# Patient Record
Sex: MALE | Race: WHITE | NOT HISPANIC OR LATINO | Employment: OTHER | URBAN - METROPOLITAN AREA
[De-identification: names, ages, dates, MRNs, and addresses within clinical notes are randomized per-mention and may not be internally consistent; named-entity substitution may affect disease eponyms.]

---

## 2019-02-10 ENCOUNTER — APPOINTMENT (EMERGENCY)
Dept: RADIOLOGY | Facility: HOSPITAL | Age: 54
End: 2019-02-10
Payer: COMMERCIAL

## 2019-02-10 ENCOUNTER — HOSPITAL ENCOUNTER (INPATIENT)
Facility: HOSPITAL | Age: 54
LOS: 4 days | Discharge: HOME | DRG: 045 | End: 2019-02-14
Attending: ANESTHESIOLOGY | Admitting: ANESTHESIOLOGY
Payer: COMMERCIAL

## 2019-02-10 ENCOUNTER — HOSPITAL ENCOUNTER (EMERGENCY)
Facility: HOSPITAL | Age: 54
End: 2019-02-10
Attending: EMERGENCY MEDICINE | Admitting: EMERGENCY MEDICINE
Payer: COMMERCIAL

## 2019-02-10 VITALS
RESPIRATION RATE: 18 BRPM | WEIGHT: 228.62 LBS | SYSTOLIC BLOOD PRESSURE: 163 MMHG | HEIGHT: 72 IN | TEMPERATURE: 97.3 F | HEART RATE: 61 BPM | OXYGEN SATURATION: 96 % | DIASTOLIC BLOOD PRESSURE: 97 MMHG | BODY MASS INDEX: 30.97 KG/M2

## 2019-02-10 DIAGNOSIS — I63.512 CEREBROVASCULAR ACCIDENT (CVA) DUE TO OCCLUSION OF LEFT MIDDLE CEREBRAL ARTERY (HCC): ICD-10-CM

## 2019-02-10 DIAGNOSIS — F10.10 ALCOHOL ABUSE: ICD-10-CM

## 2019-02-10 DIAGNOSIS — I63.9 CVA (CEREBRAL VASCULAR ACCIDENT) (HCC): ICD-10-CM

## 2019-02-10 DIAGNOSIS — G45.9 TIA (TRANSIENT ISCHEMIC ATTACK): Primary | ICD-10-CM

## 2019-02-10 DIAGNOSIS — I63.9 CVA (CEREBRAL VASCULAR ACCIDENT) (HCC): Primary | ICD-10-CM

## 2019-02-10 PROBLEM — I10 HYPERTENSION: Status: ACTIVE | Noted: 2019-02-10

## 2019-02-10 LAB
ANION GAP SERPL CALCULATED.3IONS-SCNC: 8 MMOL/L (ref 4–13)
APTT PPP: 26 SECONDS (ref 26–38)
BUN SERPL-MCNC: 17 MG/DL (ref 5–25)
CALCIUM SERPL-MCNC: 9.1 MG/DL (ref 8.3–10.1)
CHLORIDE SERPL-SCNC: 104 MMOL/L (ref 100–108)
CO2 SERPL-SCNC: 27 MMOL/L (ref 21–32)
CREAT SERPL-MCNC: 1.18 MG/DL (ref 0.6–1.3)
ERYTHROCYTE [DISTWIDTH] IN BLOOD BY AUTOMATED COUNT: 11.9 % (ref 11.6–15.1)
GFR SERPL CREATININE-BSD FRML MDRD: 70 ML/MIN/1.73SQ M
GLUCOSE SERPL-MCNC: 123 MG/DL (ref 65–140)
HCT VFR BLD AUTO: 47.6 % (ref 36.5–49.3)
HGB BLD-MCNC: 15.5 G/DL (ref 12–17)
INR PPP: 0.96 (ref 0.86–1.16)
MCH RBC QN AUTO: 28.6 PG (ref 26.8–34.3)
MCHC RBC AUTO-ENTMCNC: 32.6 G/DL (ref 31.4–37.4)
MCV RBC AUTO: 88 FL (ref 82–98)
PLATELET # BLD AUTO: 223 THOUSANDS/UL (ref 149–390)
PMV BLD AUTO: 8.8 FL (ref 8.9–12.7)
POTASSIUM SERPL-SCNC: 4.6 MMOL/L (ref 3.5–5.3)
PROTHROMBIN TIME: 10.1 SECONDS (ref 9.4–11.7)
RBC # BLD AUTO: 5.42 MILLION/UL (ref 3.88–5.62)
SODIUM SERPL-SCNC: 139 MMOL/L (ref 136–145)
WBC # BLD AUTO: 5.25 THOUSAND/UL (ref 4.31–10.16)

## 2019-02-10 PROCEDURE — 80048 BASIC METABOLIC PNL TOTAL CA: CPT | Performed by: EMERGENCY MEDICINE

## 2019-02-10 PROCEDURE — 70498 CT ANGIOGRAPHY NECK: CPT

## 2019-02-10 PROCEDURE — 93005 ELECTROCARDIOGRAM TRACING: CPT

## 2019-02-10 PROCEDURE — 99285 EMERGENCY DEPT VISIT HI MDM: CPT

## 2019-02-10 PROCEDURE — 36415 COLL VENOUS BLD VENIPUNCTURE: CPT | Performed by: EMERGENCY MEDICINE

## 2019-02-10 PROCEDURE — 92610 EVALUATE SWALLOWING FUNCTION: CPT

## 2019-02-10 PROCEDURE — G8996 SWALLOW CURRENT STATUS: HCPCS

## 2019-02-10 PROCEDURE — G8997 SWALLOW GOAL STATUS: HCPCS

## 2019-02-10 PROCEDURE — 70450 CT HEAD/BRAIN W/O DYE: CPT

## 2019-02-10 PROCEDURE — 96374 THER/PROPH/DIAG INJ IV PUSH: CPT

## 2019-02-10 PROCEDURE — 85730 THROMBOPLASTIN TIME PARTIAL: CPT | Performed by: EMERGENCY MEDICINE

## 2019-02-10 PROCEDURE — 99291 CRITICAL CARE FIRST HOUR: CPT | Performed by: ANESTHESIOLOGY

## 2019-02-10 PROCEDURE — 85027 COMPLETE CBC AUTOMATED: CPT | Performed by: EMERGENCY MEDICINE

## 2019-02-10 PROCEDURE — 82948 REAGENT STRIP/BLOOD GLUCOSE: CPT

## 2019-02-10 PROCEDURE — 70496 CT ANGIOGRAPHY HEAD: CPT

## 2019-02-10 PROCEDURE — G8998 SWALLOW D/C STATUS: HCPCS

## 2019-02-10 PROCEDURE — 99255 IP/OBS CONSLTJ NEW/EST HI 80: CPT | Performed by: PSYCHIATRY & NEUROLOGY

## 2019-02-10 PROCEDURE — 85610 PROTHROMBIN TIME: CPT | Performed by: EMERGENCY MEDICINE

## 2019-02-10 RX ORDER — FOLIC ACID 1 MG/1
1 TABLET ORAL DAILY
Status: DISCONTINUED | OUTPATIENT
Start: 2019-02-11 | End: 2019-02-14 | Stop reason: HOSPADM

## 2019-02-10 RX ORDER — SODIUM CHLORIDE 9 MG/ML
100 INJECTION, SOLUTION INTRAVENOUS CONTINUOUS
Status: DISCONTINUED | OUTPATIENT
Start: 2019-02-10 | End: 2019-02-12

## 2019-02-10 RX ORDER — LABETALOL 20 MG/4 ML (5 MG/ML) INTRAVENOUS SYRINGE
10 ONCE
Status: COMPLETED | OUTPATIENT
Start: 2019-02-10 | End: 2019-02-10

## 2019-02-10 RX ORDER — CLOPIDOGREL BISULFATE 75 MG/1
75 TABLET ORAL DAILY
Status: DISCONTINUED | OUTPATIENT
Start: 2019-02-11 | End: 2019-02-14 | Stop reason: HOSPADM

## 2019-02-10 RX ORDER — CLOPIDOGREL BISULFATE 75 MG/1
300 TABLET ORAL ONCE
Status: COMPLETED | OUTPATIENT
Start: 2019-02-10 | End: 2019-02-10

## 2019-02-10 RX ORDER — ATORVASTATIN CALCIUM 40 MG/1
40 TABLET, FILM COATED ORAL
Status: DISCONTINUED | OUTPATIENT
Start: 2019-02-10 | End: 2019-02-12

## 2019-02-10 RX ORDER — ASPIRIN 325 MG
325 TABLET ORAL DAILY
Status: DISCONTINUED | OUTPATIENT
Start: 2019-02-11 | End: 2019-02-12

## 2019-02-10 RX ORDER — THIAMINE MONONITRATE (VIT B1) 100 MG
100 TABLET ORAL DAILY
Status: DISCONTINUED | OUTPATIENT
Start: 2019-02-11 | End: 2019-02-14 | Stop reason: HOSPADM

## 2019-02-10 RX ORDER — CLOPIDOGREL BISULFATE 75 MG/1
300 TABLET ORAL DAILY
Status: DISCONTINUED | OUTPATIENT
Start: 2019-02-10 | End: 2019-02-10

## 2019-02-10 RX ORDER — ASPIRIN 325 MG
325 TABLET ORAL ONCE
Status: COMPLETED | OUTPATIENT
Start: 2019-02-10 | End: 2019-02-10

## 2019-02-10 RX ADMIN — LABETALOL 20 MG/4 ML (5 MG/ML) INTRAVENOUS SYRINGE 10 MG: at 10:44

## 2019-02-10 RX ADMIN — IOHEXOL 85 ML: 350 INJECTION, SOLUTION INTRAVENOUS at 10:00

## 2019-02-10 RX ADMIN — SODIUM CHLORIDE 100 ML/HR: 0.9 INJECTION, SOLUTION INTRAVENOUS at 15:48

## 2019-02-10 RX ADMIN — ASPIRIN 325 MG: 325 TABLET ORAL at 12:04

## 2019-02-10 RX ADMIN — PHENYLEPHRINE HYDROCHLORIDE 25 MCG/MIN: 10 INJECTION INTRAVENOUS at 18:25

## 2019-02-10 RX ADMIN — ATORVASTATIN CALCIUM 40 MG: 40 TABLET, FILM COATED ORAL at 17:50

## 2019-02-10 RX ADMIN — SODIUM CHLORIDE 1000 ML: 0.9 INJECTION, SOLUTION INTRAVENOUS at 12:02

## 2019-02-10 RX ADMIN — CLOPIDOGREL BISULFATE 300 MG: 75 TABLET ORAL at 15:45

## 2019-02-10 NOTE — PLAN OF CARE
Problem: Prexisting or High Potential for Compromised Skin Integrity  Goal: Skin integrity is maintained or improved  Description  INTERVENTIONS:  - Identify patients at risk for skin breakdown  - Assess and monitor skin integrity  - Assess and monitor nutrition and hydration status  - Monitor labs (i e  albumin)  - Assess for incontinence   - Turn and reposition patient  - Assist with mobility/ambulation  - Relieve pressure over bony prominences  - Avoid friction and shearing  - Provide appropriate hygiene as needed including keeping skin clean and dry  - Evaluate need for skin moisturizer/barrier cream  - Collaborate with interdisciplinary team (i e  Nutrition, Rehabilitation, etc )   - Patient/family teaching  Outcome: Progressing     Problem: Potential for Falls  Goal: Patient will remain free of falls  Description  INTERVENTIONS:  - Assess patient frequently for physical needs  -  Identify cognitive and physical deficits and behaviors that affect risk of falls    -  [place] fall precautions as indicated by assessment   - Educate patient/family on patient safety including physical limitations  - Instruct patient to call for assistance with activity based on assessment  - Modify environment to reduce risk of injury  - Consider OT/PT consult to assist with strengthening/mobility  Outcome: Progressing     Problem: PAIN - ADULT  Goal: Verbalizes/displays adequate comfort level or baseline comfort level  Description  Interventions:  - Encourage patient to monitor pain and request assistance  - Assess pain using appropriate pain scale  - Administer analgesics based on type and severity of pain and evaluate response  - Implement non-pharmacological measures as appropriate and evaluate response  - Consider cultural and social influences on pain and pain management  - Notify physician/advanced practitioner if interventions unsuccessful or patient reports new pain  Outcome: Progressing     Problem: INFECTION - ADULT  Goal: Absence or prevention of progression during hospitalization  Description  INTERVENTIONS:  - Assess and monitor for signs and symptoms of infection  - Monitor lab/diagnostic results  - Monitor all insertion sites, i e  indwelling lines, tubes, and drains  - Monitor endotracheal (as able) and nasal secretions for changes in amount and color  - [place] appropriate cooling/warming therapies per order  - Administer medications as ordered  - Instruct and encourage patient and family to use good hand hygiene technique  - Identify and instruct in appropriate isolation precautions for identified infection/condition  Outcome: Progressing  Goal: Absence of fever/infection during neutropenic period  Description  INTERVENTIONS:  - Monitor WBC  - Implement neutropenic guidelines  Outcome: Progressing     Problem: SAFETY ADULT  Goal: Maintain or return to baseline ADL function  Description  INTERVENTIONS:  -  Assess patient's ability to carry out ADLs; assess patient's baseline for ADL function and identify physical deficits which impact ability to perform ADLs (bathing, care of mouth/teeth, toileting, grooming, dressing, etc )  - Assess/evaluate cause of self-care deficits   - Assess range of motion  - Assess patient's mobility; develop plan if impaired  - Assess patient's need for assistive devices and provide as appropriate  - Encourage maximum independence but intervene and supervise when necessary  ¯ Involve family in performance of ADLs  ¯ Assess for home care needs following discharge   ¯ Request OT consult to assist with ADL evaluation and planning for discharge  ¯ Provide patient education as appropriate  Outcome: Progressing  Goal: Maintain or return mobility status to optimal level  Description  INTERVENTIONS:  - Assess patient's baseline mobility status (ambulation, transfers, stairs, etc )    - Identify cognitive and physical deficits and behaviors that affect mobility  - Identify mobility aids required to assist with transfers and/or ambulation (gait belt, sit-to-stand, lift, walker, cane, etc )  - [place] fall precautions as indicated by assessment  - Record patient progress and toleration of activity level on Mobility SBAR; progress patient to next Phase/Stage  - Instruct patient to call for assistance with activity based on assessment  - Request Rehabilitation consult to assist with strengthening/weightbearing, etc   Outcome: Progressing     Problem: DISCHARGE PLANNING  Goal: Discharge to home or other facility with appropriate resources  Description  INTERVENTIONS:  - Identify barriers to discharge w/patient and caregiver  - Arrange for needed discharge resources and transportation as appropriate  - Identify discharge learning needs (meds, wound care, etc )  - Arrange for interpretive services to assist at discharge as needed  - Refer to Case Management Department for coordinating discharge planning if the patient needs post-hospital services based on physician/advanced practitioner order or complex needs related to functional status, cognitive ability, or social support system  Outcome: Progressing     Problem: Knowledge Deficit  Goal: Patient/family/caregiver demonstrates understanding of disease process, treatment plan, medications, and discharge instructions  Description  Complete learning assessment and assess knowledge base  Interventions:  - Provide teaching at level of understanding  - Provide teaching via preferred learning methods  Outcome: Progressing     Problem: NEUROSENSORY - ADULT  Goal: Achieves stable or improved neurological status  Description  INTERVENTIONS  - Monitor and report changes in neurological status  - Initiate measures to prevent increased intracranial pressure  - Maintain blood pressure and fluid volume within ordered parameters to optimize cerebral perfusion  - Monitor temperature, glucose, and sodium or any other associated labs   Initiate appropriate interventions as ordered  - Monitor for seizure activity   - Administer anti-seizure medications as ordered  Outcome: Progressing  Goal: Absence of seizures  Description  INTERVENTIONS  - Monitor for seizure activity  - Administer anti-seizure medications as ordered  - Monitor neurological status  Outcome: Progressing  Goal: Remains free of injury related to seizures activity  Description  INTERVENTIONS  - Maintain airway, patient safety  and administer oxygen as ordered  - Monitor patient for seizure activity, document and report duration and description of seizure to physician/advanced practitioner  - If seizure occurs,  ensure patient safety during seizure  - Reorient patient post seizure  - Seizure pads on all 4 side rails  - Instruct patient/family to notify RN of any seizure activity including if an aura is experienced  - Instruct patient/family to call for assistance with activity based on nursing assessment  - Administer anti-seizure medications as ordered  - Monitor fetal well being  Outcome: Progressing  Goal: Achieves maximal functionality and self care  Description  INTERVENTIONS  - Monitor swallowing and airway patency with patient fatigue and changes in neurological status  - Encourage and assist patient to increase activity and self care with guidance from rehab services  - Encourage visually impaired, hearing impaired and aphasic patients to use assistive/communication devices  Outcome: Progressing     Problem: CARDIOVASCULAR - ADULT  Goal: Maintains optimal cardiac output and hemodynamic stability  Description  INTERVENTIONS:  - Monitor I/O, vital signs and rhythm  - Monitor for S/S and trends of decreased cardiac output i e  bleeding, hypotension  - Administer and titrate ordered vasoactive medications to optimize hemodynamic stability  - Assess quality of pulses, skin color and temperature  - Assess for signs of decreased coronary artery perfusion - ex   Angina  - Instruct patient to report change in severity of symptoms  Outcome: Progressing  Goal: Absence of cardiac dysrhythmias or at baseline rhythm  Description  INTERVENTIONS:  - Continuous cardiac monitoring, monitor vital signs, obtain 12 lead EKG if indicated  - Administer antiarrhythmic and heart rate control medications as ordered  - Monitor electrolytes and administer replacement therapy as ordered  Outcome: Progressing     Problem: RESPIRATORY - ADULT  Goal: Achieves optimal ventilation and oxygenation  Description  INTERVENTIONS:  - Assess for changes in respiratory status  - Assess for changes in mentation and behavior  - Position to facilitate oxygenation and minimize respiratory effort  - Oxygen administration by appropriate delivery method based on oxygen saturation (per order) or ABGs  - Initiate smoking cessation education as indicated  - Encourage broncho-pulmonary hygiene including cough, deep breathe, Incentive Spirometry  - Assess the need for suctioning and aspirate as needed  - Assess and instruct to report SOB or any respiratory difficulty  - Respiratory Therapy support as indicated  Outcome: Progressing     Problem: GASTROINTESTINAL - ADULT  Goal: Maintains or returns to baseline bowel function  Description  INTERVENTIONS:  - Assess bowel function  - Encourage oral fluids to ensure adequate hydration  - Administer IV fluids as ordered to ensure adequate hydration  - Administer ordered medications as needed  - Encourage mobilization and activity  - Nutrition services referral to assist patient with appropriate food choices  Outcome: Progressing     Problem: GENITOURINARY - ADULT  Goal: Maintains or returns to baseline urinary function  Description  INTERVENTIONS:  - Assess urinary function  - Encourage oral fluids to ensure adequate hydration  - Administer IV fluids as ordered to ensure adequate hydration  - Administer ordered medications as needed  - Offer frequent toileting  - Follow urinary retention protocol if ordered  Outcome: Progressing Problem: METABOLIC, FLUID AND ELECTROLYTES - ADULT  Goal: Electrolytes maintained within normal limits  Description  INTERVENTIONS:  - Monitor labs and assess patient for signs and symptoms of electrolyte imbalances  - Administer electrolyte replacement as ordered  - Monitor response to electrolyte replacements, including repeat lab results as appropriate  - Instruct patient on fluid and nutrition as appropriate  Outcome: Progressing  Goal: Fluid balance maintained  Description  INTERVENTIONS:  - Monitor labs and assess for signs and symptoms of volume excess or deficit  - Monitor I/O and WT  - Instruct patient on fluid and nutrition as appropriate  Outcome: Progressing  Goal: Glucose maintained within target range  Description  INTERVENTIONS:  - Monitor Blood Glucose as ordered  - Assess for signs and symptoms of hyperglycemia and hypoglycemia  - Administer ordered medications to maintain glucose within target range  - Assess nutritional intake and initiate nutrition service referral as needed  Outcome: Progressing     Problem: SKIN/TISSUE INTEGRITY - ADULT  Goal: Skin integrity remains intact  Description  INTERVENTIONS  - Identify patients at risk for skin breakdown  - Assess and monitor skin integrity  - Assess and monitor nutrition and hydration status  - Monitor labs (i e  albumin)  - Assess for incontinence   - Turn and reposition patient  - Assist with mobility/ambulation  - Relieve pressure over bony prominences  - Avoid friction and shearing  - Provide appropriate hygiene as needed including keeping skin clean and dry  - Evaluate need for skin moisturizer/barrier cream  - Collaborate with interdisciplinary team (i e  Nutrition, Rehabilitation, etc )   - Patient/family teaching  Outcome: Progressing  Goal: Incision(s), wounds(s) or drain site(s) healing without S/S of infection  Description  INTERVENTIONS  - Assess and document risk factors for skin impairment   - Assess and document dressing, incision, wound bed, drain sites and surrounding tissue  - Initiate Nutrition services consult and/or wound management as needed  Outcome: Progressing  Goal: Oral mucous membranes remain intact  Description  INTERVENTIONS  - Assess oral mucosa and hygiene practices  - Implement preventative oral hygiene regimen  - Implement oral medicated treatments as ordered  - Initiate Nutrition services referral as needed  Outcome: Progressing     Problem: HEMATOLOGIC - ADULT  Goal: Maintains hematologic stability  Description  INTERVENTIONS  - Assess for signs and symptoms of bleeding or hemorrhage  - Monitor labs  - Administer supportive blood products/factors as ordered and appropriate  Outcome: Progressing     Problem: MUSCULOSKELETAL - ADULT  Goal: Maintain or return mobility to safest level of function  Description  INTERVENTIONS:  - Assess patient's ability to carry out ADLs; assess patient's baseline for ADL function and identify physical deficits which impact ability to perform ADLs (bathing, care of mouth/teeth, toileting, grooming, dressing, etc )  - Assess/evaluate cause of self-care deficits   - Assess range of motion  - Assess patient's mobility; develop plan if impaired  - Assess patient's need for assistive devices and provide as appropriate  - Encourage maximum independence but intervene and supervise when necessary  - Involve family in performance of ADLs  - Assess for home care needs following discharge   - Request OT consult to assist with ADL evaluation and planning for discharge  - Provide patient education as appropriate  Outcome: Progressing  Goal: Maintain proper alignment of affected body part  Description  INTERVENTIONS:  - Support, maintain and protect limb and body alignment  - Provide pt/fam with appropriate education  Outcome: Progressing     Problem: Neurological Deficit  Goal: Neurological status is stable or improving  Description  Interventions:  - Monitor and assess patient's level of consciousness, motor function, sensory function, and level of assistance needed for ADLs  - Monitor and report changes from baseline  Collaborate with interdisciplinary team to initiate plan and implement interventions as ordered  - Provide and maintain a safe environment  - Utilize seizure precautions  - Utilize fall precautions  - Utilize aspiration precautions  - Utilize bleeding precautions  Outcome: Progressing     Problem: Activity Intolerance/Impaired Mobility  Goal: Mobility/activity is maintained at optimum level for patient  Description  Interventions:  - Assess and monitor patient  barriers to mobility and need for assistive/adaptive devices  - Assess patient's emotional response to limitations  - Collaborate with interdisciplinary team and initiate plans and interventions as ordered  - Encourage independent activity per ability   - Maintain proper body alignment  - Perform active/passive rom as tolerated/ordered  - Plan activities to conserve energy   - Turn patient  Outcome: Progressing     Problem: Communication Impairment  Goal: Ability to express needs and understand communication  Description  Assess patient's communication skills and ability to understand information  Patient will demonstrate use of effective communication techniques, alternative methods of communication and understanding even if not able to speak  - Encourage communication and provide alternate methods of communication as needed  - Collaborate with case management/ for discharge needs  - Include patient/family/caregiver in decisions related to communication  Outcome: Progressing     Problem: Potential for Aspiration  Goal: Non-ventilated patient's risk of aspiration is minimized  Description  Assess and monitor vital signs, respiratory status, and labs (WBC)  Monitor for signs of aspiration (tachypnea, cough, rales, wheezing, cyanosis, fever)  - Assess and monitor patient's ability to swallow    - Place patient up in chair to eat if possible  - HOB up at 90 degrees to eat if unable to get patient up into chair   - Supervise patient during oral intake  - Instruct patient to take small bites  - Instruct patient to take small single sips when taking liquids  - Follow patient-specific strategies generated by speech pathologist   Outcome: Progressing  Goal: Ventilated patient's risk of aspiration is minimized  Description  Assess and monitor vital signs, respiratory status, airway cuff pressure, and labs (WBC)  Monitor for signs of aspiration (tachypnea, cough, rales, wheezing, cyanosis, fever)  - Elevate head of bed 30 degrees if patient has tube feeding   - Monitor tube feeding  Outcome: Progressing     Problem: Nutrition  Goal: Nutrition/Hydration status is improving  Description  Monitor and assess patient's nutrition/hydration status for malnutrition (ex- brittle hair, bruises, dry skin, pale skin and conjunctiva, muscle wasting, smooth red tongue, and disorientation)  Collaborate with interdisciplinary team and initiate plan and interventions as ordered  Monitor patient's weight and dietary intake as ordered or per policy  Utilize nutrition screening tool and intervene per policy  Determine patient's food preferences and provide high-protein, high-caloric foods as appropriate  - Assist patient with eating   - Allow adequate time for meals   - Encourage patient to take dietary supplement as ordered  - Collaborate with clinical nutritionist   - Include patient/family/caregiver in decisions related to nutrition    Outcome: Progressing

## 2019-02-10 NOTE — H&P
History and Physical - Critical Care   Luis M Lr 48 y o  male MRN: [id number]  Unit/Bed#: ICU 07 Encounter: [id number]    Reason for Admission / Chief Complaint: neurological monitoring with potential for TPA if symptoms worsen  History of Present Illness:  Luis M Lr is a 48 y o  male with vague history of htn who presents as a transfer from from [facility] with stroke like symptoms that resolved and TPA was held  Patient is with daughter and wife  His wife admits that he's been having word finding with expressive aphasia for the past 2 weeks  This morning around 830am, his wife found him sitting on a stool in his kitchen and he was complaining of right sided weakness  Patient denies headaches, lh, dizziness, or recent falls  While in [address] non-contrast ct of head showed loss of left insular ribbon consistent with acute L MCA stroke without acute intracranial hemorrhage  CTA of neck and brain showed Left ICA occlusion and thrombus within Left MCA M1 and excellent collaterol flow  tPA was planned but held due to rapidly improved symptoms  History obtained from the patient, wife, and daughter  Past Medical History:  Past Medical History:   Diagnosis Date    Hypertension        Past Surgical History:  History reviewed  No pertinent surgical history  Past Family History:  History reviewed  No pertinent family history      Social History:  Social History     Tobacco Use   Smoking Status Never Smoker   Smokeless Tobacco Never Used     Social History     Substance and Sexual Activity   Alcohol Use Yes    Alcohol/week: 3 6 oz    Types: 6 Cans of beer per week    Frequency: 4 or more times a week    Drinks per session: 5 or 6    Binge frequency: Weekly     Social History     Substance and Sexual Activity   Drug Use Never     Marital Status: /Civil Union  Exercise History: unknown    Medications:  Current Facility-Administered Medications   Medication Dose Route Frequency    [START ON 2019] aspirin tablet 325 mg  325 mg Oral Daily    clopidogrel (PLAVIX) tablet 300 mg  300 mg Oral Daily     Home medications:  Prior to Admission medications    Not on File     Allergies:  No Known Allergies    ROS:   Review of Systems   Constitutional: Negative for activity change, chills, diaphoresis and fever  Eyes: Negative for photophobia and visual disturbance  Respiratory: Negative for cough, chest tightness and shortness of breath  Cardiovascular: Negative for chest pain, palpitations and leg swelling  Gastrointestinal: Negative for abdominal distention, abdominal pain, constipation, diarrhea, nausea and vomiting  Musculoskeletal: Negative for joint swelling, neck pain and neck stiffness  Skin: Negative for color change and rash  Neurological: Positive for facial asymmetry, speech difficulty and weakness  Negative for dizziness, tremors, seizures, syncope, light-headedness and numbness  Psychiatric/Behavioral: Positive for confusion  Vitals:  Vitals:    02/10/19 1305 02/10/19 1310   BP: 165/98 159/93   BP Location: Right arm    Pulse: 70 70   Resp: 20 (!) 23   Temp: (!) 97 4 °F (36 3 °C)    TempSrc: Oral    SpO2: 98% 98%   Weight: 103 kg (226 lb 6 6 oz)    Height: 6' (1 829 m)      Temperature:   Temp (24hrs), Av 4 °F (36 3 °C), Min:97 3 °F (36 3 °C), Max:97 4 °F (36 3 °C)    Current Temperature: (!) 97 4 °F (36 3 °C)    Weights:   IBW: 77 6 kg  Body mass index is 30 71 kg/m²  Hemodynamic Monitoring:  N/A     Non-Invasive/Invasive Ventilation Settings:  Respiratory    Lab Data (Last 4 hours)    None         O2/Vent Data (Last 4 hours)    None              No results found for: PHART, UBE1DSX, PO2ART, KRS9PRY, X2SDRLVS, BEART, SOURCE  SpO2: SpO2: 98 %     Physical Exam:  Physical Exam   Constitutional: He appears well-developed and well-nourished  No distress  HENT:   Head: Normocephalic and atraumatic     Nose: Nose normal    Mouth/Throat: Oropharynx is clear and moist  No oropharyngeal exudate  Eyes: Pupils are equal, round, and reactive to light  EOM are normal    Neck: Normal range of motion  Neck supple  No JVD present  No tracheal deviation present  No thyromegaly present  Cardiovascular: Normal rate, regular rhythm, normal heart sounds and intact distal pulses  Exam reveals no friction rub  No murmur heard  Pulmonary/Chest: Effort normal and breath sounds normal  No stridor  No respiratory distress  Abdominal: Soft  Bowel sounds are normal  He exhibits no distension  There is no tenderness  There is no guarding  Musculoskeletal: Normal range of motion  He exhibits no edema or tenderness  Neurological: He is alert  He displays normal reflexes  No sensory deficit  He exhibits normal muscle tone  Coordination abnormal  GCS eye subscore is 4  GCS verbal subscore is 5  GCS motor subscore is 6  Alert only to person  +expressive aphasia  Mild right facial droop  Mild right UE weakness   Skin: Skin is warm and dry  Capillary refill takes less than 2 seconds  He is not diaphoretic  No erythema  No pallor  Psychiatric: He has a normal mood and affect  His behavior is normal        Labs:  Results from last 7 days   Lab Units 02/10/19  0959   WBC Thousand/uL 5 25   HEMOGLOBIN g/dL 15 5   HEMATOCRIT % 47 6   PLATELETS Thousands/uL 223      Results from last 7 days   Lab Units 02/10/19  0959   POTASSIUM mmol/L 4 6   CHLORIDE mmol/L 104   CO2 mmol/L 27   BUN mg/dL 17   CREATININE mg/dL 1 18   CALCIUM mg/dL 9 1              Results from last 7 days   Lab Units 02/10/19  0959   INR  0 96   PTT seconds 26         No results found for: TROPONINI    Imaging:  I have personally reviewed pertinent reports  MRI Inpatient Order    (Results Pending)     CT head w/o contrast:  demonstrates loss of the left insular ribbon consistent with acute left middle cerebral artery distribution stroke   Also noted is a small focus of decreased density in the cortical left posterior insula and low left posterior frontal lobe consistent with small late subacute or perhaps chronic posterior MCA distribution infarction  No acute intracranial hemorrhage  CTA brain and neck: demonstrates loss of the left insular ribbon consistent with acute left middle cerebral artery distribution stroke  Also noted is a small focus of encephalomalacia in the left posterior insula and low left posterior frontal lobe   consistent with small chronic cortical infarction in that location  No acute intracranial hemorrhage        Micro:  No results found for: Torrie Mon, SPUTUMCULTUR    ______________________________________________________________________    Assessment: 48year old male who presents with 2 weeks of expressive aphasia and word finding with new onset confusion, Right sided weakness and right facial droop      Plan:     Neuro:   1  Ischemic M1 CVA  · Thrombolytics contraindicated at this time  · Daily  and 300mg Plavix  · Neurology consulted  · Neurosurgery consulted  · Neurochecks q1  · F/u MRI  · Lipid panel, A1c pending    CV:   · SBP goal >180  · Meeting goal without pressors   · S/p 10mg labetalol  · Echo pending    Pulm:   · No acute issues  · Maintain SpO2 >90%  · Continue spirometry    GI:  · No acute issues  · Gi ptx not indicated at this time    :   · No acute issues  · Monitor I's and O's    F/E/N:   · Maintenance Nss @ 100cc/hour  · Replete electrolytes as necessary    ID:   · No acute issues  · Monitor daily CBC and Temp  Heme:   · No acute issues    Endo:   · Follow up A1C pending  · q6 BG checks     Msk/Skin:   · Out of bed with assistence      Disposition: Continue ICU admission  ______________________________________________________________________    VTE Pharmacologic Prophylaxis: Pharmacologic VTE Prophylaxis contraindicated due to Acute CVA  VTE Mechanical Prophylaxis: sequential compression device    Invasive lines and devices:   Invasive Devices Peripheral Intravenous Line            Peripheral IV 02/10/19 Left Arm less than 1 day                Code Status: Level 1 - Full Code  POA:    POLST:      Given critical illness, patient length of stay will require greater than two midnights  Portions of the record may have been created with voice recognition software  Occasional wrong word or "sound a like" substitutions may have occurred due to the inherent limitations of voice recognition software  Read the chart carefully and recognize, using context, where substitutions have occurred        [address] [place] [address],

## 2019-02-10 NOTE — ED PROVIDER NOTES
History  Chief Complaint   Patient presents with    STROKE Alert     Pt with facial droop? difficulty communicating symtpom started 1 hour ago? pt has been having periods of confusion for two weeks  Patient is brought in by medics today for expressive aphasia  The patient was in his usual health this morning and was seen by his wife at 0800 hours  At 0830 hours when she saw him again he was unable to find the right words to communicate with her  He had no headache no nausea or vomiting  Wife states that the patient has been having episodes for at least the last 2 weeks with slurred speech, right facial droop, and mild weakness in the right arm  Patient did not seek medical attention at that time  He had no episodes of expressive aphasia during that time  None       History reviewed  No pertinent past medical history  History reviewed  No pertinent surgical history  History reviewed  No pertinent family history  I have reviewed and agree with the history as documented  Social History     Tobacco Use    Smoking status: Never Smoker    Smokeless tobacco: Never Used   Substance Use Topics    Alcohol use: Yes     Alcohol/week: 3 6 oz     Types: 6 Cans of beer per week    Drug use: Never        Review of Systems   Constitutional: Negative for fever  HENT: Negative for congestion, drooling and trouble swallowing  Respiratory: Negative for cough and shortness of breath  Cardiovascular: Negative for chest pain  Gastrointestinal: Negative for abdominal pain and vomiting  Genitourinary: Negative for dysuria  Musculoskeletal: Negative for back pain  Skin: Negative for rash  Neurological: Positive for facial asymmetry, speech difficulty and weakness  Negative for tremors, seizures, syncope and headaches  Hematological: Does not bruise/bleed easily  Psychiatric/Behavioral: Positive for confusion  All other systems reviewed and are negative        Physical Exam  Physical Exam   Constitutional: He is oriented to person, place, and time  He appears well-developed and well-nourished  HENT:   Head: Normocephalic and atraumatic  Mouth/Throat: Oropharynx is clear and moist    Eyes: Pupils are equal, round, and reactive to light  Conjunctivae and EOM are normal    Neck: Normal range of motion  Neck supple  Cardiovascular: Normal rate, regular rhythm and normal heart sounds  Pulmonary/Chest: Effort normal and breath sounds normal    Abdominal: Soft  There is no tenderness  Musculoskeletal: Normal range of motion  He exhibits no edema  Neurological: He is alert and oriented to person, place, and time  No sensory deficit  He exhibits normal muscle tone  Coordination normal    Patient has mild right-sided facial droop  He is exhibiting expressive aphasia on arrival without slurred speech  Skin: Skin is warm  Psychiatric: He has a normal mood and affect  His behavior is normal    Nursing note and vitals reviewed        Vital Signs  ED Triage Vitals   Temperature Pulse Respirations Blood Pressure SpO2   02/10/19 1012 02/10/19 0950 02/10/19 0950 02/10/19 0950 02/10/19 0950   (!) 97 3 °F (36 3 °C) 70 16 (!) 180/87 99 %      Temp Source Heart Rate Source Patient Position - Orthostatic VS BP Location FiO2 (%)   02/10/19 1012 02/10/19 0950 02/10/19 0950 02/10/19 0950 --   Tympanic Monitor Lying Right arm       Pain Score       02/10/19 0950       No Pain           Vitals:    02/10/19 1024 02/10/19 1037 02/10/19 1047 02/10/19 1104   BP: (!) 179/103 164/100 166/99 161/96   Pulse: 84 82 72 60   Patient Position - Orthostatic VS: Lying Sitting Sitting Sitting       Visual Acuity  Visual Acuity      Most Recent Value   L Pupil Size (mm)  3   R Pupil Size (mm)  3          ED Medications  Medications   sodium chloride 0 9 % bolus 1,000 mL (has no administration in time range)   aspirin tablet 325 mg (has no administration in time range)   iohexol (OMNIPAQUE) 350 MG/ML injection (MULTI-DOSE) 85 mL (85 mL Intravenous Given 2/10/19 1000)   Labetalol HCl (NORMODYNE) injection 10 mg (10 mg Intravenous Given 2/10/19 1044)       Diagnostic Studies  Results Reviewed     Procedure Component Value Units Date/Time    Protime-INR [[id number]]  (Normal) Collected:  02/10/19 0959    Lab Status:  Final result Specimen:  Blood from Arm, Right Updated:  02/10/19 1019     Protime 10 1 seconds      INR 0 96    APTT [[id number]]  (Normal) Collected:  02/10/19 0959    Lab Status:  Final result Specimen:  Blood from Arm, Right Updated:  02/10/19 1019     PTT 26 seconds     Basic metabolic panel [[id number]] Collected:  02/10/19 0959    Lab Status:  Final result Specimen:  Blood from Arm, Right Updated:  02/10/19 1015     Sodium 139 mmol/L      Potassium 4 6 mmol/L      Chloride 104 mmol/L      CO2 27 mmol/L      ANION GAP 8 mmol/L      BUN 17 mg/dL      Creatinine 1 18 mg/dL      Glucose 123 mg/dL      Calcium 9 1 mg/dL      eGFR 70 ml/min/1 73sq m     Narrative:       National Kidney Disease Education Program recommendations are as follows:  GFR calculation is accurate only with a steady state creatinine  Chronic Kidney disease less than 60 ml/min/1 73 sq  meters  Kidney failure less than 15 ml/min/1 73 sq  meters  CBC and Platelet [[id number]]  (Abnormal) Collected:  02/10/19 0959    Lab Status:  Final result Specimen:  Blood from Arm, Right Updated:  02/10/19 1004     WBC 5 25 Thousand/uL      RBC 5 42 Million/uL      Hemoglobin 15 5 g/dL      Hematocrit 47 6 %      MCV 88 fL      MCH 28 6 pg      MCHC 32 6 g/dL      RDW 11 9 %      Platelets 373 Thousands/uL      MPV 8 8 fL                  CTA stroke alert (head/neck)   Final Result by Lorna Gaytan MD (02/10 1011)      Occlusion of left internal carotid artery beginning at its origin  Enhancement of proximal left M1 via collateral circulation across [place] via small caliber left A1 and tiny caliber left posterior communicating arteries    Occlusion of left mid to distal left M1 middle cerebral artery segment  There is some    enhancement relatively diminutive of anterior and middle M2 and M3 middle cerebral artery branches presumably via collateral circulation  Mild atherosclerotic narrowing at the origin of the right internal carotid artery  Noncontrast head CT demonstrates loss of the left insular ribbon consistent with acute left middle cerebral artery distribution stroke  Also noted is a small focus of encephalomalacia in the left posterior insula and low left posterior frontal lobe    consistent with small chronic cortical infarction in that location  No acute intracranial hemorrhage  Findings were directly discussed with Loretta Bocanegra on 2/10/2019 10:03 AM                      Workstation performed: [id number]         CT stroke alert brain   Final Result by Larissa Whitlock MD (02/10 1014)      Noncontrast head CT demonstrates loss of the left insular ribbon consistent with acute left middle cerebral artery distribution stroke  Also noted is a small focus of decreased density in the cortical left posterior insula and low left posterior frontal lobe consistent with small late subacute or perhaps chronic posterior MCA distribution infarction  No acute intracranial hemorrhage        Findings were directly discussed with Loretta Bocanegra on 2/10/2019 10:03 AM       Workstation performed: [id number]                    Procedures  Procedures       Phone Contacts  ED Phone Contact    ED Course             Stroke Assessment     Row Name 02/10/19 1148             NIH Stroke Scale    Interval  Baseline      Level of Consciousness (1a )  0      LOC Questions (1b )  0      LOC Commands (1c )  0      Best Gaze (2 )  0      Visual (3 )  0      Facial Palsy (4 )  1      Motor Arm, Left (5a )  0      Motor Arm, Right (5b )  0      Motor Leg, Left (6a )  0      Motor Leg, Right (6b )  0      Limb Ataxia (7 )  0      Sensory (8 )  0      Best Language (9 )  2      Dysarthria (10 )  0      Extinction and Inattention (11 ) (Formerly Neglect)  0      Total  3                          MDM  Number of Diagnoses or Management Options  CVA (cerebral vascular accident) [facility]):   TIA (transient ischemic attack):   Diagnosis management comments: Patient had recent subacute symptoms of mild right facial droop, right  weakness  He was still exhibiting the new finding of expressive aphasia on arrival which had started at 8 30 a m     The patient's expressive aphasia resolved after CT scan, after I had discussed the case with Neurology and decided to give the patient tPA  Thrombolysis was canceled due to the resolution of the symptoms of expressive aphasia  The M1 occlusion which reconstitute from the other blood vessels is still a concern to IR who is considering thrombectomy  Will transfer the patient to [facility]      Disposition  Final diagnoses:   CVA (cerebral vascular accident) [facility])   TIA (transient ischemic attack)     Time reflects when diagnosis was documented in both MDM as applicable and the Disposition within this note     Time User Action Codes Description Comment    2/10/2019  9:48 AM [place] [facility] A Add [I63 9] CVA (cerebral vascular accident) ([facility] [address] )     2/10/2019 11:15 AM [place] [facility] A Add [G45 9] TIA (transient ischemic attack)     2/10/2019 11:15 AM Aure Koch Modify [I63 9] CVA (cerebral vascular accident) ([facility] [address] )     2/10/2019 11:15 AM [place] [facility] A Modify [G45 9] TIA (transient ischemic attack)       ED Disposition     ED Disposition Condition Date/Time Comment    Transfer to Another Facility-In Network  Sun Feb 10, 2019 11:45 AM Patient will be transferred to [facility] 16    None         Patient's Medications    No medications on file     No discharge procedures on file      ED Provider  Electronically Signed by           Ariana Salazar MD  02/10/19 9802

## 2019-02-10 NOTE — EMTALA/ACUTE CARE TRANSFER
[facility] EMERGENCY DEPARTMENT  [address] [facility] [address]  Dept: [phone]      EMTALA TRANSFER CONSENT    NAME Marisela Emmanuel                                         1965                              MRN [id number]    I have been informed of my rights regarding examination, treatment, and transfer   by Dr Chema Chavez MD    Benefits:      Risks:        Consent for Transfer:  I acknowledge that my medical condition has been evaluated and explained to me by the emergency department physician or other qualified medical person and/or my attending physician, who has recommended that I be transferred to the service of  Accepting Physician: Dr Arianna Hampton at [address] Name, [address] : Sarah Marsheila  The above potential benefits of such transfer, the potential risks associated with such transfer, and the probable risks of not being transferred have been explained to me, and I fully understand them  The doctor has explained that, in my case, the benefits of transfer outweigh the risks  I agree to be transferred  I authorize the performance of emergency medical procedures and treatments upon me in both transit and upon arrival at the receiving facility  Additionally, I authorize the release of any and all medical records to the receiving facility and request they be transported with me, if possible  I understand that the safest mode of transportation during a medical emergency is an ambulance and that the Hospital advocates the use of this mode of transport  Risks of traveling to the receiving facility by car, including absence of medical control, life sustaining equipment, such as oxygen, and medical personnel has been explained to me and I fully understand them  (LANE CORRECT BOX BELOW)  [  ]  I consent to the stated transfer and to be transported by ambulance/helicopter    [  ]  I consent to the stated transfer, but refuse transportation by ambulance and accept full responsibility for my transportation by car  I understand the risks of non-ambulance transfers and I exonerate the Hospital and its staff from any deterioration in my condition that results from this refusal     X___________________________________________    DATE  02/10/19  TIME________  Signature of patient or legally responsible individual signing on patient behalf           RELATIONSHIP TO PATIENT_________________________          Provider Certification    NAME Jo Cazares                                         1965                              MRN [id number]    A medical screening exam was performed on the above named patient  Based on the examination:    Condition Necessitating Transfer The primary encounter diagnosis was TIA (transient ischemic attack)  A diagnosis of CVA (cerebral vascular accident) [facility]) was also pertinent to this visit  Patient Condition:      Reason for Transfer:      Transfer Requirements: [address] [place] [address]   · Space available and qualified personnel available for treatment as acknowledged by    · Agreed to accept transfer and to provide appropriate medical treatment as acknowledged by       Dr Cooper Browne  · Appropriate medical records of the examination and treatment of the patient are provided at the time of transfer   [address] _______  · Transfer will be performed by qualified personnel from    and appropriate transfer equipment as required, including the use of necessary and appropriate life support measures      Provider Certification: I have examined the patient and explained the following risks and benefits of being transferred/refusing transfer to the patient/family:         Based on these reasonable risks and benefits to the patient and/or the unborn child(khai), and based upon the information available at the time of the patients examination, I certify that the medical benefits reasonably to be expected from the provision of appropriate medical treatments at another medical facility outweigh the increasing risks, if any, to the individuals medical condition, and in the case of labor to the unborn child, from effecting the transfer      X____________________________________________ DATE 02/10/19        TIME_______      ORIGINAL - SEND TO MEDICAL RECORDS   COPY - SEND WITH PATIENT DURING TRANSFER

## 2019-02-10 NOTE — PROGRESS NOTES
Mr Karen Mcdaniel presents with a left MCA syndrome  By report from the [facility] ED, he has been having fixed RUE weakness and right facial droop symptoms for the past 2 weeks  There was also intermittent dysarthria  This morning he presented to the ER with similar symptoms but also now with aphasia  CTA revealed left ICA occlusion as well as thrombus within the left MCA M1 segment but either with excellent collaterals or some flow past the clot  tPA was planned to be administered but ultimately not given as his symptoms rapidly improved and only had the RUE weakness and facial droop remaining  I have recommended transfer to [facility] for hemodynamic support and neuro monitoring  Please keep SBP > 160 and begin DAPT therapy with 300 mg Plavix load and 75 mg daily thereafter   mg was given in the ER  325 or 81 mg daily may be continued  If he declines again neurologically, may then become a candidate for tPA and endovascular therapy again  Continue stroke pathway

## 2019-02-10 NOTE — SPEECH THERAPY NOTE
Speech Language/Pathology  Speech/Language Pathology  Assessment    Patient Name: Tony ALEXANDERALA'J Date: 2/10/2019     Problem List  Patient Active Problem List   Diagnosis    CVA (cerebral vascular accident) [facility])     Past Medical History  Past Medical History:   Diagnosis Date    Hypertension      Past Surgical History  History reviewed  No pertinent surgical history  Tony Garcia is a 48 y o  male with vague history of htn who presents as a transfer from from [facility] with stroke like symptoms that resolved and TPA was held  Patient is with daughter and wife  His wife admits that he's been having word finding with expressive aphasia for the past 2 weeks  This morning around 830am, his wife found him sitting on a stool in his kitchen and he was complaining of right sided weakness  Patient denies headaches, lh, dizziness, or recent falls  While in [facility] non-contrast ct of head showed loss of left insular ribbon consistent with acute L MCA stroke without acute intracranial hemorrhage  CTA of neck and brain showed Left ICA occlusion and thrombus within Left MCA M1 and excellent collaterol flow  tPA was planned but held due to rapidly improved symptoms  Bedside Swallow Evaluation:    Summary:  Pt presents w/ functional oropharyngeal swallow skills for regular diet c thin liquids c no overt s/s penetration/aspiration noted  Pt is noted to have mild dysarthria and word finding difficulties when asked orientation questions  Will benefit from formal speech/language evaluation  Recommendations:  Diet: regular  Liquid: thin  Meds: whole c water  Supervision: assist as needed  Positioning:Upright  Strategies: Pt to take PO/Meds only when fully alert and upright     Oral care: encourage twice daily brushing    Eval only, f/u speech/language eval    Consider consult w/:  Nutrition      Reason for consult:  R/o aspiration  Determine safest and least restrictive diet  New neuro event    Current diet:  NPO  Premorbid diet[de-identified]  Reg/thin  Previous VBS:  No  O2 requirement:  RA  Voice/Speech:  WNL/mild expressive aphasia/dysarthria  Social:  From home  Follows commands:   yes                       Cognitive Status:  Grossly intact  Oral [facility] exam:  Adequate dentition  R sided facial droop c decreased labial retraction    Items administered:  Puree, hard solid, thin liquids  Liquids were taken by straw/cup       Oral stage:  Lip closure: adequate  Mastication: adequate  Bolus formation: adequate  Bolus control: adequate  Transfer: prompt  Oral residue: no  Pocketing: no    Pharyngeal stage:  Swallow promptness: prompt  Laryngeal rise: adequate  Wet voice: no  Throat clear: no  Cough: no  Secondary swallows: no  Audible swallows: no  No overt s/s aspiration    Esophageal stage:  No s/s reported    Results d/w:  Pt, nursing, family, physician

## 2019-02-10 NOTE — QUICK NOTE
I received a page to the [facility] Emergency Department at 10:28 a m  Upon returning at the age approximately 5 minutes later, I was connected to ED physician who stated he had the patient and with acute stroke symptoms  He reports that another neurology provider had been on hold with him earlier, while awaiting CT and CTA results  Of note, there was no stroke alert activation over the pager system  Per discussion with the ER physician, the this patient had had some episodic difficulties with speech during the week, but seemed to be okay when he awoke this morning at 8:00 a m , but at 830 he was noted to develop expressive more than receptive aphasia  There was no reported associated sensory or motor deficits other than minimal R hand and face weakness which family stated has been unchanged for 2 weeks  NIHSS 2  His head CT is described as showing some insular blurring of the gray-white junction  His CTA demonstrated complete occlusion of the left internal carotid artery a from its origin at the bifurcation and through the intracranial portion  There is some filling of the proximal and mid left M1 branch, but a 2nd area of filling defect is noted in the mid to distal M1, with patchy feeling of more distal branches  As there are no contraindications to tPA, I advised this could be given  Following a discussion with Dr Staci Dash and his review of imaging, it was recommended that he be considered for transfer for potential intracranial embolectomy  Upon repeat conference call with Dr Staci Dash, myself, and the ER physician, it is revealed that the patient's aphasia has resolved, and as such, tPA was not administered  We discussed, and will arrange transfer to [facility] just in the event that he might deteriorate and warrant intervention  For now there will be no endovascular alert    We will try to maintain his blood pressure greater than 160 to avoid hypoperfusion

## 2019-02-10 NOTE — CONSULTS
Consultation - Neurology   Andrew Cunningham 48 y o  male MRN: [id number]  Unit/Bed#: 3[facility] -01 Encounter: [id number]      Assessment/Plan   Assessment:  L ICA occlusion and L MCA occlusion: likely severe chronic atherosclerotic disease of the L ICA  In situ thrombosis with artery to artery occlusion of the L MCA  Collateral circulation is robust   NIHSS =3 (aphasia, R arm, and R leg)  Did not receive TPA or thrombectomy  -CTH with subtle loss of grey white in the basal ganglia and frontal lobe  -CTA showing complete L ICA occlusion and M1 occlusion   -MRI to be performed  -Echo, cardiac telemonitoring  -A1c and Lipid panel  -DAPT (was loaded plavix 300mg and  on 2/10)  -high intensity statin  -therapeutic hypertension, goal SBP >160 with IVF and pressor support  -frequent neuro checks    ETOH dependence:   -thiamine  -folate  -MV  -close observation for withdrawal    History of Present Illness     Reason for Consult / Principal Problem: R sided weakness and aphasia  Hx and PE limited by: Patient and his wife at the bedside  HPI: Andrew Cunningham is a 48 y o  right handed male with hx of HTN, tobacco use, and ETOH dependence, who presents with new onset aphasia and R sided weakness  According to his wife, he was at his baseline at 0830 this AM   They were at home together in the kitchen  The wife left and returned to find that the patient had spilled coffee, and was seated, staring forward, unable to speak or follow commands  She did not note if there was any weakness  EMS was activated and patient was brought to [facility]  On his exam there, he had an NIHSS of 2 for R sided hand and face weakness  BP at the time was 165/98, fluctuating to 181/108  CTH and CTA were performed, showing complete occlusion of the L ICA from the bifurcation to the terminus, and an M1 occlusion in the L MCA  He was transferred to [facility] for further evaluation and management       According to the wife, the patient had one episode 2 weeks prior with transient dysarthria  He had normal comprehension at the time, but had to concentrate on annunciation in order to be understood  The lasted a brief time, less than several hours  Since then, the patient has been without neurological deficits  ROS:   Denies recent febrile illness, cough, chest pain, shortness of breath  No headaches  No visual disturbances  Endorses the one episode of transient dysarthria described in HPI  Denies abdominal pain, diarrhea, N/V, dysuria  No gait abnormalities  Denies arthralgias, weight loss, and new skin rashes  Denies hematuria  PMHx:   HTN: dx'd 7 years ago  Briefly on antihypertensives  Did not tolerate  Self discontinued  Takes BP at home, averaging 160/100  No home medications  FMHx:   Mother with HTN  Father with DM  SHx:   Works as a serna  MRS = 0    30 pack year tobacco history from age [age]  Wife reports about 8 ETOH drinks daily  No illicit substances  Inpatient consult to Neurology  Consult performed by: Evan Botello MD  Consult ordered by: Swetha Ross DO          Review of Systems    Historical Information   Past Medical History:   Diagnosis Date    Hypertension      History reviewed  No pertinent surgical history  Social History   Social History     Substance and Sexual Activity   Alcohol Use Yes    Alcohol/week: 3 6 oz    Types: 6 Cans of beer per week    Frequency: 4 or more times a week    Drinks per session: 5 or 6    Binge frequency: Weekly     Social History     Substance and Sexual Activity   Drug Use Never     Social History     Tobacco Use   Smoking Status Never Smoker   Smokeless Tobacco Never Used       Review of previous medical records was completed       Meds/Allergies   current meds:   Current Facility-Administered Medications   Medication Dose Route Frequency    [START ON 2/11/2019] aspirin tablet 325 mg  325 mg Oral Daily    atorvastatin (LIPITOR) tablet 40 mg 40 mg Oral Daily With Dinner    [START ON 2/11/2019] clopidogrel (PLAVIX) tablet 75 mg  75 mg Oral Daily    [START ON 8/97/2122] folic acid (FOLVITE) tablet 1 mg  1 mg Oral Daily    [START ON 2/11/2019] multivitamin-minerals (CENTRUM) tablet 1 tablet  1 tablet Oral Daily    phenylephrine (BAYLEE-SYNEPHRINE) 50 mg (STANDARD CONCENTRATION) in sodium chloride 0 9% 250 mL   mcg/min Intravenous Titrated    sodium chloride 0 9 % infusion  100 mL/hr Intravenous Continuous    [START ON 2/11/2019] thiamine (VITAMIN B1) tablet 100 mg  100 mg Oral Daily    and PTA meds:   None       No Known Allergies    Objective   Vitals:Blood pressure 141/89, pulse 66, temperature 97 9 °F (36 6 °C), temperature source Oral, resp  rate 22, height 6' (1 829 m), weight 103 kg (226 lb 6 6 oz), SpO2 97 %  ,Body mass index is 30 71 kg/m²  Intake/Output Summary (Last 24 hours) at 2/10/2019 1707  Last data filed at 2/10/2019 1703  Gross per 24 hour   Intake    Output 550 ml   Net -550 ml       Invasive Devices: Invasive Devices     Peripheral Intravenous Line            Peripheral IV 02/10/19 Left Arm less than 1 day                Physical Exam   GEN: NAD  Resting comfortably in bed  Well nourished appearing  CV: RRR, S1, S2  No extra sounds  Normal peripheral pulses  PULM: CTAB  HEENT: atraumatic  No nucchal rigidity  GI: soft, non-tender, no bruits, normal bowel sounds  EXTREM: no stigmata of emboli  PSYCH: mildly blunted affect  SKIN: no rashes    Neurologic Exam   Awake alert, following commands  Disoriented: stated he was in [place], and date was indecipherable  Unable to register 3 objects, and 0/3 recall at 5 minutes  Unable to calculate  Able to spell own name forward but not in reverse  No R/L confusion  Following cross body commands  Names high frequency objects (watch,pen), but not low frequency (knuckle, button)  And had paraphasic errors, called a lapel a ladder  Able to repeat a simple phrase  PERRLA, EOMI, no papilledema on fundoscopy, but unable to visualize venous pulsations, no nystagmus, no dysarthria, full visual fields, tongue/uvula/palate symmetric, SCM and trapezius 5/5  MOTOR: normal bulk and tone with mild R arm and leg drift  On confrontation has 4++/5 strength on the R biceps, BB, triceps, wrist ext/flex, and 4++/5 strength in the R hip flexors, knee extensors, and 5/5 in ankle dorsiflexion  SENSORY: intact symmetrically to light touch  No neglect  GAIT: deferred  COORDINATION: normal FNF   REFLEXES: 2+ symmetric BB, BR, 3+ patellar with bilateral cross adductors, 2+ R ankle, 1+ L, toes bilaterally downgoing    Lab Results:   CBC:   Results from last 7 days   Lab Units 02/10/19  0959   WBC Thousand/uL 5 25   RBC Million/uL 5 42   HEMOGLOBIN g/dL 15 5   HEMATOCRIT % 47 6   MCV fL 88   PLATELETS Thousands/uL 223   , BMP/CMP:   Results from last 7 days   Lab Units 02/10/19  0959   SODIUM mmol/L 139   POTASSIUM mmol/L 4 6   CHLORIDE mmol/L 104   CO2 mmol/L 27   BUN mg/dL 17   CREATININE mg/dL 1 18   CALCIUM mg/dL 9 1   EGFR ml/min/1 73sq m 70   , Vitamin B12:   , HgBA1C:   , TSH:   , Coagulation:   Results from last 7 days   Lab Units 02/10/19  0959   INR  0 96   , Lipid Profile:     Imaging Studies: I have personally reviewed pertinent films in PACS   [facility] images personally reviewed: there is subtle loss of grey white differentiation in the basal ganglia, with preserved isnular ribbon  There are foci of grey white differentiation loss in the vertex of the L frontal lobe in the MCA distribution  There is a likely hyperdense MCA sign in the L M1 segment of the MCA  CTA H&N: Severe atherosclerotic disease of the L ICA at the bifurcation with complete occlusion of the ICA from the bifurcation to the carotid terminus  Atherosclerotic stenosis also in the R ICA at the bifurcation  There is a segmental occlusion of the L MCA at the M1 segment with reconstitution distally        EKG, Pathology, and Other Studies: No EKG in chart  VTE Prophylaxis: Heparin    Code Status: Level 1 - Full Code  Advance Directive and Living Will:      Power of :    POLST:      Counseling / Coordination of Care  Total time spent today 55 minutes  Greater than 50% of total time was spent with the patient and / or family counseling and / or coordination of care  A description of the counseling / coordination of care: Obtaining history and examining the patient  Reviewing medical record and viewing imaging    1 [facility] care plan with patient, his famiily, and the primary care team

## 2019-02-10 NOTE — EMTALA/ACUTE CARE TRANSFER
[address] [facility] EMERGENCY DEPARTMENT  [address] [facility] [address]  Dept: [phone]      EMTALA TRANSFER CONSENT    NAME Christy Ugalde                                         1965                              MRN [id number]    I have been informed of my rights regarding examination, treatment, and transfer   by Dr Miriam Crump MD    Benefits:      Risks:        Consent for Transfer:  I acknowledge that my medical condition has been evaluated and explained to me by the emergency department physician or other qualified medical person and/or my attending physician, who has recommended that I be transferred to the service of  Accepting Physician: Dr Estee Harvey at [address] Name, [address] : Lisha Lew  The above potential benefits of such transfer, the potential risks associated with such transfer, and the probable risks of not being transferred have been explained to me, and I fully understand them  The doctor has explained that, in my case, the benefits of transfer outweigh the risks  I agree to be transferred  I authorize the performance of emergency medical procedures and treatments upon me in both transit and upon arrival at the receiving facility  Additionally, I authorize the release of any and all medical records to the receiving facility and request they be transported with me, if possible  I understand that the safest mode of transportation during a medical emergency is an ambulance and that the Hospital advocates the use of this mode of transport  Risks of traveling to the receiving facility by car, including absence of medical control, life sustaining equipment, such as oxygen, and medical personnel has been explained to me and I fully understand them  (LANE CORRECT BOX BELOW)  [  ]  I consent to the stated transfer and to be transported by ambulance/helicopter    [  ]  I consent to the stated transfer, but refuse transportation by ambulance and accept full responsibility for my transportation by car  I understand the risks of non-ambulance transfers and I exonerate the Hospital and its staff from any deterioration in my condition that results from this refusal     X___________________________________________    DATE  02/10/19  TIME________  Signature of patient or legally responsible individual signing on patient behalf           RELATIONSHIP TO PATIENT_________________________          Provider Certification    NAME Akanksha Porras                                         1965                              MRN [id number]    A medical screening exam was performed on the above named patient  Based on the examination:    Condition Necessitating Transfer The primary encounter diagnosis was TIA (transient ischemic attack)  A diagnosis of CVA (cerebral vascular accident) [facility]) was also pertinent to this visit  Patient Condition:      Reason for Transfer:      Transfer Requirements: [address] [place] [address]   · Space available and qualified personnel available for treatment as acknowledged by    · Agreed to accept transfer and to provide appropriate medical treatment as acknowledged by       Dr Isidro Mccullough  · Appropriate medical records of the examination and treatment of the patient are provided at the time of transfer   [address], [address] _______  · Transfer will be performed by qualified personnel from    and appropriate transfer equipment as required, including the use of necessary and appropriate life support measures      Provider Certification: I have examined the patient and explained the following risks and benefits of being transferred/refusing transfer to the patient/family:         Based on these reasonable risks and benefits to the patient and/or the unborn child(khai), and based upon the information available at the time of the patients examination, I certify that the medical benefits reasonably to be expected from the provision of appropriate medical treatments at another medical facility outweigh the increasing risks, if any, to the individuals medical condition, and in the case of labor to the unborn child, from effecting the transfer      X____________________________________________ DATE 02/10/19        TIME_______      ORIGINAL - SEND TO MEDICAL RECORDS   COPY - SEND WITH PATIENT DURING TRANSFER

## 2019-02-11 ENCOUNTER — APPOINTMENT (INPATIENT)
Dept: RADIOLOGY | Facility: HOSPITAL | Age: 54
DRG: 045 | End: 2019-02-11
Payer: COMMERCIAL

## 2019-02-11 ENCOUNTER — APPOINTMENT (INPATIENT)
Dept: NON INVASIVE DIAGNOSTICS | Facility: HOSPITAL | Age: 54
DRG: 045 | End: 2019-02-11
Payer: COMMERCIAL

## 2019-02-11 LAB
ALBUMIN SERPL BCP-MCNC: 3.6 G/DL (ref 3.5–5)
ALP SERPL-CCNC: 69 U/L (ref 46–116)
ALT SERPL W P-5'-P-CCNC: 32 U/L (ref 12–78)
ANION GAP SERPL CALCULATED.3IONS-SCNC: 7 MMOL/L (ref 4–13)
AST SERPL W P-5'-P-CCNC: 20 U/L (ref 5–45)
ATRIAL RATE: 72 BPM
BASOPHILS # BLD AUTO: 0.03 THOUSANDS/ΜL (ref 0–0.1)
BASOPHILS NFR BLD AUTO: 0 % (ref 0–1)
BILIRUB SERPL-MCNC: 0.54 MG/DL (ref 0.2–1)
BUN SERPL-MCNC: 11 MG/DL (ref 5–25)
CALCIUM SERPL-MCNC: 8.5 MG/DL (ref 8.3–10.1)
CHLORIDE SERPL-SCNC: 109 MMOL/L (ref 100–108)
CHOLEST SERPL-MCNC: 264 MG/DL (ref 50–200)
CO2 SERPL-SCNC: 23 MMOL/L (ref 21–32)
CREAT SERPL-MCNC: 1 MG/DL (ref 0.6–1.3)
EOSINOPHIL # BLD AUTO: 0.13 THOUSAND/ΜL (ref 0–0.61)
EOSINOPHIL NFR BLD AUTO: 2 % (ref 0–6)
ERYTHROCYTE [DISTWIDTH] IN BLOOD BY AUTOMATED COUNT: 12.1 % (ref 11.6–15.1)
EST. AVERAGE GLUCOSE BLD GHB EST-MCNC: 117 MG/DL
GFR SERPL CREATININE-BSD FRML MDRD: 86 ML/MIN/1.73SQ M
GLUCOSE SERPL-MCNC: 116 MG/DL (ref 65–140)
GLUCOSE SERPL-MCNC: 97 MG/DL (ref 65–140)
HBA1C MFR BLD: 5.7 % (ref 4.2–6.3)
HCT VFR BLD AUTO: 44 % (ref 36.5–49.3)
HDLC SERPL-MCNC: 39 MG/DL (ref 40–60)
HGB BLD-MCNC: 14.5 G/DL (ref 12–17)
IMM GRANULOCYTES # BLD AUTO: 0.03 THOUSAND/UL (ref 0–0.2)
IMM GRANULOCYTES NFR BLD AUTO: 0 % (ref 0–2)
LDLC SERPL CALC-MCNC: 192 MG/DL (ref 0–100)
LYMPHOCYTES # BLD AUTO: 1.74 THOUSANDS/ΜL (ref 0.6–4.47)
LYMPHOCYTES NFR BLD AUTO: 20 % (ref 14–44)
MAGNESIUM SERPL-MCNC: 2.5 MG/DL (ref 1.6–2.6)
MCH RBC QN AUTO: 28.5 PG (ref 26.8–34.3)
MCHC RBC AUTO-ENTMCNC: 33 G/DL (ref 31.4–37.4)
MCV RBC AUTO: 87 FL (ref 82–98)
MONOCYTES # BLD AUTO: 0.67 THOUSAND/ΜL (ref 0.17–1.22)
MONOCYTES NFR BLD AUTO: 8 % (ref 4–12)
NEUTROPHILS # BLD AUTO: 6.24 THOUSANDS/ΜL (ref 1.85–7.62)
NEUTS SEG NFR BLD AUTO: 70 % (ref 43–75)
NRBC BLD AUTO-RTO: 0 /100 WBCS
P AXIS: 75 DEGREES
PHOSPHATE SERPL-MCNC: 2.8 MG/DL (ref 2.7–4.5)
PLATELET # BLD AUTO: 248 THOUSANDS/UL (ref 149–390)
PMV BLD AUTO: 8.8 FL (ref 8.9–12.7)
POTASSIUM SERPL-SCNC: 4.1 MMOL/L (ref 3.5–5.3)
PR INTERVAL: 166 MS
PROT SERPL-MCNC: 7.1 G/DL (ref 6.4–8.2)
QRS AXIS: 71 DEGREES
QRSD INTERVAL: 88 MS
QT INTERVAL: 402 MS
QTC INTERVAL: 440 MS
RBC # BLD AUTO: 5.08 MILLION/UL (ref 3.88–5.62)
SODIUM SERPL-SCNC: 139 MMOL/L (ref 136–145)
T WAVE AXIS: 50 DEGREES
T4 FREE SERPL-MCNC: 0.72 NG/DL (ref 0.76–1.46)
TRIGL SERPL-MCNC: 166 MG/DL
TSH SERPL DL<=0.05 MIU/L-ACNC: 3.77 UIU/ML (ref 0.36–3.74)
VENTRICULAR RATE: 72 BPM
WBC # BLD AUTO: 8.84 THOUSAND/UL (ref 4.31–10.16)

## 2019-02-11 PROCEDURE — 99233 SBSQ HOSP IP/OBS HIGH 50: CPT | Performed by: INTERNAL MEDICINE

## 2019-02-11 PROCEDURE — 99232 SBSQ HOSP IP/OBS MODERATE 35: CPT | Performed by: PSYCHIATRY & NEUROLOGY

## 2019-02-11 PROCEDURE — 84100 ASSAY OF PHOSPHORUS: CPT | Performed by: FAMILY MEDICINE

## 2019-02-11 PROCEDURE — 83735 ASSAY OF MAGNESIUM: CPT | Performed by: FAMILY MEDICINE

## 2019-02-11 PROCEDURE — G8980 MOBILITY D/C STATUS: HCPCS

## 2019-02-11 PROCEDURE — 70551 MRI BRAIN STEM W/O DYE: CPT

## 2019-02-11 PROCEDURE — G8987 SELF CARE CURRENT STATUS: HCPCS

## 2019-02-11 PROCEDURE — 84443 ASSAY THYROID STIM HORMONE: CPT | Performed by: PHYSICIAN ASSISTANT

## 2019-02-11 PROCEDURE — 80053 COMPREHEN METABOLIC PANEL: CPT | Performed by: FAMILY MEDICINE

## 2019-02-11 PROCEDURE — 97163 PT EVAL HIGH COMPLEX 45 MIN: CPT

## 2019-02-11 PROCEDURE — 80061 LIPID PANEL: CPT | Performed by: FAMILY MEDICINE

## 2019-02-11 PROCEDURE — G8978 MOBILITY CURRENT STATUS: HCPCS

## 2019-02-11 PROCEDURE — 93010 ELECTROCARDIOGRAM REPORT: CPT | Performed by: INTERNAL MEDICINE

## 2019-02-11 PROCEDURE — 97166 OT EVAL MOD COMPLEX 45 MIN: CPT

## 2019-02-11 PROCEDURE — 83036 HEMOGLOBIN GLYCOSYLATED A1C: CPT | Performed by: FAMILY MEDICINE

## 2019-02-11 PROCEDURE — 85025 COMPLETE CBC W/AUTO DIFF WBC: CPT | Performed by: FAMILY MEDICINE

## 2019-02-11 PROCEDURE — G8979 MOBILITY GOAL STATUS: HCPCS

## 2019-02-11 PROCEDURE — 84439 ASSAY OF FREE THYROXINE: CPT | Performed by: PHYSICIAN ASSISTANT

## 2019-02-11 PROCEDURE — G8988 SELF CARE GOAL STATUS: HCPCS

## 2019-02-11 PROCEDURE — 93306 TTE W/DOPPLER COMPLETE: CPT

## 2019-02-11 PROCEDURE — 99254 IP/OBS CNSLTJ NEW/EST MOD 60: CPT | Performed by: PHYSICAL MEDICINE & REHABILITATION

## 2019-02-11 RX ADMIN — SODIUM CHLORIDE 100 ML/HR: 0.9 INJECTION, SOLUTION INTRAVENOUS at 02:00

## 2019-02-11 RX ADMIN — PHENYLEPHRINE HYDROCHLORIDE 50 MCG/MIN: 10 INJECTION INTRAVENOUS at 21:43

## 2019-02-11 RX ADMIN — ENOXAPARIN SODIUM 40 MG: 40 INJECTION SUBCUTANEOUS at 11:43

## 2019-02-11 RX ADMIN — Medication 1 TABLET: at 09:20

## 2019-02-11 RX ADMIN — SODIUM CHLORIDE 100 ML/HR: 0.9 INJECTION, SOLUTION INTRAVENOUS at 11:43

## 2019-02-11 RX ADMIN — ATORVASTATIN CALCIUM 40 MG: 40 TABLET, FILM COATED ORAL at 16:17

## 2019-02-11 RX ADMIN — FOLIC ACID 1 MG: 1 TABLET ORAL at 09:20

## 2019-02-11 RX ADMIN — CLOPIDOGREL BISULFATE 75 MG: 75 TABLET ORAL at 09:20

## 2019-02-11 RX ADMIN — ASPIRIN 325 MG ORAL TABLET 325 MG: 325 PILL ORAL at 09:20

## 2019-02-11 RX ADMIN — THIAMINE HCL TAB 100 MG 100 MG: 100 TAB at 09:20

## 2019-02-11 NOTE — MEDICAL STUDENT
Medical Student Progress Note    Subjective    Mr  Dexter Rodriguez is a 52yo male w/ PMH of htn, ETOH dependence who presented to [facility] yesterday 2/10 via EMS w/ cc new onset aphasia and R sided weakness  The morning of presentation, was noted to be well at 0830 at home just prior to symptoms starting  Once at the ED, his NIHSS was noted to be 2 d/t right sided ahnd and face weakness  CT and CTA of the head were performed at that time, showing complete L ICA occlusion from bifurcation to terminus and M1 occlusion of L MCA  His aphasia resolved and thus was not administered tPA, but was transferred to [facility] d/t risk of acute decompensation  Similar episode 2 weeks prior to presentation, but only lasted a few hours and only featured difficulty finding words when talking to his wife  As of today, pt denies any complaints other than continued word-finding difficulty  Review of Systems   Constitutional: Negative for chills, fatigue and fever  HENT: Negative  Eyes: Negative for photophobia and visual disturbance  Respiratory: Negative  Cardiovascular: Negative for chest pain, palpitations and leg swelling  Gastrointestinal: Negative  Neurological: Positive for speech difficulty  Negative for dizziness, seizures, syncope, weakness, light-headedness and headaches  Psychiatric/Behavioral: Negative  All other systems reviewed and are negative        Objective    Vitals:    02/11/19 0300 02/11/19 0400 02/11/19 0500 02/11/19 0600   BP: 169/87 (!) 174/86 (!) 184/95 (!) 184/96   BP Location:  Right arm     Pulse: (!) 48 (!) 50 (!) 48 (!) 48   Resp: 13 14 (!) 26 18   Temp:  98 4 °F (36 9 °C)     TempSrc:  Oral     SpO2: 96% 96% 96% 97%   Weight:       Height:         Scheduled Meds:  Current Facility-Administered Medications:  aspirin 325 mg Oral Daily Carlos Arteaga, DO    atorvastatin 40 mg Oral Daily With [employer], DO    clopidogrel 75 mg Oral Daily Carlos Kleinponepnani, DO    folic acid 1 mg Oral Daily Carlos Sakdiponephong, DO    multivitamin-minerals 1 tablet Oral Daily Carlos Sakdiponephong, DO    phenylephine  mcg/min Intravenous Titrated Carlos Sakdiponephong, DO Last Rate: 65 mcg/min (02/10/19 2201)   sodium chloride 100 mL/hr Intravenous Continuous Carlos Sakdiponephong, DO Last Rate: 100 mL/hr (02/11/19 0200)   thiamine 100 mg Oral Daily Carlos Sakdiponephong, DO      Continuous Infusions:  phenylephine  mcg/min Last Rate: 65 mcg/min (02/10/19 2201)   sodium chloride 100 mL/hr Last Rate: 100 mL/hr (02/11/19 0200)     PRN Meds:     Physical Exam   Constitutional: He appears well-developed and well-nourished  HENT:   Head: Normocephalic and atraumatic  Eyes: Pupils are equal, round, and reactive to light  Conjunctivae and EOM are normal    Neck: Normal range of motion  Neck supple  Cardiovascular: Normal rate, regular rhythm and normal heart sounds  Pulmonary/Chest: Effort normal and breath sounds normal    Abdominal: Soft  Musculoskeletal: Normal range of motion  Neurological: He is alert  He has normal strength and normal reflexes  He displays no tremor  No cranial nerve deficit or sensory deficit  Coordination and gait normal  GCS eye subscore is 4  GCS verbal subscore is 5  GCS motor subscore is 6  He displays no Babinski's sign on the right side  He displays no Babinski's sign on the left side  Reflex Scores:       Tricep reflexes are 2+ on the right side and 2+ on the left side  Bicep reflexes are 2+ on the right side and 2+ on the left side  Brachioradialis reflexes are 2+ on the right side and 2+ on the left side  Patellar reflexes are 2+ on the right side and 2+ on the left side  Achilles reflexes are 2+ on the right side and 2+ on the left side    AAOx2; variable whether he can tell time/place, but unclear if this is due to naming difficulty or truly confused  Recalled 3/3 objects at initial exam, 2/3 at 5 minutes  Could spell name backward/forward, days of week backward/forward  Clearly differentiates R/L and follows related commands  Can name simple objects (ie pen) but struggles with others (called a button a pencil, but could identify a lapel)  Repeats simple phrases  Strength 5/5 in all extremities  Upward drift in RUE  Skin: Skin is warm and dry  Psychiatric: He has a normal mood and affect           Labs  Note: HbA1c normal  Component      Latest Ref Rng & Units 2/10/2019 2/11/2019   WBC      4 31 - 10 16 Thousand/uL 5 25 8 84   Red Blood Cell Count      3 88 - 5 62 Million/uL 5 42 5 08   Hemoglobin      12 0 - 17 0 g/dL 15 5 14 5   HCT      36 5 - 49 3 % 47 6 44 0   MCV      82 - 98 fL 88 87   MCH      26 8 - 34 3 pg 28 6 28 5   MCHC      31 4 - 37 4 g/dL 32 6 33 0   RDW      11 6 - 15 1 % 11 9 12 1   MPV      8 9 - 12 7 fL 8 8 (L) 8 8 (L)   Platelet Count      974 - 390 Thousands/uL 223 248   nRBC      /100 WBCs  0   Neutrophils %      43 - 75 %  70   Immat GRANS %      0 - 2 %  0   Lymphocytes Relative      14 - 44 %  20   Monocytes Relative      4 - 12 %  8   Eosinophils      0 - 6 %  2   Basophils Relative      0 - 1 %  0   Absolute Neutrophils      1 85 - 7 62 Thousands/µL  6 24   Immature Grans Absolute      0 00 - 0 20 Thousand/uL  0 03   Lymphocytes Absolute      0 60 - 4 47 Thousands/µL  1 74   Absolute Monocytes      0 17 - 1 22 Thousand/µL  0 67   Absolute Eosinophils      0 00 - 0 61 Thousand/µL  0 13   Basophils Absolute      0 00 - 0 10 Thousands/µL  0 03   Sodium      136 - 145 mmol/L  139   Potassium      3 5 - 5 3 mmol/L  4 1   Chloride      100 - 108 mmol/L  109 (H)   CO2      21 - 32 mmol/L  23   Anion Gap      4 - 13 mmol/L  7   BUN      5 - 25 mg/dL  11   Creatinine      0 60 - 1 30 mg/dL  1 00   Glucose, Random      65 - 140 mg/dL  97   Calcium      8 3 - 10 1 mg/dL  8 5   AST      5 - 45 U/L  20   ALT      12 - 78 U/L  32   Alkaline Phosphatase      46 - 116 U/L  69   Total Protein      6 4 - 8 2 g/dL  7 1   Albumin      3 5 - 5 0 g/dL  3 6   TOTAL BILIRUBIN      0 20 - 1 00 mg/dL  0 54   eGFR      ml/min/1 73sq m  86   Cholesterol      50 - 200 mg/dL  264 (H)   Triglycerides      <=150 mg/dL  166 (H)   HDL      40 - 60 mg/dL  39 (L)   LDL Direct      0 - 100 mg/dL  192 (H)   Protime      9 4 - 11 7 seconds 10 1    INR      0 86 - 1 16 0 96    Hemoglobin A1C      4 2 - 6 3 %  5 7   EAG      mg/dl  117   PTT      26 - 38 seconds 26    Magnesium      1 6 - 2 6 mg/dL  2 5   Phosphorus      2 7 - 4 5 mg/dL  2 8       Imaging  Ct Stroke Alert Brain  Result Date: 2/10/2019  Impression: Noncontrast head CT demonstrates loss of the left insular ribbon consistent with acute left middle cerebral artery distribution stroke  Also noted is a small focus of decreased density in the cortical left posterior insula and low left posterior frontal lobe consistent with small late subacute or perhaps chronic posterior MCA distribution infarction  No acute intracranial hemorrhage  Findings were directly discussed with Brijesh Paiz on 2/10/2019 10:03 AM  Workstation performed: [id number]     Cta Stroke Alert (head/neck)  Result Date: 2/10/2019  Impression: Occlusion of left internal carotid artery beginning at its origin  Enhancement of proximal left M1 via collateral circulation across [place] via small caliber left A1 and tiny caliber left posterior communicating arteries  Occlusion of left mid to distal left M1 middle cerebral artery segment  There is some enhancement relatively diminutive of anterior and middle M2 and M3 middle cerebral artery branches presumably via collateral circulation  Mild atherosclerotic narrowing at the origin of the right internal carotid artery  Noncontrast head CT demonstrates loss of the left insular ribbon consistent with acute left middle cerebral artery distribution stroke    Also noted is a small focus of encephalomalacia in the left posterior insula and low left posterior frontal lobe consistent with small chronic cortical infarction in that location  No acute intracranial hemorrhage  Findings were directly discussed with Raj England on 2/10/2019 10:03 AM  Workstation performed: [id number]       Assessment/Plan    Mr Dustin Butts is a 52yo male who suffered a left ICA/MCA stroke, currently improving  1  L ICA occlusion and L MCA occlusion: likely severe chronic atherosclerotic disease of the L ICA  In situ thrombosis with artery to artery occlusion of the L MCA  Collateral circulation is robust   NIHSS =2 (mild aphasia and questionably oriented)  Did not receive TPA or thrombectomy  -CTH with subtle loss of grey white in the basal ganglia and frontal lobe  -CTA showing complete L ICA occlusion and M1 occlusion   -MRI and echo ordered  -Lipid panel shows HLD, atorvastatin started yesterday   -continue DAPT (was loaded plavix 300mg and  on 2/10)  -currently on therapeutic hypertension, goal SBP >160 with IVF and pressor support, can consider weaning now that pt is 24hrs post event  -continue neuro checks q4hrs     2   ETOH dependence:   -thiamine  -folate  -MV  -close observation for withdrawal      Disposition: continue inpt care

## 2019-02-11 NOTE — CONSULTS
PHYSICAL MEDICINE AND REHABILITATION CONSULT NOTE  Bryan Aleman 48 y o  male MRN: [id number]  Unit/Bed#: 3150 [facility] -01 Encounter: [id number]    Requested by (Physician/Service): Desmond Childs MD  Reason for Consultation:  Assessment of rehabilitation needs  Reason for Hospitalization:  CVA (cerebral vascular accident) [facility]) [I63 [id number]]  Rehabilitation Diagnosis: L MCA CVA    History of Present Illness:  Bryan Aleman is a 48 y o  male who  has a past medical history of Hypertension  who presented to the [facility] for expressive aphasia  He had been having episodes for the past 2 weeks with r facial droop, sided weakness, slurred speech  NIHSS 2  CTH showed insular blurring of the gray-white junction  CTA showed complete occlusion of L ICA from its origin and through the intracranial portion and thrombus in left LMCA m1 with collateral flow  tPA was not administered, as his aphasia resolved  He was transferred to [facility] for close neuro monitoring  He was loaded with ASA/Plavix  Also started on CIWA protocol for alcohol abuse  MRI is pending  Echo is pending  At this time he is feeling well, he denies any headaches, lightheadedness, dizziness, changes in vision/hearing, difficulty swallowing, CP, SOB, fevers, chills, N/V, abdominal pain, bowel/blader dysfunction, new numbness/tingling/weakness  He works as a serna  PM&R consulted for rehabilitation recommendations  Restrictions include:  none    Hospital Complications/Comorbidities:   Complications: As above  Comorbidities: As above    Morbid Obesity (BMI > 40) No     Last Weight Last BMI   Wt Readings from Last 1 Encounters:   02/10/19 103 kg (226 lb 6 6 oz)    Body mass index is 30 71 kg/m²  Functional History:     Prior to Admission:     Functional Status: Patient was independent with mobility/ambulation, transfers, ADL's, IADL's  Able to drive       Present:  Physical Therapy Occupational Therapy 2/11/19 Speech Therapy 2/10/19   Pending ADL   Eating Assistance 5  Supervision/Setup   Grooming Assistance 5  Supervision/Setup   UB Bathing Assistance 5  Supervision/Setup   LB Bathing Assistance 5  Supervision/Setup   UB Dressing Assistance 5  Supervision/Setup   LB Dressing Assistance 5  Supervision/Setup   LB Dressing Deficit Don/doff R sock; Don/doff L sock   Toileting Assistance  5  Supervision/Setup   Toileting Deficit Supervison/safety; Perineal hygiene;Use of bedpan/urinal setup   Functional Assistance 5  Supervision/Setup   Functional Deficit Verbal cueing;Supervision/safety        Summary:  Pt presents w/ functional oropharyngeal swallow skills for regular diet c thin liquids c no overt s/s penetration/aspiration noted  Pt is noted to have mild dysarthria and word finding difficulties when asked orientation questions  Will benefit from formal speech/language evaluation       Recommendations:  Diet: regular  Liquid: thin  Meds: whole c water  Supervision: assist as needed  Positioning:Upright  Strategies: Pt to take PO/Meds only when fully alert and upright  Oral care: encourage twice daily brushing     Eval only, f/u speech/language eval         Past Medical History:   Past Surgical History:   Family History:     Past Medical History:   Diagnosis Date    Hypertension     History reviewed  No pertinent surgical history  History reviewed  No pertinent family history        Medications:    Current Facility-Administered Medications:     aspirin tablet 325 mg, 325 mg, Oral, Daily, Carlos Sakdiponephong, DO, 325 mg at 02/11/19 0920    atorvastatin (LIPITOR) tablet 40 mg, 40 mg, Oral, Daily With Dinner, Carlos Sakdiponephong, DO, 40 mg at 02/10/19 1750    clopidogrel (PLAVIX) tablet 75 mg, 75 mg, Oral, Daily, Carlos Sakdiponephong, DO, 75 mg at 95/74/69 7738    folic acid (FOLVITE) tablet 1 mg, 1 mg, Oral, Daily, Carlos Sakdiponephong, DO, 1 mg at 02/11/19 0920    multivitamin-minerals (CENTRUM) tablet 1 tablet, 1 tablet, Oral, Daily, Carlos Maria De Jesusdiponephong, DO, 1 tablet at 02/11/19 0920    phenylephrine (BAYLEE-SYNEPHRINE) 50 mg (STANDARD CONCENTRATION) in sodium chloride 0 9% 250 mL,  mcg/min, Intravenous, Titrated, Carlos Sakdiponephong, DO, Last Rate: 19 5 mL/hr at 02/10/19 2201, 65 mcg/min at 02/10/19 2201    sodium chloride 0 9 % infusion, 100 mL/hr, Intravenous, Continuous, Carlos Sakdiponephong, DO, Last Rate: 100 mL/hr at 02/11/19 0200, 100 mL/hr at 02/11/19 0200    thiamine (VITAMIN B1) tablet 100 mg, 100 mg, Oral, Daily, Carlos Sakdiponephong, DO, 100 mg at 02/11/19 0920    Allergies:   No Known Allergies     Social History:    Social History     Socioeconomic History    Marital status: /Civil Union     Spouse name: None    Number of children: None    Years of education: None    Highest education level: None   Occupational History    None   Social Needs    Financial resource strain: None    Food insecurity:     Worry: None     Inability: None    Transportation needs:     Medical: None     Non-medical: None   Tobacco Use    Smoking status: Never Smoker    Smokeless tobacco: Never Used   Substance and Sexual Activity    Alcohol use:  Yes     Alcohol/week: 3 6 oz     Types: 6 Cans of beer per week     Frequency: 4 or more times a week     Drinks per session: 5 or 6     Binge frequency: Weekly    Drug use: Never    Sexual activity: None   Lifestyle    Physical activity:     Days per week: None     Minutes per session: None    Stress: None   Relationships    Social connections:     Talks on phone: None     Gets together: None     Attends Faith service: None     Active member of club or organization: None     Attends meetings of clubs or organizations: None     Relationship status: None    Intimate partner violence:     Fear of current or ex partner: None     Emotionally abused: None     Physically abused: None     Forced sexual activity: None   Other Topics Concern    None   Social History Narrative    None Richar Valdez Lives with: lives with their family  Wife, two daughters (Aged 13 and 17yo)  He lives in [facility]) single family home  The living area: bedroom on 2nd floor and bathroom on 2md floor  Equipment in home: Grab bars, bilateral hand rails  There 1 step to enter the home  Patient/family's goals: Return to previous home/apartment  The patient will have 24 hour ARC Supervision/physical assistance: supervision/physical assistance available upon discharge from his daughter and his sister  Review of Systems: A 10-point review of systems was performed  Negative except as listed above  Physical Exam:  Vital Signs:      Temp:  [97 3 °F (36 3 °C)-98 6 °F (37 °C)] 98 4 °F (36 9 °C)  HR:  [48-87] 48  Resp:  [13-29] 18  BP: (141-197)/() 184/96   Intake/Output Summary (Last 24 hours) at 2/11/2019 0931  Last data filed at 2/11/2019 0836  Gross per 24 hour   Intake 2178 88 ml   Output 2200 ml   Net -21 12 ml        Laboratory:      Lab Results   Component Value Date    HGB 14 5 02/11/2019    HCT 44 0 02/11/2019    WBC 8 84 02/11/2019     Lab Results   Component Value Date    BUN 11 02/11/2019    K 4 1 02/11/2019     (H) 02/11/2019    CREATININE 1 00 02/11/2019     Lab Results   Component Value Date    PROTIME 10 1 02/10/2019    INR 0 96 02/10/2019        General: alert, no apparent distress, cooperative and comfortable  Head: Normal, normocephalic, atraumatic  Eye: Normal external eye, conjunctiva, lids   Ears: Normal external ears  Nose: Normal external nose, mucus membranes  Pharynx: Dental Hygiene adequate  Normal buccal mucosa  Normal pharynx  Neck / Thyroid: Supple, no masses, nodes, nodules or enlargement    Pulmonary: clear to auscultation bilaterally and no crackles, no wheezes, chest expansion normal  Cardiovascular: normal rate, regular rhythm, normal S1, S2, no murmurs, rubs, clicks or gallops  Abdomen: soft, nontender, nondistended, no masses or organomegaly  Skin/Extremity: no rashes, no erythema, no peripheral edema  Neurologic: AAOx3, CN 2-12 intact  Speech with some word finding difficulties  Repetition, comprehension are intact  Coordination with FTN is normal  No dysdiadochokinesia  Reflexes are 2+ throughout except 1+ in achilles bilaterally  Plantar response is flexor, Trish's is absent  Psych: Appropriate affect, alert and oriented to person, place and time   Musculoskeletal - Strength:   Right  Left  Site  Right  Left  Site    5 5  S Ab: Shoulder Abductors  5  5  HF: Hip Flexors    5 5  EF: Elbow Flexors  5 5 KF: Knee Flexors    5  5  EE: Elbow Extensors  5  5  KE: Knee Extensors    5  5  WE: Wrist Extensors  5  5  DR: Dorsi Flexors    5  5  FF: Finger Flexors  5  5  PF: Plantar Flexors    5  5  HI: Hand Intrinsics  5  5  EHL: Extensor Hallucis Longus     Imaging: Reviewed  Ct Stroke Alert Brain    Result Date: 2/10/2019  Impression: Noncontrast head CT demonstrates loss of the left insular ribbon consistent with acute left middle cerebral artery distribution stroke  Also noted is a small focus of decreased density in the cortical left posterior insula and low left posterior frontal lobe consistent with small late subacute or perhaps chronic posterior MCA distribution infarction  No acute intracranial hemorrhage  Findings were directly discussed with Kailash Chow on 2/10/2019 10:03 AM  Workstation performed: [id number]     Cta Stroke Alert (head/neck)    Result Date: 2/10/2019  Impression: Occlusion of left internal carotid artery beginning at its origin  Enhancement of proximal left M1 via collateral circulation across [place] via small caliber left A1 and tiny caliber left posterior communicating arteries  Occlusion of left mid to distal left M1 middle cerebral artery segment  There is some enhancement relatively diminutive of anterior and middle M2 and M3 middle cerebral artery branches presumably via collateral circulation   Mild atherosclerotic narrowing at the origin of the right internal carotid artery  Noncontrast head CT demonstrates loss of the left insular ribbon consistent with acute left middle cerebral artery distribution stroke  Also noted is a small focus of encephalomalacia in the left posterior insula and low left posterior frontal lobe consistent with small chronic cortical infarction in that location  No acute intracranial hemorrhage  Findings were directly discussed with Vandiver Blades on 2/10/2019 10:03 AM  Workstation performed: [id number]       Assessment and Recommendations:  Mr Franklyn Oneil is a 49 yo M with PMH alcohol use, tobacco use, and HTN who is presenting with L MCA CVA likely 2/2 to thrombosis  PM&R consulted for rehabilitation recommendations  Impairments:  Impaired functional mobility and ability to perform ADL's  Impaired cognition and speech    Recommendations:  - Chart reviewed  - Imaging reviewed   - Continue PT/OT/SLP while inpatient  - Pt is unable to safely return home until he is at the supervision/contact-guard functional level (25% assistance level with or without a device)  - Based on the patient's current functional level (awaiting PT note), anticipate that he should be able to progress to home with outpatient therapies  - Will follow-up MRI/Echo   - Would trial stairs with PT prior to discharge  Thank you for allowing the PM&R service to participate in the care of this patient  We will continue to follow Jeanna England progress with you  Please do not hesitate to call with questions or concerns    ** Please Note: Fluency Direct voice to text software may have been used in the creation of this document   **

## 2019-02-11 NOTE — PROGRESS NOTES
Progress Note - Critical Care   Dakota Giordano 48 y o  male MRN: [id number]  Unit/Bed#: [address] -01 Encounter: [id number]    Attending Physician: Geovanny House MD      ______________________________________________________________________  Assessment and Plan:   Principal Problem:    CVA (cerebral vascular accident) [facility])  Active Problems:    Alcohol abuse    Hypertension  Resolved Problems:    * No resolved hospital problems  *        Neuro:   1  Ischemic Left M1 CVA and occlusion of L ICA  · Thrombolytics contraindicated at this time  · Daily   · 300mg Plavix loading dose on 2/10, with subsequent 75mg daily  · SBP BP goal >180 mmHg with phenylephrine @ 65mcg/min  · Neurology consulted  · Neurosurgery consulted  · Neurochecks q1  · F/u MRI  · Lipid panel, A1c pending  · High dose statin lipitor 40mg    2  Alcohol abuse  · CIWA protocol  · Thiamine and folate    Neuro checks as per unit protocol  Take precautions to prevent ICU delirium  Monitor GCS    CV:   · SBP goal >180  · Meeting goal with 65 of phenylephrin mcg/min    Continuous telemetry  Pulm:   · No acute issues  · Continue spirometry  · Maintain SpO2 >90%    GI:   · GI ppx: n/a  · Bowel regimen: consider colace     :   · UOP 2200 mL over past 24H (91 66 mL/hr)  · Monitor I&Os, net -351 1 mL over past 24H  · Cr 1 0, GFR 86    F/E/N:   1  Fluids/Electrolytes  · IVF Nss running at 100 mL/hr currently  · Replete as needed  2   Nutrition  · Nutrition recommendations: regular house    ID:   No evidence of infection, afebrile, no  leukocytosis      Heme: No acute issues  1   DVT ppx: holding 2/2 possible neurosurgical intervention    Endo:   · Follow up with A1C  · q6 BG checkes     Msk/Skin:   · Ambulate with assistance  · PT/OT when appropriate    Disposition: continue ICU care with follow up of Neurology recommendations    Code Status: Level 1 - Full Code      ______________________________________________________________________    Chief Complaint:  No complaints    24 Hour Events: No events  Maintained BP goal >180mmHg      ______________________________________________________________________    Physical Exam:   Physical Exam   HENT:   Head: Normocephalic and atraumatic  Nose: Nose normal    Mouth/Throat: Oropharynx is clear and moist  No oropharyngeal exudate  Eyes: Pupils are equal, round, and reactive to light  Conjunctivae and EOM are normal    Neck: Normal range of motion  No JVD present  No tracheal deviation present  Cardiovascular: Normal rate, regular rhythm, normal heart sounds and intact distal pulses  No murmur heard  Pulmonary/Chest: Effort normal and breath sounds normal  No stridor  No respiratory distress  He has no wheezes  Abdominal: Soft  Bowel sounds are normal  He exhibits no distension  There is no tenderness  There is no guarding  Musculoskeletal: He exhibits no edema or tenderness  Neurological: He is alert  No cranial nerve deficit or sensory deficit  GCS eye subscore is 4  GCS verbal subscore is 5  GCS motor subscore is 6    4/5 strength RUE  5/5 strength LUE and b/l LE    Expressive aphasia   Skin: Skin is warm and dry  Capillary refill takes less than 2 seconds  Psychiatric: He has a normal mood and affect  His behavior is normal          ______________________________________________________________________  Vitals:    19 0300 19 0400 19 0500 19 0600   BP: 169/87 (!) 174/86 (!) 184/95 (!) 184/96   BP Location:  Right arm     Pulse: (!) 48 (!) 50 (!) 48 (!) 48   Resp: 13 14 (!) 26 18   Temp:  98 4 °F (36 9 °C)     TempSrc:  Oral     SpO2: 96% 96% 96% 97%   Weight:       Height:           Temperature:   Temp (24hrs), Av °F (36 7 °C), Min:97 3 °F (36 3 °C), Max:98 6 °F (37 °C)    Current Temperature: 98 4 °F (36 9 °C)  Weights:   IBW: 77 6 kg    Body mass index is 30 71 kg/m²    Weight (last 2 days)     Date/Time   Weight    02/10/19 1305   103 (226 41)            Hemodynamic Monitoring:  N/A     Non-Invasive/Invasive Ventilation Settings:  Respiratory    Lab Data (Last 4 hours)    None         O2/Vent Data (Last 4 hours)    None              No results found for: PHART, CRR2FNY, PO2ART, HQY4LAS, T4JZICVM, BEART, SOURCE  n/a  Intake and Outputs:  I/O       02/09 0701 - 02/10 0700 02/10 0701 - 02/11 0700    P  O   240    I V  (mL/kg)  1608 9 (15 6)    Total Intake(mL/kg)  1848 9 (18)    Urine (mL/kg/hr)  1825    Total Output  1825    Net  +23 9              UOP: 91 7/hour   Nutrition:        Diet Orders   (From admission, onward)            Start     Ordered    02/10/19 1532  Diet Regular; Regular House  Diet effective now     Question Answer Comment   Diet Type Regular    Regular Regular House    RD to adjust diet per protocol? Yes        02/10/19 1531          Labs:   Results from last 7 days   Lab Units 02/11/19  0433 02/10/19  0959   WBC Thousand/uL 8 84 5 25   HEMOGLOBIN g/dL 14 5 15 5   HEMATOCRIT % 44 0 47 6   PLATELETS Thousands/uL 248 223   NEUTROS PCT % 70  --    MONOS PCT % 8  --      Results from last 7 days   Lab Units 02/11/19  0433 02/10/19  0959   POTASSIUM mmol/L 4 1 4 6   CHLORIDE mmol/L 109* 104   CO2 mmol/L 23 27   BUN mg/dL 11 17   CREATININE mg/dL 1 00 1 18   CALCIUM mg/dL 8 5 9 1   ALK PHOS U/L 69  --    ALT U/L 32  --    AST U/L 20  --      Results from last 7 days   Lab Units 02/11/19  0433   MAGNESIUM mg/dL 2 5     Lab Results   Component Value Date    PHOS 2 8 02/11/2019      Results from last 7 days   Lab Units 02/10/19  0959   INR  0 96   PTT seconds 26     No results found for: TROPONINI      ABG:No results found for: PHART, VAH3NKI, PO2ART, OBQ7TKW, E5BKXSPA, BEART, SOURCE  Imaging:  I have personally reviewed pertinent reports       MRI Inpatient Order    (Results Pending)         Micro:  No results found for: Zuleika Millington, WOUNDCULT, SPUTUMCULTUR  Allergies: No Known Allergies  Medications:   Scheduled Meds:  Current Facility-Administered Medications:  aspirin 325 mg Oral Daily Carlos Sakdiponephong, DO    atorvastatin 40 mg Oral Daily With [employer], DO    clopidogrel 75 mg Oral Daily Carlos Sakdiponephong, DO    folic acid 1 mg Oral Daily Carlos Sakdiponephong, DO    multivitamin-minerals 1 tablet Oral Daily Carlos Sakdiponephong, DO    phenylephine  mcg/min Intravenous Titrated Carlos Sakdiponephong, DO Last Rate: 65 mcg/min (02/10/19 2201)   sodium chloride 100 mL/hr Intravenous Continuous Carlos Sakdiponephong, DO Last Rate: 100 mL/hr (02/11/19 0200)   thiamine 100 mg Oral Daily Carlos Sakdiponephong, DO      Continuous Infusions:  phenylephine  mcg/min Last Rate: 65 mcg/min (02/10/19 2201)   sodium chloride 100 mL/hr Last Rate: 100 mL/hr (02/11/19 0200)     PRN Meds:     VTE Pharmacologic Prophylaxis: pending  VTE Mechanical Prophylaxis: sequential compression device  Invasive lines and devices: Invasive Devices     Peripheral Intravenous Line            Peripheral IV 02/10/19 Left Antecubital less than 1 day    Peripheral IV 02/10/19 Right Antecubital less than 1 day    Peripheral IV 02/11/19 Left Forearm less than 1 day                     Portions of the record may have been created with voice recognition software  Occasional wrong word or "sound a like" substitutions may have occurred due to the inherent limitations of voice recognition software  Read the chart carefully and recognize, using context, where substitutions have occurred      [facility], DO

## 2019-02-11 NOTE — PLAN OF CARE
Problem: DISCHARGE PLANNING - CARE MANAGEMENT  Goal: Discharge to post-acute care or home with appropriate resources  Description  INTERVENTIONS:  - Conduct assessment to determine patient/family and health care team treatment goals, and need for post-acute services based on payer coverage, community resources, and patient preferences, and barriers to discharge  - Address psychosocial, clinical, and financial barriers to discharge as identified in assessment in conjunction with the patient/family and health care team  - Arrange appropriate level of post-acute services according to patient's   needs and preference and payer coverage in collaboration with the physician and health care team  - Communicate with and update the patient/family, physician, and health care team regarding progress on the discharge plan  - Arrange appropriate transportation to post-acute venues  Pt will go home to family when medically clear for dc   Outcome: Progressing

## 2019-02-11 NOTE — SOCIAL WORK
CM met with pt and wife Luis Lee at bedside, introduced self and made aware of CM role at dc  Pt denies having a LW and POA  Primary contact is his wife Luis Lee- [phone]  Pt lives with his wife and 2 children in a 2 story house with 2 MARY LOU, 12 steps with hand rails to the 2nd flr bedroom and bathroom and 12 steps with hand rails to the basement  Pt was IPTA with all ADL"s, drive and employed  Pt has no DME  Pt denies hx with HC, STR, alc, drug and IP psych tx  Pt reported that he drinks 4-6 cans of beers/ day  CM explained and offered HOST for alc rehab for pt but pt refused  Pharmacy is [facility] in [place], [place]  PCP is Dr Jeni Hicks with [facility] practice  Pt has transportation when dc  CM reviewed d/c planning process including the following: identifying help at home, patient preference for d/c planning needs, Discharge Lounge, [facility] Meds to Bed program, availability of treatment team to discuss questions or concerns patient and/or family may have regarding understanding medications and recognizing signs and symptoms once discharged  CM also encouraged patient to follow up with all recommended appointments after discharge  Patient advised of importance for patient and family to participate in managing patients medical well being

## 2019-02-11 NOTE — OCCUPATIONAL THERAPY NOTE
[address] Evaluation     Patient Name: Rosalee Mccarty  CLHCW'J Date: 2/11/2019  Problem List  Patient Active Problem List   Diagnosis    [facility]A [facility]cerebral vascular accident) ([facility] [address] )    Alcohol abuse    Hypertension     Past Medical History  Past Medical History:   Diagnosis Date    Hypertension      Past Surgical History  History reviewed  No pertinent surgical history  02/11/19 0915   Note Type   Note type Eval/Treat   Restrictions/Precautions   Weight Bearing Precautions Per Order No   Other Precautions Cognitive; Chair Alarm;Multiple lines;Telemetry; Fall Risk;Pain   Pain Assessment   Pain Assessment No/denies pain   Pain Score No Pain   Home Living   Type of [address] [place] [address] Two level; Other (Comment); Bed/bath upstairs  (MARY LOU)   Bathroom Shower/Tub Tub/shower unit   Bathroom Toilet Standard   Bathroom Equipment Other (Comment)  (denies any)   Home Equipment Other (Comment)  (denies any)   Additional Comments pt reports living in 2 SH, MARY LOU, bed/bath upstairs   Prior Function   Level of [place] Independent with ADLs and functional mobility   Lives With Spouse;Daughter   Receives Help From Family   ADL Assistance Independent   IADLs Independent   Falls in the last 6 months 1 to 4  (2)   Vocational Full time employment   Comments Pt reports being I w/ ADLS, IADLS, transfers and functional mobility PTA   Lifestyle   Autonomy I ADLS, IADLS, transfers and functional mobility PTA   Reciprocal Relationships Pt lives w/ spouse and dght   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   Intrinsic Gratification Pt reports not having time for leisure activities   Psychosocial   Psychosocial (WDL) WDL   ADL   Eating Assistance 5  Supervision/Setup   Grooming Assistance 5  Supervision/Setup   [address] [place] [address] Dr Elizabeth Hatch  66  5  Supervision/Setup    [facility] 5  Supervision/Setup   LB Dressing Deficit Don/doff R sock;Don/doff L sock   Toileting Assistance  5  Supervision/Setup   Toileting Deficit Supervison/safety; Perineal hygiene;Use of bedpan/urinal setup   Functional Assistance 5  Supervision/Setup   Functional Deficit Verbal cueing;Supervision/safety   Bed Mobility   Supine to Sit 5  Supervision   Additional items HOB elevated; Increased time required;Verbal cues   Sit to Supine Unable to assess   Additional Comments Pt went from supine to sit w/ S for safety and increased time: HOB elevated for assist  Sat EOB w/ G balance/trunk control   Transfers   Sit to Stand 5  Supervision   Additional items Increased time required;Verbal cues   Stand to Sit 5  Supervision   Additional items Increased time required;Verbal cues   Additional Comments Pt performed sit-stand from EOB w/ S for safety, no use of AD/DME needed, +VC for proper technique   Functional Mobility   Functional Mobility 4  Minimal assistance  (CTG)   Additional Comments pt ambulated short household distance w/ CTG for safety/balance, no use of AD/DME; assist of 2nd for line management   Additional items Hand hold assistance   Balance   Static Sitting Good   Dynamic Sitting Fair +   Static Standing Fair   Ambulatory Fair -   Activity Tolerance   Activity Tolerance Patient tolerated treatment well   Medical Staff Made Aware PTBetzaida   Nurse Made Aware yes   RUE Assessment   RUE Assessment WFL   LUE Assessment   LUE Assessment WFL   Hand Function   Gross Motor Coordination Functional   Fine Motor Coordination Functional   Sensation   Light Touch No apparent deficits   Proprioception   Proprioception No apparent deficits   Vision - Complex Assessment   Ocular Range of Motion WFL   Perception   Inattention/Neglect Appears intact   Cognition   Overall Cognitive Status Impaired   Arousal/Participation Responsive; Cooperative   Attention Within functional limits   Orientation Level Oriented to person;Oriented to place;Oriented to situation;Disoriented to time   Memory Decreased short term memory;Decreased recall of precautions   Following Commands Follows one step commands without difficulty   Comments Pt is pleasant and cooperative; has mild word finding difficulties; not oriented to time and has decreased short term memory  Requires +VC for safety and occasional re-direction to task   Assessment   Limitation Decreased ADL status; Decreased Safe judgement during ADL;Decreased cognition;Decreased endurance;Decreased high-level ADLs   Prognosis Fair   Assessment Pt is a 47 y/o male seen for OT eval s/p adm to [facility] as a transfer from [facility] for stroke like symptoms that resolved- tPA withheld  Pt is dx'd w/ L MCA syndrome, R facial droop, dysarthria/aphasia, RUE weakness, CVA- CTH and CTA were performed, showing complete occlusion of the L ICA from the bifurcation to the terminus, and an M1 occlusion in the L MCA  Pt transferred to [facility] for hemodynamic support and neuro monitoring  Comorbidities include a h/o HTN and alcohol abuse  Pt with active OT orders and up with assistance  orders  Pt lives with spouse and dght in 2 , MARY LOU, bed/bath upstairs  Pt was I w/ ADLS and IADLS, drove, & required no use of DME PTA  Pt is currently demonstrating the following occupational deficits: S/CTG overall ADLS, transfers and functional mobility w/ no use of AD/DME  These deficits that are impacting pt's baseline areas of occupation are a result of the following impairments: endurance, activity tolerance, functional mobility, decreased I w/ ADLS/IADLS, cognitive impairments, decreased safety awareness and decreased insight into deficits, and mild word finding difficulty  The following Occupational Performance Areas to address include: grooming, bathing/shower, toilet hygiene, dressing, socialization, health maintenance, functional mobility, community mobility, clothing management, household maintenance, job performance/volunteering and social participation   Pt scored overall 70/100 on the Barthel Index  Based on the aforementioned OT evaluation, functional performance deficits, and assessments, pt has been identified as a moderate complexity evaluation  Recommend home w/ family support vs  OP OT pending progress & further cognitive evaluation upon D/C  Pt to continue to benefit from acute immediate OT services to address the following goals 3-5x/week to  w/in 7-10 days:    Goals   Patient Goals to get back to work   LTG Time Frame 7-10   Long Term Goal #1 see below listed goals   Plan   Treatment Interventions ADL retraining;Functional transfer training; Endurance training;Cognitive reorientation;Patient/family training;Continued evaluation   Goal Expiration Date 19   OT Frequency 3-5x/wk   Recommendation   OT Discharge Recommendation Home with family support  (vs  OP OT pending progress)   OT - OK to Discharge Yes  (when medically stable)   Barthel Index   Feeding 10   Bathing 5   Grooming Score 5   Dressing Score 10   Bladder Score 10   Bowels Score 10   Toilet Use Score 10   Transfers (Bed/Chair) Score 10   Mobility (Level Surface) Score 0   Stairs Score 0   Barthel Index Score 70   Modified [place] Scale   Modified [place] Scale 3     GOALS    1) Pt will improve activity tolerance to G for min 30 min txment sessions for increase engagement in functional tasks    2) Pt will complete UB/LB dressing/self care w/ mod I using adaptive device and DME as needed    3) Pt will complete bathing w/ Mod I w/ use of AE and DME as needed    4) Pt will complete toileting w/ mod I w/ G hygiene/thoroughness using DME as needed    5) Pt will improve functional transfers to Mod I on/off all surfaces using DME as needed w/ G balance/safety     6) Pt will improve functional mobility during ADL/IADL/leisure tasks to Mod I using DME as needed w/ G balance/safety     7) Pt will be attentive 100% of the time during ongoing cognitive assessment w/ G participation to assist w/ safe d/c planning/recommendations    8) Pt will demonstrate G carryover of pt/caregiver education and training as appropriate w/o cues w/ good tolerance to increase safety during functional tasks    9) Pt will demonstrate 100% carryover of energy conservation techniques t/o functional I/ADL/leisure tasks w/o cues s/p skilled education to increase endurance during functional tasks       Elan Duran MS, OTR/L

## 2019-02-11 NOTE — PHYSICAL THERAPY NOTE
Physical Therapy Evaluation     Patient's Name: Kwame Miller    Admitting Diagnosis  CVA (cerebral vascular accident) [facility]) [I63 9]    Problem List  Patient Active Problem List   Diagnosis    CVA (cerebral vascular accident) ([address] )    Alcohol abuse    Hypertension       Past Medical History  Past Medical History:   Diagnosis Date    Hypertension        Past Surgical History  History reviewed  No pertinent surgical history  02/11/19 0916   Note Type   Note type Eval only   Pain Assessment   Pain Assessment No/denies pain   Pain Score No Pain   Home Living   Type of [address] [place] [address] Two level;Stairs to enter with rails;Bed/bath upstairs   Bathroom Shower/Tub Tub/shower unit   Bathroom Toilet Standard   Bathroom Equipment   (DENIES)   Home Equipment   (DENIES)   Prior Function   Level of [place] Independent with ADLs and functional mobility   Lives With Spouse;Daughter   Receives Help From Family   ADL Assistance Independent   IADLs Independent   Falls in the last 6 months 1 to 4  (2)   Vocational Full time employment   Restrictions/Precautions   Wells Max Bearing Precautions Per Order No   Braces or Orthoses   (NONE)   Other Precautions Fall Risk;Telemetry;Multiple lines; Chair Alarm;Cognitive  (CHAIR ALARM ACTIVE POST PT EVAL )   General   Family/Caregiver Present No   Cognition   Overall Cognitive Status Impaired   Arousal/Participation Alert   Attention Within functional limits   Orientation Level Oriented to person;Oriented to place;Oriented to situation;Disoriented to time   Memory Decreased short term memory   Following Commands Follows one step commands with increased time or repetition   RUE Assessment   RUE Assessment WFL   LUE Assessment   LUE Assessment WFL   RLE Assessment   RLE Assessment WFL  (4+/5 (GROSSLY))   LLE Assessment   LLE Assessment WFL  (4+/5 (GROSSLY))   Bed Mobility   Supine to Sit 6  Modified independent   Additional items HOB elevated; Bedrails   Sit to Supine Unable to assess   Transfers   Sit to Stand 5  Supervision   Stand to Sit 5  Supervision   Ambulation/Elevation   Gait pattern WNL   Gait Assistance 5  Supervision   Assistive Device None   Distance 200   Balance   Static Sitting Good   Static Standing Fair   Ambulatory Fair -   Activity Tolerance   Activity Tolerance Patient tolerated treatment well   Medical Staff Made Aware OT [address] 26    Nurse Made Aware RN [facility]    Assessment   Prognosis Good   Problem List Decreased cognition; Impaired judgement   Assessment PT COMPLETED EVALUATION OF 48YEAR OLD MALE ADMITTED TO [facility] ON 2/10/19 AS TRANSFER FROM [facility] WITH SYMPTOMS OF WORD FINDING DIFFICULTY AND R SIDED WEAKNESS  CVA ALERT  CTA HEAD/NECK IDENTIFIED L ICA AND L MCA OCCLUSION  TPA ADMINISTERED  CURRENT STATUS INSTABILITIES INCLUDE CONTINUED ICU ADMISSION, CONTINUOUS O2/HR/BP MONITORING, AND A REGRESSION IN FUNCTIONAL STATUS FROM BASELINE  PMH IS SIGNIFICANT FOR HTN AND ETOH ABUSE  PERSONAL FACTORS INCLUDED STEPS AT HOME  PRIOR TO THIS ADMISSION PATIENT REPORTS RESIDING WITH HIS SPOUSE AND ADULT DGHT (SPOUSE WORKS DURING DAY, PATIENT REPORTS DGHT IS HOME) IN A TWO STORY HOME WHERE HE REPORTS PRIOR I WITH MOBILITY (NO AD), ADLS, AND IADLS  REPORTS 2 FALLS IN PAST WEEK SECONDARY TO DIZZINESS  EMPLOYED AND +   CURRENT IMPAIRMENTS INCLUDED COGNITIVE DEFICITS (PENDING FORMAL COGNITIVE ASSESSMENT WITH OT), MINIMAL WORD FINDING DIFFICULTIES, FALLS RISK (LINES), AND DECREASED ACTIVITY TOLERANCE  DURING PT EVALUATION PATIENT'S B/L UE AND LE STRENGTH AND SENSATION WERE WNF  HE PERFORMED BED MOBILITY MOD-I AND SIT<-->STAND TRANSFER AND AMBULATION S LEVEL  PATIENT AMBULATED 200 FEET W/O USE OF AD, NO LOB, WNL  DO NOT ANTICIPATE DIFFICULTY NAVIGATING STAIRS UPON D/C  AT THIS PATIENT DEMONSTRATES SAFE/EFFECTIVE MOBILITY AND DENIES QUESTIONS/CONCERNS ABOUT D/C HOME  RECOMMEND CONTINUED MOBILITY WITH RN STAFF THIS ADMISSION  D/C INPT PT      Goals   Patient Goals TO USE THE BATHROOM    Treatment Day 0   Recommendation   Recommendation Home with family support;D/C PT   Equipment Recommended   (NONE)   PT - OK to Discharge Yes  (HOME W/ FAMILY ASSIST PRN)   Modified [place] Scale   Modified [place] Scale 3   Barthel Index   Feeding 10   Bathing 5   Grooming Score 5   Dressing Score 10   Bladder Score 10   Bowels Score 10   Toilet Use Score 10   Transfers (Bed/Chair) Score 10   Mobility (Level Surface) Score 10   Stairs Score 5   Barthel Index Score 85         Kavin Michael, PT

## 2019-02-11 NOTE — PROGRESS NOTES
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  Plan:  -MRI brain pending  -CTH demonstrated loss of left insular ribbon consistent with acute left MCA distribution stroke; subtle loss of gray white in the basal ganglia and frontal lobe  -CTA with occlusion of left ICA beginning at its origin and M1 occlusion  -Echo read pending  -TSH pending   -Hemoglobin A1c 5 7  -Lipid panel:  Cholesterol 264, triglycerides 166, HDL 39,   -Telemetry  -PT/OT/ST  -Therapeutic hypertension, goal SBP > 160 with IVF and pressor support for another 24 hours  -Plan for SBP goal 140-160 at 48 hours   -Continue dual antiplatelet therapy including  mg daily and Plavix 75 mg daily  -Continue atorvastatin 40 mg daily  -Continue folic acid 1 mg daily and thiamine 100 mg daily  -Close observation for withdrawal  -Frequent neuro checks  -Continue to monitor notify with changes    Subjective:   No acute events overnight  Patients he feels a lot batter compared to yesterday  Patient states his Right sided strength is 85% in comparison to his baseline at 100% Denies CP, SOB, headache, dizziness, vision changes, N/V, abdominal pain, weakness or numbness  ROS:  12 point ROS performed, as stated above, all others negative  Medications:   All current active meds have been reviewed and current meds:  Scheduled Meds:  Current Facility-Administered Medications:  aspirin 325 mg Oral Daily Carlos Arteaga, DO    atorvastatin 40 mg Oral Daily With [employer], DO    clopidogrel 75 mg Oral Daily Carlos Sakdiponephong, DO    folic acid 1 mg Oral Daily Carlos Sakdiponephong, DO    multivitamin-minerals 1 tablet Oral Daily Carlos Sakdiponephong, DO    phenylephine  mcg/min Intravenous Titrated Carlos Sakdiponephong, DO Last Rate: 65 mcg/min (02/10/19 2201)   sodium chloride 100 mL/hr Intravenous Continuous Carlos Sakdiponephong, DO Last Rate: 100 mL/hr (02/11/19 0200)   thiamine 100 mg Oral Daily Carlos Sakdiponephong, DO      Continuous Infusions:  phenylephine  mcg/min Last Rate: 65 mcg/min (02/10/19 2201)   sodium chloride 100 mL/hr Last Rate: 100 mL/hr (02/11/19 0200)     PRN Meds:       Vitals: Blood pressure (!) 184/96, pulse (!) 48, temperature 98 4 °F (36 9 °C), temperature source Oral, resp  rate 18, height 6' (1 829 m), weight 103 kg (226 lb 6 6 oz), SpO2 97 %  ,Body mass index is 30 71 kg/m²  Physical Exam   Constitutional: He appears well-developed and well-nourished  No distress  HENT:   Head: Normocephalic and atraumatic  Eyes: Pupils are equal, round, and reactive to light  EOM are normal    Neck: Normal range of motion  Neck supple  Cardiovascular: Normal rate and regular rhythm  Pulmonary/Chest: Effort normal and breath sounds normal  No respiratory distress  Abdominal: Soft  Bowel sounds are normal  There is no tenderness  Neurological: He has a normal Finger-Nose-Finger Test    Skin: Skin is warm and dry  He is not diaphoretic  Psychiatric: He has a normal mood and affect  His behavior is normal  Judgment and thought content normal      Neurologic Exam     Mental Status   Patient is alert, sitting up in chair  Oriented to person  No dysarthria or aphasia noted  States he is in [facility]  States the date is January 10th, 2019  Able to name all objects provided including thumb, lapel, pen, ink  Able to name the president  Name the days of the week forward and backwards   Able to follow multistep commands and answers all questions appropriately  Cranial Nerves     CN II   Visual fields full to confrontation  CN III, IV, VI   Pupils are equal, round, and reactive to light  Extraocular motions are normal    Nystagmus: none   Conjugate gaze: present    CN V   Facial sensation intact  CN VIII   Hearing: intact    CN IX, X   Palate: symmetric    CN XI   CN XI normal      CN XII   Tongue deviation: none    Lower right facial droop noted at rest, resolves when smiling      Motor Exam   Upward drift noted in RUE on pronator drift testing   strength symmetric  BUE and BLE strength symmetric and full 5/5 throughout      Sensory Exam   Light touch normal      Gait, Coordination, and Reflexes     Coordination   Finger to nose coordination: normal    Tremor   Resting tremor: absent    Reflexes   Right ankle clonus: absent  Left ankle clonus: absent  Intentional tremor noted on finger no nose bilaterally  Bilateral biceps, brachioradialis, and triceps reflexes 2 +  Bilateral patellar reflexes 2+ achilles reflexes 1+ bilat  Positive cross adductor reflexes bilat       Lab Results: I have personally reviewed pertinent reports    Recent Results (from the past 24 hour(s))   Basic metabolic panel    Collection Time: 02/10/19  9:59 AM   Result Value Ref Range    Sodium 139 136 - 145 mmol/L    Potassium 4 6 3 5 - 5 3 mmol/L    Chloride 104 100 - 108 mmol/L    CO2 27 21 - 32 mmol/L    ANION GAP 8 4 - 13 mmol/L    BUN 17 5 - 25 mg/dL    Creatinine 1 18 0 60 - 1 30 mg/dL    Glucose 123 65 - 140 mg/dL    Calcium 9 1 8 3 - 10 1 mg/dL    eGFR 70 ml/min/1 73sq m   CBC and Platelet    Collection Time: 02/10/19  9:59 AM   Result Value Ref Range    WBC 5 25 4 31 - 10 16 Thousand/uL    RBC 5 42 3 88 - 5 62 Million/uL    Hemoglobin 15 5 12 0 - 17 0 g/dL    Hematocrit 47 6 36 5 - 49 3 %    MCV 88 82 - 98 fL    MCH 28 6 26 8 - 34 3 pg    MCHC 32 6 31 4 - 37 4 g/dL    RDW 11 9 11 6 - 15 1 %    Platelets 341 642 - 138 Thousands/uL    MPV 8 8 (L) 8 9 - 12 7 fL   Protime-INR    Collection Time: 02/10/19  9:59 AM   Result Value Ref Range    Protime 10 1 9 4 - 11 7 seconds    INR 0 96 0 86 - 1 16   APTT    Collection Time: 02/10/19  9:59 AM   Result Value Ref Range    PTT 26 26 - 38 seconds   Lipid Panel with Direct LDL reflex    Collection Time: 02/11/19  4:33 AM   Result Value Ref Range    Cholesterol 264 (H) 50 - 200 mg/dL    Triglycerides 166 (H) <=150 mg/dL    HDL, Direct 39 (L) 40 - 60 mg/dL    LDL Calculated 192 (H) 0 - 100 mg/dL   Hemoglobin A1c w/EAG Estimation    Collection Time: 02/11/19  4:33 AM   Result Value Ref Range    Hemoglobin A1C 5 7 4 2 - 6 3 %     mg/dl   Comprehensive metabolic panel    Collection Time: 02/11/19  4:33 AM   Result Value Ref Range    Sodium 139 136 - 145 mmol/L    Potassium 4 1 3 5 - 5 3 mmol/L    Chloride 109 (H) 100 - 108 mmol/L    CO2 23 21 - 32 mmol/L    ANION GAP 7 4 - 13 mmol/L    BUN 11 5 - 25 mg/dL    Creatinine 1 00 0 60 - 1 30 mg/dL    Glucose 97 65 - 140 mg/dL    Calcium 8 5 8 3 - 10 1 mg/dL    AST 20 5 - 45 U/L    ALT 32 12 - 78 U/L    Alkaline Phosphatase 69 46 - 116 U/L    Total Protein 7 1 6 4 - 8 2 g/dL    Albumin 3 6 3 5 - 5 0 g/dL    Total Bilirubin 0 54 0 20 - 1 00 mg/dL    eGFR 86 ml/min/1 73sq m   CBC and differential    Collection Time: 02/11/19  4:33 AM   Result Value Ref Range    WBC 8 84 4 31 - 10 16 Thousand/uL    RBC 5 08 3 88 - 5 62 Million/uL    Hemoglobin 14 5 12 0 - 17 0 g/dL    Hematocrit 44 0 36 5 - 49 3 %    MCV 87 82 - 98 fL    MCH 28 5 26 8 - 34 3 pg    MCHC 33 0 31 4 - 37 4 g/dL    RDW 12 1 11 6 - 15 1 %    MPV 8 8 (L) 8 9 - 12 7 fL    Platelets 378 915 - 938 Thousands/uL    nRBC 0 /100 WBCs    Neutrophils Relative 70 43 - 75 %    Immat GRANS % 0 0 - 2 %    Lymphocytes Relative 20 14 - 44 %    Monocytes Relative 8 4 - 12 %    Eosinophils Relative 2 0 - 6 %    Basophils Relative 0 0 - 1 %    Neutrophils Absolute 6 24 1 85 - 7 62 Thousands/µL    Immature Grans Absolute 0 03 0 00 - 0 20 Thousand/uL    Lymphocytes Absolute 1 74 0 60 - 4 47 Thousands/µL    Monocytes Absolute 0 67 0 17 - 1 22 Thousand/µL    Eosinophils Absolute 0 13 0 00 - 0 61 Thousand/µL    Basophils Absolute 0 03 0 00 - 0 10 Thousands/µL   Magnesium    Collection Time: 02/11/19  4:33 AM   Result Value Ref Range    Magnesium 2 5 1 6 - 2 6 mg/dL   Phosphorus    Collection Time: 02/11/19  4:33 AM   Result Value Ref Range    Phosphorus 2 8 2 7 - 4 5 mg/dL   ]  Imaging Studies: I have personally reviewed pertinent reports and I have personally reviewed pertinent films in PACS  EKG, Pathology, and Other Studies: I have personally reviewed pertinent reports  VTE Prophylaxis: Sequential compression device (Venodyne)     Counseling / Coordination of Care  Total time spent today 30 minutes  Greater than 50% of total time was spent with the patient and/or family counseling and/or coordination of care  A description of the counseling/coordination of care:  Patient was seen and evaluated  Discussed with attending  Chart reviewed thoroughly including laboratory and imaging studies    Plan of care discussed with patient and primary team

## 2019-02-11 NOTE — SPEECH THERAPY NOTE
Speech Language/Pathology    Speech/Language Pathology Progress Note    Patient Name: Law PERERAJ'J Date: 2/11/2019     Met c pt and wife, pt reports improvement in speech  Cont to have mild intermittent word finding  Discussed formal language evaluation tomorrow, pt and wife agreeable

## 2019-02-11 NOTE — PLAN OF CARE
Problem: OCCUPATIONAL THERAPY ADULT  Goal: Performs self-care activities at highest level of function for planned discharge setting  See evaluation for individualized goals  Description  Treatment Interventions: ADL retraining, Functional transfer training, Endurance training, Cognitive reorientation, Patient/family training, Continued evaluation          See flowsheet documentation for full assessment, interventions and recommendations  Note:   Limitation: Decreased ADL status, Decreased Safe judgement during ADL, Decreased cognition, Decreased endurance, Decreased high-level ADLs  Prognosis: Fair  Assessment: Pt is a 47 y/o male seen for OT eval s/p adm to [facility] as a transfer from [facility] for stroke like symptoms that resolved- tPA withheld  Pt is dx'd w/ L MCA syndrome, R facial droop, dysarthria/aphasia, RUE weakness, CVA- CTH and CTA were performed, showing complete occlusion of the L ICA from the bifurcation to the terminus, and an M1 occlusion in the L MCA  Pt transferred to [facility] for hemodynamic support and neuro monitoring  Comorbidities include a h/o HTN and alcohol abuse  Pt with active OT orders and up with assistance  orders  Pt lives with spouse and dght in 2 , MARY LOU, bed/bath upstairs  Pt was I w/ ADLS and IADLS, drove, & required no use of DME PTA  Pt is currently demonstrating the following occupational deficits: S/CTG overall ADLS, transfers and functional mobility w/ no use of AD/DME  These deficits that are impacting pt's baseline areas of occupation are a result of the following impairments: endurance, activity tolerance, functional mobility, decreased I w/ ADLS/IADLS, cognitive impairments, decreased safety awareness and decreased insight into deficits, and mild word finding difficulty  The following Occupational Performance Areas to address include: grooming, bathing/shower, toilet hygiene, dressing, socialization, health maintenance, functional mobility, community mobility, clothing management, household maintenance, job performance/volunteering and social participation  Pt scored overall 70/100 on the Barthel Index  Based on the aforementioned OT evaluation, functional performance deficits, and assessments, pt has been identified as a moderate complexity evaluation  Recommend home w/ family support vs  OP OT pending progress & further cognitive evaluation upon D/C   Pt to continue to benefit from acute immediate OT services to address the following goals 3-5x/week to  w/in 7-10 days:      OT Discharge Recommendation: Home with family support(vs  OP OT pending progress)  OT - OK to Discharge: Yes(when medically stable)

## 2019-02-11 NOTE — UTILIZATION REVIEW
Initial Clinical Review    Admission: Date/Time/Statement: 2/10/19 @ 1257   Orders Placed This Encounter   Procedures    Inpatient Admission     Standing Status:   Standing     Number of Occurrences:   1     Order Specific Question:   Admitting Physician     Answer:   Mony Silva     Order Specific Question:   Level of Care     Answer:   Critical Care [15]     Order Specific Question:   Estimated length of stay     Answer:   More than 2 Midnights     Order Specific Question:   Certification     Answer:   I certify that inpatient services are medically necessary for this patient for a duration of greater than two midnights  See H&P and MD Progress Notes for additional information about the patient's course of treatment  Mike Devire   12-10-19  2 hours     Dx  TIA CVA  Transfer to [address] [place] [address]  Prior to arrival  Ivf  9% ns, iv Labetalol,  Aspirin        History of Illness    48year old female received from [address] [place] [address] ed for evaluation of aphasia for 2 weeks and new right sided weakness  with CT showing acute left middle cerebral artery distribution stroke  Symptoms resolved so TPA not given          02/10/19 1305 02/10/19 1305 02/10/19 1305 02/10/19 1305 02/10/19 1305   (!) 97 4 °F (36 3 °C) 70 20 165/98 98 %      No Pain       02/10/19 103 kg (226 lb 6 6 oz)     Vital Signs (abnormal):  Date/Time  Temp  Pulse   BP  MAP (mmHg)    02/11/19 0600    48Abnormal    184/96Abnormal   144    02/11/19 0500    48Abnormal    184/95Abnormal   122    02/11/19 0400  98 4 °F (36 9 °C)  50Abnormal    174/86Abnormal   126    02/11/19 0200    66   182/103Abnormal   124    02/11/19 0100    68   176/84Abnormal   114    02/11/19 0000  98 6 °F (37 °C)  52Abnormal    197/100Abnormal   134    02/10/19 2300    50Abnormal    185/94Abnormal   145    02/10/19 2200    52Abnormal    176/80Abnormal   133    02/10/19 2100    58   179/98Abnormal   138          Pertinent Labs/Diagnostic Test Results: CTA  Head and neck      demonstrates loss of the left insular ribbon consistent with acute left middle cerebral artery distribution stroke  Laurenles Sheppardsheila noted is a small focus of encephalomalacia in the left posterior insula and low left posterior frontal lobe   consistent with small chronic cortical infarction in that location   No acute intracranial hemorrhage             CT head   Noncontrast head CT demonstrates loss of the left insular ribbon consistent with acute left middle cerebral artery distribution stroke        Also noted is a small focus of decreased density in the cortical left posterior insula and low left posterior frontal lobe consistent with small late subacute or perhaps chronic posterior MCA distribution infarction  No acute intracranial hemorrhage  Physical exam : oriented to person,  Expressive aphasia, right facial droop and right upper extremity weakness  GCS 15        Past Medical History:   Diagnosis Date    Hypertension      Admitting Diagnosis: CVA (cerebral vascular accident) ([facility] [address] ) [I63 9]       Age/Sex: 48 y o  male   Neuro surgery evaluation in [facility]        Assessment/Plan:    Ischemic M1 CVA  Nih=4   · Thrombolytics contraindicated at this time  · Daily  and 300mg Plavix  · Neurology consulted  · Neurosurgery consulted  · Neurochecks q1  · F/u MRI  · Lipid panel, A1c pending    alcohol abuse   8-10 drinks per day     ciwa protocol for risk of alcohol withdrawal      Per neurology  Not a candidate for TPA Last known well unknown   Neuro surgery  Recommendations pending      Admission Orders:    ciwa assessments  And protocol  Telemetry  Consult neuro surgery   Npo   PT OT and Speech eval and treat   Dysphagia assessment prior to po intake  Stroke education  Neuro checks  q1 hr     aspirin 325 mg Oral Daily   atorvastatin 40 mg Oral Daily With [employer]   clopidogrel 75 mg Oral Daily   folic acid 1 mg Oral Daily   multivitamin-minerals 1 tablet Oral Daily   phenylephine  mcg/min Intravenous Titrated   sodium chloride 100 mL/hr Intravenous Continuous   thiamine 100 mg Oral Daily

## 2019-02-11 NOTE — CONSULTS
Consultation - Neurosurgery   Maria Teresa Mendoza 48 y o  male MRN: [id number]  Unit/Bed#: 3150 [facility] -01 Encounter: [id number]    Images reviewed at morning rounds on 2/11/2019 at 8:00AM  Patient was seen and examined on 2/11/2019  at 8:45am    Inpatient consult to Neurosurgery  Consult performed by: Cleopatra Ovalles PA-C  Consult ordered by: Monica Yoon DO          Assessment/Plan               Assessment:  1  Left MCA syndrome  2  Right facial drrop  3  Dysarthria/ Aphasia  4  Right UE weakness  5  History of alcohol abuse      Plan:   · Exam: GCS 14-15, OE=4, VR=5 ; MR=6, SF without dysarthria/aphasia, very subtle right facial assymetry LUO, non focal  · Images:  CT of head on 02/10/2019 demonstrates loss of left insular ribbon consistent with L MCA distribution stroke  No acute intracranial hemorrhage  · CTA stroke alert on 02/10/2019 demonstrates occlusion of left mid to distal left M1 middle cerebral artery segment  · MRI of head is pending  · Pain control: per primary team  · DVT ppx:   · SCDs  · Continue ASA & Plavix as ordered  · Seizure ppx: None  · Activity: Bedside when able  · PT/OT evaluation & treatment-bed side  · Medical Mx: per primary team  · Neurology evaluation and follow up  · Neurocheck: Q 1H  · HOB>30-45 degrees  · sBP>160  · NSx evaluated the patient this morning  Patient is doing fine without complaint, denies headache nausea vomiting or visual issues  Improved speech and right-sided upper extremity weakness  Neuro exam nonfocal   No NSx  procedure is indicated at this juncture  Patient can continue plavix and ASA and follow with Neurology  Okay to give him tPA if he declines neurologically  BP control with primary provider  Will sign off  Call for concern or problem    HPI: Maria Teresa Mendoza is a 48y o  year old male with PMHx of alcohol abuse, admitted with right facial droop, right upper extremity weakness and dysarthria followed by aphasia of 1 day duration    Patient states that he had right upper extremity weakness and facial droop about 2 weeks ago which progresses, followed by fainting and dysarthria a day before his admission  Patient denies history of nausea, vomiting, headache, blurry vision or diplopia  Denies history of bowel or bladder issues  Denies history of previous stroke, seizure, bleeding disorder or taking anticoagulant medication  Denies history of hypertension, congestive heart failure, diabetes mellitus, or vascular disease  History of fever, chills, rigors, cough or chest pain  He states his speech improved and right-sided facial droop and upper extremity weakness improved remarkably  Patient denies history of smoking cigarettes, but drinks beer every day  CT head demonstrates loss of left insular ribbon consistent with L MCA distribution stroke  No acute intracranial hemorrhage  CTA showsocclusion of left mid to distal left M1 middle cerebral artery segment  Review of Systems   Constitutional: Negative for activity change, appetite change, chills and fever  HENT: Negative for trouble swallowing and voice change  Eyes: Negative for photophobia and visual disturbance  Respiratory: Negative for apnea, cough and stridor  Cardiovascular: Negative for chest pain and leg swelling  Gastrointestinal: Negative for diarrhea, nausea and vomiting  Genitourinary: Negative for difficulty urinating  Skin: Negative for wound  Neurological: Positive for dizziness, facial asymmetry and speech difficulty  Negative for tremors, seizures, syncope, weakness, light-headedness, numbness and headaches  Hematological: Does not bruise/bleed easily  Psychiatric/Behavioral: Negative for confusion and decreased concentration  Historical Information   Past Medical History:   Diagnosis Date    Hypertension      History reviewed  No pertinent surgical history    Social History     Substance and Sexual Activity   Alcohol Use Yes    Alcohol/week: 3 6 oz    Types: 6 Cans of beer per week    Frequency: 4 or more times a week    Drinks per session: 5 or 6    Binge frequency: Weekly     Social History     Substance and Sexual Activity   Drug Use Never     Social History     Tobacco Use   Smoking Status Never Smoker   Smokeless Tobacco Never Used     History reviewed  No pertinent family history  Meds/Allergies   all current active meds have been reviewed and current meds:   Current Facility-Administered Medications   Medication Dose Route Frequency    aspirin tablet 325 mg  325 mg Oral Daily    atorvastatin (LIPITOR) tablet 40 mg  40 mg Oral Daily With Dinner    clopidogrel (PLAVIX) tablet 75 mg  75 mg Oral Daily    folic acid (FOLVITE) tablet 1 mg  1 mg Oral Daily    multivitamin-minerals (CENTRUM) tablet 1 tablet  1 tablet Oral Daily    phenylephrine (BAYLEE-SYNEPHRINE) 50 mg (STANDARD CONCENTRATION) in sodium chloride 0 9% 250 mL   mcg/min Intravenous Titrated    sodium chloride 0 9 % infusion  100 mL/hr Intravenous Continuous    thiamine (VITAMIN B1) tablet 100 mg  100 mg Oral Daily     No Known Allergies    Objective   I/O       02/09 0701 - 02/10 0700 02/10 0701 - 02/11 0700 02/11 0701 - 02/12 0700    P  O   240     I V  (mL/kg)  1608 9 (15 6)     Total Intake(mL/kg)  1848 9 (18)     Urine (mL/kg/hr)  2200     Total Output  2200     Net  -351 1                  Physical Exam   Constitutional: He appears well-developed and well-nourished  HENT:   Head: Normocephalic and atraumatic  Eyes: Pupils are equal, round, and reactive to light  Conjunctivae and EOM are normal    Neck: Normal range of motion  Neck supple  Cardiovascular: Normal rate  Pulmonary/Chest: Effort normal and breath sounds normal  He exhibits no tenderness  Abdominal: Soft  Musculoskeletal: Normal range of motion  Neurological: He is alert  He has normal strength  He displays no tremor  No sensory deficit  GCS eye subscore is 4  GCS verbal subscore is 5   GCS motor subscore is 6  He displays no Babinski's sign on the right side  He displays no Babinski's sign on the left side  Reflex Scores:       Tricep reflexes are 3+ on the right side and 2+ on the left side  Bicep reflexes are 3+ on the right side and 2+ on the left side  Brachioradialis reflexes are 3+ on the right side and 2+ on the left side  Patellar reflexes are 2+ on the right side and 2+ on the left side  Achilles reflexes are 2+ on the right side and 2+ on the left side  Very subtle right facial droop   Skin: Skin is warm  Psychiatric: His speech is normal      Neurologic Exam     Mental Status   Disoriented to person  Disoriented to place  Oriented to country, city and area  Disoriented to date  Oriented to year and month  Registration: recalls 3 of 3 objects  Recall at 5 minutes: recalls 3 of 3 objects  Attention: normal  Concentration: normal    Speech: speech is normal   Level of consciousness: alert  Knowledge: good  Able to perform simple calculations  Able to name object  Cranial Nerves     CN II   Visual fields full to confrontation  CN III, IV, VI   Pupils are equal, round, and reactive to light  Extraocular motions are normal    Right pupil: Size: 2 mm  Shape: regular  Reactivity: brisk  Left pupil: Size: 2 mm  Shape: regular  Reactivity: brisk  Nystagmus: none     CN V   Right facial sensation deficit: none  Left facial sensation deficit: none    CN VII   Right facial weakness: none  Left facial weakness: none    CN XI   CN XI normal      CN XII   CN XII normal      Motor Exam   Muscle bulk: normal  Overall muscle tone: normal  Right arm tone: normal  Left arm tone: normal  Right arm pronator drift: absent  Left arm pronator drift: absent  Right leg tone: normal  Left leg tone: normal    Strength   Strength 5/5 throughout       Sensory Exam   Light touch normal    Proprioception normal    Pinprick normal      Gait, Coordination, and Reflexes     Reflexes   Right brachioradialis: 3+  Left brachioradialis: 2+  Right biceps: 3+  Left biceps: 2+  Right triceps: 3+  Left triceps: 2+  Right patellar: 2+  Left patellar: 2+  Right achilles: 2+  Left achilles: 2+  Right : 2+  Left : 2+  Right plantar: normal  Left plantar: normal  Right Louie: absent  Left Louie: absent  Right ankle clonus: absent  Left ankle clonus: absent      Vitals:Blood pressure (!) 184/96, pulse (!) 48, temperature 98 4 °F (36 9 °C), temperature source Oral, resp  rate 18, height 6' (1 829 m), weight 103 kg (226 lb 6 6 oz), SpO2 97 %  ,Body mass index is 30 71 kg/m²  Lab Results:   Results from last 7 days   Lab Units 02/11/19  0433 02/10/19  0959   WBC Thousand/uL 8 84 5 25   HEMOGLOBIN g/dL 14 5 15 5   HEMATOCRIT % 44 0 47 6   PLATELETS Thousands/uL 248 223   NEUTROS PCT % 70  --    MONOS PCT % 8  --      Results from last 7 days   Lab Units 02/11/19  0433 02/10/19  0959   POTASSIUM mmol/L 4 1 4 6   CHLORIDE mmol/L 109* 104   CO2 mmol/L 23 27   BUN mg/dL 11 17   CREATININE mg/dL 1 00 1 18   CALCIUM mg/dL 8 5 9 1   ALK PHOS U/L 69  --    ALT U/L 32  --    AST U/L 20  --      Results from last 7 days   Lab Units 02/11/19  0433   MAGNESIUM mg/dL 2 5     Results from last 7 days   Lab Units 02/11/19  0433   PHOSPHORUS mg/dL 2 8     Results from last 7 days   Lab Units 02/10/19  0959   INR  0 96   PTT seconds 26     No results found for: TROPONINT  ABG:No results found for: PHART, NUC1LWI, PO2ART, VTS0LXB, Y8DCKTQZ, BEART, SOURCE    Imaging Studies: I have personally reviewed pertinent reports  and I have personally reviewed pertinent films in PACS    EKG, Pathology, and Other Studies: I have personally reviewed pertinent reports     and I have personally reviewed pertinent films in PACS    VTE Prophylaxis: Sequential compression device Marly Vivas     Code Status: Level 1 - Full Code  Advance Directive and Living Will:      Power of :    POLST:      Counseling / Coordination of Care  I spent 20 minutes with the patient

## 2019-02-12 LAB
ANION GAP SERPL CALCULATED.3IONS-SCNC: 6 MMOL/L (ref 4–13)
BASOPHILS # BLD AUTO: 0.06 THOUSANDS/ΜL (ref 0–0.1)
BASOPHILS NFR BLD AUTO: 1 % (ref 0–1)
BUN SERPL-MCNC: 9 MG/DL (ref 5–25)
CA-I BLD-SCNC: 1.15 MMOL/L (ref 1.12–1.32)
CALCIUM SERPL-MCNC: 8.3 MG/DL (ref 8.3–10.1)
CHLORIDE SERPL-SCNC: 107 MMOL/L (ref 100–108)
CO2 SERPL-SCNC: 24 MMOL/L (ref 21–32)
CREAT SERPL-MCNC: 1.01 MG/DL (ref 0.6–1.3)
EOSINOPHIL # BLD AUTO: 0.16 THOUSAND/ΜL (ref 0–0.61)
EOSINOPHIL NFR BLD AUTO: 2 % (ref 0–6)
ERYTHROCYTE [DISTWIDTH] IN BLOOD BY AUTOMATED COUNT: 12.1 % (ref 11.6–15.1)
GFR SERPL CREATININE-BSD FRML MDRD: 85 ML/MIN/1.73SQ M
GLUCOSE SERPL-MCNC: 95 MG/DL (ref 65–140)
HCT VFR BLD AUTO: 42.7 % (ref 36.5–49.3)
HGB BLD-MCNC: 14 G/DL (ref 12–17)
IMM GRANULOCYTES # BLD AUTO: 0.02 THOUSAND/UL (ref 0–0.2)
IMM GRANULOCYTES NFR BLD AUTO: 0 % (ref 0–2)
LYMPHOCYTES # BLD AUTO: 1.9 THOUSANDS/ΜL (ref 0.6–4.47)
LYMPHOCYTES NFR BLD AUTO: 21 % (ref 14–44)
MCH RBC QN AUTO: 28.5 PG (ref 26.8–34.3)
MCHC RBC AUTO-ENTMCNC: 32.8 G/DL (ref 31.4–37.4)
MCV RBC AUTO: 87 FL (ref 82–98)
MONOCYTES # BLD AUTO: 0.72 THOUSAND/ΜL (ref 0.17–1.22)
MONOCYTES NFR BLD AUTO: 8 % (ref 4–12)
NEUTROPHILS # BLD AUTO: 6.13 THOUSANDS/ΜL (ref 1.85–7.62)
NEUTS SEG NFR BLD AUTO: 68 % (ref 43–75)
NRBC BLD AUTO-RTO: 0 /100 WBCS
PA ADP BLD-ACNC: 399 ARU
PLATELET # BLD AUTO: 223 THOUSANDS/UL (ref 149–390)
PMV BLD AUTO: 8.9 FL (ref 8.9–12.7)
POTASSIUM SERPL-SCNC: 3.8 MMOL/L (ref 3.5–5.3)
RBC # BLD AUTO: 4.92 MILLION/UL (ref 3.88–5.62)
SODIUM SERPL-SCNC: 137 MMOL/L (ref 136–145)
WBC # BLD AUTO: 8.99 THOUSAND/UL (ref 4.31–10.16)

## 2019-02-12 PROCEDURE — 82330 ASSAY OF CALCIUM: CPT | Performed by: PHYSICIAN ASSISTANT

## 2019-02-12 PROCEDURE — 99232 SBSQ HOSP IP/OBS MODERATE 35: CPT | Performed by: PSYCHIATRY & NEUROLOGY

## 2019-02-12 PROCEDURE — 85576 BLOOD PLATELET AGGREGATION: CPT | Performed by: PSYCHIATRY & NEUROLOGY

## 2019-02-12 PROCEDURE — 92523 SPEECH SOUND LANG COMPREHEN: CPT

## 2019-02-12 PROCEDURE — 93306 TTE W/DOPPLER COMPLETE: CPT | Performed by: INTERNAL MEDICINE

## 2019-02-12 PROCEDURE — 99233 SBSQ HOSP IP/OBS HIGH 50: CPT | Performed by: INTERNAL MEDICINE

## 2019-02-12 PROCEDURE — G9162 LANG EXPRESS CURRENT STATUS: HCPCS

## 2019-02-12 PROCEDURE — 85025 COMPLETE CBC W/AUTO DIFF WBC: CPT | Performed by: PHYSICIAN ASSISTANT

## 2019-02-12 PROCEDURE — 80048 BASIC METABOLIC PNL TOTAL CA: CPT | Performed by: PHYSICIAN ASSISTANT

## 2019-02-12 PROCEDURE — G9163 LANG EXPRESS GOAL STATUS: HCPCS

## 2019-02-12 RX ORDER — ALBUMIN, HUMAN INJ 5% 5 %
25 SOLUTION INTRAVENOUS ONCE
Status: COMPLETED | OUTPATIENT
Start: 2019-02-12 | End: 2019-02-12

## 2019-02-12 RX ORDER — MIDODRINE HYDROCHLORIDE 5 MG/1
5 TABLET ORAL
Status: DISCONTINUED | OUTPATIENT
Start: 2019-02-13 | End: 2019-02-13

## 2019-02-12 RX ORDER — ATORVASTATIN CALCIUM 80 MG/1
80 TABLET, FILM COATED ORAL
Status: DISCONTINUED | OUTPATIENT
Start: 2019-02-12 | End: 2019-02-14 | Stop reason: HOSPADM

## 2019-02-12 RX ORDER — ASPIRIN 81 MG/1
324 TABLET, CHEWABLE ORAL DAILY
Status: DISCONTINUED | OUTPATIENT
Start: 2019-02-13 | End: 2019-02-12

## 2019-02-12 RX ORDER — ASPIRIN 81 MG/1
81 TABLET, CHEWABLE ORAL DAILY
Status: DISCONTINUED | OUTPATIENT
Start: 2019-02-13 | End: 2019-02-14 | Stop reason: HOSPADM

## 2019-02-12 RX ORDER — MIDODRINE HYDROCHLORIDE 2.5 MG/1
2.5 TABLET ORAL
Status: DISCONTINUED | OUTPATIENT
Start: 2019-02-12 | End: 2019-02-12

## 2019-02-12 RX ORDER — ALBUMIN, HUMAN INJ 5% 5 %
12.5 SOLUTION INTRAVENOUS ONCE
Status: DISCONTINUED | OUTPATIENT
Start: 2019-02-12 | End: 2019-02-12

## 2019-02-12 RX ORDER — ASPIRIN 81 MG/1
81 TABLET, CHEWABLE ORAL DAILY
Status: DISCONTINUED | OUTPATIENT
Start: 2019-02-13 | End: 2019-02-12

## 2019-02-12 RX ORDER — HEPARIN SODIUM 5000 [USP'U]/ML
5000 INJECTION, SOLUTION INTRAVENOUS; SUBCUTANEOUS EVERY 8 HOURS SCHEDULED
Status: DISCONTINUED | OUTPATIENT
Start: 2019-02-13 | End: 2019-02-14 | Stop reason: HOSPADM

## 2019-02-12 RX ADMIN — PHENYLEPHRINE HYDROCHLORIDE 50 MCG/MIN: 10 INJECTION INTRAVENOUS at 12:42

## 2019-02-12 RX ADMIN — ALBUMIN (HUMAN) 25 G: 12.5 SOLUTION INTRAVENOUS at 12:42

## 2019-02-12 RX ADMIN — CARBIDOPA AND LEVODOPA 5 MG: 50; 200 TABLET, EXTENDED RELEASE ORAL at 12:41

## 2019-02-12 RX ADMIN — ENOXAPARIN SODIUM 40 MG: 40 INJECTION SUBCUTANEOUS at 09:18

## 2019-02-12 RX ADMIN — ATORVASTATIN CALCIUM 80 MG: 80 TABLET, FILM COATED ORAL at 15:38

## 2019-02-12 RX ADMIN — PHENYLEPHRINE HYDROCHLORIDE 40 MCG/MIN: 10 INJECTION INTRAVENOUS at 16:52

## 2019-02-12 RX ADMIN — CARBIDOPA AND LEVODOPA 5 MG: 50; 200 TABLET, EXTENDED RELEASE ORAL at 15:38

## 2019-02-12 RX ADMIN — THIAMINE HCL TAB 100 MG 100 MG: 100 TAB at 09:17

## 2019-02-12 RX ADMIN — FOLIC ACID 1 MG: 1 TABLET ORAL at 09:17

## 2019-02-12 RX ADMIN — Medication 1 TABLET: at 09:18

## 2019-02-12 RX ADMIN — CLOPIDOGREL BISULFATE 75 MG: 75 TABLET ORAL at 09:17

## 2019-02-12 RX ADMIN — ASPIRIN 325 MG ORAL TABLET 325 MG: 325 PILL ORAL at 09:18

## 2019-02-12 NOTE — PROGRESS NOTES
S: Patient was taken of phenylephrine drip and started on Midodrine 2 5 PO TID  At 1215, patient's wife mentioned to the residents that the patient feels off  On exam the patient's bp was 127/82  Patient denies any lightheadedness, headache, shortness of breath, MALIK, chest pain/pressure, visual deficits, or weakness  O: pt is awake, alert, and orientated x3  Patient continues to have right facial droop but no word finding or dysarthria  Tongue is midline and CN II-XII are intact  Mild pronator drift of RUE with 4/5 strength  Strength is 5/5 in all other extremities  Sensation and coordination is intact  AP: will restart patient on phenylephrine infusion starting at 25mcg/min with an increase in 10mcg/min every 10 minutes  Will give patient 25mg of 5% albumin IV  Increase midodrine 2 5mg to 5mg TID and continue to monitor BP's with SBP >140

## 2019-02-12 NOTE — UTILIZATION REVIEW
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      Continue with right  Sided weakness        Vital Signs:     02/11/19 2100    60  19  179/100Abnormal   125  98 %       02/11/19 2000  100 °F (37 8 °C)  60  24Abnormal   171/91Abnormal   132  99 %       02/11/19 1931    62  23Abnormal   158/93  114  98 %       02/11/19 1840    66  23Abnormal   171/88Abnormal   128  97 %       02/11/19 1600  99 °F (37 2 °C)  55  22  186/96Abnormal   140  98 %       02/11/19 1400    64  19  159/90  120  97 %       02/11/19 1300    58  20  154/87  111  97 %       02/11/19 1200  99 4 °F (37 4 °C)  56  22  160/88  111  97 %       02/11/19 1100    64  24Abnormal   163/95  120  97 %       02/11/19 1000    70  21  128/85  99  98 %       02/11/19 0900    72  24Abnormal   173/102Abnormal   137  97 %       02/11/19 0800  98 9 °F (37 2 °C)  76  30Abnormal   173/102Abnormal   127  98 %       02/11/19 0600    48Abnormal   18  184/96Abnormal   144  97 %       02/11/19 0500    48Abnormal   26Abnormal   184/95Abnormal   122  96 %       02/11/19 0400  98 4 °F (36 9 °C)  50Abnormal   14  174/86Abnormal   126  96 %  None (Room air)  Lying   02/11/19 0300    48Abnormal   13  169/87  135  96 %       02/11/19 0200    66  17  182/103Abnormal   124  98 %       02/11/19 0100    68  22  176/84Abnormal   114  96 %       02/11/19 0000  98 6 °F (37 °C)  52Abnormal   15  197/100Abnormal   134  97 %  None (Room air)  Lying     Assessment/Plan:    Neurology progress   MRI brain results pending   Echo results pending  Continue telemetry  PT OT and Speech eval and treat  Therapeutic hypertension goal sbp>160   Continue iv fluids  And pressor support   Goal  sbp 140 to 160  At  4 hours  continue dual antiplatelet  Therapy  Of aspirin and plavix  continue atorvastatin  Continue folic acid and thiamine and monitor  for withdrawal   Continue neuro checks          CONSULT PHYSICAL MEDICATION AND REHABILITATION   Pt is unable to safely return home until he is at the supervision/contact-guard functional level (25% assistance level with or without a device)  CONSULT NEURO SURGERY   No neuro surgery procedure procedure required  As symptoms improving    If declines  Ok to give TPA    Medications:        [START ON 2/13/2019] aspirin 324 mg Oral Daily   atorvastatin 80 mg Oral Daily With Dinner   clopidogrel 75 mg Oral Daily   folic acid 1 mg Oral Daily   [START ON 2/13/2019] heparin (porcine) 5,000 Units Subcutaneous Q8H [address]   multivitamin-minerals 1 tablet Oral Daily   phenylephine  mcg/min Intravenous Titrated   thiamine 100 mg Oral Daily       Discharge Plan: to be determined pending progress with therapy and recommendations of PMR

## 2019-02-12 NOTE — PROGRESS NOTES
Progress Note - Critical Care   Marisela Cholo 48 y o  male MRN: [id number]  Unit/Bed#: 3150 [facility]  Encounter: [id number]    Attending Physician: Shanika Hare MD      ______________________________________________________________________  Assessment and Plan:   Principal Problem:    CVA (cerebral vascular accident) [facility])  Active Problems:    Alcohol abuse    Hypertension  Resolved Problems:    * No resolved hospital problems  *       Neuro:   1  Ischemic Left M1 CVA and occlusion of L ICA  · Thrombolytics contraindicated at this time  · Daily Asa and plavix  · SBP BP goal >160 mmHg with phenylephrine @ 40mcg/min  · Adjust to 140-160 at 3pm today  · Neurology consulted: long term goal 140-160 SBP,   · Neurosurgery consulted: ok to use tPA if symptoms recur, signed off  · Neurochecks q1  · MRI: Late acute left MCA territory infarct small with small petechial hemorhages in area of infarct  · High dose statin lipitor 40mg     2  Alcohol abuse  · CIWA protocol  · Thiamine and folate  · Alcohol cessation counseling     Neuro checks as per unit protocol  Take precautions to prevent ICU delirium  Monitor GCS     CV:   · SBP goal >160  · Meeting goal with 40mcg/min of Gee  · Echo pending read     Hyperlipidemia  · Cholesterol 264  · T  ·   · Continue Lipitor 40mg upon discharge  · ASCVD risk: 18 6%  Continuous telemetry      Pulm:   · No acute issues  · Continue spirometry  · Maintain SpO2 >90%     GI:   · GI ppx: n/a  · Bowel regimen: colace      :   · UOP 1300 mL over past 24H (undetermined mL/hr)   · Monitor I&Os, net +1934 6 over past 24H  (2 unmeasured urines)  · Cr 1 01, GFR 85     F/E/N:   1  Fluids/Electrolytes  · IVF Nss running at 100 mL/hr currently  · Replete as needed       2   Nutrition  · Nutrition recommendations: regular house     ID:   No evidence of infection, afebrile, no  leukocytosis        Heme: No acute issues  1   DVT ppx: Lovenox and SCD's b/l     Endo:   Mild Hypothyroidism  · TSH 3 77  · Free T4 0 72    A1C: 5 7  q6 BG checks                Msk/Skin:   · Ambulate with assistance  · PT/OT   · PM&R consult     Disposition: continue close BP monitoring for 24 hours and then consider weaning off pressors  Code Status: Level 1 - Full Code    ______________________________________________________________________    Chief Complaint: No complaints    24 Hour Events: No acute events  Pt had MRI and has maintained adequate SBP goal >160      ______________________________________________________________________    Physical Exam   Constitutional: He is oriented to person, place, and time  He appears well-developed and well-nourished  No distress  HENT:   Head: Normocephalic and atraumatic  Nose: Nose normal    Mouth/Throat: Oropharynx is clear and moist    Eyes: Pupils are equal, round, and reactive to light  Conjunctivae and EOM are normal    Neck: Normal range of motion  Neck supple  No JVD present  No tracheal deviation present  Cardiovascular: Normal rate, regular rhythm and normal heart sounds  No murmur heard  Pulmonary/Chest: Effort normal and breath sounds normal  No stridor  No respiratory distress  He has no wheezes  Abdominal: Soft  Bowel sounds are normal  He exhibits no distension  There is no tenderness  There is no guarding  Musculoskeletal: He exhibits no edema or tenderness  Neurological: He is alert and oriented to person, place, and time  He displays normal reflexes  No cranial nerve deficit or sensory deficit  He exhibits normal muscle tone  Coordination normal  GCS eye subscore is 3  GCS verbal subscore is 5  GCS motor subscore is 6    4/5 strength RUE  Mild right facial droop   Skin: Skin is warm and dry  Capillary refill takes less than 2 seconds  He is not diaphoretic  Psychiatric: He has a normal mood and affect   His behavior is normal          ______________________________________________________________________  Vitals: 19 0255 19 0355 19 0500 19 0600   BP: 163/72 163/90 161/98 (!) 183/95   Pulse: (!) 54 68 (!) 52 (!) 52   Resp: 15 20 16 17   Temp:  99 2 °F (37 3 °C)     TempSrc:  Oral     SpO2: 94% 93% 94% 95%   Weight:       Height:           Temperature:   Temp (24hrs), Av 3 °F (37 4 °C), Min:98 9 °F (37 2 °C), Max:100 °F (37 8 °C)    Current Temperature: 99 2 °F (37 3 °C)  Weights:   IBW: 77 6 kg    Body mass index is 30 71 kg/m²  Weight (last 2 days)     Date/Time   Weight    02/10/19 1305   103 (226 41)            Hemodynamic Monitoring: Peripheral BP    Non-Invasive/Invasive Ventilation Settings:  Respiratory    Lab Data (Last 4 hours)    None         O2/Vent Data (Last 4 hours)    None              No results found for: PHART, OGF4IUK, PO2ART, SSI4KMQ, I8NMIOJM, BEART, SOURCE  SpO2 96% on RA  Intake and Outputs:  I/O       02/10 07 -  0700  07 -  0700    P  O  240 430    I V  (mL/kg) 1608 9 (15 6) 2804 6 (27 2)    Total Intake(mL/kg) 1848 9 (18) 3234 6 (31 4)    Urine (mL/kg/hr) 2200 1300 (0 5)    Total Output 2200 1300    Net -351 1 +1934 6          Unmeasured Urine Occurrence  2 x        UOP: 54 2/hour, not accurate, missed 2 urine occurrences   Nutrition:        Diet Orders   (From admission, onward)            Start     Ordered    195  Diet Regular; Regular House; Sodium 4 GM (PATSY), Lo Cholesterol  Diet effective now     Question Answer Comment   Diet Type Regular    Regular Regular House    Other Restriction(s): Sodium 4 GM (PATSY)    Other Restriction(s): Lo Cholesterol    RD to adjust diet per protocol?  Yes        19 1735          Labs:   Results from last 7 days   Lab Units 19  0443 19  0433 02/10/19  0959   WBC Thousand/uL 8 99 8 84 5 25   HEMOGLOBIN g/dL 14 0 14 5 15 5   HEMATOCRIT % 42 7 44 0 47 6   PLATELETS Thousands/uL 223 248 223   NEUTROS PCT % 68 70  --    MONOS PCT % 8 8  --      Results from last 7 days   Lab Units 19  0446 02/11/19  0433 02/10/19  0959   POTASSIUM mmol/L 3 8 4 1 4 6   CHLORIDE mmol/L 107 109* 104   CO2 mmol/L 24 23 27   BUN mg/dL 9 11 17   CREATININE mg/dL 1 01 1 00 1 18   CALCIUM mg/dL 8 3 8 5 9 1   ALK PHOS U/L  --  69  --    ALT U/L  --  32  --    AST U/L  --  20  --      Results from last 7 days   Lab Units 02/11/19  0433   MAGNESIUM mg/dL 2 5     Lab Results   Component Value Date    PHOS 2 8 02/11/2019      Results from last 7 days   Lab Units 02/10/19  0959   INR  0 96   PTT seconds 26     No results found for: TROPONINI      ABG:No results found for: PHART, FZR5KZC, PO2ART, FXG5DVS, X0JZLZHO, BEART, SOURCE  Imaging:   MRI brain wo contrast   Final Result by Brijesh Soni MD (02/11 2318)      Late acute left MCA territory infarct, primarily involving the dorsal lateral aspect of the left frontal lobe, frontal operculum and left basal ganglia  Mild petechial hemorrhage  No significant mass effect  The study was marked in [facility] for immediate notification        Workstation performed: [id number]           Echo pending read      Micro:  No results found for: CYNTHIA Kline  Allergies: No Known Allergies  Medications:   Scheduled Meds:  Current Facility-Administered Medications:  aspirin 325 mg Oral Daily Carlos Sakdiponephong, DO    atorvastatin 40 mg Oral Daily With [employer], DO    clopidogrel 75 mg Oral Daily Carlos Sakdiponephong, DO    enoxaparin 40 mg Subcutaneous Q24H [address] Neftaly Weiner PA-C    folic acid 1 mg Oral Daily Carlos Sakdiponephong, DO    multivitamin-minerals 1 tablet Oral Daily Carlos Sakdiponephong, DO    phenylephine  mcg/min Intravenous Titrated Yovana Hua PA-C Last Rate: 35 mcg/min (02/12/19 0601)   sodium chloride 100 mL/hr Intravenous Continuous Carlos Sakdiponephong, DO Last Rate: 100 mL/hr (02/11/19 1143)   thiamine 100 mg Oral Daily Carlos Sakdiponephong, DO      Continuous Infusions:  phenylephine  mcg/min Last Rate: 35 mcg/min (02/12/19 0601)   sodium chloride 100 mL/hr Last Rate: 100 mL/hr (02/11/19 1143)     PRN Meds:     VTE Pharmacologic Prophylaxis: Enoxaparin (Lovenox)  VTE Mechanical Prophylaxis: sequential compression device  Invasive lines and devices: Invasive Devices     Peripheral Intravenous Line            Peripheral IV 02/10/19 Right Antecubital 1 day    Peripheral IV 02/11/19 Left Forearm 1 day                     Portions of the record may have been created with voice recognition software  Occasional wrong word or "sound a like" substitutions may have occurred due to the inherent limitations of voice recognition software  Read the chart carefully and recognize, using context, where substitutions have occurred      [address], DO

## 2019-02-12 NOTE — PROGRESS NOTES
02/12/19 1100   Spiritual Beliefs/Perceptions   Support Systems Family members   Coping Responses   Patient Coping Open/discussion   Plan of Care   Comments Cultivated a relationship of care and support     Assessment Completed by: Unit visit

## 2019-02-12 NOTE — PROGRESS NOTES
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, no tpa or thrombectomy intervention  MRI of brain was completed which showed - late acute left MCA territory infarct with mild petechial hemorrhage no significant mass effect  CTA of the head and neck completed at time of stroke alert - revealed occlusion of the left internal carotid artery beginning as origin, there is also enhancement of the proximal left M1 with occlusion of the left mid to distal M1  Plan:  -MRI brain as below  -CTH demonstrated loss of left insular ribbon consistent with acute left MCA distribution stroke; subtle loss of gray white in the basal ganglia and frontal lobe  -CTA with occlusion of left ICA beginning at its origin and M1 occlusion  -Echo - 60% ef, with nl atrium  -Hemoglobin A1c 5 7  -Lipid panel:  Cholesterol 264, triglycerides 166, HDL 39,   -Telemetry  -PT/OT/ST  -Continue dual antiplatelet therapy including ASA 81 mg daily and Plavix 75 mg daily  -Check API  -Ct of head in a m   -Continue atorvastatin 40 mg daily  -Continue folic acid 1 mg daily and thiamine 100 mg daily  -Close observation for withdrawal  -Frequent neuro checks  -Continue to monitor notify with changes     Subjective: Follow up in regards to stroke  No events overnight, MRI completed  Reviewed with nursing no events overnight  ROS:  He denies chest pain, shortness of breath, nausea or vomiting, one-sided weakness or numbness that is new, he denies problems with vision, he denies headache  Vitals: Blood pressure 155/85, pulse (!) 52, temperature 99 2 °F (37 3 °C), temperature source Oral, resp  rate 14, height 6' (1 829 m), weight 103 kg (226 lb 6 6 oz), SpO2 95 %  ,Body mass index is 30 71 kg/m²         Current Facility-Administered Medications:  aspirin 325 mg Oral Daily Carlos Ernieponephong, DO    atorvastatin 40 mg Oral Daily With [employer], DO    clopidogrel 75 mg Oral Daily Carlos Sakdiponephong, DO    enoxaparin 40 mg Subcutaneous Q24H [facility] Neftaly Weiner PA-C    folic acid 1 mg Oral Daily Carlos Sakdiponephong, DO    multivitamin-minerals 1 tablet Oral Daily Carlos Sakdiponephong, DO    phenylephine  mcg/min Intravenous Titrated Fifi Estevez PA-C Last Rate: 40 mcg/min (02/12/19 0629)   sodium chloride 100 mL/hr Intravenous Continuous Carlos Sakdiponephong, DO Last Rate: 100 mL/hr (02/11/19 1143)   thiamine 100 mg Oral Daily Carlos Sakdiponephong, DO      Physical Exam:    Vital signs - reviewed  Constitutional - in NAD sitting in bed, just finished eating breakfast  HEENT - NC/AT, no icterus noted, conjuctiva clear, oral mucosa free of exudate, tongue was midline  Cardiac - No murmur noted, rate regular  Lungs - Clear to auscultation bilaterally, no wheezing noted  He is in no obvious respiratory distress  Abdomen - Soft and non tender, non distended  Extremities - No edema noted, no pain with muscle skeletal testing, normal range of motion  Skin - no rashes noted    Neurological    Mental status - the patient is awake alert oriented x 2, not place  He was able tell me president a [place], he was able to tell me day of week, he was able to tell me month and year  He was able to read the NIH stroke card without difficulties, however omitted 1/6 pictures on the NIH stroke scale  He had a hard time recognizing button, there was evidence of mild expressive aphasia  No dysarthria  Cranial nerves 2 through 12 are intact, except for decrease in nasolabial fold on the right  Motor - 5/5 upper extremities and lower extremities, + drift on the right upper  No tremor or fixed deficit noted    Rapid movements are equal bilaterally    Sensation - nonfocal to touch grossly, no neglect noted  Coordination -  no ataxia noted on finger-to-nose     Reflexes are equal - in uppers and lowers, decreased through out  Toes are equivical bilaterally    No evidence of clonus or myoclonus or tremor  Lab, Imaging and other studies:   I have personally reviewed pertinent reports  , CBC:   Results from last 7 days   Lab Units 02/12/19 0443 02/11/19 0433 02/10/19  0959   WBC Thousand/uL 8 99 8 84 5 25   RBC Million/uL 4 92 5 08 5 42   HEMOGLOBIN g/dL 14 0 14 5 15 5   HEMATOCRIT % 42 7 44 0 47 6   MCV fL 87 87 88   PLATELETS Thousands/uL 223 248 223   , BMP/CMP:   Results from last 7 days   Lab Units 02/12/19 0443 02/11/19 0433 02/10/19  0959   SODIUM mmol/L 137 139 139   POTASSIUM mmol/L 3 8 4 1 4 6   CHLORIDE mmol/L 107 109* 104   CO2 mmol/L 24 23 27   BUN mg/dL 9 11 17   CREATININE mg/dL 1 01 1 00 1 18   CALCIUM mg/dL 8 3 8 5 9 1   AST U/L  --  20  --    ALT U/L  --  32  --    ALK PHOS U/L  --  69  --    EGFR ml/min/1 73sq m 85 86 70   , Vitamin B12:   , HgBA1C:   Results from last 7 days   Lab Units 02/11/19  0433   HEMOGLOBIN A1C % 5 7   , TSH:   Results from last 7 days   Lab Units 02/11/19  0433   TSH 3RD GENERATON uIU/mL 3 770*   , Coagulation:   Results from last 7 days   Lab Units 02/10/19  0959   INR  0 96   , Lipid Profile:   Results from last 7 days   Lab Units 02/11/19 0433   HDL mg/dL 39*   LDL CALC mg/dL 192*   TRIGLYCERIDES mg/dL 166*   , Ammonia:   , Urinalysis:       Invalid input(s): URIBILINOGEN, Drug Screen:        Procedure: Mri Brain Wo Contrast    Result Date: 2/11/2019  Narrative: MRI BRAIN WITHOUT CONTRAST INDICATION: Acute left MCA infarct  COMPARISON:   2/10/2019  TECHNIQUE:  Sagittal T1, axial T2, axial FLAIR, axial T1, axial [place] and axial diffusion imaging  IMAGE QUALITY:  Diagnostic  FINDINGS: BRAIN PARENCHYMA: There is restricted diffusion and T2/flair hyperintensity in the dorsal lateral aspect of the left frontal lobe, frontal operculum and anterior insula    Signal abnormality is also present in the anterior caudate body, head and lentiform  nuclei  Few punctate foci of restricted diffusion and T2 hyperintensity in the left corona radiata  There is dural swelling in the affected areas  Foci of petechial hemorrhage within the areas of acute infarct  No midline shift  VENTRICLES:  No hydrocephalus or extra-axial collection  SELLA AND PITUITARY GLAND:  Normal  ORBITS:  No retro-orbital inflammation  PARANASAL SINUSES:  No significant paranasal sinus disease  VASCULATURE:  Evaluation of the major intracranial vasculature demonstrates appropriate flow voids  CALVARIUM AND SKULL BASE:  No evidence of osseous lesion  EXTRACRANIAL SOFT TISSUES:  No soft tissue mass  Impression: Late acute left MCA territory infarct, primarily involving the dorsal lateral aspect of the left frontal lobe, frontal operculum and left basal ganglia  Mild petechial hemorrhage  No significant mass effect  The study was marked in [facility] for immediate notification  Workstation performed: [id number]     Procedure: Ct Stroke Alert Brain    Result Date: 2/10/2019  Narrative: CT BRAIN - STROKE ALERT PROTOCOL INDICATION:   Expressive aphasia  COMPARISON:  Concurrently performed CT angiogram of the head and neck  TECHNIQUE:  CT examination of the brain was performed  In addition to axial images, coronal reformatted images were created and submitted for interpretation  Radiation dose length product (DLP) for this visit:  2065 74 mGy-cm   This examination, like all CT scans performed in the [facility], was performed utilizing techniques to minimize radiation dose exposure, including the use of iterative reconstruction and automated exposure control  IMAGE QUALITY:  Diagnostic  FINDINGS:  PARENCHYMA:  There is subtle loss of the left insular ribbon in keeping with early CT changes of acute left middle cerebral artery distribution stroke    Also noted is a small area of chronic cortical encephalomalacia involving the posterior superior left  insula and low lower left frontal lobe perhaps best seen on coronal image 58 of series 700 consistent with the sequela of subacute or perhaps even chronic left posterior MCA distribution infarction  There is focal high density over the distal left M1 on image 14 of series 5 consistent with distal M1 occlusion better visualized on CT angiography  No acute parenchymal hemorrhage  No intracranial mass, mass effect or midline shift  VENTRICLES AND EXTRA-AXIAL SPACES:  Minor prominence of lateral and 3rd ventricle suggesting mild central atrophy  No hydrocephalus  No extra-axial collection  VISUALIZED ORBITS AND PARANASAL SINUSES:  Unremarkable  CALVARIUM AND EXTRACRANIAL SOFT TISSUES:   Normal      Impression: Noncontrast head CT demonstrates loss of the left insular ribbon consistent with acute left middle cerebral artery distribution stroke  Also noted is a small focus of decreased density in the cortical left posterior insula and low left posterior frontal lobe consistent with small late subacute or perhaps chronic posterior MCA distribution infarction  No acute intracranial hemorrhage  Findings were directly discussed with Orlean Severs on 2/10/2019 10:03 AM  Workstation performed: [id number]     Procedure: Cta Stroke Alert (head/neck)    Result Date: 2/10/2019  Narrative: CTA NECK AND BRAIN WITH CONTRAST INDICATION:  Expressive aphasia  Stroke COMPARISON:   Concurrently performed noncontrast head CT  TECHNIQUE:   Post contrast imaging was performed after administration of iodinated contrast through the neck and brain  Post contrast axial 0 625 mm images timed to opacify the arterial system  3D rendering was performed on an independent workstation  MIP reconstructions performed  Coronal reconstructions were performed of the noncontrast portion of the brain  Radiation dose length product (DLP) for this visit:  419 72 mGy-cm     This examination, like all CT scans performed in the [facility], was performed utilizing techniques to minimize radiation dose exposure, including the use of iterative  reconstruction and automated exposure control  IV Contrast:  85 mL of iohexol (OMNIPAQUE)  IMAGE QUALITY:   Diagnostic FINDINGS: CERVICAL VASCULATURE AORTIC ARCH AND GREAT VESSELS:  Normal aortic arch and great vessel origins  Normal visualized subclavian vessels  RIGHT VERTEBRAL ARTERY CERVICAL SEGMENT:  Normal origin  The vessel is normal in caliber throughout the neck  LEFT VERTEBRAL ARTERY CERVICAL SEGMENT:  Normal origin  The vessel is normal in caliber throughout the neck  RIGHT EXTRACRANIAL CAROTID SEGMENT:  There is fibrocalcific plaque at the carotid bifurcation resulting in mild approximate 20-30% atherosclerotic stenosis at right internal carotid artery origin  Distal cervical right internal carotid artery is uniform  and normal in caliber to the carotid terminus  LEFT EXTRACRANIAL CAROTID SEGMENT:  There is calcific and fibrocalcific atherosclerotic plaque at the carotid bifurcation and there is complete occlusion of the left internal carotid artery at its origin  There is no opacification of the cervical left internal carotid artery in the neck  Left external carotid artery is patent  NASCET criteria was used to determine the degree of internal carotid artery diameter stenosis  INTRACRANIAL VASCULATURE INTERNAL CAROTID ARTERIES:  Minor fibrocalcific plaque associated with the right cavernous and supraclinoid internal carotid artery  Normal right-sided carotid terminus  There is minimal retrograde collateral flow into the supraclinoid left internal carotid artery cavernous with flow coming across the [place] via patent but somewhat small caliber left A1 anterior cerebral artery segment and patent but very small  caliber left posterior communicating artery   ANTERIOR CIRCULATION:  Patent bilateral A1 segments, right larger than left, patent distal anterior cerebral arteries with normal enhancement  Normal anterior communicating artery  MIDDLE CEREBRAL ARTERY CIRCULATION:  Right M1 segment and middle cerebral artery branches demonstrate normal enhancement bilaterally  Opacification of the proximal and mid left M1 middle cerebral artery occurs via collateral circulation across the [place] via small caliber left A1 and tiny caliber left posterior communicating artery  There is occlusion of the mid to distal left middle cerebral artery with reconstitution of opacification of small anterior and middle left M2 branches via collateral circulation  DISTAL VERTEBRAL ARTERIES:  Normal distal vertebral arteries  Posterior inferior cerebellar artery origins are normal  Normal vertebral basilar junction  BASILAR ARTERY:  Basilar artery is normal in caliber  Normal superior cerebellar arteries  POSTERIOR CEREBRAL ARTERIES: Both posterior cerebral arteries arises from the basilar tip  Both arteries demonstrate normal enhancement  Patent small caliber bilateral posterior communicating arteries  DURAL VENOUS SINUSES:  Normal  NON VASCULAR ANATOMY BONY STRUCTURES:  No acute osseous abnormality  SOFT TISSUES OF THE NECK:  Unremarkable  THORACIC INLET:  Unremarkable  Impression: Occlusion of left internal carotid artery beginning at its origin  Enhancement of proximal left M1 via collateral circulation across [place] via small caliber left A1 and tiny caliber left posterior communicating arteries  Occlusion of left mid to distal left M1 middle cerebral artery segment  There is some enhancement relatively diminutive of anterior and middle M2 and M3 middle cerebral artery branches presumably via collateral circulation  Mild atherosclerotic narrowing at the origin of the right internal carotid artery   Noncontrast head CT demonstrates loss of the left insular ribbon consistent with acute left middle cerebral artery distribution stroke  Also noted is a small focus of encephalomalacia in the left posterior insula and low left posterior frontal lobe consistent with small chronic cortical infarction in that location  No acute intracranial hemorrhage  Findings were directly discussed with Mary Ann Harvey on 2/10/2019 10:03 AM  Workstation performed: [id number]     Counseling / Coordination of Care  Total time spent today 25 minutes  Greater than 50% of total time was spent with the patient and / or family counseling and / or coordination of care   A description of the counseling / coordination of care: floor time and reviewing records, physical examination

## 2019-02-12 NOTE — SPEECH THERAPY NOTE
Speech Language/Pathology  Speech/Language Assessment    Patient Name: Law Owusu  WUVMO'C Date: 2/12/2019     Problem List  Patient Active Problem List   Diagnosis    CVA (cerebral vascular accident) ([address] )    Alcohol abuse    Hypertension    Hypercholesterolemia    Benign essential hypertension     Past Medical History  Past Medical History:   Diagnosis Date    Hypertension      Past Surgical History  History reviewed  No pertinent surgical history  Speech-Language Evaluation    Impression:  Pt presents with mild expressive aphasia characterized by higher level word finding deficits and occasional semantic paraphasias  Introduced word finding strategies  Recommendation:  Pt will benefit from speech therapy in the sub-acute or OP setting to improve word finding skills  Therapy Prognosis: good  Prognosis considerations: age, motivation, support  Frequency: 1-2x per week       Patient's goal:  "for the words to come out"      Goals:  1  Pt will perform higher level word finding activities with 95% accuracy  2   Pt will state and utilize word finding strategies with 90% accuracy  HPI:  Katie Wang a 48 y  o  male with vague history of htn who presents as a transfer from from [place] with stroke like symptoms that resolved and TPA was held  Patient is with daughter and wife  His wife admits that he's been having word finding with expressive aphasia for the past 2 weeks  This morning around 830am, his wife found him sitting on a stool in his kitchen and he was complaining of right sided weakness  Patient denies headaches, lh, dizziness, or recent falls  While in [facility] 96 non-contrast ct of head showed loss of left insular ribbon consistent with acute L MCA stroke without acute intracranial hemorrhage  CTA of neck and brain showed Left ICA occlusion and thrombus within Left MCA M1 and excellent collaterol flow   tPA was planned but held due to rapidly improved symptoms  Auditory Comprehension:  R/L discrim: 100%  Body part ID: 100%  One step commands: 100%  Complex or 3 step commands:100%  Picture ID: 100%  Body Part ID: 100%  Basic y/n ?'s: 100%  Complex y/n ?'s: 100%  Paragraph Comprehension: 100%      Oral Expression:  Auto Sequences: 100%   Word Repetition:  100%  Phrase Completion: 100%  Responsive Namin%  Picture Naming:  10 of 12; delayed retrieval (5-10 sec) x 4; semantic paraphasia x 1  Divergent Namin animals in a minute; 4 words beginning with the letter B in 1 minute  Picture Description: good fluency and content  Semantic paraphasia x 1    Occasional delay for word retrieval   Conversation:  Occasional higher level word finding difficulty  Able to make basic needs known? yes      Motor Speech:  [facility]

## 2019-02-13 ENCOUNTER — APPOINTMENT (INPATIENT)
Dept: RADIOLOGY | Facility: HOSPITAL | Age: 54
DRG: 045 | End: 2019-02-13
Payer: COMMERCIAL

## 2019-02-13 PROBLEM — I63.512 CEREBROVASCULAR ACCIDENT (CVA) DUE TO OCCLUSION OF LEFT MIDDLE CEREBRAL ARTERY (HCC): Status: ACTIVE | Noted: 2019-02-10

## 2019-02-13 LAB
ANION GAP SERPL CALCULATED.3IONS-SCNC: 7 MMOL/L (ref 4–13)
BASOPHILS # BLD AUTO: 0.04 THOUSANDS/ΜL (ref 0–0.1)
BASOPHILS NFR BLD AUTO: 1 % (ref 0–1)
BUN SERPL-MCNC: 13 MG/DL (ref 5–25)
CA-I BLD-SCNC: 1.18 MMOL/L (ref 1.12–1.32)
CALCIUM SERPL-MCNC: 8.8 MG/DL (ref 8.3–10.1)
CHLORIDE SERPL-SCNC: 107 MMOL/L (ref 100–108)
CO2 SERPL-SCNC: 24 MMOL/L (ref 21–32)
CREAT SERPL-MCNC: 1.04 MG/DL (ref 0.6–1.3)
EOSINOPHIL # BLD AUTO: 0.16 THOUSAND/ΜL (ref 0–0.61)
EOSINOPHIL NFR BLD AUTO: 2 % (ref 0–6)
ERYTHROCYTE [DISTWIDTH] IN BLOOD BY AUTOMATED COUNT: 11.9 % (ref 11.6–15.1)
GFR SERPL CREATININE-BSD FRML MDRD: 82 ML/MIN/1.73SQ M
GLUCOSE SERPL-MCNC: 95 MG/DL (ref 65–140)
HCT VFR BLD AUTO: 44.5 % (ref 36.5–49.3)
HGB BLD-MCNC: 14.5 G/DL (ref 12–17)
IMM GRANULOCYTES # BLD AUTO: 0.02 THOUSAND/UL (ref 0–0.2)
IMM GRANULOCYTES NFR BLD AUTO: 0 % (ref 0–2)
LYMPHOCYTES # BLD AUTO: 1.61 THOUSANDS/ΜL (ref 0.6–4.47)
LYMPHOCYTES NFR BLD AUTO: 20 % (ref 14–44)
MAGNESIUM SERPL-MCNC: 2.3 MG/DL (ref 1.6–2.6)
MCH RBC QN AUTO: 28.4 PG (ref 26.8–34.3)
MCHC RBC AUTO-ENTMCNC: 32.6 G/DL (ref 31.4–37.4)
MCV RBC AUTO: 87 FL (ref 82–98)
MONOCYTES # BLD AUTO: 0.73 THOUSAND/ΜL (ref 0.17–1.22)
MONOCYTES NFR BLD AUTO: 9 % (ref 4–12)
NEUTROPHILS # BLD AUTO: 5.36 THOUSANDS/ΜL (ref 1.85–7.62)
NEUTS SEG NFR BLD AUTO: 68 % (ref 43–75)
NRBC BLD AUTO-RTO: 0 /100 WBCS
PHOSPHATE SERPL-MCNC: 3.2 MG/DL (ref 2.7–4.5)
PLATELET # BLD AUTO: 223 THOUSANDS/UL (ref 149–390)
PMV BLD AUTO: 9 FL (ref 8.9–12.7)
POTASSIUM SERPL-SCNC: 3.8 MMOL/L (ref 3.5–5.3)
RBC # BLD AUTO: 5.11 MILLION/UL (ref 3.88–5.62)
SODIUM SERPL-SCNC: 138 MMOL/L (ref 136–145)
WBC # BLD AUTO: 7.92 THOUSAND/UL (ref 4.31–10.16)

## 2019-02-13 PROCEDURE — G0515 COGNITIVE SKILLS DEVELOPMENT: HCPCS

## 2019-02-13 PROCEDURE — 99232 SBSQ HOSP IP/OBS MODERATE 35: CPT | Performed by: PSYCHIATRY & NEUROLOGY

## 2019-02-13 PROCEDURE — 70450 CT HEAD/BRAIN W/O DYE: CPT

## 2019-02-13 PROCEDURE — 97127 HB COGNITIVE SKILLS DEVELOPMENT, EACH 15 MUNUTES: CPT

## 2019-02-13 PROCEDURE — 82330 ASSAY OF CALCIUM: CPT | Performed by: PHYSICIAN ASSISTANT

## 2019-02-13 PROCEDURE — 80048 BASIC METABOLIC PNL TOTAL CA: CPT | Performed by: PHYSICIAN ASSISTANT

## 2019-02-13 PROCEDURE — 99232 SBSQ HOSP IP/OBS MODERATE 35: CPT | Performed by: INTERNAL MEDICINE

## 2019-02-13 PROCEDURE — 85025 COMPLETE CBC W/AUTO DIFF WBC: CPT | Performed by: PHYSICIAN ASSISTANT

## 2019-02-13 PROCEDURE — 84100 ASSAY OF PHOSPHORUS: CPT | Performed by: PHYSICIAN ASSISTANT

## 2019-02-13 PROCEDURE — 83735 ASSAY OF MAGNESIUM: CPT | Performed by: PHYSICIAN ASSISTANT

## 2019-02-13 RX ORDER — MIDODRINE HYDROCHLORIDE 5 MG/1
5 TABLET ORAL
Status: DISCONTINUED | OUTPATIENT
Start: 2019-02-13 | End: 2019-02-14 | Stop reason: HOSPADM

## 2019-02-13 RX ORDER — MIDODRINE HYDROCHLORIDE 5 MG/1
5 TABLET ORAL ONCE
Status: COMPLETED | OUTPATIENT
Start: 2019-02-13 | End: 2019-02-13

## 2019-02-13 RX ORDER — MIDODRINE HYDROCHLORIDE 5 MG/1
10 TABLET ORAL
Status: DISCONTINUED | OUTPATIENT
Start: 2019-02-13 | End: 2019-02-13

## 2019-02-13 RX ADMIN — HEPARIN SODIUM 5000 UNITS: 5000 INJECTION INTRAVENOUS; SUBCUTANEOUS at 05:58

## 2019-02-13 RX ADMIN — THIAMINE HCL TAB 100 MG 100 MG: 100 TAB at 09:09

## 2019-02-13 RX ADMIN — MIDODRINE HYDROCHLORIDE 5 MG: 5 TABLET ORAL at 09:23

## 2019-02-13 RX ADMIN — HEPARIN SODIUM 5000 UNITS: 5000 INJECTION INTRAVENOUS; SUBCUTANEOUS at 13:12

## 2019-02-13 RX ADMIN — Medication 1 TABLET: at 09:09

## 2019-02-13 RX ADMIN — ATORVASTATIN CALCIUM 80 MG: 80 TABLET, FILM COATED ORAL at 17:26

## 2019-02-13 RX ADMIN — CLOPIDOGREL BISULFATE 75 MG: 75 TABLET ORAL at 10:05

## 2019-02-13 RX ADMIN — MIDODRINE HYDROCHLORIDE 5 MG: 5 TABLET ORAL at 17:26

## 2019-02-13 RX ADMIN — FOLIC ACID 1 MG: 1 TABLET ORAL at 09:09

## 2019-02-13 RX ADMIN — MIDODRINE HYDROCHLORIDE 5 MG: 5 TABLET ORAL at 06:00

## 2019-02-13 RX ADMIN — HEPARIN SODIUM 5000 UNITS: 5000 INJECTION INTRAVENOUS; SUBCUTANEOUS at 22:50

## 2019-02-13 RX ADMIN — MIDODRINE HYDROCHLORIDE 5 MG: 5 TABLET ORAL at 13:12

## 2019-02-13 RX ADMIN — ASPIRIN 81 MG 81 MG: 81 TABLET ORAL at 10:05

## 2019-02-13 NOTE — UTILIZATION REVIEW
Continued Stay Review    Date: 2-13-19    48year old male with left  ICA  And MCA occlusions with small hemorrhagic transformation  Without TPA prior to arrival   Patient contineus to require pressor support   Vital Signs: /76 (BP Location: Left arm)   Pulse 74   Temp 99 2 °F (37 3 °C) (Oral)   Resp 22   Ht 6' (1 829 m)   Wt 103 kg (226 lb 6 6 oz)   SpO2 96%   BMI 30 71 kg/m²        Pertinent diagnostics     Repeat CT brain 2-13-19   Evolving hemorrhagic left MCA territory infarction, not significant changed from the prior study  Cerebral atrophy with chronic small vessel ischemic change  Assessment/Plan:       · SBP goal 140-160mmHg  · Continue Midodrine 5mg TID  · Wean off phenylephrine gtt  · Echo: EF 60% with no regional wall motion abnormalities     Continuous telemetry      Medications:     Medication Administration - last 24 hours from 02/12/2019 1346 to 02/13/2019 1346      02/13/2019 1112 phenylephrine (BAYLEE-SYNEPHRINE) 50 mg (STANDARD CONCENTRATION) in sodium chloride 0 9% 250 mL 10 mcg/min Intravenous     02/13/2019 0940 phenylephrine (BAYLEE-SYNEPHRINE) 50 mg (STANDARD CONCENTRATION) in sodium chloride 0 9% 250 mL 15 mcg/min Intravenous     02/13/2019 0900 phenylephrine (BAYLEE-SYNEPHRINE) 50 mg (STANDARD CONCENTRATION) in sodium chloride 0 9% 250 mL 10 mcg/min Intravenous     02/13/2019 0830 phenylephrine (BAYLEE-SYNEPHRINE) 50 mg (STANDARD CONCENTRATION) in sodium chloride 0 9% 250 mL 20 mcg/min Intravenous     02/13/2019 0630 phenylephrine (BAYLEE-SYNEPHRINE) 50 mg (STANDARD CONCENTRATION) in sodium chloride 0 9% 250 mL 25 mcg/min Intravenous     02/12/2019 2350 phenylephrine (BAYLEE-SYNEPHRINE) 50 mg (STANDARD CONCENTRATION) in sodium chloride 0 9% 250 mL 25 mcg/min Intravenous     02/12/2019 2036 phenylephrine (BAYLEE-SYNEPHRINE) 50 mg (STANDARD CONCENTRATION) in sodium chloride 0 9% 250 mL 25 mcg/min Intravenous     02/12/2019 1652 phenylephrine (BAYLEE-SYNEPHRINE) 50 mg (STANDARD CONCENTRATION) in sodium chloride 0 9% 250 mL 40 mcg/min Intravenous     02/12/2019 1542 phenylephrine (BAYLEE-SYNEPHRINE) 50 mg (STANDARD CONCENTRATION) in sodium chloride 0 9% 250 mL 40 mcg/min Intravenous          aspirin 81 mg Oral Daily   atorvastatin 80 mg Oral Daily With [employer]   clopidogrel 75 mg Oral Daily   folic acid 1 mg Oral Daily   heparin (porcine) 5,000 Units Subcutaneous Q8H [address]   midodrine 5 mg Oral TID AC   multivitamin-minerals 1 tablet Oral Daily   thiamine 100 mg Oral Daily     Discharge Plan  To be determined

## 2019-02-13 NOTE — OCCUPATIONAL THERAPY NOTE
Occupational Therapy Treatment Note      Peggy Huizar    2/13/2019    Patient Active Problem List   Diagnosis    CVA (cerebral vascular accident) (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   Intrinsic Gratification Pt reports not having time for leisure activities   Pain Assessment   Pain Assessment No/denies pain   Pain Score No Pain   Cognition   Overall Cognitive Status Impaired   Arousal/Participation Responsive; Cooperative   Attention Attends with cues to redirect   Orientation Level Oriented to person;Oriented to place; Disoriented to time;Oriented to situation   Memory Decreased short term memory;Decreased recall of recent events   Following Commands Follows one step commands without difficulty   Comments Pt is pleasant and cooperative; completed MOCA; scored 15/30 indicating moderate cognitive impairment; further results are listed below   Cognition Assessment Tools MOCA   Score 15   Activity Tolerance   Activity Tolerance Patient tolerated treatment well   Medical Staff Made Aware RN   Assessment   Assessment Patient participated in Skilled OT session 2/13/19 with interventions consisting of  formal cognitive assessment (Administration of the [facility])*   Patient agreeable to OT treatment session, upon arrival patient was found seated OOB to Recliner  Pt scored a 15/30 indicating moderate cognitive impairment   Pt scored most poorly on visuospatial/executive functioning, attention, abstraction and delayed recall  Pt reported he feels he is having trouble getting his thoughts out  Both pt and pt's wife report pt appears to be slightly "off" cognitively and slower to process information  Patient continues to be functioning below baseline level, occupational performance remains limited secondary to factors listed above and increased risk for falls and injury  From OT standpoint, recommendation at time of d/c would be Outpatient OT for cognitive therapy, higher level thinking skills  Patient to benefit from continued Occupational Therapy treatment while in the hospital to address deficits as defined above and maximize level of functional independence with ADLs and functional mobility  Plan   Treatment Interventions ADL retraining;Functional transfer training; Endurance training;Cognitive reorientation;Patient/family training;Continued evaluation; Energy conservation; Activityengagement   Goal Expiration Date 02/21/19   Treatment Day 1   OT Frequency 3-5x/wk   Recommendation   OT Discharge Recommendation Outpatient OT  (for cognitive therapy)   OT - OK to Discharge Yes  (when medically stable)   Barthel Index   Feeding 10   Bathing 5   Grooming Score 5   Dressing Score 10   Bladder Score 10   Bowels Score 10   Toilet Use Score 10   Transfers (Bed/Chair) Score 10   Mobility (Level Surface) Score 10   Stairs Score 5   Barthel Index Score 85   Modified Anson Scale   Modified Anson Scale 3       PT SEEN FOR CON COGNITIVE ASSESSMENT  SCORED  15/30 INDICATING MODERATE COGNITIVE IMPAIRMENT FOR AGE/EDUCATION  SCORES ARE AS FOLLOWS:    VISUOSPATIAL/EXECUTIVE FUNCTION: 3/5  Pt was able to complete trail  Pt was able to copy cube  Pt was able to draw a clock, unable to accurately place the numbers, and unable to properly place the hands at ten past eleven  NAMING: 3/3  Pt able to name 3/3 animals  ATTENTION: 3/6  Pt was able to repeat sequence of numbers forwards but not backwards   Pt was unable to attend to sequence of letters  Pt was able to correctly subtract 7 from 100 2/5 times  LANGUAGE: 1/3  Pt was able to repeat 1/2 sentences back to therapist  Pt was able to produce 4 words starting with the letter F in 1 minute  ABSTRACTION: 0/2  Pt was able to identify the similarity of two items x0/2 trials  DELAYED RECALL: 0/5  Pt recalled 0/5 words without cues  Pt recalled 1/5 words with category cue  Pt recalled 3/5 words with multiple choice options  ORIENTATION: 5/6  Pt is oriented to month, year, day, place and city  Not oriented to date        Rosy Kim MS, OTR/L

## 2019-02-13 NOTE — PROGRESS NOTES
IM Residency Progress Note   Unit/Bed#:  -01 Encounter: [id number]  SOD Team B       Francis Rojas 48 y o  male [id number]    Hospital Stay Days: 4    Assessment/Plan:    Principal Problem:    Cerebrovascular accident (CVA) due to occlusion of left middle cerebral artery ([address] )  Active Problems:    Alcohol abuse    Hypertension   1  Left MCA ischemic stroke  · Was not a tPA candidate as symptoms resolved spontaneously  · CT head left external ICA occlusion as well as left MCA ischemic stroke  · MRI brain 02/11/2019 acute left MCA infarct  With  Mild petechial hemorrhage  · Neuro exam consistent with mild dysarthria and mild right facial droop  · Loaded with Plavix 300 mg  · Continue dual antiplatelet Plavix 75 mg and aspirin 81 mg  · Continue high-intensity statin Lipitor 80 mg  · Appreciate Neurology recommendations  · Status post phenylephrine for permissive hypertension  · Continue midodrine 5 mg t i d  For blood pressure goal more than 120 continue upon discharge  · Continue neuro check every shift  · PT recommended home with family support  · OT recommended home with OT for mild cognitive impairment  · Speech recommended rehab speech therapy  · Continue cardiac diet  · Continue subcu heparin for DVT prophylaxis    2  Left external ICA occlusion  · CT and MRI brain  as above  · Management as above    3  Hypertension  · Continue midodrine 5 mg t i d  Keep blood pressure goal more than 120 goal 120-160 per Neurology given severe MCA M1 stenosis  4  Alcohol abuse   · Drinks 46 beers daily last drink 1 day prior to admission  · Continue CIWA protocol has been 0  · Continue folate 1 mg daily  · Continue a thiamine 100 mg  · Counseling for alcohol abstinence      Disposition:  Likely Home with PT       Subjective:    This is a 48years old male with past medical history of hypertension, alcohol abuse last drink was 02/09/2019 who was admitted as a transfer from [facility] after had right side weakness and slurred speech and found to have left ICA occlusion as well as left MCA ischemic stroke  He has not seen a primary doctor since  and currently work as a  from time to time who for the past 2 weeks had symptoms of slurred speech that resolved  thought due to stroke however last  his wife noticed right-sided weakness with slurred speech therefore called ambulance and brought to the hospital     He was not a candidate for tPA as his symptoms resolved  He was transferred to [facility] for hemodynamics support as well as close neuro monitoring at the level of neuro ICU in [place]  Was started on dual antiplatelet with Plavix and aspirin with high intensity statin Lipitor 80 mg  He was seen by Neurology, Physical therapy, Occupational therapy, as well as speech pathology  At the time of my exam he does not have significant dysarthria sitting on his chair comfortably on room air with mild word-finding difficulties and alert and oriented with some delay in intellectual abilities  Vitals: Temp (24hrs), Av 9 °F (37 2 °C), Min:98 6 °F (37 °C), Max:99 2 °F (37 3 °C)  Current: Temperature: 99 2 °F (37 3 °C)  Vitals:    19 1125 19 1225 19 1250 19 1325   BP: 119/85 124/81 127/99 125/76   BP Location:    Left arm   Pulse: 72 66 74 74   Resp: (!) 25 (!) 23 (!) 27 22   Temp:  99 2 °F (37 3 °C)     TempSrc:  Oral     SpO2:  95% 96% 96%   Weight:       Height:        Body mass index is 30 71 kg/m²  I/O last 24 hours: In: 1659 7 [P O :790;  I V :369 7; IV Piggyback:500]  Out: 1750 [Urine:1750]      Physical Exam: /81   Pulse 78   Temp 99 2 °F (37 3 °C) (Oral)   Resp 22   Ht 6' (1 829 m)   Wt 103 kg (226 lb 6 6 oz)   SpO2 94%   BMI 30 71 kg/m²   General appearance: alert and oriented, in no acute distress  Head: Normocephalic, without obvious abnormality, atraumatic  Eyes: Non icterus, pupils round equally constricted bilaterally  Throat: Moist mucous membrane  Lungs: clear to auscultation bilaterally  Heart: regular rate and rhythm, S1, S2 normal, no murmur, click, rub or gallop  Abdomen: soft, non-tender; bowel sounds normal; no masses,  no organomegaly  Extremities: No lower extremity edema or deformity  Neurologic: Alert and oriented X 3, normal strength and tone  Normal symmetric reflexes   Normal coordination and gait  Mental status: Alert, oriented, thought content appropriate  Cranial nerves: normal  Sensory: normal  Reflexes: 2+ and symmetric   Motor 5/5 bilaterally throughout no Babinski sign    Invasive Devices     Peripheral Intravenous Line            Peripheral IV 02/13/19 Right Forearm less than 1 day                          Labs:   Recent Results (from the past 24 hour(s))   Aspirin Platelet Inhibitor    Collection Time: 02/12/19  3:44 PM   Result Value Ref Range    Aspirin Response Test 399 ARU   Basic metabolic panel    Collection Time: 02/13/19  4:40 AM   Result Value Ref Range    Sodium 138 136 - 145 mmol/L    Potassium 3 8 3 5 - 5 3 mmol/L    Chloride 107 100 - 108 mmol/L    CO2 24 21 - 32 mmol/L    ANION GAP 7 4 - 13 mmol/L    BUN 13 5 - 25 mg/dL    Creatinine 1 04 0 60 - 1 30 mg/dL    Glucose 95 65 - 140 mg/dL    Calcium 8 8 8 3 - 10 1 mg/dL    eGFR 82 ml/min/1 73sq m   CBC and differential    Collection Time: 02/13/19  4:40 AM   Result Value Ref Range    WBC 7 92 4 31 - 10 16 Thousand/uL    RBC 5 11 3 88 - 5 62 Million/uL    Hemoglobin 14 5 12 0 - 17 0 g/dL    Hematocrit 44 5 36 5 - 49 3 %    MCV 87 82 - 98 fL    MCH 28 4 26 8 - 34 3 pg    MCHC 32 6 31 4 - 37 4 g/dL    RDW 11 9 11 6 - 15 1 %    MPV 9 0 8 9 - 12 7 fL    Platelets 815 447 - 352 Thousands/uL    nRBC 0 /100 WBCs    Neutrophils Relative 68 43 - 75 %    Immat GRANS % 0 0 - 2 %    Lymphocytes Relative 20 14 - 44 %    Monocytes Relative 9 4 - 12 %    Eosinophils Relative 2 0 - 6 %    Basophils Relative 1 0 - 1 %    Neutrophils Absolute 5 36 1 85 - 7 62 Thousands/µL    Immature Grans Absolute 0 02 0 00 - 0 20 Thousand/uL    Lymphocytes Absolute 1 61 0 60 - 4 47 Thousands/µL    Monocytes Absolute 0 73 0 17 - 1 22 Thousand/µL    Eosinophils Absolute 0 16 0 00 - 0 61 Thousand/µL    Basophils Absolute 0 04 0 00 - 0 10 Thousands/µL   Calcium, ionized    Collection Time: 02/13/19  4:40 AM   Result Value Ref Range    Calcium, Ionized 1 18 1 12 - 1 32 mmol/L   Magnesium    Collection Time: 02/13/19  4:40 AM   Result Value Ref Range    Magnesium 2 3 1 6 - 2 6 mg/dL   Phosphorus    Collection Time: 02/13/19  4:40 AM   Result Value Ref Range    Phosphorus 3 2 2 7 - 4 5 mg/dL        Radiology Results:  - CT head stroke without contrast lost of left insular right bone consistent with acute left MCA stroke 2/10/19    CTA head and neck 2/10/19  - Occlusion of left ICA, occlusion of left M1 MCA    MRI brain WO 2/11/19   - Acute left MCA territory infarct, primarily involving the dorsal lateral aspect of the left frontal lobe, frontal operculum and left basal ganglia  Mild petechial hemorrhage  No significant mass effect  I have personally reviewed pertinent reports  Other Diagnostic Testing:   I have personally reviewed pertinent reports     and CBC unremarkable, BMP unremarkable, aspirin response test as expected normal, thyroid function mild hypothyroidism      Active Meds:   Current Facility-Administered Medications   Medication Dose Route Frequency    aspirin chewable tablet 81 mg  81 mg Oral Daily    atorvastatin (LIPITOR) tablet 80 mg  80 mg Oral Daily With Dinner    clopidogrel (PLAVIX) tablet 75 mg  75 mg Oral Daily    folic acid (FOLVITE) tablet 1 mg  1 mg Oral Daily    heparin (porcine) subcutaneous injection 5,000 Units  5,000 Units Subcutaneous Q8H [facility]    midodrine (PROAMATINE) tablet 5 mg  5 mg Oral TID AC    multivitamin-minerals (CENTRUM) tablet 1 tablet  1 tablet Oral Daily    thiamine (VITAMIN B1) tablet 100 mg  100 mg Oral Daily VTE Pharmacologic Prophylaxis: Heparin  VTE Mechanical Prophylaxis: sequential compression device    Meghan Bojorquez MD  Critical access hospital  Internal medicine resident

## 2019-02-13 NOTE — PROGRESS NOTES
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, no tpa or thrombectomy intervention at time of arrival      MRI of brain was completed which showed - late acute left MCA territory infarct with mild petechial hemorrhage no significant mass effect      CTA of the head and neck completed at time of stroke alert - revealed occlusion of the left internal carotid artery beginning as origin, there is also enhancement of the proximal left M1 with occlusion of the left mid to distal M1  Recent CT (today) -evolving hemorrhagic left MCA territory infarction, no significant change    Plan:  -Recent CT as above  -Echo - 60% ef, with nl atrium  -Hemoglobin A1c 5 7  -Lipid panel:  Cholesterol 264, triglycerides 166, HDL 39,   -Telemetry  -PT/OT/ST  -Continue dual antiplatelet therapy including ASA 81 mg daily and Plavix 75 mg daily, will review with attending  -DANITZA [id number] - response detected  -Continue atorvastatin 80 mg daily  -Continue folic acid 1 mg daily and thiamine 100 mg daily  -Frequent neuro checks, stat CT of head with any neurological changes   -Continue to monitor notify with changes     Subjective:   Patient continues to be on pressor support  No events overnight per nursing, reviewed with ICU team, his blood pressure did decrease overnight which was asymptomatic  He currently is asymptomatic, he denies any new neurological complaints, he denies any headache, chest pain, shortness of breath, palpitations, nausea or vomiting, one-sided weakness or ataxia, he denies any problems with vision or confusion, he denies any lapses in memory  He reports he believes his speech has improved, he is getting his words out better      Vitals: Blood pressure 123/76, pulse 56, temperature 98 9 °F (37 2 °C), temperature source Oral, resp  rate 13, height 6' (1 829 m), weight 103 kg (226 lb 6 6 oz), SpO2 93 %  ,Body mass index is 30 71 kg/m²  Current Facility-Administered Medications:  aspirin 81 mg Oral Daily Aartimarci Lewis, DO    atorvastatin 80 mg Oral Daily With [employer] MINH Weiner PA-C    clopidogrel 75 mg Oral Daily Carlos Sakdiponephong, DO    folic acid 1 mg Oral Daily Carlos Sakdiponephong, DO    heparin (porcine) 5,000 Units Subcutaneous [facility] Neftaly Weiner PA-C    midodrine 5 mg Oral TID AC Carlos Sakdiponephong, DO    multivitamin-minerals 1 tablet Oral Daily Carlos Sakdiponephong, DO    phenylephine  mcg/min Intravenous Titrated Carlos Sakdiponephong, DO Last Rate: 25 mcg/min (02/13/19 0630)   thiamine 100 mg Oral Daily Carlos Sakdiponephong, DO      Physical Exam:     Constitutional - in NAD, lying in bed in NAD  HEENT - NC/AT, no icterus noted, conjuctiva clear, oral mucosa free of exudate  Cardiac - No murmur noted, rate regular  Lungs - Clear to auscultation bilaterally, no wheezing noted  No respiratory distress  Abdomen - Soft and non tender, non distended  Extremities - No edema noted  Skin - no rashes noted    Neurological    Mental status - the patient is awake alert oriented x 3, with no evidence of dysarthria, patient is able to follow simple commands, is able to follow complex commands  No paraphasic errors noted  He was able to read, repeat, recognize all objects and tell me how they are used  He is able to perform simple calculations, he had difficulty with spelling WORLD backwards  There was some limited verbal out put, however, it appears his aphasia is improving since yesterday,    Cranial nerves 2 through 12 are intact, except decrease in nasolabial fold on the right  Motor - 5-/5 upper extremities and lower extremities on the right, left full power, minimal drift noted on the right with outstretched hands       No tremor or fixed deficit noted    Rapid movements are equal bilaterally    Sensation - nonfocal to touch, no neglect noted on testing  Coordination -  no ataxia noted on finger-to-nose or heel-to-shin    Toes are downgoing bilaterally    Lab, Imaging and other studies:   I have personally reviewed pertinent reports  , CBC:   Results from last 7 days   Lab Units 02/13/19 0440 02/12/19 0443 02/11/19 0433   WBC Thousand/uL 7 92 8 99 8 84   RBC Million/uL 5 11 4 92 5 08   HEMOGLOBIN g/dL 14 5 14 0 14 5   HEMATOCRIT % 44 5 42 7 44 0   MCV fL 87 87 87   PLATELETS Thousands/uL 223 223 248   , BMP/CMP:   Results from last 7 days   Lab Units 02/13/19 0440 02/12/19 0443 02/11/19 0433   SODIUM mmol/L 138 137 139   POTASSIUM mmol/L 3 8 3 8 4 1   CHLORIDE mmol/L 107 107 109*   CO2 mmol/L 24 24 23   BUN mg/dL 13 9 11   CREATININE mg/dL 1 04 1 01 1 00   CALCIUM mg/dL 8 8 8 3 8 5   AST U/L  --   --  20   ALT U/L  --   --  32   ALK PHOS U/L  --   --  69   EGFR ml/min/1 73sq m 82 85 86   , Vitamin B12:   , HgBA1C:   Results from last 7 days   Lab Units 02/11/19 0433   HEMOGLOBIN A1C % 5 7   , TSH:   Results from last 7 days   Lab Units 02/11/19  0433   TSH 3RD GENERATON uIU/mL 3 770*   , Coagulation:   Results from last 7 days   Lab Units 02/10/19  0959   INR  0 96   , Lipid Profile:   Results from last 7 days   Lab Units 02/11/19 0433   HDL mg/dL 39*   LDL CALC mg/dL 192*   TRIGLYCERIDES mg/dL 166*   , Ammonia:        Procedure: Ct Head Wo Contrast    Result Date: 2/13/2019  Narrative: CT BRAIN - WITHOUT CONTRAST INDICATION:   Cerebral hemorrhage suspected; Intracranial hemorrhage  COMPARISON:  CT brain and CTA stroke alert performed February 10, 2019  TECHNIQUE:  CT examination of the brain was performed  In addition to axial images, coronal 2D reformatted images were created and submitted for interpretation  Radiation dose length product (DLP) for this visit:  966 93 mGy-cm     This examination, like all CT scans performed in the [facility], was performed utilizing techniques to minimize radiation dose exposure, including the use of iterative  reconstruction and automated exposure control  IMAGE QUALITY:  Diagnostic  FINDINGS: PARENCHYMA:  There are evolving areas of infarction in the anterior left temporal lobe, left basal ganglia, left caudate nucleus and left external capsule  Ill-defined areas of increased attenuation are present in the areas of infarction, most pronounced within the external capsule  Findings are compatible with areas of petechial hemorrhage and hemorrhagic conversion  These appear similar to the earlier MRI  Stable mild mass effect with sulcal effacement  2 mm of midline shift to the right  No new areas of acute intraparenchymal hemorrhage are identified  No acute infarctions are seen  Foci of decreased attenuation are present in the periventricular regions and centrum semiovale bilaterally, consistent with chronic small vessel ischemic change  Bilateral internal carotid artery and vertebral artery calcifications  VENTRICLES AND EXTRA-AXIAL SPACES:  Enlargement of ventricles and extra-axial CSF spaces, out of proportion to the patient's age most consistent with cerebral and cerebellar atrophy  VISUALIZED ORBITS AND PARANASAL SINUSES:  No acute abnormality involving the orbits  Mild scattered sinus mucosal thickening is noted  No fluid levels are seen  CALVARIUM AND EXTRACRANIAL SOFT TISSUES:  Normal      Impression: Evolving hemorrhagic left MCA territory infarction, not significant changed from the prior study  Cerebral atrophy with chronic small vessel ischemic change  No acute intracranial abnormality  Workstation performed: [id number]     Procedure: Mri Brain Wo Contrast    Result Date: 2/11/2019  Narrative: MRI BRAIN WITHOUT CONTRAST INDICATION: Acute left MCA infarct  COMPARISON:   2/10/2019   TECHNIQUE:  Sagittal T1, axial T2, axial FLAIR, axial T1, axial [place] and axial diffusion imaging  IMAGE QUALITY:  Diagnostic  FINDINGS: BRAIN PARENCHYMA: There is restricted diffusion and T2/flair hyperintensity in the dorsal lateral aspect of the left frontal lobe, frontal operculum and anterior insula  Signal abnormality is also present in the anterior caudate body, head and lentiform  nuclei  Few punctate foci of restricted diffusion and T2 hyperintensity in the left corona radiata  There is dural swelling in the affected areas  Foci of petechial hemorrhage within the areas of acute infarct  No midline shift  VENTRICLES:  No hydrocephalus or extra-axial collection  SELLA AND PITUITARY GLAND:  Normal  ORBITS:  No retro-orbital inflammation  PARANASAL SINUSES:  No significant paranasal sinus disease  VASCULATURE:  Evaluation of the major intracranial vasculature demonstrates appropriate flow voids  CALVARIUM AND SKULL BASE:  No evidence of osseous lesion  EXTRACRANIAL SOFT TISSUES:  No soft tissue mass  Impression: Late acute left MCA territory infarct, primarily involving the dorsal lateral aspect of the left frontal lobe, frontal operculum and left basal ganglia  Mild petechial hemorrhage  No significant mass effect  The study was marked in [facility] for immediate notification  Workstation performed: [id number]     Procedure: Ct Stroke Alert Brain    Result Date: 2/10/2019  Narrative: CT BRAIN - STROKE ALERT PROTOCOL INDICATION:   Expressive aphasia  COMPARISON:  Concurrently performed CT angiogram of the head and neck  TECHNIQUE:  CT examination of the brain was performed  In addition to axial images, coronal reformatted images were created and submitted for interpretation  Radiation dose length product (DLP) for this visit:  2065 74 mGy-cm   This examination, like all CT scans performed in the [facility], was performed utilizing techniques to minimize radiation dose exposure, including the use of iterative reconstruction and automated exposure control    IMAGE QUALITY: Diagnostic  FINDINGS:  PARENCHYMA:  There is subtle loss of the left insular ribbon in keeping with early CT changes of acute left middle cerebral artery distribution stroke  Also noted is a small area of chronic cortical encephalomalacia involving the posterior superior left  insula and low lower left frontal lobe perhaps best seen on coronal image 58 of series 700 consistent with the sequela of subacute or perhaps even chronic left posterior MCA distribution infarction  There is focal high density over the distal left M1 on image 14 of series 5 consistent with distal M1 occlusion better visualized on CT angiography  No acute parenchymal hemorrhage  No intracranial mass, mass effect or midline shift  VENTRICLES AND EXTRA-AXIAL SPACES:  Minor prominence of lateral and 3rd ventricle suggesting mild central atrophy  No hydrocephalus  No extra-axial collection  VISUALIZED ORBITS AND PARANASAL SINUSES:  Unremarkable  CALVARIUM AND EXTRACRANIAL SOFT TISSUES:   Normal      Impression: Noncontrast head CT demonstrates loss of the left insular ribbon consistent with acute left middle cerebral artery distribution stroke  Also noted is a small focus of decreased density in the cortical left posterior insula and low left posterior frontal lobe consistent with small late subacute or perhaps chronic posterior MCA distribution infarction  No acute intracranial hemorrhage  Findings were directly discussed with Gia Abarca on 2/10/2019 10:03 AM  Workstation performed: [id number]     Procedure: Cta Stroke Alert (head/neck)    Result Date: 2/10/2019  Narrative: CTA NECK AND BRAIN WITH CONTRAST INDICATION:  Expressive aphasia  Stroke COMPARISON:   Concurrently performed noncontrast head CT  TECHNIQUE:   Post contrast imaging was performed after administration of iodinated contrast through the neck and brain  Post contrast axial 0 625 mm images timed to opacify the arterial system    3D rendering was performed on an independent workstation  MIP reconstructions performed  Coronal reconstructions were performed of the noncontrast portion of the brain  Radiation dose length product (DLP) for this visit:  419 72 mGy-cm   This examination, like all CT scans performed in the [facility], was performed utilizing techniques to minimize radiation dose exposure, including the use of iterative  reconstruction and automated exposure control  IV Contrast:  85 mL of iohexol (OMNIPAQUE)  IMAGE QUALITY:   Diagnostic FINDINGS: CERVICAL VASCULATURE AORTIC ARCH AND GREAT VESSELS:  Normal aortic arch and great vessel origins  Normal visualized subclavian vessels  RIGHT VERTEBRAL ARTERY CERVICAL SEGMENT:  Normal origin  The vessel is normal in caliber throughout the neck  LEFT VERTEBRAL ARTERY CERVICAL SEGMENT:  Normal origin  The vessel is normal in caliber throughout the neck  RIGHT EXTRACRANIAL CAROTID SEGMENT:  There is fibrocalcific plaque at the carotid bifurcation resulting in mild approximate 20-30% atherosclerotic stenosis at right internal carotid artery origin  Distal cervical right internal carotid artery is uniform  and normal in caliber to the carotid terminus  LEFT EXTRACRANIAL CAROTID SEGMENT:  There is calcific and fibrocalcific atherosclerotic plaque at the carotid bifurcation and there is complete occlusion of the left internal carotid artery at its origin  There is no opacification of the cervical left internal carotid artery in the neck  Left external carotid artery is patent  NASCET criteria was used to determine the degree of internal carotid artery diameter stenosis  INTRACRANIAL VASCULATURE INTERNAL CAROTID ARTERIES:  Minor fibrocalcific plaque associated with the right cavernous and supraclinoid internal carotid artery  Normal right-sided carotid terminus   There is minimal retrograde collateral flow into the supraclinoid left internal carotid artery cavernous with flow coming across the [place] via patent but somewhat small caliber left A1 anterior cerebral artery segment and patent but very small  caliber left posterior communicating artery  ANTERIOR CIRCULATION:  Patent bilateral A1 segments, right larger than left, patent distal anterior cerebral arteries with normal enhancement  Normal anterior communicating artery  MIDDLE CEREBRAL ARTERY CIRCULATION:  Right M1 segment and middle cerebral artery branches demonstrate normal enhancement bilaterally  Opacification of the proximal and mid left M1 middle cerebral artery occurs via collateral circulation across the [place] via small caliber left A1 and tiny caliber left posterior communicating artery  There is occlusion of the mid to distal left middle cerebral artery with reconstitution of opacification of small anterior and middle left M2 branches via collateral circulation  DISTAL VERTEBRAL ARTERIES:  Normal distal vertebral arteries  Posterior inferior cerebellar artery origins are normal  Normal vertebral basilar junction  BASILAR ARTERY:  Basilar artery is normal in caliber  Normal superior cerebellar arteries  POSTERIOR CEREBRAL ARTERIES: Both posterior cerebral arteries arises from the basilar tip  Both arteries demonstrate normal enhancement  Patent small caliber bilateral posterior communicating arteries  DURAL VENOUS SINUSES:  Normal  NON VASCULAR ANATOMY BONY STRUCTURES:  No acute osseous abnormality  SOFT TISSUES OF THE NECK:  Unremarkable  THORACIC INLET:  Unremarkable  Impression: Occlusion of left internal carotid artery beginning at its origin  Enhancement of proximal left M1 via collateral circulation across [place] via small caliber left A1 and tiny caliber left posterior communicating arteries  Occlusion of left mid to distal left M1 middle cerebral artery segment    There is some enhancement relatively diminutive of anterior and middle M2 and M3 middle cerebral artery branches presumably via collateral circulation  Mild atherosclerotic narrowing at the origin of the right internal carotid artery  Noncontrast head CT demonstrates loss of the left insular ribbon consistent with acute left middle cerebral artery distribution stroke  Also noted is a small focus of encephalomalacia in the left posterior insula and low left posterior frontal lobe consistent with small chronic cortical infarction in that location  No acute intracranial hemorrhage  Findings were directly discussed with Yu Sinha on 2/10/2019 10:03 AM  Workstation performed: [id number]     Counseling / Coordination of Care  Total time spent today 20 minutes  Greater than 50% of total time was spent with the patient and / or family counseling and / or coordination of care  A description of the counseling / coordination of care: floor time, reviewing records, reviewing CT of head with attending at morning rounds, reviewing plan of care with critical care, physical examination, reviewed with patient etiology of stroke, treatment plans, and importance of medication compliance and followup

## 2019-02-13 NOTE — PROGRESS NOTES
Progress Note - Critical Care   Richar Valdez 48 y o  male MRN: [id number]  Unit/Bed#: [address] -01 Encounter: [id number]    Attending Physician: Sepideh Dixon MD      ______________________________________________________________________  Assessment and Plan:   Principal Problem:    CVA (cerebral vascular accident) [facility])  Active Problems:    Alcohol abuse    Hypertension  Resolved Problems:    * No resolved hospital problems  *        Neuro:   1  Ischemic Left M1 CVA and occlusion of L ICA  · Thrombolytics held at this time due to improvement of symptoms  · Daily Asa 81mg and plavix 75mg  · SBP BP goal 140-160 mmHg  · Continue Midodrine 5mg TID  · Wean patient off of phenylephrine gtt  · Neurology consulted: long term goal 140-160 SBP,   · Neurosurgery consulted: ok to use tPA if symptoms recur, signed off  · MRI: Late acute left MCA territory infarct small with small petechial hemorhages in area of infarct  · Repeat CT noncon head: Evolving hemorrhagic left MCA territory infarction, not significant change from the prior study  · High dose statin: lipitor 80mg     2  Alcohol abuse  · CIWA protocol  · Continue thiamine and folate daily  · Alcohol cessation counseling    Neuro checks as per unit protocol  Take precautions to prevent ICU delirium  Monitor GCS      CV:  · SBP goal 140-160mmHg  · Continue Midodrine 5mg TID  · Wean off phenylephrine gtt  · Echo: EF 60% with no regional wall motion abnormalities    Continuous telemetry  Pulm:   · No acute issues  · Continue spirometry  · Maintain SpO2 >90%     GI:   · GI ppx: n/a  · Bowel regimen: colace      :   · UOP 1450 mL over past 24H, 60 4cc/hr  · Monitor I&Os, net -165 3 over past 24H  · Cr 1 04, HNP 92     F/E/N:   1  Fluids/Electrolytes  · IVF Nss running at 100 mL/hr  · Replete as needed       2   Nutrition  · Nutrition recommendations: regular house     ID:   No evidence of infection, afebrile, no  leukocytosis     Heme: No acute issues  1  DVT ppx: Lovenox and SCD's b/l     Endo:   Mild Hypothyroidism  · TSH 3 77  · Free T4 0 72     A1C: 5 7  q6 BG checks     Msk/Skin:   · Ambulate with assistance  · PT/OT   · PM&R consult      Disposition: Continue close BP monitoring and wean off of Gee gtt  Code Status: Level 1 - Full Code        ______________________________________________________________________    Chief Complaint: No complaints    24 Hour Events: patient was off phenylephrine gtt until he fell asleep and his pressures dropped to 125/85 and was restarted on 25mcg/hr  ______________________________________________________________________    Physical Exam:   Physical Exam   Constitutional: He is oriented to person, place, and time  He appears well-developed and well-nourished  No distress  HENT:   Head: Normocephalic and atraumatic  Nose: Nose normal    Mouth/Throat: Oropharynx is clear and moist  No oropharyngeal exudate  Eyes: Pupils are equal, round, and reactive to light  Conjunctivae and EOM are normal  No scleral icterus  Neck: Normal range of motion  No JVD present  No tracheal deviation present  Cardiovascular: Normal rate, regular rhythm, normal heart sounds and intact distal pulses  No murmur heard  Pulmonary/Chest: Effort normal and breath sounds normal  No stridor  No respiratory distress  He has no wheezes  He has no rales  He exhibits no tenderness  Abdominal: Soft  Bowel sounds are normal  He exhibits no distension and no mass  There is no tenderness  There is no rebound and no guarding  Musculoskeletal: He exhibits no edema, tenderness or deformity  Neurological: He is alert and oriented to person, place, and time  No cranial nerve deficit or sensory deficit  He exhibits abnormal muscle tone (4/5 RUE)  Coordination normal    Skin: Skin is warm and dry  Capillary refill takes less than 2 seconds  He is not diaphoretic  No erythema  No pallor  Psychiatric: He has a normal mood and affect   His behavior is normal  Thought content normal    Vitals reviewed  ______________________________________________________________________  Vitals:    19 0300 19 0428 19 0500 19 0600   BP: 166/94 155/90 167/93 (!) 171/90   Pulse: 56 56 (!) 50 (!) 54   Resp: 14 18 15 16   Temp:  98 9 °F (37 2 °C)     TempSrc:  Oral     SpO2: 94% 94% 94% 95%   Weight:       Height:           Temperature:   Temp (24hrs), Av °F (37 2 °C), Min:98 7 °F (37 1 °C), Max:99 8 °F (37 7 °C)    Current Temperature: 98 9 °F (37 2 °C)  Weights:   IBW: 77 6 kg    Body mass index is 30 71 kg/m²  Weight (last 2 days)     None        Hemodynamic Monitoring:  PAP:  , PAP mean:        Non-Invasive/Invasive Ventilation Settings:  Respiratory    Lab Data (Last 4 hours)    None         O2/Vent Data (Last 4 hours)    None              No results found for: PHART, LSI8ZBG, PO2ART, FHB0WNK, D9XKOEZW, BEART, SOURCE  SpO2: SpO2: 95 %  Intake and Outputs:  I/O        07 -  0700 701 -  0700    P  O  430 430    I V  (mL/kg) 2804 6 (27 2) 354 7 (3 4)    IV Piggyback  500    Total Intake(mL/kg) 3234 6 (31 4) 1284 7 (12 5)    Urine (mL/kg/hr) 1300 (0 5) 1450 (0 6)    Total Output 1300 1450    Net +1934 6 -165 3          Unmeasured Urine Occurrence 2 x 1 x        UOP: 60 4cc/hour   Nutrition:        Diet Orders   (From admission, onward)            Start     Ordered    19 0706  Diet Cardiovascular; Sodium 4 Gm (PATSY); Lo Cholesterol  Diet effective now     Question Answer Comment   Diet Type Cardiovascular    Cardiac Sodium 4 Gm (PATSY)    Other Restriction(s): Lo Cholesterol    RD to adjust diet per protocol?  Yes        19 0707          Labs:   Results from last 7 days   Lab Units 19  0440 19  0443 19  0433   WBC Thousand/uL 7 92 8 99 8 84   HEMOGLOBIN g/dL 14 5 14 0 14 5   HEMATOCRIT % 44 5 42 7 44 0   PLATELETS Thousands/uL 223 223 248   NEUTROS PCT % 68 68 70   MONOS PCT % 9 8 8     Results from last 7 days   Lab Units 02/13/19  0440 02/12/19  0443 02/11/19  0433   POTASSIUM mmol/L 3 8 3 8 4 1   CHLORIDE mmol/L 107 107 109*   CO2 mmol/L 24 24 23   BUN mg/dL 13 9 11   CREATININE mg/dL 1 04 1 01 1 00   CALCIUM mg/dL 8 8 8 3 8 5   ALK PHOS U/L  --   --  69   ALT U/L  --   --  32   AST U/L  --   --  20     Results from last 7 days   Lab Units 02/13/19  0440 02/11/19  0433   MAGNESIUM mg/dL 2 3 2 5     Lab Results   Component Value Date    PHOS 3 2 02/13/2019    PHOS 2 8 02/11/2019      Results from last 7 days   Lab Units 02/10/19  0959   INR  0 96   PTT seconds 26     No results found for: TROPONINI      ABG:No results found for: PHART, YYX5QIW, PO2ART, KXA5TXZ, P7QVLDRN, BEART, SOURCE  Imaging:  I have personally reviewed pertinent reports  CT head wo contrast   Final Result by Monik Gaming DO (02/13 0500)   Evolving hemorrhagic left MCA territory infarction, not significant changed from the prior study  Cerebral atrophy with chronic small vessel ischemic change  No acute intracranial abnormality  Workstation performed: [id number]         MRI brain wo contrast   Final Result by Fabian Fenton MD (02/11 0866)      Late acute left MCA territory infarct, primarily involving the dorsal lateral aspect of the left frontal lobe, frontal operculum and left basal ganglia  Mild petechial hemorrhage  No significant mass effect  The study was marked in [facility] for immediate notification        Workstation performed: [id number]             Micro:  No results found for: CYNTHIA Oakes  Allergies: No Known Allergies  Medications:   Scheduled Meds:  Current Facility-Administered Medications:  aspirin 81 mg Oral Daily aArti Lewis DO    atorvastatin 80 mg Oral Daily With [employer] MINH Weiner PA-C    clopidogrel 75 mg Oral Daily Carlos Arteaga, DO    folic acid 1 mg Oral Daily Carlos Kleinponepnani, DO    heparin (porcine) 5,000 Units Subcutaneous [facility]   Monica Haines PA-C    midodrine 5 mg Oral TID AC Carlos Sakdiponephong, DO    multivitamin-minerals 1 tablet Oral Daily Carlos Sakdiponephong, DO    phenylephine  mcg/min Intravenous Titrated Carlos Sakdiponephong, DO Last Rate: Stopped (02/13/19 0600)   thiamine 100 mg Oral Daily Carlos Sakdiponephong, DO      Continuous Infusions:  phenylephine  mcg/min Last Rate: Stopped (02/13/19 0600)     PRN Meds:     VTE Pharmacologic Prophylaxis: Heparin  VTE Mechanical Prophylaxis: sequential compression device  Invasive lines and devices: Invasive Devices     Peripheral Intravenous Line            Peripheral IV 02/10/19 Right Antecubital 2 days    Peripheral IV 02/11/19 Left Forearm 2 days                     Portions of the record may have been created with voice recognition software  Occasional wrong word or "sound a like" substitutions may have occurred due to the inherent limitations of voice recognition software  Read the chart carefully and recognize, using context, where substitutions have occurred      [address] [place] [address], DO

## 2019-02-13 NOTE — SPEECH THERAPY NOTE
Speech Language/Pathology  Provided activities for difficulties w/ word-finding  Family in room w/ pt, pt reported improvement w/ his speech today  Will follow up as needed

## 2019-02-13 NOTE — PLAN OF CARE
Problem: OCCUPATIONAL THERAPY ADULT  Goal: Performs self-care activities at highest level of function for planned discharge setting  See evaluation for individualized goals  Description  Treatment Interventions: ADL retraining, Functional transfer training, Endurance training, Cognitive reorientation, Patient/family training, Continued evaluation          See flowsheet documentation for full assessment, interventions and recommendations  Note:   Limitation: Decreased ADL status, Decreased Safe judgement during ADL, Decreased cognition, Decreased endurance, Decreased high-level ADLs  Prognosis: Fair  Assessment: Patient participated in Skilled OT session 2/13/19 with interventions consisting of  formal cognitive assessment (Administration of the [address] [place] [address], Ne)*   Patient agreeable to OT treatment session, upon arrival patient was found seated OOB to Recliner  Pt scored a 15/30 indicating moderate cognitive impairment  Pt scored most poorly on visuospatial/executive functioning, attention, abstraction and delayed recall  Pt reported he feels he is having trouble getting his thoughts out  Both pt and pt's wife report pt appears to be slightly "off" cognitively and slower to process information  Patient continues to be functioning below baseline level, occupational performance remains limited secondary to factors listed above and increased risk for falls and injury  From OT standpoint, recommendation at time of d/c would be Outpatient OT for cognitive therapy, higher level thinking skills  Patient to benefit from continued Occupational Therapy treatment while in the hospital to address deficits as defined above and maximize level of functional independence with ADLs and functional mobility        OT Discharge Recommendation: (S) Outpatient OT(for cognitive therapy)  OT - OK to Discharge: Yes(when medically stable)     Rosy Kim MS, OTR/L

## 2019-02-13 NOTE — PROGRESS NOTES
Progress Note - ICU Transfer to Step down/[facility]  surg  Geovanny Bray 48 y o  male MRN: [id number]    Unit/Bed#: [address] -01 Encounter: [id number]      Code Status: Level 1 - Full Code    Date of ICU admission: 2/10/2019    Reason for ICU adm: CVA (cerebral vascular accident) [facility]) [I63 9]    Active problems:   Patient Active Problem List   Diagnosis    CVA (cerebral vascular accident) ([address] )    Alcohol abuse    Hypertension    Hypercholesterolemia    Benign essential hypertension       Summary of clinical course:   A 59-year-old male presents with right upper extremity weakness and right facial droop with word-finding found to have a right M1 MCA thrombotic stroke with of her left ICA occlusion  TPA was held due to improvement of symptoms  Neurosurgery recommended tPA if symptoms recur otherwise they signed off and no intervention at this time  Neurology was consulted, and recommended patient be on dual anti-platelet therapy as well as maintaining blood pressure above 660 systolic  Patient was started on phenylephrine drip that was successfully weaned off  Neurology recommended a systolic blood pressure goal of >120  Patient was started on 5 mg t i d  of midodrine  Repeat CT noncontrast of the head showed evolving hemorrhagic left MCA infarction without significant change from prior study  Patient will require further neurology follow-up for anti-platelet therapy  PT/OT consulted and recommended home with PT/OT  Patient is medically stable and ready for transfer to [facility] with telemetry  Recent or scheduled procedures:  None    Recent diagnostics:  None    Neuro: GCS 15   Pain: 0  CV:RRR, normotensive, no M/R/R  Pulm:  CTA bilateral, no wheezing rales or rhonchi  Encourage Incentive Spirometry  SpO2 95% on room air  GI:  Diet sodium for Gram, low-cholesterol  : Monitor Intake and Output  Skin:  Continue ambulation  F/E/N:  Euvolemic does not require fluids continue diet    Activity: OOB/PT/OT    Hospital discharge planning:  Continue telemetry monitoring  Determined anti-platelet therapy for discharge as per Neurology  Discharge home with home PT/OT when medically cleared

## 2019-02-14 VITALS
WEIGHT: 226.41 LBS | SYSTOLIC BLOOD PRESSURE: 142 MMHG | OXYGEN SATURATION: 95 % | HEIGHT: 72 IN | DIASTOLIC BLOOD PRESSURE: 85 MMHG | HEART RATE: 64 BPM | BODY MASS INDEX: 30.67 KG/M2 | RESPIRATION RATE: 18 BRPM | TEMPERATURE: 98.5 F

## 2019-02-14 PROCEDURE — 97535 SELF CARE MNGMENT TRAINING: CPT

## 2019-02-14 RX ORDER — ASPIRIN 81 MG/1
81 TABLET, CHEWABLE ORAL DAILY
Qty: 14 TABLET | Refills: 0 | Status: SHIPPED | OUTPATIENT
Start: 2019-02-14 | End: 2019-02-22 | Stop reason: SDUPTHER

## 2019-02-14 RX ORDER — ATORVASTATIN CALCIUM 80 MG/1
80 TABLET, FILM COATED ORAL
Qty: 14 TABLET | Refills: 0 | Status: SHIPPED | OUTPATIENT
Start: 2019-02-14 | End: 2019-02-22 | Stop reason: SDUPTHER

## 2019-02-14 RX ORDER — FOLIC ACID 1 MG/1
1 TABLET ORAL DAILY
Qty: 14 TABLET | Refills: 0 | Status: SHIPPED | OUTPATIENT
Start: 2019-02-14 | End: 2019-02-22 | Stop reason: SDUPTHER

## 2019-02-14 RX ORDER — LANOLIN ALCOHOL/MO/W.PET/CERES
100 CREAM (GRAM) TOPICAL DAILY
Qty: 14 TABLET | Refills: 0 | Status: SHIPPED | OUTPATIENT
Start: 2019-02-14 | End: 2019-02-22 | Stop reason: SDUPTHER

## 2019-02-14 RX ORDER — MIDODRINE HYDROCHLORIDE 5 MG/1
5 TABLET ORAL
Qty: 28 TABLET | Refills: 0 | Status: SHIPPED | OUTPATIENT
Start: 2019-02-14 | End: 2019-02-18

## 2019-02-14 RX ORDER — POTASSIUM CHLORIDE 20 MEQ/1
20 TABLET, EXTENDED RELEASE ORAL ONCE
Status: COMPLETED | OUTPATIENT
Start: 2019-02-14 | End: 2019-02-14

## 2019-02-14 RX ORDER — CLOPIDOGREL BISULFATE 75 MG/1
75 TABLET ORAL DAILY
Qty: 14 TABLET | Refills: 0 | Status: SHIPPED | OUTPATIENT
Start: 2019-02-14 | End: 2019-02-22 | Stop reason: SDUPTHER

## 2019-02-14 RX ADMIN — FOLIC ACID 1 MG: 1 TABLET ORAL at 09:46

## 2019-02-14 RX ADMIN — POTASSIUM CHLORIDE 20 MEQ: 1500 TABLET, EXTENDED RELEASE ORAL at 09:46

## 2019-02-14 RX ADMIN — Medication 1 TABLET: at 09:46

## 2019-02-14 RX ADMIN — ASPIRIN 81 MG 81 MG: 81 TABLET ORAL at 09:46

## 2019-02-14 RX ADMIN — MIDODRINE HYDROCHLORIDE 5 MG: 5 TABLET ORAL at 05:46

## 2019-02-14 RX ADMIN — THIAMINE HCL TAB 100 MG 100 MG: 100 TAB at 09:46

## 2019-02-14 RX ADMIN — HEPARIN SODIUM 5000 UNITS: 5000 INJECTION INTRAVENOUS; SUBCUTANEOUS at 05:46

## 2019-02-14 RX ADMIN — CLOPIDOGREL BISULFATE 75 MG: 75 TABLET ORAL at 09:46

## 2019-02-14 NOTE — DISCHARGE SUMMARY
[facility] Discharge Summary - Medical Kwame Miller 48 y o  male MRN: [id number]    [address] [facility] 7 Room / Bed: [facility] 704/[facility] 202-60 Encounter: [id number]    BRIEF OVERVIEW    Admitting Provider: Jamilah Cornell MD  Discharge Provider: Carla Morris MD  Primary Care Physician at Discharge: Dr Buckley Schlatter, [phone]  Admission Date: 2/10/2019     Discharge Date: No discharge date for patient encounter  Subjective  Patient denies any complaint  Denies any focal motor weakness, chest pain, fevers, chills, confusion  Patient alert orient x4 with 5/5 strength in upper and lower extremities bilaterally  Cranial nerves 2-12 grossly intact with the exception of slight facial droop  He is not grossly dysarthric at this time  Hospital Course    This is a 48years old male with past medical history of hypertension, alcohol abuse last drink was 02/09/2019 who was admitted as a transfer from [address] [place] [address] after had right side weakness and slurred speech and found to have left ICA occlusion as well as left MCA ischemic stroke  He has not seen a primary doctor since 2011 and currently work as a  from time to time who for the past 2 weeks had symptoms of slurred speech that resolved  thought due to stroke however last Sunday his wife noticed right-sided weakness with slurred speech therefore called ambulance and brought to the hospital      He was not a candidate for tPA as his symptoms resolved  He was transferred to [facility] for hemodynamics support as well as close neuro monitoring at the level of neuro ICU in [place]  Was started on dual antiplatelet with Plavix and aspirin with high intensity statin Lipitor 80 mg  He was seen by Neurology, Physical therapy, Occupational therapy, as well as speech pathology    At the time of my exam he does not have significant dysarthria sitting on his chair comfortably on room air with mild word-finding difficulties and alert and oriented with some delay in intellectual abilities  Presenting Problem/History of Present Illness  Principal Problem:    Cerebrovascular accident (CVA) due to occlusion of left middle cerebral artery (HCC)  Active Problems:    Alcohol abuse    Hypertension  Resolved Problems:    * No resolved hospital problems  *    1  Left MCA ischemic stroke  · Was not a tPA candidate as symptoms resolved spontaneously  · CT head left external ICA occlusion as well as left MCA ischemic stroke  · MRI brain 02/11/2019 acute left MCA infarct  With Regino Sauger petechial hemorrhage  · Neuro exam consistent with mild dysarthria and mild right facial droop  · Loaded with Plavix 300 mg  · Continue dual antiplatelet Plavix 75 mg and aspirin 81 mg  · Continue high-intensity statin Lipitor 80 mg  · Appreciate Neurology recommendations  · Status post phenylephrine for permissive hypertension  · Continue midodrine 5 mg t i d  For blood pressure goal more than 120 continue upon discharge  · Continue neuro check every shift  · PT recommended home with family support  · OT recommended home with OT for mild cognitive impairment  · Speech recommended rehab speech therapy  · Continue cardiac diet     2  Left external ICA occlusion  · CT and MRI brain  as above  · Management as above     3  Hypertension  · Continue midodrine 5 mg t i d  Keep blood pressure goal more than 120 goal 120-160 per Neurology given severe MCA M1 stenosis  · Discharged on admitted during  Patient has home BP cuff was instructed to make sure his blood pressure remains between 120-160  His blood pressure goes above 160 he was instructed to discontinue admitted during  · Patient structure to follow up closely with his PCP as he will likely not require admitted drain long-term     4   Alcohol abuse   · Continue folate 1 mg daily  · Continue a thiamine 100 mg  · Counseling for alcohol abstinence        Diagnostic Procedures Performed  Imaging Studies:  Ct Head Wo Contrast    Result Date: 2/13/2019  Impression: Evolving hemorrhagic left MCA territory infarction, not significant changed from the prior study  Cerebral atrophy with chronic small vessel ischemic change  No acute intracranial abnormality  Workstation performed: [id number]     Mri Brain Wo Contrast    Result Date: 2/11/2019  Impression: Late acute left MCA territory infarct, primarily involving the dorsal lateral aspect of the left frontal lobe, frontal operculum and left basal ganglia  Mild petechial hemorrhage  No significant mass effect  The study was marked in [facility] for immediate notification  Workstation performed: [id number]     Ct Stroke Alert Brain    Result Date: 2/10/2019  Impression: Noncontrast head CT demonstrates loss of the left insular ribbon consistent with acute left middle cerebral artery distribution stroke  Also noted is a small focus of decreased density in the cortical left posterior insula and low left posterior frontal lobe consistent with small late subacute or perhaps chronic posterior MCA distribution infarction  No acute intracranial hemorrhage  Findings were directly discussed with Mera Almaguer on 2/10/2019 10:03 AM  Workstation performed: [id number]     Cta Stroke Alert (head/neck)    Result Date: 2/10/2019  Impression: Occlusion of left internal carotid artery beginning at its origin  Enhancement of proximal left M1 via collateral circulation across [place] of [place] via small caliber left A1 and tiny caliber left posterior communicating arteries  Occlusion of left mid to distal left M1 middle cerebral artery segment  There is some enhancement relatively diminutive of anterior and middle M2 and M3 middle cerebral artery branches presumably via collateral circulation  Mild atherosclerotic narrowing at the origin of the right internal carotid artery   Noncontrast head CT demonstrates loss of the left insular ribbon consistent with acute left middle cerebral artery distribution stroke  Also noted is a small focus of encephalomalacia in the left posterior insula and low left posterior frontal lobe consistent with small chronic cortical infarction in that location  No acute intracranial hemorrhage   Findings were directly discussed with Paul Ji on 2/10/2019 10:03 AM  Workstation performed: [id number]     Pertinent Labs:    Results from last 7 days   Lab Units 02/13/19  0440 02/12/19  0443 02/11/19  0433   POTASSIUM mmol/L 3 8 3 8 4 1   CHLORIDE mmol/L 107 107 109*   CO2 mmol/L 24 24 23   BUN mg/dL 13 9 11   CREATININE mg/dL 1 04 1 01 1 00   CALCIUM mg/dL 8 8 8 3 8 5   ALK PHOS U/L  --   --  69   ALT U/L  --   --  32   AST U/L  --   --  20       Therapeutic Procedures Performed  None    Test Results Pending at Discharge:  None    Medications     Medication List to be Continued at Discharge  Current Discharge Medication List        Current Discharge Medication List      START taking these medications    Details   aspirin 81 mg chewable tablet Chew 1 tablet (81 mg total) daily for 14 days  Qty: 14 tablet, Refills: 0    Associated Diagnoses: Cerebrovascular accident (CVA) due to occlusion of left middle cerebral artery (HCC)      atorvastatin (LIPITOR) 80 mg tablet Take 1 tablet (80 mg total) by mouth daily with dinner  Qty: 14 tablet, Refills: 0    Associated Diagnoses: Cerebrovascular accident (CVA) due to occlusion of left middle cerebral artery (HCC)      clopidogrel (PLAVIX) 75 mg tablet Take 1 tablet (75 mg total) by mouth daily for 14 days  Qty: 14 tablet, Refills: 0    Associated Diagnoses: Cerebrovascular accident (CVA) due to occlusion of left middle cerebral artery (HCC)      folic acid (FOLVITE) 1 mg tablet Take 1 tablet (1 mg total) by mouth daily for 14 days  Qty: 14 tablet, Refills: 0    Associated Diagnoses: Alcohol abuse      midodrine (PROAMATINE) 5 mg tablet Take 1 tablet (5 mg total) by mouth 2 (two) times a day for 14 days  Qty: 28 tablet, Refills: 0    Associated Diagnoses: Cerebrovascular accident (CVA) due to occlusion of left middle cerebral artery (HCC)      thiamine 100 MG tablet Take 1 tablet (100 mg total) by mouth daily for 14 days  Qty: 14 tablet, Refills: 0    Associated Diagnoses: Alcohol abuse           Current Discharge Medication List          Allergies  No Known Allergies  Discharge Diet: Therapeutic lifestyle modifications  Activity restrictions: As tolerated  Discharge Condition: stable  Discharged With Lines: no    Discharge Disposition:   Home    Outpatient Follow-Up   PCP    Neurology      Code Status: Level 1 - Full Code  Advance Directive and Living Will: <no information>  Power of :    POLST:      Discharge  Statement   I spent 30 minutes minutes discharging the patient  This time was spent on the day of discharge  I had direct contact with the patient on the day of discharge  Additional documentation is required if more than 30 minutes were spent on discharge

## 2019-02-14 NOTE — PROGRESS NOTES
Met with patient to discuss follow up with outpatient neurology  Plan for discharge at this time is to return home today and follow up with outpatient therapies  Resides with wife Luis Lee and 2 children who can assist with care as needed, patient independent at home at baseline  Patient has stroke education binder at bedside, I reviewed stroke risk factor management and symptoms with him, he verbalizes understanding  Provided him my card for assistance and follow up after discharge  Patient does not have any questions or concerns at this time  Will follow up post discharge

## 2019-02-14 NOTE — PLAN OF CARE
Problem: Prexisting or High Potential for Compromised Skin Integrity  Goal: Skin integrity is maintained or improved  Description  INTERVENTIONS:  - Identify patients at risk for skin breakdown  - Assess and monitor skin integrity  - Assess and monitor nutrition and hydration status  - Monitor labs (i e  albumin)  - Assess for incontinence   - Turn and reposition patient  - Assist with mobility/ambulation  - Relieve pressure over bony prominences  - Avoid friction and shearing  - Provide appropriate hygiene as needed including keeping skin clean and dry  - Evaluate need for skin moisturizer/barrier cream  - Collaborate with interdisciplinary team (i e  Nutrition, Rehabilitation, etc )   - Patient/family teaching  Outcome: Progressing     Problem: Potential for Falls  Goal: Patient will remain free of falls  Description  INTERVENTIONS:  - Assess patient frequently for physical needs  -  Identify cognitive and physical deficits and behaviors that affect risk of falls    -  [place] fall precautions as indicated by assessment   - Educate patient/family on patient safety including physical limitations  - Instruct patient to call for assistance with activity based on assessment  - Modify environment to reduce risk of injury  - Consider OT/PT consult to assist with strengthening/mobility  Outcome: Progressing     Problem: PAIN - ADULT  Goal: Verbalizes/displays adequate comfort level or baseline comfort level  Description  Interventions:  - Encourage patient to monitor pain and request assistance  - Assess pain using appropriate pain scale  - Administer analgesics based on type and severity of pain and evaluate response  - Implement non-pharmacological measures as appropriate and evaluate response  - Consider cultural and social influences on pain and pain management  - Notify physician/advanced practitioner if interventions unsuccessful or patient reports new pain  Outcome: Progressing     Problem: INFECTION - ADULT  Goal: Absence or prevention of progression during hospitalization  Description  INTERVENTIONS:  - Assess and monitor for signs and symptoms of infection  - Monitor lab/diagnostic results  - Monitor all insertion sites, i e  indwelling lines, tubes, and drains  - Monitor endotracheal (as able) and nasal secretions for changes in amount and color  - [place] appropriate cooling/warming therapies per order  - Administer medications as ordered  - Instruct and encourage patient and family to use good hand hygiene technique  - Identify and instruct in appropriate isolation precautions for identified infection/condition  Outcome: Progressing  Goal: Absence of fever/infection during neutropenic period  Description  INTERVENTIONS:  - Monitor WBC  - Implement neutropenic guidelines  Outcome: Progressing     Problem: SAFETY ADULT  Goal: Maintain or return to baseline ADL function  Description  INTERVENTIONS:  -  Assess patient's ability to carry out ADLs; assess patient's baseline for ADL function and identify physical deficits which impact ability to perform ADLs (bathing, care of mouth/teeth, toileting, grooming, dressing, etc )  - Assess/evaluate cause of self-care deficits   - Assess range of motion  - Assess patient's mobility; develop plan if impaired  - Assess patient's need for assistive devices and provide as appropriate  - Encourage maximum independence but intervene and supervise when necessary  ¯ Involve family in performance of ADLs  ¯ Assess for home care needs following discharge   ¯ Request OT consult to assist with ADL evaluation and planning for discharge  ¯ Provide patient education as appropriate  Outcome: Progressing  Goal: Maintain or return mobility status to optimal level  Description  INTERVENTIONS:  - Assess patient's baseline mobility status (ambulation, transfers, stairs, etc )    - Identify cognitive and physical deficits and behaviors that affect mobility  - Identify mobility aids required to assist with transfers and/or ambulation (gait belt, sit-to-stand, lift, walker, cane, etc )  - [place] fall precautions as indicated by assessment  - Record patient progress and toleration of activity level on Mobility SBAR; progress patient to next Phase/Stage  - Instruct patient to call for assistance with activity based on assessment  - Request Rehabilitation consult to assist with strengthening/weightbearing, etc   Outcome: Progressing     Problem: DISCHARGE PLANNING  Goal: Discharge to home or other facility with appropriate resources  Description  INTERVENTIONS:  - Identify barriers to discharge w/patient and caregiver  - Arrange for needed discharge resources and transportation as appropriate  - Identify discharge learning needs (meds, wound care, etc )  - Arrange for interpretive services to assist at discharge as needed  - Refer to Case Management Department for coordinating discharge planning if the patient needs post-hospital services based on physician/advanced practitioner order or complex needs related to functional status, cognitive ability, or social support system  Outcome: Progressing     Problem: Knowledge Deficit  Goal: Patient/family/caregiver demonstrates understanding of disease process, treatment plan, medications, and discharge instructions  Description  Complete learning assessment and assess knowledge base  Interventions:  - Provide teaching at level of understanding  - Provide teaching via preferred learning methods  Outcome: Progressing     Problem: NEUROSENSORY - ADULT  Goal: Achieves stable or improved neurological status  Description  INTERVENTIONS  - Monitor and report changes in neurological status  - Initiate measures to prevent increased intracranial pressure  - Maintain blood pressure and fluid volume within ordered parameters to optimize cerebral perfusion  - Monitor temperature, glucose, and sodium or any other associated labs   Initiate appropriate interventions as ordered  - Monitor for seizure activity   - Administer anti-seizure medications as ordered  Outcome: Progressing  Goal: Absence of seizures  Description  INTERVENTIONS  - Monitor for seizure activity  - Administer anti-seizure medications as ordered  - Monitor neurological status  Outcome: Progressing  Goal: Remains free of injury related to seizures activity  Description  INTERVENTIONS  - Maintain airway, patient safety  and administer oxygen as ordered  - Monitor patient for seizure activity, document and report duration and description of seizure to physician/advanced practitioner  - If seizure occurs,  ensure patient safety during seizure  - Reorient patient post seizure  - Seizure pads on all 4 side rails  - Instruct patient/family to notify RN of any seizure activity including if an aura is experienced  - Instruct patient/family to call for assistance with activity based on nursing assessment  - Administer anti-seizure medications as ordered  - Monitor fetal well being  Outcome: Progressing  Goal: Achieves maximal functionality and self care  Description  INTERVENTIONS  - Monitor swallowing and airway patency with patient fatigue and changes in neurological status  - Encourage and assist patient to increase activity and self care with guidance from rehab services  - Encourage visually impaired, hearing impaired and aphasic patients to use assistive/communication devices  Outcome: Progressing     Problem: CARDIOVASCULAR - ADULT  Goal: Maintains optimal cardiac output and hemodynamic stability  Description  INTERVENTIONS:  - Monitor I/O, vital signs and rhythm  - Monitor for S/S and trends of decreased cardiac output i e  bleeding, hypotension  - Administer and titrate ordered vasoactive medications to optimize hemodynamic stability  - Assess quality of pulses, skin color and temperature  - Assess for signs of decreased coronary artery perfusion - ex   Angina  - Instruct patient to report change in severity of symptoms  Outcome: Progressing  Goal: Absence of cardiac dysrhythmias or at baseline rhythm  Description  INTERVENTIONS:  - Continuous cardiac monitoring, monitor vital signs, obtain 12 lead EKG if indicated  - Administer antiarrhythmic and heart rate control medications as ordered  - Monitor electrolytes and administer replacement therapy as ordered  Outcome: Progressing     Problem: RESPIRATORY - ADULT  Goal: Achieves optimal ventilation and oxygenation  Description  INTERVENTIONS:  - Assess for changes in respiratory status  - Assess for changes in mentation and behavior  - Position to facilitate oxygenation and minimize respiratory effort  - Oxygen administration by appropriate delivery method based on oxygen saturation (per order) or ABGs  - Initiate smoking cessation education as indicated  - Encourage broncho-pulmonary hygiene including cough, deep breathe, Incentive Spirometry  - Assess the need for suctioning and aspirate as needed  - Assess and instruct to report SOB or any respiratory difficulty  - Respiratory Therapy support as indicated  Outcome: Progressing     Problem: GASTROINTESTINAL - ADULT  Goal: Maintains or returns to baseline bowel function  Description  INTERVENTIONS:  - Assess bowel function  - Encourage oral fluids to ensure adequate hydration  - Administer IV fluids as ordered to ensure adequate hydration  - Administer ordered medications as needed  - Encourage mobilization and activity  - Nutrition services referral to assist patient with appropriate food choices  Outcome: Progressing     Problem: GENITOURINARY - ADULT  Goal: Maintains or returns to baseline urinary function  Description  INTERVENTIONS:  - Assess urinary function  - Encourage oral fluids to ensure adequate hydration  - Administer IV fluids as ordered to ensure adequate hydration  - Administer ordered medications as needed  - Offer frequent toileting  - Follow urinary retention protocol if ordered  Outcome: Progressing Problem: METABOLIC, FLUID AND ELECTROLYTES - ADULT  Goal: Electrolytes maintained within normal limits  Description  INTERVENTIONS:  - Monitor labs and assess patient for signs and symptoms of electrolyte imbalances  - Administer electrolyte replacement as ordered  - Monitor response to electrolyte replacements, including repeat lab results as appropriate  - Instruct patient on fluid and nutrition as appropriate  Outcome: Progressing  Goal: Fluid balance maintained  Description  INTERVENTIONS:  - Monitor labs and assess for signs and symptoms of volume excess or deficit  - Monitor I/O and WT  - Instruct patient on fluid and nutrition as appropriate  Outcome: Progressing  Goal: Glucose maintained within target range  Description  INTERVENTIONS:  - Monitor Blood Glucose as ordered  - Assess for signs and symptoms of hyperglycemia and hypoglycemia  - Administer ordered medications to maintain glucose within target range  - Assess nutritional intake and initiate nutrition service referral as needed  Outcome: Progressing     Problem: SKIN/TISSUE INTEGRITY - ADULT  Goal: Skin integrity remains intact  Description  INTERVENTIONS  - Identify patients at risk for skin breakdown  - Assess and monitor skin integrity  - Assess and monitor nutrition and hydration status  - Monitor labs (i e  albumin)  - Assess for incontinence   - Turn and reposition patient  - Assist with mobility/ambulation  - Relieve pressure over bony prominences  - Avoid friction and shearing  - Provide appropriate hygiene as needed including keeping skin clean and dry  - Evaluate need for skin moisturizer/barrier cream  - Collaborate with interdisciplinary team (i e  Nutrition, Rehabilitation, etc )   - Patient/family teaching  Outcome: Progressing  Goal: Incision(s), wounds(s) or drain site(s) healing without S/S of infection  Description  INTERVENTIONS  - Assess and document risk factors for skin impairment   - Assess and document dressing, incision, wound bed, drain sites and surrounding tissue  - Initiate Nutrition services consult and/or wound management as needed  Outcome: Progressing  Goal: Oral mucous membranes remain intact  Description  INTERVENTIONS  - Assess oral mucosa and hygiene practices  - Implement preventative oral hygiene regimen  - Implement oral medicated treatments as ordered  - Initiate Nutrition services referral as needed  Outcome: Progressing     Problem: HEMATOLOGIC - ADULT  Goal: Maintains hematologic stability  Description  INTERVENTIONS  - Assess for signs and symptoms of bleeding or hemorrhage  - Monitor labs  - Administer supportive blood products/factors as ordered and appropriate  Outcome: Progressing     Problem: MUSCULOSKELETAL - ADULT  Goal: Maintain or return mobility to safest level of function  Description  INTERVENTIONS:  - Assess patient's ability to carry out ADLs; assess patient's baseline for ADL function and identify physical deficits which impact ability to perform ADLs (bathing, care of mouth/teeth, toileting, grooming, dressing, etc )  - Assess/evaluate cause of self-care deficits   - Assess range of motion  - Assess patient's mobility; develop plan if impaired  - Assess patient's need for assistive devices and provide as appropriate  - Encourage maximum independence but intervene and supervise when necessary  - Involve family in performance of ADLs  - Assess for home care needs following discharge   - Request OT consult to assist with ADL evaluation and planning for discharge  - Provide patient education as appropriate  Outcome: Progressing  Goal: Maintain proper alignment of affected body part  Description  INTERVENTIONS:  - Support, maintain and protect limb and body alignment  - Provide pt/fam with appropriate education  Outcome: Progressing     Problem: Neurological Deficit  Goal: Neurological status is stable or improving  Description  Interventions:  - Monitor and assess patient's level of consciousness, motor function, sensory function, and level of assistance needed for ADLs  - Monitor and report changes from baseline  Collaborate with interdisciplinary team to initiate plan and implement interventions as ordered  - Provide and maintain a safe environment  - Utilize seizure precautions  - Utilize fall precautions  - Utilize aspiration precautions  - Utilize bleeding precautions  Outcome: Progressing     Problem: Activity Intolerance/Impaired Mobility  Goal: Mobility/activity is maintained at optimum level for patient  Description  Interventions:  - Assess and monitor patient  barriers to mobility and need for assistive/adaptive devices  - Assess patient's emotional response to limitations  - Collaborate with interdisciplinary team and initiate plans and interventions as ordered  - Encourage independent activity per ability   - Maintain proper body alignment  - Perform active/passive rom as tolerated/ordered  - Plan activities to conserve energy   - Turn patient  Outcome: Progressing     Problem: Communication Impairment  Goal: Ability to express needs and understand communication  Description  Assess patient's communication skills and ability to understand information  Patient will demonstrate use of effective communication techniques, alternative methods of communication and understanding even if not able to speak  - Encourage communication and provide alternate methods of communication as needed  - Collaborate with case management/ for discharge needs  - Include patient/family/caregiver in decisions related to communication  Outcome: Progressing     Problem: Potential for Aspiration  Goal: Non-ventilated patient's risk of aspiration is minimized  Description  Assess and monitor vital signs, respiratory status, and labs (WBC)  Monitor for signs of aspiration (tachypnea, cough, rales, wheezing, cyanosis, fever)  - Assess and monitor patient's ability to swallow    - Place patient up in chair to eat if possible  - HOB up at 90 degrees to eat if unable to get patient up into chair   - Supervise patient during oral intake  - Instruct patient to take small bites  - Instruct patient to take small single sips when taking liquids  - Follow patient-specific strategies generated by speech pathologist   Outcome: Progressing     Problem: Nutrition  Goal: Nutrition/Hydration status is improving  Description  Monitor and assess patient's nutrition/hydration status for malnutrition (ex- brittle hair, bruises, dry skin, pale skin and conjunctiva, muscle wasting, smooth red tongue, and disorientation)  Collaborate with interdisciplinary team and initiate plan and interventions as ordered  Monitor patient's weight and dietary intake as ordered or per policy  Utilize nutrition screening tool and intervene per policy  Determine patient's food preferences and provide high-protein, high-caloric foods as appropriate  - Assist patient with eating   - Allow adequate time for meals   - Encourage patient to take dietary supplement as ordered  - Collaborate with clinical nutritionist   - Include patient/family/caregiver in decisions related to nutrition    Outcome: Progressing     Problem: DISCHARGE PLANNING - CARE MANAGEMENT  Goal: Discharge to post-acute care or home with appropriate resources  Description  INTERVENTIONS:  - Conduct assessment to determine patient/family and health care team treatment goals, and need for post-acute services based on payer coverage, community resources, and patient preferences, and barriers to discharge  - Address psychosocial, clinical, and financial barriers to discharge as identified in assessment in conjunction with the patient/family and health care team  - Arrange appropriate level of post-acute services according to patient's   needs and preference and payer coverage in collaboration with the physician and health care team  - Communicate with and update the patient/family, physician, and health care team regarding progress on the discharge plan  - Arrange appropriate transportation to post-acute venues  Pt will go home to family when medically clear for dc   Outcome: Progressing     Problem: Nutrition/Hydration-ADULT  Goal: Nutrient/Hydration intake appropriate for improving, restoring or maintaining nutritional needs  Description  Monitor and assess patient's nutrition/hydration status for malnutrition (ex- brittle hair, bruises, dry skin, pale skin and conjunctiva, muscle wasting, smooth red tongue, and disorientation)  Collaborate with interdisciplinary team and initiate plan and interventions as ordered  Monitor patient's weight and dietary intake as ordered or per policy  Utilize nutrition screening tool and intervene per policy  Determine patient's food preferences and provide high-protein, high-caloric foods as appropriate       INTERVENTIONS:  - Monitor oral intake, urinary output, labs, and treatment plans  - Assess nutrition and hydration status and recommend course of action  - Evaluate amount of meals eaten  - Assist patient with eating if necessary   - Allow adequate time for meals  - Recommend/ encourage appropriate diets, oral nutritional supplements, and vitamin/mineral supplements  - Order, calculate, and assess calorie counts as needed  - Recommend, monitor, and adjust tube feedings and TPN/PPN based on assessed needs  - Assess need for intravenous fluids  - Provide specific nutrition/hydration education as appropriate  - Include patient/family/caregiver in decisions related to nutrition  Outcome: Progressing

## 2019-02-14 NOTE — MEDICAL STUDENT
INTERNAL MEDICINE HISTORY AND PHYSICAL  [facility] 704-01 SOD Team B     NAME: Rodney Haskins  AGE: 48 y o  SEX: male  : 1965   MRN: [id number]  ENCOUNTER: [id number]    DATE: 2019  TIME: 8:59 AM    Primary Care Physician: No primary care provider on file  Admitting Provider: Armin Solis MD    Chief complaint: Facial droop and dysarthria     History of Present Illness     Rondey Haskins is a 48 y o  male with past medical history of HTN, hypercholesterolemia, and alcohol abuse presented to the [facility] ED 4 days ago with right-sided facial droop, expressive dysphasia, and mild right arm weakness  Symptoms stated at [address] [place] [address] when he was having difficulty communicating with his wife  She states that he has had episodes for 2 weeks of slurred speech, right facial droop, and mild right arm weakness but the patient did not seek medical attention  CT head/ CTA showed a left showed occlusion of left mid to distal left M1 with excellent collaterals  Treatment with tPA and embolectomy were discussed with neurology, however patients symptoms started to improve and aphasia resolved so tPA was no administered  DAPT therapy started with  mg, Plavix load and 75 mg daily after  Transferred to [facility]  In the ICU, patient received 65 phenylephrine mcg/min to meet SBP goal >180, IV normal saline, as well as CIWA with thiamine and folate for alcohol withdrawal    MRI     Review of Systems   Review of Systems   Constitutional: Negative for chills and fever  Eyes: Negative for photophobia and visual disturbance  Respiratory: Negative for chest tightness and shortness of breath  Cardiovascular: Negative for chest pain and palpitations  Gastrointestinal: Negative for abdominal pain, constipation, diarrhea and nausea  Neurological: Positive for weakness  Negative for dizziness, tremors, numbness and headaches         Past Medical History     Past Medical History:   Diagnosis Date    Hypertension Past Surgical History   History reviewed  No pertinent surgical history  Social History     Social History     Substance and Sexual Activity   Alcohol Use Yes    Alcohol/week: 3 6 oz    Types: 6 Cans of beer per week    Frequency: 4 or more times a week    Drinks per session: 5 or 6    Binge frequency: Weekly     Social History     Substance and Sexual Activity   Drug Use Never     Social History     Tobacco Use   Smoking Status Never Smoker   Smokeless Tobacco Never Used       Family History   History reviewed  No pertinent family history  Medications Prior to Admission     Prior to Admission medications    Not on File       Allergies   No Known Allergies    Objective     Vitals:    02/13/19 1825 02/13/19 1940 02/13/19 2221 02/14/19 0813   BP: 157/90 143/91 133/83 142/85   BP Location:  Left arm Left arm Left arm   Pulse: 66 74 70 64   Resp: 17 18 18 18   Temp:  99 °F (37 2 °C) 98 8 °F (37 1 °C) 98 5 °F (36 9 °C)   TempSrc:  Oral Oral Oral   SpO2: 98% 99% 96% 95%   Weight:       Height:         Body mass index is 30 71 kg/m²  Intake/Output Summary (Last 24 hours) at 2/14/2019 0859  Last data filed at 2/14/2019 0530  Gross per 24 hour   Intake 473 8 ml   Output 0 ml   Net 473 8 ml     Invasive Devices     Peripheral Intravenous Line            Peripheral IV 02/13/19 Right Forearm 1 day                Physical Exam  GENERAL: Appears well-developed and well-nourished  Appears in no acute distress   HEENT: Normocephalic and atraumatic  No scleral icterus  PERRLA  EOMI B/L  No oropharyngeal edema  MM moist    NECK: Neck supple with no lymphadenopathy  Trachea midline  No JVD  CARDIOVASCULAR: S1 and S2 are present  Regular rate and rhythm  No murmurs, rubs, or gallops  RESPIRATORY: CTA B/L, no rales, rhonci or wheezes  Normal respiratory expansion  ABDOMINAL: Bowel sounds present in all 4 quadrants, non-tender, soft, non-distended  No organomegaly, rebound, or guarding     EXTREMITIES: 2+ DP and PT pulses bilaterally; no cyanosis, clubbing, edema  ROM intact  LUO x4   MUSCULOSKELETAL: No joint tenderness, deformity or swelling, full range of motion without pain  NEUROLOGIC: Patient is alert and oriented to person, place, and time  No sensory or motor deficits  CN 2-12 intact  Plantars downgoing bilaterally  Speech fluent  SKIN: Skin is warm and dry  No skin lesions are present  No rashes  PSYCHIATRIC: Normal mood and affect     Lab Results: I have personally reviewed pertinent reports  {IMRH&PLABS:64078}    Imaging: I have personally reviewed pertinent films in PACS  Ct Head Wo Contrast    Result Date: 2/13/2019  Narrative: CT BRAIN - WITHOUT CONTRAST INDICATION:   Cerebral hemorrhage suspected; Intracranial hemorrhage  COMPARISON:  CT brain and CTA stroke alert performed February 10, 2019  TECHNIQUE:  CT examination of the brain was performed  In addition to axial images, coronal 2D reformatted images were created and submitted for interpretation  Radiation dose length product (DLP) for this visit:  966 93 mGy-cm   This examination, like all CT scans performed in the [facility], was performed utilizing techniques to minimize radiation dose exposure, including the use of iterative  reconstruction and automated exposure control  IMAGE QUALITY:  Diagnostic  FINDINGS: PARENCHYMA:  There are evolving areas of infarction in the anterior left temporal lobe, left basal ganglia, left caudate nucleus and left external capsule  Ill-defined areas of increased attenuation are present in the areas of infarction, most pronounced within the external capsule  Findings are compatible with areas of petechial hemorrhage and hemorrhagic conversion  These appear similar to the earlier MRI  Stable mild mass effect with sulcal effacement  2 mm of midline shift to the right  No new areas of acute intraparenchymal hemorrhage are identified  No acute infarctions are seen   Foci of decreased attenuation are present in the periventricular regions and centrum semiovale bilaterally, consistent with chronic small vessel ischemic change  Bilateral internal carotid artery and vertebral artery calcifications  VENTRICLES AND EXTRA-AXIAL SPACES:  Enlargement of ventricles and extra-axial CSF spaces, out of proportion to the patient's age most consistent with cerebral and cerebellar atrophy  VISUALIZED ORBITS AND PARANASAL SINUSES:  No acute abnormality involving the orbits  Mild scattered sinus mucosal thickening is noted  No fluid levels are seen  CALVARIUM AND EXTRACRANIAL SOFT TISSUES:  Normal      Impression: Evolving hemorrhagic left MCA territory infarction, not significant changed from the prior study  Cerebral atrophy with chronic small vessel ischemic change  No acute intracranial abnormality  Workstation performed: [id number]     Mri Brain Wo Contrast    Result Date: 2/11/2019  Narrative: MRI BRAIN WITHOUT CONTRAST INDICATION: Acute left MCA infarct  COMPARISON:   2/10/2019  TECHNIQUE:  Sagittal T1, axial T2, axial FLAIR, axial T1, axial [place] and axial diffusion imaging  IMAGE QUALITY:  Diagnostic  FINDINGS: BRAIN PARENCHYMA: There is restricted diffusion and T2/flair hyperintensity in the dorsal lateral aspect of the left frontal lobe, frontal operculum and anterior insula  Signal abnormality is also present in the anterior caudate body, head and lentiform  nuclei  Few punctate foci of restricted diffusion and T2 hyperintensity in the left corona radiata  There is dural swelling in the affected areas  Foci of petechial hemorrhage within the areas of acute infarct  No midline shift  VENTRICLES:  No hydrocephalus or extra-axial collection  SELLA AND PITUITARY GLAND:  Normal  ORBITS:  No retro-orbital inflammation  PARANASAL SINUSES:  No significant paranasal sinus disease  VASCULATURE:  Evaluation of the major intracranial vasculature demonstrates appropriate flow voids   CALVARIUM AND SKULL BASE: No evidence of osseous lesion  EXTRACRANIAL SOFT TISSUES:  No soft tissue mass  Impression: Late acute left MCA territory infarct, primarily involving the dorsal lateral aspect of the left frontal lobe, frontal operculum and left basal ganglia  Mild petechial hemorrhage  No significant mass effect  The study was marked in [facility] for immediate notification  Workstation performed: [id number]     Ct Stroke Alert Brain    Result Date: 2/10/2019  Narrative: CT BRAIN - STROKE ALERT PROTOCOL INDICATION:   Expressive aphasia  COMPARISON:  Concurrently performed CT angiogram of the head and neck  TECHNIQUE:  CT examination of the brain was performed  In addition to axial images, coronal reformatted images were created and submitted for interpretation  Radiation dose length product (DLP) for this visit:  2065 74 mGy-cm   This examination, like all CT scans performed in the [facility], was performed utilizing techniques to minimize radiation dose exposure, including the use of iterative reconstruction and automated exposure control  IMAGE QUALITY:  Diagnostic  FINDINGS:  PARENCHYMA:  There is subtle loss of the left insular ribbon in keeping with early CT changes of acute left middle cerebral artery distribution stroke  Also noted is a small area of chronic cortical encephalomalacia involving the posterior superior left  insula and low lower left frontal lobe perhaps best seen on coronal image 58 of series 700 consistent with the sequela of subacute or perhaps even chronic left posterior MCA distribution infarction  There is focal high density over the distal left M1 on image 14 of series 5 consistent with distal M1 occlusion better visualized on CT angiography  No acute parenchymal hemorrhage  No intracranial mass, mass effect or midline shift  VENTRICLES AND EXTRA-AXIAL SPACES:  Minor prominence of lateral and 3rd ventricle suggesting mild central atrophy  No hydrocephalus    No extra-axial collection  VISUALIZED ORBITS AND PARANASAL SINUSES:  Unremarkable  CALVARIUM AND EXTRACRANIAL SOFT TISSUES:   Normal      Impression: Noncontrast head CT demonstrates loss of the left insular ribbon consistent with acute left middle cerebral artery distribution stroke  Also noted is a small focus of decreased density in the cortical left posterior insula and low left posterior frontal lobe consistent with small late subacute or perhaps chronic posterior MCA distribution infarction  No acute intracranial hemorrhage  Findings were directly discussed with Alexa Potts on 2/10/2019 10:03 AM  Workstation performed: [id number]     Cta Stroke Alert (head/neck)    Result Date: 2/10/2019  Narrative: CTA NECK AND BRAIN WITH CONTRAST INDICATION:  Expressive aphasia  Stroke COMPARISON:   Concurrently performed noncontrast head CT  TECHNIQUE:   Post contrast imaging was performed after administration of iodinated contrast through the neck and brain  Post contrast axial 0 625 mm images timed to opacify the arterial system  3D rendering was performed on an independent workstation  MIP reconstructions performed  Coronal reconstructions were performed of the noncontrast portion of the brain  Radiation dose length product (DLP) for this visit:  419 72 mGy-cm   This examination, like all CT scans performed in the [facility], was performed utilizing techniques to minimize radiation dose exposure, including the use of iterative  reconstruction and automated exposure control  IV Contrast:  85 mL of iohexol (OMNIPAQUE)  IMAGE QUALITY:   Diagnostic FINDINGS: CERVICAL VASCULATURE AORTIC ARCH AND GREAT VESSELS:  Normal aortic arch and great vessel origins  Normal visualized subclavian vessels  RIGHT VERTEBRAL ARTERY CERVICAL SEGMENT:  Normal origin  The vessel is normal in caliber throughout the neck  LEFT VERTEBRAL ARTERY CERVICAL SEGMENT:  Normal origin  The vessel is normal in caliber throughout the neck   RIGHT EXTRACRANIAL CAROTID SEGMENT:  There is fibrocalcific plaque at the carotid bifurcation resulting in mild approximate 20-30% atherosclerotic stenosis at right internal carotid artery origin  Distal cervical right internal carotid artery is uniform  and normal in caliber to the carotid terminus  LEFT EXTRACRANIAL CAROTID SEGMENT:  There is calcific and fibrocalcific atherosclerotic plaque at the carotid bifurcation and there is complete occlusion of the left internal carotid artery at its origin  There is no opacification of the cervical left internal carotid artery in the neck  Left external carotid artery is patent  NASCET criteria was used to determine the degree of internal carotid artery diameter stenosis  INTRACRANIAL VASCULATURE INTERNAL CAROTID ARTERIES:  Minor fibrocalcific plaque associated with the right cavernous and supraclinoid internal carotid artery  Normal right-sided carotid terminus  There is minimal retrograde collateral flow into the supraclinoid left internal carotid artery cavernous with flow coming across the [place] via patent but somewhat small caliber left A1 anterior cerebral artery segment and patent but very small  caliber left posterior communicating artery  ANTERIOR CIRCULATION:  Patent bilateral A1 segments, right larger than left, patent distal anterior cerebral arteries with normal enhancement  Normal anterior communicating artery  MIDDLE CEREBRAL ARTERY CIRCULATION:  Right M1 segment and middle cerebral artery branches demonstrate normal enhancement bilaterally  Opacification of the proximal and mid left M1 middle cerebral artery occurs via collateral circulation across the [place] via small caliber left A1 and tiny caliber left posterior communicating artery  There is occlusion of the mid to distal left middle cerebral artery with reconstitution of opacification of small anterior and middle left M2 branches via collateral circulation   DISTAL VERTEBRAL ARTERIES:  Normal distal vertebral arteries  Posterior inferior cerebellar artery origins are normal  Normal vertebral basilar junction  BASILAR ARTERY:  Basilar artery is normal in caliber  Normal superior cerebellar arteries  POSTERIOR CEREBRAL ARTERIES: Both posterior cerebral arteries arises from the basilar tip  Both arteries demonstrate normal enhancement  Patent small caliber bilateral posterior communicating arteries  DURAL VENOUS SINUSES:  Normal  NON VASCULAR ANATOMY BONY STRUCTURES:  No acute osseous abnormality  SOFT TISSUES OF THE NECK:  Unremarkable  THORACIC INLET:  Unremarkable  Impression: Occlusion of left internal carotid artery beginning at its origin  Enhancement of proximal left M1 via collateral circulation across [place] via small caliber left A1 and tiny caliber left posterior communicating arteries  Occlusion of left mid to distal left M1 middle cerebral artery segment  There is some enhancement relatively diminutive of anterior and middle M2 and M3 middle cerebral artery branches presumably via collateral circulation  Mild atherosclerotic narrowing at the origin of the right internal carotid artery  Noncontrast head CT demonstrates loss of the left insular ribbon consistent with acute left middle cerebral artery distribution stroke  Also noted is a small focus of encephalomalacia in the left posterior insula and low left posterior frontal lobe consistent with small chronic cortical infarction in that location  No acute intracranial hemorrhage  Findings were directly discussed with Sushil Edwards on 2/10/2019 10:03 AM  Workstation performed: [id number]       Microbiology: cultures obtained in emergency department include ***    Urinalysis:       Invalid input(s): URIBILINOGEN     Urine Micro:        EKG, Pathology, and Other Studies: I have personally reviewed pertinent reports        Medications given in Emergency Department     Medication Administration - last 24 hours from 02/13/2019 0859 to 02/14/2019 0859       Date/Time Order Dose Route Action Action by     02/13/2019 1936 clopidogrel (PLAVIX) tablet 75 mg   Oral MAR Unhold Jonathan Soto, YARI     02/13/2019 1934 clopidogrel (PLAVIX) tablet 75 mg   Oral MAR Hold Automatic Transfer Provider     02/13/2019 1005 clopidogrel (PLAVIX) tablet 75 mg 75 mg Oral Given Gasperne Montana, RN     02/13/2019 1936 thiamine (VITAMIN B1) tablet 100 mg   Oral MAR Unhold Jonathan Soto, RN     02/13/2019 1934 thiamine (VITAMIN B1) tablet 100 mg   Oral MAR Hold Automatic Transfer Provider     02/13/2019 0909 thiamine (VITAMIN B1) tablet 100 mg 100 mg Oral Given Romayne Montana, RN     35/04/8847 5839 folic acid (FOLVITE) tablet 1 mg   Oral MAR Unhold Jonathan Soto, RN     68/74/4058 9957 folic acid (FOLVITE) tablet 1 mg   Oral MAR Hold Automatic Transfer Provider     69/51/4384 2198 folic acid (FOLVITE) tablet 1 mg 1 mg [facility]     02/13/2019 1936 multivitamin-minerals (CENTRUM) tablet 1 tablet   Oral MAR Unhold Jonathan Soto,      02/13/2019 1934 multivitamin-minerals (CENTRUM) tablet 1 tablet   Oral MAR Hold Automatic Transfer Provider     02/13/2019 0909 multivitamin-minerals (CENTRUM) tablet 1 tablet 1 tablet Oral [facility], RN     02/14/2019 0546 heparin (porcine) subcutaneous injection 5,000 Units 5,000 Units Subcutaneous Given Jonathan Soto, RN     02/13/2019 2250 heparin (porcine) subcutaneous injection 5,000 Units 5,000 Units Subcutaneous Given Jonathan Brenner, RN     02/13/2019 1936 heparin (porcine) subcutaneous injection 5,000 Units   Subcutaneous MAR Unhold Jonathan Soto, YARI     02/13/2019 1934 heparin (porcine) subcutaneous injection 5,000 Units   Subcutaneous MAR Hold Automatic Transfer Provider     02/13/2019 1312 heparin (porcine) subcutaneous injection 5,000 Units 5,000 Units Subcutaneous Given Gasperne Montana, RN     02/13/2019 1936 atorvastatin (LIPITOR) tablet 80 mg   Oral MAR Unhold Ty Farias, RN     02/13/2019 1934 atorvastatin (LIPITOR) tablet 80 mg   Oral MAR Hold Automatic Transfer Provider     02/13/2019 1726 atorvastatin (LIPITOR) tablet 80 mg 80 mg Oral Given Angel Luis Pineda, RN     02/13/2019 1936 aspirin chewable tablet 81 mg   Oral MAR Unhold Ty Farias, YARI     02/13/2019 1934 aspirin chewable tablet 81 mg   Oral MAR Hold Automatic Transfer Provider     02/13/2019 1005 aspirin chewable tablet 81 mg 81 mg Oral Given Inocente Vargas RN     02/13/2019 1150 phenylephrine (BAYLEE-SYNEPHRINE) 50 mg (STANDARD CONCENTRATION) in sodium chloride 0 9% 250 mL 0 mcg/min Intravenous Stopped Inocente Vargas RN     02/13/2019 1112 phenylephrine (BAYLEE-SYNEPHRINE) 50 mg (STANDARD CONCENTRATION) in sodium chloride 0 9% 250 mL 10 mcg/min Intravenous Rate/Dose Change Inocente Vargas RN     02/13/2019 0940 phenylephrine (BAYLEE-SYNEPHRINE) 50 mg (STANDARD CONCENTRATION) in sodium chloride 0 9% 250 mL 15 mcg/min Intravenous Rate/Dose Change Inocente Vargas RN     02/13/2019 0900 phenylephrine (BAYLEE-SYNEPHRINE) 50 mg (STANDARD CONCENTRATION) in sodium chloride 0 9% 250 mL 10 mcg/min Intravenous Rate/Dose Change Inocente Vargas RN     02/13/2019 0923 midodrine (PROAMATINE) tablet 5 mg 5 mg Oral Given Inocente Vargas RN     02/14/2019 0700 midodrine (PROAMATINE) tablet 5 mg   Oral Canceled Entry Ty Farias, YARI     02/14/2019 0546 midodrine (PROAMATINE) tablet 5 mg 5 mg Oral Given Ty Farias, RN     02/13/2019 1936 midodrine (PROAMATINE) tablet 5 mg   Oral MAR Unhold Ty Farias, YARI     02/13/2019 1934 midodrine (PROAMATINE) tablet 5 mg   Oral MAR Hold Automatic Transfer Provider     02/13/2019 1726 midodrine (PROAMATINE) tablet 5 mg 5 mg Oral Given Angel Luis Pineda, RN     02/13/2019 1312 midodrine (PROAMATINE) tablet 5 mg 5 mg Oral Given Inocente Vargas RN          Assessment and Plan     Problem List     * (Principal) Cerebrovascular accident (CVA) due to occlusion of left middle cerebral artery ([facility] [address] )    Alcohol abuse    Hypertension    Hypercholesterolemia    Overview Signed 2/12/2019 10:27 AM by Fifi Estevez PA-C     Procedure/Onset: 05/01/2012         Benign essential hypertension    Overview Signed 2/12/2019 10:27 AM by Fifi Estevez PA-C     Procedure/Onset: 05/01/2012               Code Status: {IMRCODE:71262::"Level 1 - Full Code"}  VTE Pharmacologic Prophylaxis: {Pharmacologic VTE Prophylaxis:813058253}   VTE Mechanical Prophylaxis: {Mechanical VTE Prophylaxis:88589}  Admission Status: {Admission Status:80779}    Admission Time  I spent {Time; 15 min - 1 hour:74083} admitting the patient  This involved direct patient contact where I performed a full history and physical, reviewing previous records, and reviewing laboratory and other diagnostic studies      Aleksandra Stewart MS-3  Internal Medicine

## 2019-02-14 NOTE — OCCUPATIONAL THERAPY NOTE
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   Intrinsic Gratification Pt reports not having time for leisure activities   Pain Assessment   Pain Assessment No/denies pain   Pain Score No Pain   ADL   Where Assessed Standing at sink   Grooming Assistance 7  Independent   Transfers   Sit to Stand 7  Independent   Stand to Sit 7  Independent   Functional Mobility   Functional Mobility 7  Independent   Cognition   Overall Cognitive Status Unable to assess   Arousal/Participation Responsive; Cooperative; Alert   Attention Within functional limits   Orientation Level Oriented X4   Memory Unable to assess   Following Commands Follows all commands and directions without difficulty   Activity Tolerance   Activity Tolerance Patient tolerated treatment well   Assessment   Assessment Pt participated in occupational therapy with focus on activity tolerance, functional transfers/mob, standing tolerance and balance for pt engagement in UB self-care  Pt cleared by RN/ Ana for pt participation in occupational therapy  Pt received /HOB raised/supine pt sitting upright and agreeable to therapy following pt Identifiers confirmed  Pt reported his goal to go home  Pt progressing well and able to demonstrate improved skill level for all pt ADL/self-care tasks, activity tolerance and meeting good carryover and activity tolerance skills for pt safety awareness  Pt nurse reporting pt to now d/c to home with family support  OT continue to recommend pt for Out-Pt cognitive therapy      Plan   Treatment Interventions ADL retraining   Goal Expiration Date 02/21/19   Treatment Day 2   OT Frequency 3-5x/wk   Recommendation   OT Discharge Recommendation Home with family support   Barthel Index   Feeding 10   Bathing 5   Grooming Score 5   Dressing Score 10   Bladder Score 10   Bowels Score 10   Toilet Use Score 10   Transfers (Bed/Chair) Score 10   Mobility (Level Surface) Score 10   Stairs Score 5   Barthel Index Score 85   Modified Hector Scale   Modified Hector Scale 2     Bradley TARANGOA/L

## 2019-02-14 NOTE — DISCHARGE INSTRUCTIONS
Please take medications as prescribed at these will prevent you having another stroke  Discuss with her primary care provider discontinue admitted during if her blood pressure remains at goal     Effects of a Stroke   WHAT YOU NEED TO KNOW:   What are the effects of a stroke? The effects of a stroke may be different for everyone  They may depend on where the stroke happened in your brain and how much damage occurred there  Some people may make a full recovery  Other people may have long-term effects  It is important to talk to your healthcare provider about any symptoms you have after a stroke  There are medicines and therapies to help manage the effects of a stroke  What should family or support persons know about the effects of a stroke? It is important for family or support persons to know how a stroke has affected your loved one  Know when to call 911, seek immediate care, or call your loved one's healthcare provider  What speech, language, or memory problems can a stroke cause? · Difficulty finding the right words or putting complete sentences together    · Difficulty putting words together that make sense    · Difficulty paying attention or a short attention span    · Difficulty writing or reading    · Problems with memory or being disoriented to time, place, or situation    · Problems thinking clearly or feeling confused    · Difficulty understanding written or spoken language or learning something new  What muscle and nerve problems can a stroke cause? A stroke may affect one side of your body or part of one side  You may be at an increased risk for falling if you have difficulty moving your leg muscles   You may have any of the following:  · Inability to move your arm, leg, or one side of your face (paralysis)    · Muscle weakness, spasms, or muscles that stay in one position (contracted)    · Poor balance, difficulty walking, or difficulty grasping objects    · Changes in your vision or poor vision    · Ignoring, or being unaware of one side of your body    · Numbness of your arm, hand, fingers, leg, foot, or toes    · Pain, tickling, or prickling in weak or paralyzed parts of your body  What bowel and bladder problems can a stroke cause? · Loss of control of your urine or bowel movements    · Feeling like you have to urinate frequently, even when your bladder is not full    · Difficulty emptying your bladder or constipation  What swallowing and eating problems can a stroke cause? · Difficulty swallowing food    · Difficulty feeding yourself    · A lack of taste or a change in the way you taste things    · An increased risk for aspiration (food moves into the lungs) and pneumonia due to swallowing problems  What changes in personality or mood can a stroke cause? · Depression, sadness, irritability, or hopelessness    · Anger, frustration, or anxiety    · Difficulty controlling emotions or expressing inappropriate emotions    · Quick mood changes  What problems with fatigue can a stroke cause? · Low energy levels    · Tiring easily    · A lack of motivation  What sleeping problems can a stroke cause? · Development of sleep apnea    · Changes in sleep such as sleeping too much or not enough  Where can I find support and more information? · National Stroke Association  [address] , [facility] [place] [facility] [place] [address]  Phone: [phone]  Web Address: [facility] [place]  What problems with sexual function can a stroke cause? · Difficulty having or keeping an erection    · Vaginal dryness    · A decreased interest in sex  Call 911 or have someone else call for any of the following:   · You have any of the following signs of a stroke:      ¨ Numbness or drooping on one side of your face     ¨ Weakness in an arm or leg    ¨ Confusion or difficulty speaking    ¨ Dizziness, a severe headache, or vision loss         · You cannot be woken up  · You fall and hit your head       · You have a seizure  · You feel lightheaded, short of breath, and have chest pain  · You cough up blood  When should I seek immediate care? · Your arm or leg is painful, red, or larger than normal      · You feel weak, dizzy, or faint  · You have a fall without hitting your head  · Your blood sugar level or blood pressure is higher or lower than your healthcare provider said it should be  · You bleed heavily or cannot stop bleeding from a cut or injury  · You have a new, severe headache  When should I contact my healthcare provider? · You have a fever  · You have a rash  · You have new or worsening symptoms  · You feel extremely sad or anxious  · You feel you are unable to cope with your condition  · You have trouble sleeping  · You have open sores  · You choke or cough when eating or drinking  · You have questions or concerns about your condition or care  CARE AGREEMENT:   You have the right to help plan your care  Learn about your health condition and how it may be treated  Discuss treatment options with your caregivers to decide what care you want to receive  You always have the right to refuse treatment  The above information is an  only  It is not intended as medical advice for individual conditions or treatments  Talk to your doctor, nurse or pharmacist before following any medical regimen to see if it is safe and effective for you  © 2017 [employer] Information is for End User's use only and may not be sold, redistributed or otherwise used for commercial purposes  All illustrations and images included in CareNotes® are the copyrighted property of [employer]  or Dayne Justin

## 2019-02-15 ENCOUNTER — TRANSITIONAL CARE MANAGEMENT (OUTPATIENT)
Dept: FAMILY MEDICINE CLINIC | Facility: CLINIC | Age: 54
End: 2019-02-15

## 2019-02-18 ENCOUNTER — OFFICE VISIT (OUTPATIENT)
Dept: FAMILY MEDICINE CLINIC | Facility: CLINIC | Age: 54
End: 2019-02-18
Payer: COMMERCIAL

## 2019-02-18 ENCOUNTER — TELEPHONE (OUTPATIENT)
Dept: NEUROLOGY | Facility: CLINIC | Age: 54
End: 2019-02-18

## 2019-02-18 VITALS
WEIGHT: 226 LBS | RESPIRATION RATE: 16 BRPM | TEMPERATURE: 97.8 F | HEART RATE: 92 BPM | BODY MASS INDEX: 30.61 KG/M2 | DIASTOLIC BLOOD PRESSURE: 99 MMHG | HEIGHT: 72 IN | SYSTOLIC BLOOD PRESSURE: 150 MMHG

## 2019-02-18 DIAGNOSIS — F10.10 ALCOHOL ABUSE: ICD-10-CM

## 2019-02-18 DIAGNOSIS — I10 ESSENTIAL HYPERTENSION: ICD-10-CM

## 2019-02-18 DIAGNOSIS — I65.21 STENOSIS OF RIGHT CAROTID ARTERY: ICD-10-CM

## 2019-02-18 DIAGNOSIS — I63.512 CEREBROVASCULAR ACCIDENT (CVA) DUE TO OCCLUSION OF LEFT MIDDLE CEREBRAL ARTERY (HCC): Primary | ICD-10-CM

## 2019-02-18 DIAGNOSIS — R73.09 ABNORMAL GLUCOSE: ICD-10-CM

## 2019-02-18 DIAGNOSIS — E78.00 HYPERCHOLESTEROLEMIA: ICD-10-CM

## 2019-02-18 PROCEDURE — 99495 TRANSJ CARE MGMT MOD F2F 14D: CPT | Performed by: FAMILY MEDICINE

## 2019-02-18 NOTE — TELEPHONE ENCOUNTER
Majo Roach, can you please assist in scheduling patient's hospital follow up with your office? Thanks!

## 2019-02-18 NOTE — TELEPHONE ENCOUNTER
The purpose of this phone call is to assess patient's general wellbeing or for any assistance needed with follow-up care  Called patient, since discharge he has not experienced any new or worsening stroke symptoms  Over the phone, his speech is clear, concise, and fluent  He admits to slight word finding at times  He performs his own ADLs and ambulates independently  He has his PCP appointment today where he plans to discuss outpatient therapies before making appointments with PT, OT, ST  Patient has not made his stroke hospital follow up yet, to follow in [facility], will route to [facility] office  I reviewed medications with him, there have not been any changes since discharge  He takes all medications as prescribed with no missed doses or medication side effects  Denies any signs of bleeding  He had no difficulty obtaining his medications from the pharmacy and was able to pick them up on his way home from the hospital      Patient still has stroke education binder and my card  I reviewed stroke risk factors and symptoms with him  He verbalizes understanding  He is a non smoker  He states he is trying to follow a low cholesterol diet but is having difficulty  Reviewed dietary changes for low cholesterol with him  Patient does not have any questions or concerns at this time

## 2019-02-18 NOTE — PATIENT INSTRUCTIONS
Take BP 1 time a day, first thing in the morning  Keep a log and bring it in 2 weeks  STOP Midodrine today   Worried given that SBP >160; would like to see how his blood pressure are off medications  Schedule to see Neurology and OT

## 2019-02-18 NOTE — UTILIZATION REVIEW
Initial Clinical Review     Admission: Date/Time/Statement: 2/10/19 @ 1257         Orders Placed This Encounter   Procedures    Inpatient Admission       Standing Status:   Standing       Number of Occurrences:   1       Order Specific Question:   Admitting Physician       Answer:   Fabian Kim [[id number]]       Order Specific Question:   Level of Care       Answer:   Critical Care [15]       Order Specific Question:   Estimated length of stay       Answer:   More than 2 Midnights       Order Specific Question:   Certification       Answer:   I certify that inpatient services are medically necessary for this patient for a duration of greater than two midnights  See H&P and MD Progress Notes for additional information about the patient's course of treatment       Atiya Mararhett   12-10-19  2 hours     Dx  TIA CVA  Transfer to [address] [place] [address]  Prior to arrival  Ivf  9% ns, iv Labetalol,  Aspirin          History of Illness     48year old female received from [address] [place] [address] ed for evaluation of aphasia for 2 weeks and new right sided weakness  with CT showing acute left middle cerebral artery distribution stroke    Symptoms resolved so TPA not given                    02/10/19 1305 02/10/19 1305 02/10/19 1305 02/10/19 1305 02/10/19 1305   (!) 97 4 °F (36 3 °C) 70 20 165/98 98 %       No Pain           02/10/19 103 kg (226 lb 6 6 oz)      Vital Signs (abnormal):  Date/Time   Temp   Pulse     BP   MAP (mmHg)     02/11/19 0600      48Abnormal      184/96Abnormal    144     02/11/19 0500      48Abnormal      184/95Abnormal    122     02/11/19 0400   98 4 °F (36 9 °C)   50Abnormal      174/86Abnormal    126     02/11/19 0200      66     182/103Abnormal    124     02/11/19 0100      68     176/84Abnormal    114     02/11/19 0000   98 6 °F (37 °C)   52Abnormal      197/100Abnormal    134     02/10/19 2300      50Abnormal      185/94Abnormal    145     02/10/19 2200      52Abnormal      176/80Abnormal    133     02/10/19 2100      58     179/98Abnormal    138              Pertinent Labs/Diagnostic Test Results:      CTA  Head and neck       demonstrates loss of the left insular ribbon consistent with acute left middle cerebral artery distribution stroke   Also noted is a small focus of encephalomalacia in the left posterior insula and low left posterior frontal lobe   consistent with small chronic cortical infarction in that location   No acute intracranial hemorrhage                CT head   Noncontrast head CT demonstrates loss of the left insular ribbon consistent with acute left middle cerebral artery distribution stroke        Also noted is a small focus of decreased density in the cortical left posterior insula and low left posterior frontal lobe consistent with small late subacute or perhaps chronic posterior MCA distribution infarction   No acute intracranial hemorrhage      Physical exam : oriented to person,  Expressive aphasia, right facial droop and right upper extremity weakness  GCS 15               Past Medical History:   Diagnosis Date    Hypertension        Admitting Diagnosis: CVA (cerebral vascular accident) ([facility] [address] ) [I63 9]         Age/Sex: 48 y o  male   Neuro surgery evaluation in [facility]        Assessment/Plan:     Ischemic M1 CVA  Nih=4   · Thrombolytics contraindicated at this time  · Daily  and 300mg Plavix  · Neurology consulted  · Neurosurgery consulted  · Neurochecks q1  · F/u MRI  · Lipid panel, A1c pending     alcohol abuse   8-10 drinks per day     ciwa protocol for risk of alcohol withdrawal       Per neurology  Not a candidate for TPA Last known well unknown   Neuro surgery  Recommendations pending        Admission Orders:     ciwa assessments  And protocol  Telemetry  Consult neuro surgery   Npo   PT OT and Speech eval and treat   Dysphagia assessment prior to po intake  Stroke education  Neuro checks  q1 hr      aspirin 325 mg Oral Daily   atorvastatin 40 mg Oral Daily With Dinner   clopidogrel 75 mg Oral Daily   folic acid 1 mg Oral Daily   multivitamin-minerals 1 tablet Oral Daily   phenylephine  mcg/min Intravenous Titrated   sodium chloride 100 mL/hr Intravenous Continuous   thiamine 100 mg Oral Daily    Continued Stay Review     Date:   2-11-19    48year old male with left ICA occlusion and left MCA occlusion  Likely severe chronc atherosclerotic disease  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      Continue with right  Sided weakness          Vital Signs:      02/11/19 2100      60   19   179/100Abnormal    125   98 %         02/11/19 2000   100 °F (37 8 °C)   60   24Abnormal    171/91Abnormal    132   99 %         02/11/19 1931      62   23Abnormal    158/93   114   98 %         02/11/19 1840      66   23Abnormal    171/88Abnormal    128   97 %         02/11/19 1600   99 °F (37 2 °C)   55   22   186/96Abnormal    140   98 %         02/11/19 1400      64   19   159/90   120   97 %         02/11/19 1300      58   20   154/87   111   97 %         02/11/19 1200   99 4 °F (37 4 °C)   56   22   160/88   111   97 %         02/11/19 1100      64   24Abnormal    163/95   120   97 %         02/11/19 1000      70   21   128/85   99   98 %         02/11/19 0900      72   24Abnormal    173/102Abnormal    137   97 %         02/11/19 0800   98 9 °F (37 2 °C)   76   30Abnormal    173/102Abnormal    127   98 %         02/11/19 0600      48Abnormal    18   184/96Abnormal    144   97 %         02/11/19 0500      48Abnormal    26Abnormal    184/95Abnormal    122   96 %         02/11/19 0400   98 4 °F (36 9 °C)   50Abnormal    14   174/86Abnormal    126   96 %   None (Room air)   Lying   02/11/19 0300      48Abnormal    13   169/87   135   96 %         02/11/19 0200      66   17   182/103Abnormal    124   98 %         02/11/19 0100      68   22   176/84Abnormal    114   96 %         02/11/19 0000   98 6 °F (37 °C)   52Abnormal    15   197/100Abnormal    134   97 %   None (Room air)   Lying      Assessment/Plan:     Neurology progress   MRI brain results pending   Echo results pending  Continue telemetry  PT OT and Speech eval and treat  Therapeutic hypertension goal sbp>160   Continue iv fluids  And pressor support   Goal  sbp 140 to 160  At  4 hours  continue dual antiplatelet  Therapy  Of aspirin and plavix  continue atorvastatin  Continue folic acid and thiamine and monitor  for withdrawal   Continue neuro checks             CONSULT PHYSICAL MEDICATION AND REHABILITATION   Pt is unable to safely return home until he is at the supervision/contact-guard functional level (25% assistance level with or without a device)      CONSULT NEURO SURGERY   No neuro surgery procedure procedure required  As symptoms improving    If declines  Ok to give TPA     Medications:         [START ON 2/13/2019] aspirin 324 mg Oral Daily   atorvastatin 80 mg Oral Daily With Dinner   clopidogrel 75 mg Oral Daily   folic acid 1 mg Oral Daily   [START ON 2/13/2019] heparin (porcine) 5,000 Units Subcutaneous Q8H [facility]   multivitamin-minerals 1 tablet Oral Daily   phenylephine  mcg/min Intravenous Titrated   thiamine 100 mg Oral Daily         Discharge Plan: to be determined pending progress with therapy and recommendations of PMR    Continued Stay Review     Date: 2-12-19     48year old male with left ICA occlusion and left MCA occlusion  Likely severe chronc atherosclerotic disease  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           Vital Signs: /85   Pulse 80   Temp 99 8 °F (37 7 °C) (Oral)   Resp 22   Ht 6' (1 829 m)   Wt 103 kg (226 lb 6 6 oz)   SpO2 95%   BMI 30 71 kg/m²       02/12/19 1000      80   22   148/85   107   95 %     02/12/19 0800   99 8 °F (37 7 °C)   75   26Abnormal    182/109Abnormal    133   94 %     02/12/19 0629      52Abnormal    14   155/85   112   95 %     02/12/19 0615      54Abnormal    13   163/83   117   95 %     02/12/19 0600      52Abnormal    17   183/95Abnormal    128   95 %     02/12/19 0500      52Abnormal    16   161/98   115   94 %     02/12/19 0355   99 2 °F (37 3 °C)   68   20   163/90   120   93 %     02/12/19 0255      54Abnormal    15   163/72   107   94 %     02/12/19 0200      68   20   164/94   121   96 %     02/12/19 0105      50Abnormal    15   178/84Abnormal    124   95 %     02/12/19 0005   99 5 °F (37 5 °C)   52Abnormal    17   165/85   119   97 %              Assessment/Plan:      Physical and Occupational Therapy    ADL  Requires  Supervision and set up and verbal cuing   Patient ambulated with contact guard assist for balance  Balance  Fair   Word finding difficulties, decreased short term memory  Not oriented to time    Poor judgement and safety awareness   Barthel score  70/100  Recommend home PT and OT      Critical care   Ischemic Left M1 CVA and occlusion of L ICA  · Thrombolytics contraindicated at this time  · Daily Asa and plavix  · SBP BP goal >160 mmHg with phenylephrine @ 40mcg/min  · Adjust to 140-160 at 3pm today  · Neurology consulted: long term goal 140-160 SBP,  · Neurochecks q1  · MRI: Late acute left MCA territory infarct small with small petechial hemorhages in area of infarct  · High dose statin lipitor 40mg     Alcohol abuse  · [facility] protocol  · Thiamine and folate  · Alcohol cessation counseling     Medications:         [START ON 2/13/2019] aspirin 324 mg Oral Daily   atorvastatin 80 mg Oral Daily With Dinner   clopidogrel 75 mg Oral Daily   folic acid 1 mg Oral Daily   [START ON 2/13/2019] heparin (porcine) 5,000 Units Subcutaneous Q8H [facility]   multivitamin-minerals 1 tablet Oral Daily   phenylephine  mcg/min Intravenous Titrated   thiamine 100 mg Oral Daily            Discharge Plan: to be determined    Continued Stay Review     Date: 2-13-19     48year old male with left  ICA  And MCA occlusions with small hemorrhagic transformation  Without TPA prior to arrival   Patient contineus to require pressor support             Vital Signs: /76 (BP Location: Left arm)   Pulse 74   Temp 99 2 °F (37 3 °C) (Oral)   Resp 22   Ht 6' (1 829 m)   Wt 103 kg (226 lb 6 6 oz)   SpO2 96%   BMI 30 71 kg/m²          Pertinent diagnostics      Repeat CT brain 2-13-19   Evolving hemorrhagic left MCA territory infarction, not significant changed from the prior study   Cerebral atrophy with chronic small vessel ischemic change         Assessment/Plan:         · SBP goal 140-160mmHg  · Continue Midodrine 5mg TID  · Wean off phenylephrine gtt  · Echo: EF 60% with no regional wall motion abnormalities     Continuous telemetry      Medications:               Medication Administration - last 24 hours from 02/12/2019 1346 to 02/13/2019 1346        02/13/2019 1112 phenylephrine (BAYLEE-SYNEPHRINE) 50 mg (STANDARD CONCENTRATION) in sodium chloride 0 9% 250 mL 10 mcg/min Intravenous       02/13/2019 0940 phenylephrine (BAYLEE-SYNEPHRINE) 50 mg (STANDARD CONCENTRATION) in sodium chloride 0 9% 250 mL 15 mcg/min Intravenous       02/13/2019 0900 phenylephrine (BAYLEE-SYNEPHRINE) 50 mg (STANDARD CONCENTRATION) in sodium chloride 0 9% 250 mL 10 mcg/min Intravenous       02/13/2019 0830 phenylephrine (BAYLEE-SYNEPHRINE) 50 mg (STANDARD CONCENTRATION) in sodium chloride 0 9% 250 mL 20 mcg/min Intravenous       02/13/2019 0630 phenylephrine (BAYLEE-SYNEPHRINE) 50 mg (STANDARD CONCENTRATION) in sodium chloride 0 9% 250 mL 25 mcg/min Intravenous       02/12/2019 2350 phenylephrine (BAYLEE-SYNEPHRINE) 50 mg (STANDARD CONCENTRATION) in sodium chloride 0 9% 250 mL 25 mcg/min Intravenous       02/12/2019 2036 phenylephrine (BAYLEE-SYNEPHRINE) 50 mg (STANDARD CONCENTRATION) in sodium chloride 0 9% 250 mL 25 mcg/min Intravenous       02/12/2019 1652 phenylephrine (BAYLEE-SYNEPHRINE) 50 mg (STANDARD CONCENTRATION) in sodium chloride 0 9% 250 mL 40 mcg/min Intravenous       02/12/2019 1542 phenylephrine (BAYLEE-SYNEPHRINE) 50 mg (STANDARD CONCENTRATION) in sodium chloride 0 9% 250 mL 40 mcg/min Intravenous             aspirin 81 mg Oral Daily   atorvastatin 80 mg Oral Daily With Dinner   clopidogrel 75 mg Oral Daily   folic acid 1 mg Oral Daily   heparin (porcine) 5,000 Units Subcutaneous Q8H [facility]   midodrine 5 mg Oral TID AC   multivitamin-minerals 1 tablet Oral Daily   thiamine 100 mg Oral Daily      Discharge Plan  To be determined

## 2019-02-19 PROBLEM — R73.09 ABNORMAL GLUCOSE: Status: ACTIVE | Noted: 2019-02-19

## 2019-02-19 PROBLEM — I65.21 STENOSIS OF RIGHT CAROTID ARTERY: Status: ACTIVE | Noted: 2019-02-19

## 2019-02-19 NOTE — TELEPHONE ENCOUNTER
Good Morning Tammy,    I have scheduled Mr Torie Marrero for an appointment to see Nigel Neal on 3/8/19

## 2019-02-22 DIAGNOSIS — I63.512 CEREBROVASCULAR ACCIDENT (CVA) DUE TO OCCLUSION OF LEFT MIDDLE CEREBRAL ARTERY (HCC): ICD-10-CM

## 2019-02-22 DIAGNOSIS — F10.10 ALCOHOL ABUSE: ICD-10-CM

## 2019-02-23 RX ORDER — FOLIC ACID 1 MG/1
1 TABLET ORAL DAILY
Qty: 90 TABLET | Refills: 2 | Status: SHIPPED | OUTPATIENT
Start: 2019-02-23 | End: 2019-10-02 | Stop reason: SDUPTHER

## 2019-02-23 RX ORDER — LANOLIN ALCOHOL/MO/W.PET/CERES
100 CREAM (GRAM) TOPICAL DAILY
Qty: 90 TABLET | Refills: 3 | Status: SHIPPED | OUTPATIENT
Start: 2019-02-23 | End: 2019-10-02 | Stop reason: SDUPTHER

## 2019-02-23 RX ORDER — ATORVASTATIN CALCIUM 80 MG/1
80 TABLET, FILM COATED ORAL
Qty: 90 TABLET | Refills: 3 | Status: SHIPPED | OUTPATIENT
Start: 2019-02-23 | End: 2019-10-02 | Stop reason: SDUPTHER

## 2019-02-23 RX ORDER — CLOPIDOGREL BISULFATE 75 MG/1
75 TABLET ORAL DAILY
Qty: 90 TABLET | Refills: 2 | Status: SHIPPED | OUTPATIENT
Start: 2019-02-23 | End: 2019-10-02 | Stop reason: SDUPTHER

## 2019-02-23 RX ORDER — ASPIRIN 81 MG/1
81 TABLET, CHEWABLE ORAL DAILY
Qty: 90 TABLET | Refills: 3 | Status: SHIPPED | OUTPATIENT
Start: 2019-02-23 | End: 2019-10-02

## 2019-03-04 ENCOUNTER — EVALUATION (OUTPATIENT)
Dept: SPEECH THERAPY | Facility: CLINIC | Age: 54
End: 2019-03-04
Payer: COMMERCIAL

## 2019-03-04 DIAGNOSIS — I63.512 CEREBROVASCULAR ACCIDENT (CVA) DUE TO OCCLUSION OF LEFT MIDDLE CEREBRAL ARTERY (HCC): ICD-10-CM

## 2019-03-04 DIAGNOSIS — R47.01 APHASIA: Primary | ICD-10-CM

## 2019-03-04 PROCEDURE — G9163 LANG EXPRESS GOAL STATUS: HCPCS | Performed by: SPEECH-LANGUAGE PATHOLOGIST

## 2019-03-04 PROCEDURE — G9162 LANG EXPRESS CURRENT STATUS: HCPCS | Performed by: SPEECH-LANGUAGE PATHOLOGIST

## 2019-03-04 PROCEDURE — 96105 ASSESSMENT OF APHASIA: CPT | Performed by: SPEECH-LANGUAGE PATHOLOGIST

## 2019-03-04 NOTE — PROGRESS NOTES
Speech Language Pathology Evaluation  Today's date: 3/4/2019  Patient name: Viet Fraser    : 1965        Referring provider: Mariano Garcia MD  Dx:   Encounter Diagnosis     ICD-10-CM    1  Aphasia R47 01    2  Cerebrovascular accident (CVA) due to occlusion of left middle cerebral artery [facility]) L80 220 Ambulatory referral to Speech Therapy                  Subjective Comments: The patient is a 48year old male seen for a speech and language evaluation secondary to CVA  Patient diagnosed with left middle cerebral artery CVA on 2/10/2019  Patient noticed difficulty with speech and movement on right side on the morning of 2/10/19  Sent to ER and subsequently diagnosed with CVA  Currently, the patient notes difficulty with word finding and with expressing thoughts using words  His goal is to "get back to normal " He denies difficulty swallowing and is on a regular consistency diet  Pain level: 00/10  Safety Measures: S/P CVA       Reason for Referral:Decreased language skills  Prior Functional Status:Communication effective and appropriate in all situations  Medical History significant for:   Past Medical History:   Diagnosis Date    Hypertension      Clinically Complex Situations:Discharge from SNF or Hospital in the last 30 days    Hearing:Not Tested  Vision:WNL  Medication List:   Current Outpatient Medications   Medication Sig Dispense Refill    aspirin 81 mg chewable tablet Chew 1 tablet (81 mg total) daily for 90 days 90 tablet 3    atorvastatin (LIPITOR) 80 mg tablet Take 1 tablet (80 mg total) by mouth daily with dinner 90 tablet 3    clopidogrel (PLAVIX) 75 mg tablet Take 1 tablet (75 mg total) by mouth daily for 90 days 90 tablet 2    folic acid (FOLVITE) 1 mg tablet Take 1 tablet (1 mg total) by mouth daily for 90 days 90 tablet 2    thiamine 100 MG tablet Take 1 tablet (100 mg total) by mouth daily for 90 days 90 tablet 3     No current facility-administered medications for this visit  Allergies: No Known Allergies  Primary Language: English  Preferred Language: English     Home Environment/ Lifestyle:Lives at home with wife, Marisol Rogers and two children, 24and 13years of age  Works as serna  Current Education status: Not in school  Highest Level of Education: High school  Current / Prior Services being received: Speech Therapy Acute hospital setting    Mental Status: Alert  Behavior Status:Cooperative  Communication Modalities: Verbal  Recent Speech/ Language therapy:Acute hospital setting   Rehabilitation Prognosis:Excellent rehab potential to reach the established goals    Assessments:Language Assessment   Standardized testing   The patient was assessed with sections of the [facility] Diagnostic Aphasia Exam (BDAE), The Marlo [employer] Marlo (BNT), The [place] Test for the Differential Diagnosis of Aphasia (MTDDA) and the Cognitive Linguistic Quick Test (CLQT)  The following results were obtained:   BDAE  Commands    14/15 (93%)  Responsive Naming    30/30 (100%)  Complex Ideational 12/12 (100%)  BNT    53/60 (83%)  MTDDA  Defining Words    65%  CLQT  Story Retelling    Herminio Harvey Score = 3 (average score 6)  Generative Naming    Herminio Harvey Score = 3 (average score 5)            Goals  Short Term Goals:  Goal 1: Patient will complete mid level word finding tasks with 90% accuracy or higher  Goal 2: Patient will retell spoken narratives with 80% accuracy  Goal 3: Patient will express wants, needs and ideas with 80% accuracy or higher    Long Term Goals:  Goal 1: Improve expressive language skills post CVA       Flowsheet Rows      Most Recent Value   SLP G-Codes   Assessment Type  Evaluation   Functional Limitations  Spoken language expressive   Spoken Language Expression Current Status ()  CK   Spoken Language Expression Goal Status ()  CJ          Impressions/ Recommendations  Impressions: The patient presents with mild to moderate expressive aphasia secondary to CVA  The patient displayed mild difficulty with visual naming and moderate difficulty with higher level expressive language tasks such as defining words and retelling spoken narratives  The patient displayed overlapping errors in word finding and verbal organization when asked to define words  For example, when asked to define "history" the patient replied "way back in time, history of the past "  When asked to define "opinion" started a response but was unable to finish it: "What your   " In terms of recalling spoken narratives, he was able to recall only 3 facts (all from the beginning of the story) from the Story Retelling subtest on the CLQT  His total score of 3 is below the average expected score of 6  Generative  naming was also difficult for Georgette Carlos Dr naming assesses word recall related to specific semantic and phonological categories (animal naming and words beginning with the letter /m/)  This task measures working memory in conjunction with language skills and executive functions  The patient displayed moderate difficulty with executive word finding as he had difficulty mentally searching for nouns  During spontaneous speech, pauses and hesitations were noted as the patient searched for words to complete his thoughts and ideas  Receptive language skills, in comparison, are relatively well preserved, and are grossly within normal limits  The patient did not present with signs and symptoms of apraxia of speech or dysarthria of speech  Lastly, he reports no difficulty swallowing  Recommendations:   Patients would benefit from: Speech/ language therapy   Frequency:2x weekly   Duration:Other 6 months    Intervention certification FHPL:5/7/4291  Intervention certification GA:3/8/6709  Intervention Comments:Elie participated well with the evaluation

## 2019-03-06 ENCOUNTER — APPOINTMENT (OUTPATIENT)
Dept: RADIOLOGY | Facility: CLINIC | Age: 54
End: 2019-03-06
Payer: COMMERCIAL

## 2019-03-06 ENCOUNTER — TRANSCRIBE ORDERS (OUTPATIENT)
Dept: RADIOLOGY | Facility: CLINIC | Age: 54
End: 2019-03-06

## 2019-03-06 ENCOUNTER — OFFICE VISIT (OUTPATIENT)
Dept: FAMILY MEDICINE CLINIC | Facility: CLINIC | Age: 54
End: 2019-03-06
Payer: COMMERCIAL

## 2019-03-06 VITALS
HEIGHT: 72 IN | BODY MASS INDEX: 29.88 KG/M2 | RESPIRATION RATE: 16 BRPM | WEIGHT: 220.6 LBS | TEMPERATURE: 98.4 F | HEART RATE: 86 BPM | DIASTOLIC BLOOD PRESSURE: 82 MMHG | SYSTOLIC BLOOD PRESSURE: 140 MMHG

## 2019-03-06 DIAGNOSIS — R05.9 COUGH: ICD-10-CM

## 2019-03-06 DIAGNOSIS — J18.9 PNEUMONIA OF RIGHT LUNG DUE TO INFECTIOUS ORGANISM, UNSPECIFIED PART OF LUNG: Primary | ICD-10-CM

## 2019-03-06 DIAGNOSIS — I10 ESSENTIAL HYPERTENSION: Primary | ICD-10-CM

## 2019-03-06 PROCEDURE — 71046 X-RAY EXAM CHEST 2 VIEWS: CPT

## 2019-03-06 PROCEDURE — 99213 OFFICE O/P EST LOW 20 MIN: CPT | Performed by: FAMILY MEDICINE

## 2019-03-06 RX ORDER — PREDNISONE 10 MG/1
TABLET ORAL
Qty: 30 TABLET | Refills: 0 | Status: SHIPPED | OUTPATIENT
Start: 2019-03-06 | End: 2019-06-19 | Stop reason: ALTCHOICE

## 2019-03-06 RX ORDER — AMOXICILLIN AND CLAVULANATE POTASSIUM 875; 125 MG/1; MG/1
1 TABLET, FILM COATED ORAL EVERY 12 HOURS SCHEDULED
Qty: 20 TABLET | Refills: 0 | Status: SHIPPED | OUTPATIENT
Start: 2019-03-06 | End: 2019-03-16

## 2019-03-06 NOTE — LETTER
March 6, 2019     Patient: Jesse Bowden   YOB: 1965   Date of Visit: 3/6/2019       To Whom it May Concern:    Jesse Bowden is under my professional care  He was seen in my office on 3/6/2019  He may return to work on 3/11/19  Would recommend LIGHT duty for 2 weeks and then resume normal activity 3/27/19    If you have any questions or concerns, please don't hesitate to call           Sincerely,          Paramjit Lawson MD        CC: No Recipients

## 2019-03-06 NOTE — PROGRESS NOTES
Peggy Huizar 1965 male MRN: [id number]    [address] [place] [address]  Follow-up Visit    ASSESSMENT/PLAN  Peggy Huizar is a 48 y o  male presents to the office for     1  Essential hypertension  -> Will await goal from Neurology  Will start treating if >140/80  Currently would recommend only checking BP if symptomatic  Patient understands the plan    2  Cough  -Sent for stat CXR    ADDENDUM:  CXR demonstrated infiltrate  Started on steroids/ abx  Would like to see him back in 1 week  Use ICP device at home  - XR chest pa & lateral; Future    Disposition:Return to office in 1 week  Future Appointments   Date Time Provider Benedicto Brown   3/7/2019  9:45 AM HESHAM [facility]   3/8/2019  8:15 AM Vinicio Weller PA-C NEURO [facility]   3/14/2019  8:30 AM HESHAM [facility]   3/15/2019  8:45 AM HESHAM [facility]   3/19/2019  1:45 PM HESHAM [facility]   3/21/2019  8:30 AM HESHAM [facility]   3/26/2019  1:45 PM HESHAM [facility]   3/28/2019  8:30 AM HESHAM [facility]          SUBJECTIVE  CC: Follow-up      HPI:  Peggy Huizar is a 48 y o  male presenting to the office for a follow up on BP  At our last visit patient was stopped midodrine  BP have become more of his baseline  SBP ranging from 113-140 and DBP 70-90  Scanned into chart  Patient asymptomatic  Currently getting treatment for aphasia by   Patient states that he is back to himself but recently the last several days with a cough, and fatigue, Denies fevers  No sick contacts  Review of Systems   Constitutional: Positive for fatigue  Respiratory: Positive for cough, shortness of breath and wheezing  Neurological: Positive for speech difficulty  All other systems reviewed and are negative        Historical Information The patient history was reviewed as follows:    Past Medical History:   Diagnosis Date    Hypertension      Past Surgical History:   Procedure Laterality Date    NO PAST SURGERIES       Family History   Problem Relation Age of Onset    Diabetes type II Mother     Hypertension Father     Diabetes type II Father     Diabetes type II Sister     Hypertension Brother       Social History   Social History     Substance and Sexual Activity   Alcohol Use Yes    Alcohol/week: 3 6 oz    Types: 6 Cans of beer per week    Frequency: 4 or more times a week    Drinks per session: 5 or 6    Binge frequency: Weekly     Social History     Substance and Sexual Activity   Drug Use Never     Social History     Tobacco Use   Smoking Status Never Smoker   Smokeless Tobacco Never Used       Medications:     Current Outpatient Medications:     aspirin 81 mg chewable tablet, Chew 1 tablet (81 mg total) daily for 90 days, Disp: 90 tablet, Rfl: 3    atorvastatin (LIPITOR) 80 mg tablet, Take 1 tablet (80 mg total) by mouth daily with dinner, Disp: 90 tablet, Rfl: 3    clopidogrel (PLAVIX) 75 mg tablet, Take 1 tablet (75 mg total) by mouth daily for 90 days, Disp: 90 tablet, Rfl: 2    folic acid (FOLVITE) 1 mg tablet, Take 1 tablet (1 mg total) by mouth daily for 90 days, Disp: 90 tablet, Rfl: 2    thiamine 100 MG tablet, Take 1 tablet (100 mg total) by mouth daily for 90 days, Disp: 90 tablet, Rfl: 3  No Known Allergies    OBJECTIVE    Vitals:   Vitals:    03/06/19 1437   BP: 140/82   BP Location: Left arm   Patient Position: Sitting   Cuff Size: Standard   Pulse: 86   Resp: 16   Temp: 98 4 °F (36 9 °C)   Weight: 100 kg (220 lb 9 6 oz)   Height: 6' (1 829 m)           Physical Exam   Constitutional: He is oriented to person, place, and time  Vital signs are normal  He appears well-developed and well-nourished  HENT:   Head: Normocephalic and atraumatic     Right Ear: Tympanic membrane, external ear and ear canal normal  Left Ear: Tympanic membrane, external ear and ear canal normal    Nose: Nose normal    Mouth/Throat: Oropharynx is clear and moist    Eyes: Pupils are equal, round, and reactive to light  Conjunctivae and EOM are normal    Neck: Normal range of motion  Neck supple  Cardiovascular: Normal rate, regular rhythm, S1 normal, S2 normal, normal heart sounds and intact distal pulses  No murmur heard  Pulmonary/Chest: Effort normal  No respiratory distress  He has wheezes in the right upper field, the right middle field and the right lower field  He has rhonchi in the right upper field, the right middle field and the right lower field  Musculoskeletal: Normal range of motion  He exhibits no edema  Neurological: He is alert and oriented to person, place, and time  He has normal strength  Skin: Skin is warm  Psychiatric: He has a normal mood and affect  His speech is normal and behavior is normal  Judgment and thought content normal    Vitals reviewed           Francy Sousa MD  [facility] [id number]

## 2019-03-07 ENCOUNTER — OFFICE VISIT (OUTPATIENT)
Dept: SPEECH THERAPY | Facility: CLINIC | Age: 54
End: 2019-03-07
Payer: COMMERCIAL

## 2019-03-07 DIAGNOSIS — R47.01 APHASIA: ICD-10-CM

## 2019-03-07 DIAGNOSIS — I63.512 CEREBROVASCULAR ACCIDENT (CVA) DUE TO OCCLUSION OF LEFT MIDDLE CEREBRAL ARTERY (HCC): Primary | ICD-10-CM

## 2019-03-07 PROCEDURE — 92507 TX SP LANG VOICE COMM INDIV: CPT | Performed by: SPEECH-LANGUAGE PATHOLOGIST

## 2019-03-07 NOTE — PROGRESS NOTES
Speech Treatment Note    Today's date: 3/7/2019  Patient name: Marisela Emmnauel  : 1965  MRN: [id number]  Referring provider: Shahram Cheney MD  Dx: No diagnosis found  Visit Number:2 - Amrik Preciado pending  G-codes good until: 2019  Plan of care good until 2019  Pain level: 00/10    Subjective/Behavioral: Seen for speech therapy  Bill  participated well with goals  Goal 1: Patient will complete mid level word finding tasks with 90% accuracy or higher  Word deduction at 88 % accuracy  [place] categorization was at 100% (8/8)  Goal 2: Patient will retell spoken narratives with 80% accuracy  Goal 3: Patient will express wants, needs and ideas with 80% accuracy or higher  Bill was able to explain similarities with 80% accuracy  and differences with  60 % accuracy  Scripting technique used to help with verbal organization  Other:Reviewed testing and plan of care with patient  Patient is in agreement with POC at this time    Recommendations:Continue with Plan of Care

## 2019-03-08 ENCOUNTER — OFFICE VISIT (OUTPATIENT)
Dept: NEUROLOGY | Facility: CLINIC | Age: 54
End: 2019-03-08
Payer: COMMERCIAL

## 2019-03-08 VITALS
WEIGHT: 215 LBS | SYSTOLIC BLOOD PRESSURE: 153 MMHG | DIASTOLIC BLOOD PRESSURE: 92 MMHG | BODY MASS INDEX: 29.16 KG/M2 | HEART RATE: 75 BPM

## 2019-03-08 DIAGNOSIS — I63.512 CEREBROVASCULAR ACCIDENT (CVA) DUE TO OCCLUSION OF LEFT MIDDLE CEREBRAL ARTERY (HCC): ICD-10-CM

## 2019-03-08 PROCEDURE — 99215 OFFICE O/P EST HI 40 MIN: CPT | Performed by: PHYSICIAN ASSISTANT

## 2019-03-08 NOTE — PROGRESS NOTES
Patient ID: Sofya Luna is a 48 y o  male  Assessment/Plan:    No problem-specific Assessment & Plan notes found for this encounter  Problem List Items Addressed This Visit        Cardiovascular and Mediastinum    Cerebrovascular accident (CVA) due to occlusion of left middle cerebral artery ([address] )             Acute left MCA territory infarct with hemorrhagic conversion in the setting of left ICA occlusion and left MCA occlusion, likely severe chronic atherosclerotic disease of the left ICA into the left MCA  There is adequate collateral circulation  He was not given tPA, nor thrombectomy was done, due to significant improvement of symptoms within a half an hour to an hour  Likely a chronic occlusion due to collaterals seen  The patient feels he continues to have some mild expressive aphasia for which she is going to do speech therapy  Strength is adequate  There are no other deficits on exam, nor none the patient complains of subjectively  His blood pressure is slightly elevated today because he was recently started on steroids/antibiotics for pneumonia which he developed after discharge from the hospital   He was told to continue to watch his blood pressure at home  Due to occlusion probably BP should be maintained slightly higher than 038 systolic, but no higher than 516-890 systolic  He was told to continue DAPT- Plavix plus aspirin- and 80 mg lipitor until next seen by the neurologist and we may discontinue Plavix depending on his improvements and repeat CTA if needed (he was not on asa at the time of stroke)  I will defer this decision to the neurologist   We can even have him see Neuro Interventional Radiology if needed  Or vascular surgery  He was told to maintain euglycemia, no smoking  He is okay to drive and return to work at this time      He will follow-up in 2-3 months with Dr Endy Ashley and he was encouraged to call in the meantime with any questions or concerns as they arise     Subjective:    HPI    Mr Luis M Lr is an extremely pleasant 59-year-old right-handed male here with his wife for neurological follow-up after hospital admission for left MCA stroke  The patient was in his normal state health the morning of admission on 2/10/2019  His wife found him in the kitchen with complete right hemiparesis, and he spilled the coffee-grounds on the floor  He was staring at the   His wife states that there was slurred speech and expressive aphasia  Within half an hour the patient's symptoms of speech changes at and confusion and right hemiparesis did slowly improve, they stated on route to the hospital and while in the ED the symptoms continued to improve, and the decision to give tPA was aborted due to resolve of symptoms  Thrombectomy was even considered but not done due to symptoms improving  Per Dr Tahira Kibmle: Left ICA occlusion and left MCA occlusion was suggested on imaging  Likely severe chronic atherosclerotic disease of the left ICA and in situ thrombosis with artery to artery occlusion of the left MCA  Collateral circulation robust   NIHSS 3 for aphasia and right hemiparesis of the arm and leg  His wife states that 2 weeks prior to the stroke the patient had episode lasting a few minutes where had some speech issues and also possible mild right-sided facial droop and this resolved very quickly so they did not seek medical care  he was not on aspirin at the time of the symptoms  Brain MRI reveals "late acute left MCA territory infarct primarily involving the dorsal lateral aspect of the left frontal lobe, frontal operculum and left basal ganglia    Mild petechial hemorrhage  "    Repeat head CT suggested "evolving hemorrhagic left MCA territory infarction not significantly changed from prior study  "      CTA head and neck with findings as noted above, along with mild atherosclerotic narrowing at the origin of the right ICA , and occlusion of left ICA beginning at origin  Aspirin platelet inhibitor 577- showing therapeutic platelet dysfunction     TSH/ T4 slightly  Abnormal      hemoglobin A1c 5 7%  Cholesterol 264, triglycerides 166, HDL 39,      he was placed on dual anti-platelet therapy with Plavix and aspirin and continues these today on a daily basis with no side effects including no bruising or bleeding, no nose bleeds  He was placed on high-dose statin, 80 mg atorvastatin daily  He denies muscle pains or cramping, stiffness  He denies headaches, vision changes, dizziness  He feels that his strength completely resolved and back to normal on the right side  He continues to have some mild word-finding issues, expressively  His wife agrees with this  He just started speech therapy yesterday and he is going to continue this for several weeks  He feels confident in going back to work and driving  He is not a smoker  He used to smoke but quit in the past     The following portions of the patient's history were reviewed and updated as appropriate: He  has a past medical history of Hypertension  He   Patient Active Problem List    Diagnosis Date Noted    Abnormal glucose 02/19/2019    Stenosis of right carotid artery 02/19/2019    Cerebrovascular accident (CVA) due to occlusion of left middle cerebral artery [address] [address] ) 02/10/2019    Alcohol abuse 02/10/2019    Hypertension 02/10/2019    Hypercholesterolemia 09/05/2012    Benign essential hypertension 09/05/2012     He  has a past surgical history that includes No past surgeries  His family history includes Diabetes type II in his father, mother, and sister; Hypertension in his brother and father  He  reports that he has never smoked  He has never used smokeless tobacco  He reports that he drinks about 3 6 oz of alcohol per week  He reports that he does not use drugs    Current Outpatient Medications   Medication Sig Dispense Refill    amoxicillin-clavulanate (AUGMENTIN) 875-125 mg per tablet Take 1 tablet by mouth every 12 (twelve) hours for 10 days 20 tablet 0    aspirin 81 mg chewable tablet Chew 1 tablet (81 mg total) daily for 90 days 90 tablet 3    atorvastatin (LIPITOR) 80 mg tablet Take 1 tablet (80 mg total) by mouth daily with dinner 90 tablet 3    clopidogrel (PLAVIX) 75 mg tablet Take 1 tablet (75 mg total) by mouth daily for 90 days 90 tablet 2    folic acid (FOLVITE) 1 mg tablet Take 1 tablet (1 mg total) by mouth daily for 90 days 90 tablet 2    predniSONE 10 mg tablet 4 tablets x 3, 3 tablets x 3, 2 tablets x 3, then 1 tablets x 3  30 tablet 0    thiamine 100 MG tablet Take 1 tablet (100 mg total) by mouth daily for 90 days 90 tablet 3     No current facility-administered medications for this visit  He has No Known Allergies            Objective:    Blood pressure 153/92, pulse 75, weight 97 5 kg (215 lb)  Body mass index is 29 16 kg/m²  Physical Exam    Neurological Exam  Vital signs reviewed  Well developed, well nourished  Head: Normocephalic, atraumatic    Very pleasant mood and affect  Verbal fluency is slightly decreased, he has some mild word-finding problems, no dysarthria  CN 2-12: intact and symmetric, including EOMs which are normal b/l, except for pinpoint pupil 1-2 mm on the right and 3 mm on the left, both reactive to light  MSK: 5/5 t/o, except mild neglect of the right shoulder with 5/5 on direct testing  ROM normal x all 4 extr  No pronator drift  Sensation: Inact to LT x4 extr  , and decreased temperature sensation in the hands bilaterally starting at the wrists Romberg negative  Reflexes:  2+ on the left upper and lower extremities, 3+ left biceps, knee and ankle  Coordination: Nml x4 extr  Gait: Steady normal gait, tandem gait is steady  ROS:    Review of Systems   Constitutional: Negative  HENT: Negative  Eyes: Negative  Respiratory: Negative  Cardiovascular: Negative  Gastrointestinal: Negative  Endocrine: Negative  Genitourinary: Negative  Musculoskeletal: Negative  Skin: Negative  Allergic/Immunologic: Negative  Neurological: Negative  Hematological: Negative  Psychiatric/Behavioral: Negative  The following portions of the patient's history were reviewed and updated as appropriate: allergies, current medications/ medication history, past family history, past medical history, past social history, past surgical history and problem list     Review of systems was reviewed and otherwise unremarkable from a neurological perspective

## 2019-03-13 ENCOUNTER — OFFICE VISIT (OUTPATIENT)
Dept: FAMILY MEDICINE CLINIC | Facility: CLINIC | Age: 54
End: 2019-03-13
Payer: COMMERCIAL

## 2019-03-13 VITALS
OXYGEN SATURATION: 98 % | WEIGHT: 217 LBS | TEMPERATURE: 98.4 F | HEIGHT: 72 IN | HEART RATE: 80 BPM | DIASTOLIC BLOOD PRESSURE: 82 MMHG | RESPIRATION RATE: 16 BRPM | BODY MASS INDEX: 29.39 KG/M2 | SYSTOLIC BLOOD PRESSURE: 138 MMHG

## 2019-03-13 DIAGNOSIS — I10 ESSENTIAL HYPERTENSION: ICD-10-CM

## 2019-03-13 DIAGNOSIS — J18.9 PNEUMONIA OF RIGHT LUNG DUE TO INFECTIOUS ORGANISM, UNSPECIFIED PART OF LUNG: Primary | ICD-10-CM

## 2019-03-13 DIAGNOSIS — I65.23 CALCIFICATION OF BOTH CAROTID ARTERIES: ICD-10-CM

## 2019-03-13 PROCEDURE — 3075F SYST BP GE 130 - 139MM HG: CPT | Performed by: FAMILY MEDICINE

## 2019-03-13 PROCEDURE — 1036F TOBACCO NON-USER: CPT | Performed by: FAMILY MEDICINE

## 2019-03-13 PROCEDURE — 99213 OFFICE O/P EST LOW 20 MIN: CPT | Performed by: FAMILY MEDICINE

## 2019-03-13 PROCEDURE — 3079F DIAST BP 80-89 MM HG: CPT | Performed by: FAMILY MEDICINE

## 2019-03-13 PROCEDURE — 3008F BODY MASS INDEX DOCD: CPT | Performed by: FAMILY MEDICINE

## 2019-03-13 NOTE — PROGRESS NOTES
Maxime Lofton 1965 male MRN: [id number]    FAMILY PRACTICE OFFICE VISIT  [facility] - [address]      ASSESSMENT/PLAN  Maxime Lofton is a 48 y o  male presents to the office for    1  Essential hypertension  Goal 140-160  Currently well controlled off medications  Directed wife to take BPs, if change she is notify our office  2  Pneumonia of right lung due to infectious organism, unspecified part of lung  Continue mediations  Lungs improving  Patient asymptomatic  3  Calcification of both carotid arteries  2/13/19 CT Bilateral internal carotid artery and vertebral artery calcifications  Will obtain US as recommend given recent stroke  Due in June  Mild atherosclerotic narrowing at the origin of the right internal carotid artery  On CTA  - VAS carotid complete study; Future     Disposition: Return to the office 3 months  Future Appointments   Date Time Provider Benedicto Brown   3/14/2019  8:30 AM Sinda Sample, CCC-SLP [facility]   3/15/2019  8:45 AM Sinda Sample, CCC-SLP [facility]   3/19/2019  1:45 PM Sinda Sample, CCC-SLP [facility]   3/21/2019  8:30 AM Sinda Sample, CCC-SLP [facility]   3/26/2019  1:45 PM Sinda Sample, [facility]-SLP [facility]   3/28/2019  8:30 AM Sinda Sample, CCC-SLP [facility]   6/19/2019  1:30 PM Darrick Reis MD NEURO [facility]          SUBJECTIVE  CC: Follow-up (pneumo  feels better)      HPI:  Maxime Lofton is a 48 y o  male who presents for a follow up appointment  Patient recently seen by Neurology  Was given the goal for SBP of 140-160  Home log reviewed patient does not have any blood pressures greater than 140 at home  Patient is asymptomatic with any blood pressure difficulties  He continues to have speech therapy weekly given his recent stroke  The patient's pneumonia seems to be healing appropriately    He states that his cough is improving and denies any fatigue  He has not been using his ICP at home  Patient does have bilateral calcifications seen on CT with a CTA showing right calcifications of the carotid  Patient states that he will be due for carotid according to his ER follow-up  Review of Systems   Constitutional: Positive for fatigue  Negative for activity change, appetite change, chills and fever  HENT: Negative for congestion  Respiratory: Negative for cough, chest tightness and shortness of breath  Cardiovascular: Negative for chest pain and leg swelling  Gastrointestinal: Negative for abdominal distention, abdominal pain, constipation, diarrhea, nausea and vomiting  Neurological: Positive for speech difficulty  All other systems reviewed and are negative        Historical Information   The patient history was reviewed as follows:  Past Medical History:   Diagnosis Date    Hypertension          Medications:     Current Outpatient Medications:     amoxicillin-clavulanate (AUGMENTIN) 875-125 mg per tablet, Take 1 tablet by mouth every 12 (twelve) hours for 10 days, Disp: 20 tablet, Rfl: 0    aspirin 81 mg chewable tablet, Chew 1 tablet (81 mg total) daily for 90 days, Disp: 90 tablet, Rfl: 3    atorvastatin (LIPITOR) 80 mg tablet, Take 1 tablet (80 mg total) by mouth daily with dinner, Disp: 90 tablet, Rfl: 3    clopidogrel (PLAVIX) 75 mg tablet, Take 1 tablet (75 mg total) by mouth daily for 90 days, Disp: 90 tablet, Rfl: 2    folic acid (FOLVITE) 1 mg tablet, Take 1 tablet (1 mg total) by mouth daily for 90 days, Disp: 90 tablet, Rfl: 2    predniSONE 10 mg tablet, 4 tablets x 3, 3 tablets x 3, 2 tablets x 3, then 1 tablets x 3 , Disp: 30 tablet, Rfl: 0    thiamine 100 MG tablet, Take 1 tablet (100 mg total) by mouth daily for 90 days, Disp: 90 tablet, Rfl: 3    No Known Allergies    OBJECTIVE  Vitals:   Vitals:    03/13/19 1458   BP: 138/82   BP Location: Left arm   Patient Position: Sitting   Cuff Size: Standard   Pulse: 80   Resp: 16   Temp: 98 4 °F (36 9 °C)   SpO2: 98%   Weight: 98 4 kg (217 lb)   Height: 6' (1 829 m)         Physical Exam   Constitutional: He is oriented to person, place, and time  Vital signs are normal  He appears well-developed and well-nourished  HENT:   Head: Normocephalic and atraumatic  Right Ear: Tympanic membrane, external ear and ear canal normal    Left Ear: Tympanic membrane, external ear and ear canal normal    Nose: Nose normal    Mouth/Throat: Oropharynx is clear and moist    Eyes: Pupils are equal, round, and reactive to light  Conjunctivae and EOM are normal    Neck: Normal range of motion  Neck supple  Cardiovascular: Normal rate, regular rhythm, S1 normal, S2 normal, normal heart sounds and intact distal pulses  No murmur heard  Pulmonary/Chest: Effort normal and breath sounds normal  No respiratory distress  He has no wheezes  Musculoskeletal: Normal range of motion  He exhibits no edema  Neurological: He is alert and oriented to person, place, and time  He has normal strength  Skin: Skin is warm  Psychiatric: He has a normal mood and affect  His speech is normal and behavior is normal  Judgment and thought content normal    Vitals reviewed                   Galina Ann MD,   [facility]  3/13/2019

## 2019-03-14 ENCOUNTER — APPOINTMENT (OUTPATIENT)
Dept: SPEECH THERAPY | Facility: CLINIC | Age: 54
End: 2019-03-14
Payer: COMMERCIAL

## 2019-03-14 ENCOUNTER — OFFICE VISIT (OUTPATIENT)
Dept: SPEECH THERAPY | Facility: CLINIC | Age: 54
End: 2019-03-14
Payer: COMMERCIAL

## 2019-03-14 DIAGNOSIS — I63.512 CEREBROVASCULAR ACCIDENT (CVA) DUE TO OCCLUSION OF LEFT MIDDLE CEREBRAL ARTERY (HCC): Primary | ICD-10-CM

## 2019-03-14 DIAGNOSIS — R47.01 APHASIA: ICD-10-CM

## 2019-03-14 PROCEDURE — 92507 TX SP LANG VOICE COMM INDIV: CPT | Performed by: SPEECH-LANGUAGE PATHOLOGIST

## 2019-03-14 NOTE — PROGRESS NOTES
Speech Treatment Note    Today's date: 3/14/2019  Patient name: Piter Tian  : 1965  MRN: [id number]  Referring provider: Maeve Morataya MD  Dx: No diagnosis found  Visit Number:3 - [facility] pending  G-codes good until: 2019  Plan of care good until 2019  Pain level: 0010    Subjective/Behavioral: Seen for speech therapy  Bill  participated well with goals  Goal 1: Patient will complete mid level word finding tasks with 90% accuracy or higher  Add to the category  at 80 % accuracy  Frequent word substitution errors during this task  [place] categorization (holidays) was at 100% (8/8)  Goal 2: Patient will retell spoken narratives with 80% accuracy  Not addressed this session  Goal 3: Patient will express wants, needs and ideas with 80% accuracy or higher  Bill was able to describe without naming with 77% accuracy  The use of this technique as a means of self cluing was discussed  Other:Reviewed testing and plan of care with patient  Patient is in agreement with POC at this time    Recommendations:Continue with Plan of Care

## 2019-03-15 ENCOUNTER — OFFICE VISIT (OUTPATIENT)
Dept: SPEECH THERAPY | Facility: CLINIC | Age: 54
End: 2019-03-15
Payer: COMMERCIAL

## 2019-03-15 DIAGNOSIS — R47.01 APHASIA: ICD-10-CM

## 2019-03-15 DIAGNOSIS — I63.512 CEREBROVASCULAR ACCIDENT (CVA) DUE TO OCCLUSION OF LEFT MIDDLE CEREBRAL ARTERY (HCC): Primary | ICD-10-CM

## 2019-03-15 PROCEDURE — 92507 TX SP LANG VOICE COMM INDIV: CPT | Performed by: SPEECH-LANGUAGE PATHOLOGIST

## 2019-03-15 NOTE — PROGRESS NOTES
Speech Treatment Note    Today's date: 3/15/2019  Patient name: Mariia Centeno  : 1965  MRN: [id number]  Referring provider: Goldie Hall MD  Dx: No diagnosis found  Visit Number:4 - [facility] Kline pending  G-codes good until: 2019  Plan of care good until 2019  Pain level: 00/10    Subjective/Behavioral: Seen for speech therapy  Bill  participated well with goals  Goal 1: Patient will complete mid level word finding tasks with 90% accuracy or higher  Add to the category  at 80 % accuracy  Frequent word substitution errors during this task  [place] categorization (things that fly) was at 87% (7/8)  Famous people labeled from description with 79% accuracy  (page 61 [facility] [address] workbook)  Goal 2: Patient will retell spoken narratives with 80% accuracy  Not addressed this session  Goal 3: Patient will express wants, needs and ideas with 80% accuracy or higher  Bill was able to describe without naming with 77% accuracy  This is similar to last session  The use of this technique as a means of self cluing was discussed and explained  Other:Reviewed testing and plan of care with patient  Patient is in agreement with POC at this time    Recommendations:Continue with Plan of Care

## 2019-03-18 ENCOUNTER — OFFICE VISIT (OUTPATIENT)
Dept: SPEECH THERAPY | Facility: CLINIC | Age: 54
End: 2019-03-18
Payer: COMMERCIAL

## 2019-03-18 DIAGNOSIS — R47.01 APHASIA: ICD-10-CM

## 2019-03-18 DIAGNOSIS — I63.512 CEREBROVASCULAR ACCIDENT (CVA) DUE TO OCCLUSION OF LEFT MIDDLE CEREBRAL ARTERY (HCC): Primary | ICD-10-CM

## 2019-03-18 PROCEDURE — 92507 TX SP LANG VOICE COMM INDIV: CPT | Performed by: SPEECH-LANGUAGE PATHOLOGIST

## 2019-03-18 NOTE — PROGRESS NOTES
Speech Treatment Note    Today's date: 3/18/2019  Patient name: Wilfredo Shaffer  : 1965  MRN: [id number]  Referring provider: Farris Mohs, MD  Dx:   Encounter Diagnosis     ICD-10-CM    1  Cerebrovascular accident (CVA) due to occlusion of left middle cerebral artery (HCC) I63 512    2  Aphasia R47 01                   [facility]  G-codes good until: 2019  Plan of care good until 2019  Pain level: 00/10    Subjective/Behavioral: Seen for speech therapy  Bill  participated well with goals  Goal 1: Patient will complete mid level word finding tasks with 90% accuracy or higher  Add to the category  at 80 % accuracy  Frequent word substitution errors during this task  [place] categorization (furniture) was at 87% (7/8)  Word deduction was at 88% accuracy (pages 234 and 235 from [facility] workbook)  Goal 2: Patient will retell spoken narratives with 80% accuracy  Not addressed this session  Goal 3: Patient will express wants, needs and ideas with 80% accuracy or higher  Bill was able to describe without naming with 80% accuracy, slightly higher than last session  The use of this technique as a means of self cluing was again discussed and explained  Other:Reviewed testing and plan of care with patient  Patient is in agreement with POC at this time    Recommendations:Continue with Plan of Care

## 2019-03-19 ENCOUNTER — APPOINTMENT (OUTPATIENT)
Dept: SPEECH THERAPY | Facility: CLINIC | Age: 54
End: 2019-03-19
Payer: COMMERCIAL

## 2019-03-21 ENCOUNTER — OFFICE VISIT (OUTPATIENT)
Dept: SPEECH THERAPY | Facility: CLINIC | Age: 54
End: 2019-03-21
Payer: COMMERCIAL

## 2019-03-21 DIAGNOSIS — I63.512 CEREBROVASCULAR ACCIDENT (CVA) DUE TO OCCLUSION OF LEFT MIDDLE CEREBRAL ARTERY (HCC): Primary | ICD-10-CM

## 2019-03-21 DIAGNOSIS — R47.01 APHASIA: ICD-10-CM

## 2019-03-21 PROCEDURE — 92507 TX SP LANG VOICE COMM INDIV: CPT | Performed by: SPEECH-LANGUAGE PATHOLOGIST

## 2019-03-21 NOTE — PROGRESS NOTES
Speech Treatment Note    Today's date: 3/21/2019  Patient name: Tony Garcia  : 1965  MRN: [id number]  Referring provider: Bang Gayle MD  Dx:   Encounter Diagnosis     ICD-10-CM    1  Cerebrovascular accident (CVA) due to occlusion of left middle cerebral artery (HCC) I63 512    2  Aphasia R47 01                   Visit Number:6 - auth pending  G-codes good until: 2019  Plan of care good until 2019  Pain level: 00/10    Subjective/Behavioral: Seen for speech therapy  Bill  participated well with goals  Goal 1: Patient will complete mid level word finding tasks with 90% accuracy or higher  Add to the category, concrete level,   at 80 % accuracy  Less frequent word substitution errors during this task in comparison to last session  [place] categorization (desserts) was at 72% (7/8)  Goal 2: Patient will retell spoken narratives with 80% accuracy  Not addressed this session  Goal 3: Patient will express wants, needs and ideas with 80% accuracy or higher  Bill was able to describe without naming with 80% accuracy, similar to  last session  The use of this technique as a means of self cluing was again discussed and explained  Other:Reviewed testing and plan of care with patient  Patient is in agreement with POC at this time    Recommendations:Continue with Plan of Care

## 2019-03-26 ENCOUNTER — OFFICE VISIT (OUTPATIENT)
Dept: SPEECH THERAPY | Facility: CLINIC | Age: 54
End: 2019-03-26
Payer: COMMERCIAL

## 2019-03-26 DIAGNOSIS — R47.01 APHASIA: ICD-10-CM

## 2019-03-26 DIAGNOSIS — I63.512 CEREBROVASCULAR ACCIDENT (CVA) DUE TO OCCLUSION OF LEFT MIDDLE CEREBRAL ARTERY (HCC): Primary | ICD-10-CM

## 2019-03-26 PROCEDURE — 92507 TX SP LANG VOICE COMM INDIV: CPT | Performed by: SPEECH-LANGUAGE PATHOLOGIST

## 2019-03-26 NOTE — PROGRESS NOTES
Speech Treatment Note    Today's date: 3/26/2019  Patient name: Wilfredo Shaffer  : 1965  MRN: [id number]  Referring provider: Farris Mohs, MD  Dx:   Encounter Diagnosis     ICD-10-CM    1  Cerebrovascular accident (CVA) due to occlusion of left middle cerebral artery (HCC) I63 512    2  Aphasia R47 01                   Visit Number:7 - [facility] pending  G-codes good until: 2019  Plan of care good until 2019  Pain level: 00/10    Subjective/Behavioral: Seen for speech therapy  Bill  participated well with goals  Goal 1: Patient will complete mid level word finding tasks with 90% accuracy or higher  Add to the category, concrete level,   at 80 % accuracy  Less frequent word substitution errors during this task in comparison to last session  [place] categorization ([place] items) was at 72% (7/8)  Similar to last session  Add to the category was at 94%, with fewer pauses and hesitations for word finding  Goal 2: Patient will retell spoken narratives with 80% accuracy  Not addressed this session  Goal 3: Patient will express wants, needs and ideas with 80% accuracy or higher  Bill was able to describe how items are same and different with 88 % accuracy  A scripting technique was used to help with verbal organization and planning  Darnell William displays gains in this area since it was last attempted  Other:Reviewed testing and plan of care with patient  Patient is in agreement with POC at this time    Recommendations:Continue with Plan of Care

## 2019-03-28 ENCOUNTER — OFFICE VISIT (OUTPATIENT)
Dept: SPEECH THERAPY | Facility: CLINIC | Age: 54
End: 2019-03-28
Payer: COMMERCIAL

## 2019-03-28 DIAGNOSIS — I63.512 CEREBROVASCULAR ACCIDENT (CVA) DUE TO OCCLUSION OF LEFT MIDDLE CEREBRAL ARTERY (HCC): Primary | ICD-10-CM

## 2019-03-28 DIAGNOSIS — R47.01 APHASIA: ICD-10-CM

## 2019-03-28 PROCEDURE — 92507 TX SP LANG VOICE COMM INDIV: CPT | Performed by: SPEECH-LANGUAGE PATHOLOGIST

## 2019-03-28 NOTE — PROGRESS NOTES
Speech Treatment Note    Today's date: 3/28/2019  Patient name: Francis Rojas  : 1965  MRN: [id number]  Referring provider: Skyler Santana MD  Dx:   Encounter Diagnosis     ICD-10-CM    1  Cerebrovascular accident (CVA) due to occlusion of left middle cerebral artery (HCC) I63 512    2  Aphasia R47 01                   Visit Number:8 - [place] pending  G-codes good until: 2019  Plan of care good until 2019  Pain level: 00/10    Subjective/Behavioral: Seen for speech therapy  Bill  participated well with goals  Goal 1: Patient will complete mid level word finding tasks with 90% accuracy or higher  Add to the category, abstract  at 63 % accuracy  Lower  accuracy and more  frequent word substitution errors during this task in comparison to last session  [place] categorization (clothing) was at 100% (8/8)  Slightly higher than last session  Goal 2: Patient will retell spoken narratives with 80% accuracy  Not addressed this session  Goal 3: Patient will express wants, needs and ideas with 80% accuracy or higher  Bill was able to describe how items are same and different with 88 % accuracy  A scripting technique was used to help with verbal organization and planning  Similar results as last session  Other:Reviewed testing and plan of care with patient  Patient is in agreement with POC at this time    Recommendations:Continue with Plan of Care

## 2019-04-01 ENCOUNTER — TELEPHONE (OUTPATIENT)
Dept: NEUROLOGY | Facility: CLINIC | Age: 54
End: 2019-04-01

## 2019-04-04 ENCOUNTER — OFFICE VISIT (OUTPATIENT)
Dept: SPEECH THERAPY | Facility: CLINIC | Age: 54
End: 2019-04-04
Payer: COMMERCIAL

## 2019-04-04 DIAGNOSIS — R47.01 APHASIA: ICD-10-CM

## 2019-04-04 DIAGNOSIS — I63.512 CEREBROVASCULAR ACCIDENT (CVA) DUE TO OCCLUSION OF LEFT MIDDLE CEREBRAL ARTERY (HCC): Primary | ICD-10-CM

## 2019-04-04 PROCEDURE — 92507 TX SP LANG VOICE COMM INDIV: CPT | Performed by: SPEECH-LANGUAGE PATHOLOGIST

## 2019-04-05 ENCOUNTER — OFFICE VISIT (OUTPATIENT)
Dept: SPEECH THERAPY | Facility: CLINIC | Age: 54
End: 2019-04-05
Payer: COMMERCIAL

## 2019-04-05 DIAGNOSIS — I63.512 CEREBROVASCULAR ACCIDENT (CVA) DUE TO OCCLUSION OF LEFT MIDDLE CEREBRAL ARTERY (HCC): Primary | ICD-10-CM

## 2019-04-05 DIAGNOSIS — R47.01 APHASIA: ICD-10-CM

## 2019-04-05 PROCEDURE — G9163 LANG EXPRESS GOAL STATUS: HCPCS | Performed by: SPEECH-LANGUAGE PATHOLOGIST

## 2019-04-05 PROCEDURE — G9162 LANG EXPRESS CURRENT STATUS: HCPCS | Performed by: SPEECH-LANGUAGE PATHOLOGIST

## 2019-04-05 PROCEDURE — 92507 TX SP LANG VOICE COMM INDIV: CPT | Performed by: SPEECH-LANGUAGE PATHOLOGIST

## 2019-04-09 ENCOUNTER — OFFICE VISIT (OUTPATIENT)
Dept: SPEECH THERAPY | Facility: CLINIC | Age: 54
End: 2019-04-09
Payer: COMMERCIAL

## 2019-04-09 DIAGNOSIS — R47.01 APHASIA: ICD-10-CM

## 2019-04-09 DIAGNOSIS — I63.512 CEREBROVASCULAR ACCIDENT (CVA) DUE TO OCCLUSION OF LEFT MIDDLE CEREBRAL ARTERY (HCC): Primary | ICD-10-CM

## 2019-04-09 PROCEDURE — 92507 TX SP LANG VOICE COMM INDIV: CPT | Performed by: SPEECH-LANGUAGE PATHOLOGIST

## 2019-04-11 ENCOUNTER — OFFICE VISIT (OUTPATIENT)
Dept: SPEECH THERAPY | Facility: CLINIC | Age: 54
End: 2019-04-11
Payer: COMMERCIAL

## 2019-04-11 DIAGNOSIS — I63.512 CEREBROVASCULAR ACCIDENT (CVA) DUE TO OCCLUSION OF LEFT MIDDLE CEREBRAL ARTERY (HCC): Primary | ICD-10-CM

## 2019-04-11 DIAGNOSIS — R47.01 APHASIA: ICD-10-CM

## 2019-04-11 PROCEDURE — 92507 TX SP LANG VOICE COMM INDIV: CPT | Performed by: SPEECH-LANGUAGE PATHOLOGIST

## 2019-04-16 ENCOUNTER — OFFICE VISIT (OUTPATIENT)
Dept: SPEECH THERAPY | Facility: CLINIC | Age: 54
End: 2019-04-16
Payer: COMMERCIAL

## 2019-04-16 DIAGNOSIS — I63.512 CEREBROVASCULAR ACCIDENT (CVA) DUE TO OCCLUSION OF LEFT MIDDLE CEREBRAL ARTERY (HCC): Primary | ICD-10-CM

## 2019-04-16 DIAGNOSIS — R47.01 APHASIA: ICD-10-CM

## 2019-04-16 PROCEDURE — 92507 TX SP LANG VOICE COMM INDIV: CPT | Performed by: SPEECH-LANGUAGE PATHOLOGIST

## 2019-04-18 ENCOUNTER — OFFICE VISIT (OUTPATIENT)
Dept: SPEECH THERAPY | Facility: CLINIC | Age: 54
End: 2019-04-18
Payer: COMMERCIAL

## 2019-04-18 DIAGNOSIS — R47.01 APHASIA: ICD-10-CM

## 2019-04-18 DIAGNOSIS — I63.512 CEREBROVASCULAR ACCIDENT (CVA) DUE TO OCCLUSION OF LEFT MIDDLE CEREBRAL ARTERY (HCC): Primary | ICD-10-CM

## 2019-04-18 PROCEDURE — 92507 TX SP LANG VOICE COMM INDIV: CPT | Performed by: SPEECH-LANGUAGE PATHOLOGIST

## 2019-04-23 ENCOUNTER — OFFICE VISIT (OUTPATIENT)
Dept: SPEECH THERAPY | Facility: CLINIC | Age: 54
End: 2019-04-23
Payer: COMMERCIAL

## 2019-04-23 DIAGNOSIS — R47.01 APHASIA: ICD-10-CM

## 2019-04-23 DIAGNOSIS — I63.512 CEREBROVASCULAR ACCIDENT (CVA) DUE TO OCCLUSION OF LEFT MIDDLE CEREBRAL ARTERY (HCC): Primary | ICD-10-CM

## 2019-04-23 PROCEDURE — 92507 TX SP LANG VOICE COMM INDIV: CPT | Performed by: SPEECH-LANGUAGE PATHOLOGIST

## 2019-04-25 ENCOUNTER — OFFICE VISIT (OUTPATIENT)
Dept: SPEECH THERAPY | Facility: CLINIC | Age: 54
End: 2019-04-25
Payer: COMMERCIAL

## 2019-04-25 DIAGNOSIS — I63.512 CEREBROVASCULAR ACCIDENT (CVA) DUE TO OCCLUSION OF LEFT MIDDLE CEREBRAL ARTERY (HCC): Primary | ICD-10-CM

## 2019-04-25 DIAGNOSIS — R47.01 APHASIA: ICD-10-CM

## 2019-04-25 PROCEDURE — 92507 TX SP LANG VOICE COMM INDIV: CPT | Performed by: SPEECH-LANGUAGE PATHOLOGIST

## 2019-04-30 ENCOUNTER — OFFICE VISIT (OUTPATIENT)
Dept: SPEECH THERAPY | Facility: CLINIC | Age: 54
End: 2019-04-30
Payer: COMMERCIAL

## 2019-04-30 DIAGNOSIS — I63.512 CEREBROVASCULAR ACCIDENT (CVA) DUE TO OCCLUSION OF LEFT MIDDLE CEREBRAL ARTERY (HCC): Primary | ICD-10-CM

## 2019-04-30 DIAGNOSIS — R47.01 APHASIA: ICD-10-CM

## 2019-04-30 PROCEDURE — 92507 TX SP LANG VOICE COMM INDIV: CPT | Performed by: SPEECH-LANGUAGE PATHOLOGIST

## 2019-05-07 ENCOUNTER — OFFICE VISIT (OUTPATIENT)
Dept: SPEECH THERAPY | Facility: CLINIC | Age: 54
End: 2019-05-07
Payer: COMMERCIAL

## 2019-05-07 DIAGNOSIS — I63.512 CEREBROVASCULAR ACCIDENT (CVA) DUE TO OCCLUSION OF LEFT MIDDLE CEREBRAL ARTERY (HCC): Primary | ICD-10-CM

## 2019-05-07 DIAGNOSIS — R47.01 APHASIA: ICD-10-CM

## 2019-05-07 PROCEDURE — G9163 LANG EXPRESS GOAL STATUS: HCPCS | Performed by: SPEECH-LANGUAGE PATHOLOGIST

## 2019-05-07 PROCEDURE — 92507 TX SP LANG VOICE COMM INDIV: CPT | Performed by: SPEECH-LANGUAGE PATHOLOGIST

## 2019-05-07 PROCEDURE — G9162 LANG EXPRESS CURRENT STATUS: HCPCS | Performed by: SPEECH-LANGUAGE PATHOLOGIST

## 2019-05-09 ENCOUNTER — OFFICE VISIT (OUTPATIENT)
Dept: SPEECH THERAPY | Facility: CLINIC | Age: 54
End: 2019-05-09
Payer: COMMERCIAL

## 2019-05-09 DIAGNOSIS — R47.01 APHASIA: ICD-10-CM

## 2019-05-09 DIAGNOSIS — I63.512 CEREBROVASCULAR ACCIDENT (CVA) DUE TO OCCLUSION OF LEFT MIDDLE CEREBRAL ARTERY (HCC): Primary | ICD-10-CM

## 2019-05-09 PROCEDURE — 92507 TX SP LANG VOICE COMM INDIV: CPT | Performed by: SPEECH-LANGUAGE PATHOLOGIST

## 2019-05-14 ENCOUNTER — OFFICE VISIT (OUTPATIENT)
Dept: SPEECH THERAPY | Facility: CLINIC | Age: 54
End: 2019-05-14
Payer: COMMERCIAL

## 2019-05-14 DIAGNOSIS — I63.512 CEREBROVASCULAR ACCIDENT (CVA) DUE TO OCCLUSION OF LEFT MIDDLE CEREBRAL ARTERY (HCC): Primary | ICD-10-CM

## 2019-05-14 DIAGNOSIS — R47.01 APHASIA: ICD-10-CM

## 2019-05-14 PROCEDURE — 92507 TX SP LANG VOICE COMM INDIV: CPT | Performed by: SPEECH-LANGUAGE PATHOLOGIST

## 2019-05-16 ENCOUNTER — OFFICE VISIT (OUTPATIENT)
Dept: SPEECH THERAPY | Facility: CLINIC | Age: 54
End: 2019-05-16
Payer: COMMERCIAL

## 2019-05-16 DIAGNOSIS — I63.512 CEREBROVASCULAR ACCIDENT (CVA) DUE TO OCCLUSION OF LEFT MIDDLE CEREBRAL ARTERY (HCC): Primary | ICD-10-CM

## 2019-05-16 DIAGNOSIS — R47.01 APHASIA: ICD-10-CM

## 2019-05-16 PROCEDURE — 92507 TX SP LANG VOICE COMM INDIV: CPT | Performed by: SPEECH-LANGUAGE PATHOLOGIST

## 2019-05-21 ENCOUNTER — OFFICE VISIT (OUTPATIENT)
Dept: SPEECH THERAPY | Facility: CLINIC | Age: 54
End: 2019-05-21
Payer: COMMERCIAL

## 2019-05-21 DIAGNOSIS — R47.01 APHASIA: ICD-10-CM

## 2019-05-21 DIAGNOSIS — I63.512 CEREBROVASCULAR ACCIDENT (CVA) DUE TO OCCLUSION OF LEFT MIDDLE CEREBRAL ARTERY (HCC): Primary | ICD-10-CM

## 2019-05-21 PROCEDURE — 92507 TX SP LANG VOICE COMM INDIV: CPT | Performed by: SPEECH-LANGUAGE PATHOLOGIST

## 2019-05-23 ENCOUNTER — OFFICE VISIT (OUTPATIENT)
Dept: SPEECH THERAPY | Facility: CLINIC | Age: 54
End: 2019-05-23
Payer: COMMERCIAL

## 2019-05-23 DIAGNOSIS — R47.01 APHASIA: ICD-10-CM

## 2019-05-23 DIAGNOSIS — I63.512 CEREBROVASCULAR ACCIDENT (CVA) DUE TO OCCLUSION OF LEFT MIDDLE CEREBRAL ARTERY (HCC): Primary | ICD-10-CM

## 2019-05-23 PROCEDURE — 92507 TX SP LANG VOICE COMM INDIV: CPT | Performed by: SPEECH-LANGUAGE PATHOLOGIST

## 2019-05-28 ENCOUNTER — OFFICE VISIT (OUTPATIENT)
Dept: SPEECH THERAPY | Facility: CLINIC | Age: 54
End: 2019-05-28
Payer: COMMERCIAL

## 2019-05-28 DIAGNOSIS — I63.512 CEREBROVASCULAR ACCIDENT (CVA) DUE TO OCCLUSION OF LEFT MIDDLE CEREBRAL ARTERY (HCC): Primary | ICD-10-CM

## 2019-05-28 DIAGNOSIS — R47.01 APHASIA: ICD-10-CM

## 2019-05-28 PROCEDURE — 92507 TX SP LANG VOICE COMM INDIV: CPT | Performed by: SPEECH-LANGUAGE PATHOLOGIST

## 2019-05-30 ENCOUNTER — OFFICE VISIT (OUTPATIENT)
Dept: SPEECH THERAPY | Facility: CLINIC | Age: 54
End: 2019-05-30
Payer: COMMERCIAL

## 2019-05-30 DIAGNOSIS — I63.512 CEREBROVASCULAR ACCIDENT (CVA) DUE TO OCCLUSION OF LEFT MIDDLE CEREBRAL ARTERY (HCC): Primary | ICD-10-CM

## 2019-05-30 DIAGNOSIS — R47.01 APHASIA: ICD-10-CM

## 2019-05-30 PROCEDURE — 92507 TX SP LANG VOICE COMM INDIV: CPT | Performed by: SPEECH-LANGUAGE PATHOLOGIST

## 2019-06-04 ENCOUNTER — OFFICE VISIT (OUTPATIENT)
Dept: SPEECH THERAPY | Facility: CLINIC | Age: 54
End: 2019-06-04
Payer: COMMERCIAL

## 2019-06-04 DIAGNOSIS — R47.01 APHASIA: ICD-10-CM

## 2019-06-04 DIAGNOSIS — I63.512 CEREBROVASCULAR ACCIDENT (CVA) DUE TO OCCLUSION OF LEFT MIDDLE CEREBRAL ARTERY (HCC): Primary | ICD-10-CM

## 2019-06-04 PROCEDURE — G9162 LANG EXPRESS CURRENT STATUS: HCPCS | Performed by: SPEECH-LANGUAGE PATHOLOGIST

## 2019-06-04 PROCEDURE — 92507 TX SP LANG VOICE COMM INDIV: CPT | Performed by: SPEECH-LANGUAGE PATHOLOGIST

## 2019-06-04 PROCEDURE — G9163 LANG EXPRESS GOAL STATUS: HCPCS | Performed by: SPEECH-LANGUAGE PATHOLOGIST

## 2019-06-06 ENCOUNTER — OFFICE VISIT (OUTPATIENT)
Dept: SPEECH THERAPY | Facility: CLINIC | Age: 54
End: 2019-06-06
Payer: COMMERCIAL

## 2019-06-06 DIAGNOSIS — R47.01 APHASIA: ICD-10-CM

## 2019-06-06 DIAGNOSIS — I63.512 CEREBROVASCULAR ACCIDENT (CVA) DUE TO OCCLUSION OF LEFT MIDDLE CEREBRAL ARTERY (HCC): Primary | ICD-10-CM

## 2019-06-06 PROCEDURE — 92507 TX SP LANG VOICE COMM INDIV: CPT | Performed by: SPEECH-LANGUAGE PATHOLOGIST

## 2019-06-06 PROCEDURE — G9162 LANG EXPRESS CURRENT STATUS: HCPCS | Performed by: SPEECH-LANGUAGE PATHOLOGIST

## 2019-06-06 PROCEDURE — G9163 LANG EXPRESS GOAL STATUS: HCPCS | Performed by: SPEECH-LANGUAGE PATHOLOGIST

## 2019-06-11 ENCOUNTER — OFFICE VISIT (OUTPATIENT)
Dept: SPEECH THERAPY | Facility: CLINIC | Age: 54
End: 2019-06-11
Payer: COMMERCIAL

## 2019-06-11 DIAGNOSIS — I63.512 CEREBROVASCULAR ACCIDENT (CVA) DUE TO OCCLUSION OF LEFT MIDDLE CEREBRAL ARTERY (HCC): Primary | ICD-10-CM

## 2019-06-11 DIAGNOSIS — R47.01 APHASIA: ICD-10-CM

## 2019-06-11 PROCEDURE — 92507 TX SP LANG VOICE COMM INDIV: CPT | Performed by: SPEECH-LANGUAGE PATHOLOGIST

## 2019-06-13 ENCOUNTER — OFFICE VISIT (OUTPATIENT)
Dept: SPEECH THERAPY | Facility: CLINIC | Age: 54
End: 2019-06-13
Payer: COMMERCIAL

## 2019-06-13 DIAGNOSIS — I63.512 CEREBROVASCULAR ACCIDENT (CVA) DUE TO OCCLUSION OF LEFT MIDDLE CEREBRAL ARTERY (HCC): Primary | ICD-10-CM

## 2019-06-13 DIAGNOSIS — R47.01 APHASIA: ICD-10-CM

## 2019-06-13 PROCEDURE — 92507 TX SP LANG VOICE COMM INDIV: CPT | Performed by: SPEECH-LANGUAGE PATHOLOGIST

## 2019-06-18 ENCOUNTER — OFFICE VISIT (OUTPATIENT)
Dept: SPEECH THERAPY | Facility: CLINIC | Age: 54
End: 2019-06-18
Payer: COMMERCIAL

## 2019-06-18 DIAGNOSIS — R47.01 APHASIA: ICD-10-CM

## 2019-06-18 DIAGNOSIS — I63.512 CEREBROVASCULAR ACCIDENT (CVA) DUE TO OCCLUSION OF LEFT MIDDLE CEREBRAL ARTERY (HCC): Primary | ICD-10-CM

## 2019-06-18 PROCEDURE — 92507 TX SP LANG VOICE COMM INDIV: CPT | Performed by: SPEECH-LANGUAGE PATHOLOGIST

## 2019-06-19 ENCOUNTER — OFFICE VISIT (OUTPATIENT)
Dept: NEUROLOGY | Facility: CLINIC | Age: 54
End: 2019-06-19
Payer: COMMERCIAL

## 2019-06-19 VITALS
SYSTOLIC BLOOD PRESSURE: 168 MMHG | BODY MASS INDEX: 30.2 KG/M2 | HEART RATE: 73 BPM | HEIGHT: 72 IN | WEIGHT: 223 LBS | DIASTOLIC BLOOD PRESSURE: 108 MMHG

## 2019-06-19 DIAGNOSIS — E78.2 MIXED HYPERLIPIDEMIA: ICD-10-CM

## 2019-06-19 DIAGNOSIS — I69.30 CHRONIC ISCHEMIC LEFT MCA STROKE: Primary | ICD-10-CM

## 2019-06-19 DIAGNOSIS — I10 ESSENTIAL HYPERTENSION: ICD-10-CM

## 2019-06-19 DIAGNOSIS — I66.02 MIDDLE CEREBRAL ARTERY STENOSIS, LEFT: ICD-10-CM

## 2019-06-19 DIAGNOSIS — R06.83 SNORING: ICD-10-CM

## 2019-06-19 PROCEDURE — 99214 OFFICE O/P EST MOD 30 MIN: CPT | Performed by: PSYCHIATRY & NEUROLOGY

## 2019-06-20 ENCOUNTER — OFFICE VISIT (OUTPATIENT)
Dept: SPEECH THERAPY | Facility: CLINIC | Age: 54
End: 2019-06-20
Payer: COMMERCIAL

## 2019-06-20 DIAGNOSIS — R47.01 APHASIA: ICD-10-CM

## 2019-06-20 DIAGNOSIS — I63.512 CEREBROVASCULAR ACCIDENT (CVA) DUE TO OCCLUSION OF LEFT MIDDLE CEREBRAL ARTERY (HCC): Primary | ICD-10-CM

## 2019-06-20 PROCEDURE — 92507 TX SP LANG VOICE COMM INDIV: CPT | Performed by: SPEECH-LANGUAGE PATHOLOGIST

## 2019-06-24 ENCOUNTER — TELEPHONE (OUTPATIENT)
Dept: SLEEP CENTER | Facility: CLINIC | Age: 54
End: 2019-06-24

## 2019-06-25 ENCOUNTER — OFFICE VISIT (OUTPATIENT)
Dept: SPEECH THERAPY | Facility: CLINIC | Age: 54
End: 2019-06-25
Payer: COMMERCIAL

## 2019-06-25 ENCOUNTER — HOSPITAL ENCOUNTER (OUTPATIENT)
Dept: RADIOLOGY | Facility: HOSPITAL | Age: 54
Discharge: HOME | End: 2019-06-25
Attending: FAMILY MEDICINE
Payer: COMMERCIAL

## 2019-06-25 DIAGNOSIS — I65.23 CALCIFICATION OF BOTH CAROTID ARTERIES: ICD-10-CM

## 2019-06-25 DIAGNOSIS — R47.01 APHASIA: ICD-10-CM

## 2019-06-25 DIAGNOSIS — I63.512 CEREBROVASCULAR ACCIDENT (CVA) DUE TO OCCLUSION OF LEFT MIDDLE CEREBRAL ARTERY (HCC): Primary | ICD-10-CM

## 2019-06-25 PROCEDURE — 93880 EXTRACRANIAL BILAT STUDY: CPT

## 2019-06-25 PROCEDURE — 92507 TX SP LANG VOICE COMM INDIV: CPT | Performed by: SPEECH-LANGUAGE PATHOLOGIST

## 2019-06-25 PROCEDURE — 93880 EXTRACRANIAL BILAT STUDY: CPT | Performed by: SURGERY

## 2019-06-26 ENCOUNTER — TRANSCRIBE ORDERS (OUTPATIENT)
Dept: SLEEP CENTER | Facility: CLINIC | Age: 54
End: 2019-06-26

## 2019-06-27 ENCOUNTER — OFFICE VISIT (OUTPATIENT)
Dept: SPEECH THERAPY | Facility: CLINIC | Age: 54
End: 2019-06-27
Payer: COMMERCIAL

## 2019-06-27 DIAGNOSIS — I63.512 CEREBROVASCULAR ACCIDENT (CVA) DUE TO OCCLUSION OF LEFT MIDDLE CEREBRAL ARTERY (HCC): Primary | ICD-10-CM

## 2019-06-27 DIAGNOSIS — R47.01 APHASIA: ICD-10-CM

## 2019-06-27 PROCEDURE — 92507 TX SP LANG VOICE COMM INDIV: CPT | Performed by: SPEECH-LANGUAGE PATHOLOGIST

## 2019-07-02 ENCOUNTER — OFFICE VISIT (OUTPATIENT)
Dept: SPEECH THERAPY | Facility: CLINIC | Age: 54
End: 2019-07-02
Payer: COMMERCIAL

## 2019-07-02 DIAGNOSIS — I63.512 CEREBROVASCULAR ACCIDENT (CVA) DUE TO OCCLUSION OF LEFT MIDDLE CEREBRAL ARTERY (HCC): Primary | ICD-10-CM

## 2019-07-02 DIAGNOSIS — R47.01 APHASIA: ICD-10-CM

## 2019-07-02 PROCEDURE — G9162 LANG EXPRESS CURRENT STATUS: HCPCS | Performed by: SPEECH-LANGUAGE PATHOLOGIST

## 2019-07-02 PROCEDURE — G9163 LANG EXPRESS GOAL STATUS: HCPCS | Performed by: SPEECH-LANGUAGE PATHOLOGIST

## 2019-07-02 PROCEDURE — 92507 TX SP LANG VOICE COMM INDIV: CPT | Performed by: SPEECH-LANGUAGE PATHOLOGIST

## 2019-07-02 NOTE — PROGRESS NOTES
Speech Treatment Note  Today's date: 2019  Patient name: Tejas Aldrich  : 1965  MRN: [id number]  Referring provider: Belinda Myrick MD  Dx:   No diagnosis found  Visit Number: 50 (12 visits good from 19 through 19)   G-codes good until:2019  Plan of care good until 2019  Pain level: 00/10    Subjective/Behavioral: Seen for speech therapy  Akhil  participated well with goals  Goal 1: Patient will complete mid level word finding tasks with 90% accuracy or higher  [place] categorization was at 100% accuracy  Felipe Painter displayed 5 attentional errors when card sorting was combined with categorization  Single word responses (famous people and sports) provided with 85% accuracy  Lastly,similarities and differences were explained with an initial accuracy of 50%  When these items were reviewed a second time, accuracy improved to 75% as Akhil displayed fewer word finding errors  Page 121 from [employer] [facility] [employer]  [facility] workbook was used for this task  Goal 2: Patient will retell spoken narratives with 80% accuracy  Not addressed this session  Goal 3: Patient will express wants, needs and ideas with 80% accuracy or higher  Silverne Rober has recently displayed gains in conversational speech as he displays fewer overt word finding errors  Other:Reviewed testing and plan of care with patient  Patient is in agreement with POC at this time    Recommendations:Continue with Plan of Care

## 2019-07-03 ENCOUNTER — TELEPHONE (OUTPATIENT)
Dept: FAMILY MEDICINE CLINIC | Facility: CLINIC | Age: 54
End: 2019-07-03

## 2019-07-03 DIAGNOSIS — I10 ESSENTIAL HYPERTENSION: Primary | ICD-10-CM

## 2019-07-03 NOTE — TELEPHONE ENCOUNTER
I wrote cardiology in error, I meant neurology I forwarded  Does he want to see a cardiologist at this time; is his BP controlled?

## 2019-07-03 NOTE — TELEPHONE ENCOUNTER
Patient needs an order and a referral to see Dr Velasquez Licea or Dr eHnrique Aragon----- Message from Viraj Hart MD sent at 6/26/2019  8:34 AM EDT -----  Please call patient and advise that the right carotid has < 50% blockage, usually we would intervene at 70%  At this time will forward to his Cardiology for evaluation  However wwanted to inform him on the results   Will monitor yearly

## 2019-07-09 ENCOUNTER — OFFICE VISIT (OUTPATIENT)
Dept: SPEECH THERAPY | Facility: CLINIC | Age: 54
End: 2019-07-09
Payer: COMMERCIAL

## 2019-07-09 DIAGNOSIS — I63.512 CEREBROVASCULAR ACCIDENT (CVA) DUE TO OCCLUSION OF LEFT MIDDLE CEREBRAL ARTERY (HCC): Primary | ICD-10-CM

## 2019-07-09 DIAGNOSIS — R47.01 APHASIA: ICD-10-CM

## 2019-07-09 PROCEDURE — 92507 TX SP LANG VOICE COMM INDIV: CPT | Performed by: SPEECH-LANGUAGE PATHOLOGIST

## 2019-07-09 NOTE — PROGRESS NOTES
Speech Treatment Note/Progress Note  Today's date: 2019  Patient name: Aric Martinez  : 1965  MRN: [id number]  Referring provider: Jil Díaz MD  Dx:   Encounter Diagnosis     ICD-10-CM    1  Cerebrovascular accident (CVA) due to occlusion of left middle cerebral artery (HCC) I63 512    2  Aphasia R47 01                   Visit Number: 32 (12 visits good from 19 through 19)   G-codes good until:2019  Plan of care good until 2019  Pain level: 00/10    Subjective/Behavioral: Seen for speech therapy  Akhil  participated well with goals  Goal 1: Patient will complete mid level word finding tasks with 90% accuracy or higher - partially met  [place] categorization was at 100% accuracy  Leoncio Daija displayed 0 attentional errors when card sorting was combined with categorization  This is better than last time  Similarities and differences were explained with an initial accuracy of 50%  When these items were reviewed a second time, accuracy improved to 75% as Akhil displayed fewer word finding errors  This is similar to last session  Page 121 from [employer]  [facility] workbook was used for this task  Fluency naming averaged 10 items per category per minute, which is below average for the patients age ( 15 responses per minute is average)  When this task was reviewed following clueing and review of organizational strategies  Goal 2: Patient will retell spoken narratives with 80% accuracy - partially met  Not addressed this session  Goal 3: Patient will express wants, needs and ideas with 80% accuracy or higher  - Partially met  Farida Madrigal has recently displayed gains in conversational speech as he displays fewer overt word finding errors  Walt Rosario continues to display gains in word finding and verbal expression  However, mild deficits persist in both areas  For example, generative naming (listiing category members per minute) averages 10 items per minute  Normal performance is 15 items per minute for concrete categorization  Erik Choe also struggles with other mid level langauge tasks, such as explaining similarities and differences, completing most exercises with an initial accuracy of 50%  During this and similar tasks, Erik Choe produces vague responses as he has difficulty finding specific words to describe his thoughts and ideas  Following repetition and structured practice, accuracy levels generally improve to around 75%  Erik Choe would benefit from additional speech and language therapy to maximize recovery from CVA  Other:Reviewed testing and plan of care with patient  Patient is in agreement with POC at this time    Recommendations:Continue with Plan of Care

## 2019-07-11 ENCOUNTER — OFFICE VISIT (OUTPATIENT)
Dept: SPEECH THERAPY | Facility: CLINIC | Age: 54
End: 2019-07-11
Payer: COMMERCIAL

## 2019-07-11 DIAGNOSIS — I63.512 CEREBROVASCULAR ACCIDENT (CVA) DUE TO OCCLUSION OF LEFT MIDDLE CEREBRAL ARTERY (HCC): Primary | ICD-10-CM

## 2019-07-11 DIAGNOSIS — R47.01 APHASIA: ICD-10-CM

## 2019-07-11 PROCEDURE — 92507 TX SP LANG VOICE COMM INDIV: CPT | Performed by: SPEECH-LANGUAGE PATHOLOGIST

## 2019-07-11 NOTE — PROGRESS NOTES
Speech Treatment Note  Today's date: 2019  Patient name: Johana Munson  : 1965  MRN: [id number]  Referring provider: Susan Swartz MD  Dx:   Encounter Diagnosis     ICD-10-CM    1  Cerebrovascular accident (CVA) due to occlusion of left middle cerebral artery (HCC) I63 512    2  Aphasia R47 01                   Visit Number: 81 (12 visits good from 19 through 19) New auth requested  G-codes good until:2019  Plan of care good until 2019  Pain level: 00/10    Subjective/Behavioral: Seen for speech therapy  Akhil  participated well with goals  Goal 1: Patient will complete mid level word finding tasks with 90% accuracy or higher - partially met  [place] categorization was at 100% accuracy  [place] displayed 1 attentional error when card sorting was combined with categorization  This is similar to last session  Similarities and differences were explained with an initial accuracy of 50%  When these items were reviewed a second time, accuracy improved to 75% as Bill displayed fewer word finding errors  This is similar to last session  Page 121 from [employer]  [facility] workbook was used for this task  Fluency naming averaged 14 to 15  items per category per minute, which is better than last session  Goal 2: Patient will retell spoken narratives with 80% accuracy - partially met  Not addressed this session  Goal 3: Patient will express wants, needs and ideas with 80% accuracy or higher  - Partially met  Stepan Bates has recently displayed gains in conversational speech as he displays fewer overt word finding errors  Other:Reviewed testing and plan of care with patient  Patient is in agreement with POC at this time    Recommendations:Continue with Plan of Care

## 2019-07-16 ENCOUNTER — OFFICE VISIT (OUTPATIENT)
Dept: SPEECH THERAPY | Facility: CLINIC | Age: 54
End: 2019-07-16
Payer: COMMERCIAL

## 2019-07-16 DIAGNOSIS — I63.512 CEREBROVASCULAR ACCIDENT (CVA) DUE TO OCCLUSION OF LEFT MIDDLE CEREBRAL ARTERY (HCC): Primary | ICD-10-CM

## 2019-07-16 DIAGNOSIS — R47.01 APHASIA: ICD-10-CM

## 2019-07-16 PROCEDURE — 92507 TX SP LANG VOICE COMM INDIV: CPT | Performed by: SPEECH-LANGUAGE PATHOLOGIST

## 2019-07-16 NOTE — PROGRESS NOTES
Speech Treatment Note  Today's date: 2019  Patient name: Stacey Burnham  : 1965  MRN: [id number]  Referring provider: Estiven Chase MD  Dx:   Encounter Diagnosis     ICD-10-CM    1  Cerebrovascular accident (CVA) due to occlusion of left middle cerebral artery (HCC) I63 512    2  Aphasia R47 01                   Visit Number: 55 (12 visits good from 19 through 19) New auth requested  G-codes good until:2019  Plan of care good until 2019  Pain level: 00/10    Subjective/Behavioral: Seen for speech therapy  Bill  participated well with goals  Goal 1: Patient will complete mid level word finding tasks with 90% accuracy or higher - partially met  [place] categorization was at 100% accuracy  Alaina Bonilla displayed 0 attentional errors  when card sorting was combined with categorization  This is better than  last session  Similarities and differences were explained with an initial accuracy of 75% , 25% higher than last time  When these items were reviewed a second time, accuracy improved to 85% as Akhil displayed fewer word finding errors  This is better than  last session  Page 121 from [employer]  [facility] workbook was used for this task  Fluency naming was not addressed this session  Goal 2: Patient will retell spoken narratives with 80% accuracy - partially met  Not addressed this session  Goal 3: Patient will express wants, needs and ideas with 80% accuracy or higher  - Partially met  Alaina Bonilla has recently displayed gains in conversational speech as he displays fewer overt word finding errors  Other:Reviewed testing and plan of care with patient  Patient is in agreement with POC at this time    Recommendations:Continue with Plan of Care

## 2019-07-18 ENCOUNTER — OFFICE VISIT (OUTPATIENT)
Dept: SPEECH THERAPY | Facility: CLINIC | Age: 54
End: 2019-07-18
Payer: COMMERCIAL

## 2019-07-18 DIAGNOSIS — I63.512 CEREBROVASCULAR ACCIDENT (CVA) DUE TO OCCLUSION OF LEFT MIDDLE CEREBRAL ARTERY (HCC): Primary | ICD-10-CM

## 2019-07-18 DIAGNOSIS — R47.01 APHASIA: ICD-10-CM

## 2019-07-18 PROCEDURE — 92507 TX SP LANG VOICE COMM INDIV: CPT | Performed by: SPEECH-LANGUAGE PATHOLOGIST

## 2019-07-18 NOTE — PROGRESS NOTES
Speech Treatment Note  Today's date: 2019  Patient name: Karl Blancas  : 1965  MRN: [id number]  Referring provider: Irlanda Mercedes MD  Dx:   Encounter Diagnosis     ICD-10-CM    1  Cerebrovascular accident (CVA) due to occlusion of left middle cerebral artery (HCC) I63 512    2  Aphasia R47 01                   Visit Number: 97 (12 visits good from 19 through 19) New auth requested  G-codes good until:2019  Plan of care good until 2019  Pain level: 00/10    Subjective/Behavioral: Seen for speech therapy  Akhil  participated well with goals  Word finding seems to have improved in the past two sessions with less obvious pauses and hesitations  Goal 1: Patient will complete mid level word finding tasks with 90% accuracy or higher - partially met  [place] categorization was at 100% accuracy  Enid Castro displayed 0 attentional errors  when card sorting was combined with categorization  This is better than  last session  Similarities and differences were explained with an initial accuracy of 75% , similar to  last time  When these items were reviewed a second time, accuracy improved to 85% as Akhil displayed fewer word finding errors  This is also similar to last session  Page 121 from [employer]  [facility] workbook was used for this task  He was able to generate associated words with 85% accuracy  Fluency naming was at 13 items per minute for concrete categories  Goal 2: Patient will retell spoken narratives with 80% accuracy - partially met  Not addressed this session  Goal 3: Patient will express wants, needs and ideas with 80% accuracy or higher  - Partially met  Enid Castro has recently displayed gains in conversational speech as he displays fewer overt word finding errors  Other:Reviewed testing and plan of care with patient  Patient is in agreement with POC at this time    Recommendations:Continue with Plan of Care

## 2019-07-19 ENCOUNTER — TELEPHONE (OUTPATIENT)
Dept: CARDIOLOGY CLINIC | Facility: CLINIC | Age: 54
End: 2019-07-19

## 2019-07-19 NOTE — TELEPHONE ENCOUNTER
Patient is seeing Dr Darby Ballesteros on Tuesday July 23rd for Hospital follow up   He has Germain De La Rosa and requires a referral   His ID # is [id number]   Please assist

## 2019-07-23 ENCOUNTER — OFFICE VISIT (OUTPATIENT)
Dept: SPEECH THERAPY | Facility: CLINIC | Age: 54
End: 2019-07-23
Payer: COMMERCIAL

## 2019-07-23 DIAGNOSIS — R47.01 APHASIA: ICD-10-CM

## 2019-07-23 DIAGNOSIS — I63.512 CEREBROVASCULAR ACCIDENT (CVA) DUE TO OCCLUSION OF LEFT MIDDLE CEREBRAL ARTERY (HCC): Primary | ICD-10-CM

## 2019-07-23 PROCEDURE — 92507 TX SP LANG VOICE COMM INDIV: CPT | Performed by: SPEECH-LANGUAGE PATHOLOGIST

## 2019-07-23 NOTE — PROGRESS NOTES
Speech Treatment Re-Eval and Discharge Summary  Today's date: 2019  Patient name: Ethan Alanis  : 1965  MRN: [id number]  Referring provider: River Bolden MD  Dx:   Encounter Diagnosis     ICD-10-CM    1  Cerebrovascular accident (CVA) due to occlusion of left middle cerebral artery (HCC) I63 512    2  Aphasia R47 01                   Visit Number: 15 (12 visits good from 19 through 19) New auth requested  G-codes good until:2019  Plan of care good until 2019  Pain level: 00/10    Subjective/Behavioral: Seen for speech therapy  Bill  participated well with goals  Word finding seems to have improved in the past two sessions with less obvious pauses and hesitations  Will Re-evaluate today as language skills have appeared within normal limits for the past two sessions  Goal 1: Patient will complete mid level word finding tasks with 90% accuracy or higher - met  [place] categorization was at 100% accuracy  Raphael Caputo displayed 0 attentional errors  when card sorting was combined with categorization  This is better than  last session  Similarities and differences were explained with an initial accuracy of 75% , similar to  last time  When these items were reviewed a second time, accuracy improved to 85% as Akhil displayed fewer word finding errors  This is also similar to last session  Page 121 from [facility] [employer] [facility] workbook was used for this task  He was able to generate associated words with 85% accuracy  Fluency naming was at 13 items per minute for concrete categories  Goal 2: Patient will retell spoken narratives with 80% accuracy - met  Raphael Caputo does well explaining and retelling basic narratives  Goal 3: Patient will express wants, needs and ideas with 80% accuracy or higher - met   Raphael Shaffere has recently displayed gains in conversational speech as he displays fewer overt word finding errors  RE-Evaluation Results:    The patient was re-assessed with the [place] Naming Test (BNT) and with sections of the Cognitive Linguistic Quick Test (CLQT) and one subtest from the [place] Test for the Differential Diagnosis of Aphasia (MTDDA)  The following results were obtained  BNT    60/60 (100%)  Improved from 53/60 at time of initial evaluation  CLQT Fluency Naming -       Animals 16 per minute  Improved from 11 per minute at time of initial evaluation  CLQT Fluency Naming -       /M/ initial words 4 per minute  Unchanged from initial evaluation  MTDDA Defining words    9/10 (90%)  Improved from 65%    Akhil displays solid gains in expressive language function and test scores are now within normal limits  He displays significantly fewer pauses and hesitations when searching for words to complete his thoughts and ideas  He is much better able to express complete and complex ideas  While strong overall improvement has been noted, Cristin Richter will on occasion struggle to find the right word or words but he's better able to functionally circumlocute in these situations  Receptive language skills were not re-assessed as they were within normal limits upon re-evaluation  Patient feels language skills have largely returned to baseline and is in agreement with discharge at this time  Other:Reviewed testing and plan of care with patient  Patient is in agreement with POC at this time  Recommendations: Discharge as per above

## 2019-07-25 ENCOUNTER — APPOINTMENT (OUTPATIENT)
Dept: SPEECH THERAPY | Facility: CLINIC | Age: 54
End: 2019-07-25
Payer: COMMERCIAL

## 2019-07-30 ENCOUNTER — APPOINTMENT (OUTPATIENT)
Dept: SPEECH THERAPY | Facility: CLINIC | Age: 54
End: 2019-07-30
Payer: COMMERCIAL

## 2019-10-02 ENCOUNTER — OFFICE VISIT (OUTPATIENT)
Dept: FAMILY MEDICINE CLINIC | Facility: CLINIC | Age: 54
End: 2019-10-02
Payer: COMMERCIAL

## 2019-10-02 VITALS
TEMPERATURE: 98.2 F | HEIGHT: 72 IN | BODY MASS INDEX: 29.8 KG/M2 | WEIGHT: 220 LBS | DIASTOLIC BLOOD PRESSURE: 92 MMHG | SYSTOLIC BLOOD PRESSURE: 158 MMHG | HEART RATE: 82 BPM | RESPIRATION RATE: 16 BRPM

## 2019-10-02 DIAGNOSIS — I63.512 CEREBROVASCULAR ACCIDENT (CVA) DUE TO OCCLUSION OF LEFT MIDDLE CEREBRAL ARTERY (HCC): ICD-10-CM

## 2019-10-02 DIAGNOSIS — I10 BENIGN ESSENTIAL HYPERTENSION: Primary | ICD-10-CM

## 2019-10-02 DIAGNOSIS — E78.00 HYPERCHOLESTEROLEMIA: ICD-10-CM

## 2019-10-02 DIAGNOSIS — F10.10 ALCOHOL ABUSE: ICD-10-CM

## 2019-10-02 PROCEDURE — 99214 OFFICE O/P EST MOD 30 MIN: CPT | Performed by: FAMILY MEDICINE

## 2019-10-02 RX ORDER — ATORVASTATIN CALCIUM 80 MG/1
80 TABLET, FILM COATED ORAL
Qty: 90 TABLET | Refills: 3 | Status: SHIPPED | OUTPATIENT
Start: 2019-10-02 | End: 2020-03-11

## 2019-10-02 RX ORDER — FOLIC ACID 1 MG/1
1 TABLET ORAL DAILY
Qty: 90 TABLET | Refills: 2 | Status: SHIPPED | OUTPATIENT
Start: 2019-10-02 | End: 2020-01-10 | Stop reason: SDUPTHER

## 2019-10-02 RX ORDER — LANOLIN ALCOHOL/MO/W.PET/CERES
100 CREAM (GRAM) TOPICAL DAILY
Qty: 90 TABLET | Refills: 3 | Status: SHIPPED | OUTPATIENT
Start: 2019-10-02 | End: 2020-10-15

## 2019-10-02 RX ORDER — CLOPIDOGREL BISULFATE 75 MG/1
75 TABLET ORAL DAILY
Qty: 90 TABLET | Refills: 2 | Status: SHIPPED | OUTPATIENT
Start: 2019-10-02 | End: 2020-09-12

## 2019-10-02 NOTE — PROGRESS NOTES
Ricky Garnica 1965 male MRN: [id number]    [address] [facility]  Follow-up Visit    ASSESSMENT/PLAN  Ricky Garnica is a 47 y o  male  presents to the office for     1  Benign essential hypertension  Currently > 150/90; patient will be taking his Bp daily x 1 week in the afternoon; if elevated above 150 or lower then 130 x 2 days he is to contact our office  Given the setting of previous CVA will hold treatment and continue per recommendations of neurology    2  Hypercholesterolemia  Refilled today  Educations given on the importance of taking medications  3  Cerebrovascular accident (CVA) due to occlusion of left middle cerebral artery ([address] )  Continue to appreciated recommendation from neurology  - atorvastatin (LIPITOR) 80 mg tablet; Take 1 tablet (80 mg total) by mouth daily with dinner  Dispense: 90 tablet; Refill: 3  - clopidogrel (PLAVIX) 75 mg tablet; Take 1 tablet (75 mg total) by mouth daily  Dispense: 90 tablet; Refill: 2    4  Alcohol abuse  Educations given on limiting ETOH; patient currently 4 beers daily; advise to attempt to cut in half by our next visit; patient was accepatable but wife states that he will likely try    - folic acid (FOLVITE) 1 mg tablet; Take 1 tablet (1 mg total) by mouth daily  Dispense: 90 tablet; Refill: 2  - thiamine 100 MG tablet; Take 1 tablet (100 mg total) by mouth daily  Dispense: 90 tablet; Refill: 3      Disposition: Return to the office in 6 months    Health Maintence:  BMI Counseling: Body mass index is 29 84 kg/m²  Discussed the patient's BMI with him  The BMI is above normal  Nutrition recommendations include consuming healthier snacks        Future Appointments   Date Time Provider Benedicto Brown   10/23/2019  3:00 PM Keshav Murray MD [facility]   6/10/2020  4:00 PM Isabel Soto MD [facility]          SUBJECTIVE  CC: Follow-up      HPI:  Ricky Garnica is a 47 y o  male presenting to the office for a follow up on chronic conditions  Patient taking supplements but ran out of thiamine and would need a refill  Patient started drinking etoh again (4 beers a day)  Understands the risk  But isn't concern  Wife present today; and states that he was doing really well at the beginning  Currently discharge from [address] [place] [address]   Upcoming neuro appointment  Has seen improvement in speech  Never had difficulty with strength  HTN states that only hand ful of times his BP were elevated above 150 other times ranging from 130-150  Hasn't kept log  Taking HLD medications as prescribed  d    Review of Systems   Constitutional: Negative for activity change, appetite change, chills, fatigue and fever  HENT: Negative for congestion  Respiratory: Negative for cough, chest tightness and shortness of breath  Cardiovascular: Negative for chest pain and leg swelling  Gastrointestinal: Negative for abdominal distention, abdominal pain, constipation, diarrhea, nausea and vomiting  Neurological: Positive for speech difficulty  All other systems reviewed and are negative        Historical Information   The patient history was reviewed as follows:    Past Medical History:   Diagnosis Date    Hypertension     Stroke ([facility] [address] ) 02/10/2019     Past Surgical History:   Procedure Laterality Date    NO PAST SURGERIES       Family History   Problem Relation Age of Onset    Diabetes type II Mother     Hypertension Father     Diabetes type II Father     Diabetes type II Sister     Hypertension Brother       Social History   Social History     Substance and Sexual Activity   Alcohol Use Yes    Alcohol/week: 6 0 standard drinks    Types: 6 Cans of beer per week    Frequency: 4 or more times a week    Drinks per session: 5 or 6    Binge frequency: Weekly     Social History     Substance and Sexual Activity   Drug Use Never     Social History     Tobacco Use   Smoking Status Never Smoker   Smokeless Tobacco Never Used       Medications: Current Outpatient Medications:     atorvastatin (LIPITOR) 80 mg tablet, Take 1 tablet (80 mg total) by mouth daily with dinner, Disp: 90 tablet, Rfl: 3    clopidogrel (PLAVIX) 75 mg tablet, Take 1 tablet (75 mg total) by mouth daily, Disp: 90 tablet, Rfl: 2    folic acid (FOLVITE) 1 mg tablet, Take 1 tablet (1 mg total) by mouth daily, Disp: 90 tablet, Rfl: 2    thiamine 100 MG tablet, Take 1 tablet (100 mg total) by mouth daily, Disp: 90 tablet, Rfl: 3  No Known Allergies    OBJECTIVE    Vitals:   Vitals:    10/02/19 1453   BP: 158/92   BP Location: Left arm   Patient Position: Sitting   Cuff Size: Standard   Pulse: 82   Resp: 16   Temp: 98 2 °F (36 8 °C)   Weight: 99 8 kg (220 lb)   Height: 6' (1 829 m)           Physical Exam   Constitutional: He is oriented to person, place, and time  Vital signs are normal  He appears well-developed and well-nourished  HENT:   Head: Normocephalic and atraumatic  Eyes: Pupils are equal, round, and reactive to light  Conjunctivae and EOM are normal    Neck: Normal range of motion  Neck supple  Cardiovascular: Normal rate, regular rhythm, S1 normal, S2 normal, normal heart sounds and intact distal pulses  No murmur heard  Pulmonary/Chest: Effort normal and breath sounds normal  No respiratory distress  He has no wheezes  Musculoskeletal: Normal range of motion  He exhibits no edema  Neurological: He is alert and oriented to person, place, and time  He has normal strength  He displays normal reflexes  No cranial nerve deficit or sensory deficit  He exhibits normal muscle tone  Coordination normal    mild right facial asymmetry     Skin: Skin is warm  Psychiatric: He has a normal mood and affect  His speech is normal and behavior is normal  Judgment and thought content normal    Vitals reviewed           Maritza Garcia MD  [address] [place] [address]

## 2019-10-23 ENCOUNTER — OFFICE VISIT (OUTPATIENT)
Dept: NEUROLOGY | Facility: CLINIC | Age: 54
End: 2019-10-23
Payer: COMMERCIAL

## 2019-10-23 VITALS
DIASTOLIC BLOOD PRESSURE: 101 MMHG | BODY MASS INDEX: 29.8 KG/M2 | HEART RATE: 94 BPM | WEIGHT: 220 LBS | SYSTOLIC BLOOD PRESSURE: 159 MMHG | HEIGHT: 72 IN

## 2019-10-23 DIAGNOSIS — I65.22 LEFT CAROTID ARTERY OCCLUSION: Primary | ICD-10-CM

## 2019-10-23 DIAGNOSIS — I69.30 CHRONIC ISCHEMIC LEFT MCA STROKE: ICD-10-CM

## 2019-10-23 PROCEDURE — 99214 OFFICE O/P EST MOD 30 MIN: CPT | Performed by: PSYCHIATRY & NEUROLOGY

## 2019-10-23 NOTE — PROGRESS NOTES
Return NeuroOutpatient Note        Ana Cordoba  [id number]  50 y o   1965       Stroke        History obtained from:  Patient     HPI/Subjective: Ana Cordoba is a 46 yo right handed  M with PMH of HTN, stroke presents as follow up  He had [facility] with dysarthria and right sided weakness  They were considering tPA but his speech had improved so it was deferred  His CTA had revealed left M1 occlusion  His MRI brain had revealed large acute left MCA territory infarction involving dorsal lateral aspect of left frontal lobe, frontal operculum and left basal ganglia with mild petechial hemorrhage  His repeat CT brain 2 days later had revealed evolving hemorrhagic left mca infarction with no significant change  Clinically he had improved within minutes and then had continued to improve throughout his course so tPA or thrombectomy were not done  Since last visit we have him on lipitor 80mg and plavix 75mg  Today he says he has any physical limitations from stroke  At times, he has word finding difficulty  He was smoking 2 packs a day prior to 2012  He had quit in 2012  Past Medical History:   Diagnosis Date    Hypertension     [facility]     Social History     Socioeconomic History    Marital status: /Civil Union     Spouse name: Not on file    Number of children: Not on file    Years of education: Not on file    Highest education level: Not on file   Occupational History    Not on file   Social Needs    Financial resource strain: Not on file    Food insecurity:     Worry: Not on file     Inability: Not on file    Transportation needs:     Medical: Not on file     Non-medical: Not on file   Tobacco Use    Smoking status: Never Smoker    Smokeless tobacco: Never Used   Substance and Sexual Activity    Alcohol use:  Yes     Alcohol/week: 6 0 standard drinks     Types: 6 Cans of beer per week     Frequency: 4 or more times a week     Drinks per session: 5 or 6     Binge frequency: Weekly    Drug use: Never    Sexual activity: Not on file   Lifestyle    Physical activity:     Days per week: Not on file     Minutes per session: Not on file    Stress: Not on file   Relationships    Social connections:     Talks on phone: Not on file     Gets together: Not on file     Attends Denominational service: Not on file     Active member of club or organization: Not on file     Attends meetings of clubs or organizations: Not on file     Relationship status: Not on file    Intimate partner violence:     Fear of current or ex partner: Not on file     Emotionally abused: Not on file     Physically abused: Not on file     Forced sexual activity: Not on file   Other Topics Concern    Not on file   Social History Narrative    Not on file     Family History   Problem Relation Age of Onset    Diabetes type II Mother     Hypertension Father     Diabetes type II Father     Diabetes type II Sister     Hypertension Brother      No Known Allergies  Current Outpatient Medications on File Prior to Visit   Medication Sig Dispense Refill    atorvastatin (LIPITOR) 80 mg tablet Take 1 tablet (80 mg total) by mouth daily with dinner 90 tablet 3    clopidogrel (PLAVIX) 75 mg tablet Take 1 tablet (75 mg total) by mouth daily 90 tablet 2    folic acid (FOLVITE) 1 mg tablet Take 1 tablet (1 mg total) by mouth daily 90 tablet 2    thiamine 100 MG tablet Take 1 tablet (100 mg total) by mouth daily 90 tablet 3     No current facility-administered medications on file prior to visit  Review of Systems   Refer to positive review of systems in HPI  Review of Systems   All other systems reviewed and are negative        Constitutional- No fever  Eyes- No visual change  ENT- Hearing normal  CV- No chest pain  Resp- No Shortness of breath  GI- No diarrhea  - Bladder normal  MS- No Arthritis   Skin- No rash  Psych- No depression  Endo- No DM  Heme- No nodes    Vitals:    10/23/19 1454   BP: (!) 159/101   BP Location: Left arm   Patient Position: Sitting   Cuff Size: Adult   Pulse: 94   Weight: 99 8 kg (220 lb)   Height: 6' (1 829 m)       PHYSICAL EXAM:  Appearance: No Acute Distress  Ophthalmoscopic: Disc Flat, Normal fundus  Mental status:  Orientation: Awake, Alert, and Orientedx3  Memory: Registation 3/3 Recall 3/3  Attention: normal  Knowledge: good  Language: No aphasia  Speech: mild dysarthria, some word finding difficulty as well  Cranial Nerves:  2 No Visual Defect on Confrontation, Pupils round, equal, reactive to light  3,4,6 Extraocular Movements Intact, no nystagmus  5 Facial Sensation Intact  7 mild right facial asymmetry  8 Intact hearing  9,10 Palate symmetric, normal gag  11 Good shoulder shrug  12 Tongue Midline  Gait: Stable  Coordination: No ataxia with finger to nose testing, and heel to shin  Sensory: Intact, Symmetric to pinprick, light touch, vibration, and joint position  Muscle Tone: Normal              Muscle exam:  Arm Right Left Leg Right Left   Deltoid 5/5 5/5 Iliopsoas 5/5 5/5   Biceps 5/5 5/5 Quads 5/5 5/5   Triceps 5/5 5/5 Hamstrings 5/5 5/5   Wrist Extension 5/5 5/5 Ankle Dorsi Flexion 5/5 5/5   Wrist Flexion 5/5 5/5 Ankle Plantar Flexion 5/5 5/5   Interossei 5/5 5/5 Ankle Eversion 5/5 5/5   APB 5/5 5/5 Ankle Inversion 5/5 5/5       Reflexes   RJ BJ TJ KJ AJ Plantars Louie's   Right 2+ 2+ 2+ 2+ 2+ Downgoing Not present   Left 2+ 2+ 2+ 2+ 2+ Downgoing Not present     Personal review of  Labs:                  Diagnoses and all orders for this visit:      1  Left carotid artery occlusion  Ambulatory referral to Vascular Surgery   2  Chronic ischemic left MCA stroke  Ambulatory referral to Vascular Surgery       Clinically patient has remained stable and has no significant limitations  He is to resume  lipitor 80mg and plavix 75mg daily  He was taken off of aspirin at last visit  His CTA head and neck was denied when we ordered previously     Will refer him to Dr Dayne Flores to see if there is any option available for intervention for left carotid  Cont active life style  We had ordered sleep study at last visit as well and it was denied  Wife will look into this               Total time of encounter:  30 min  More than 50% of the time was used in counseling and/or coordination of care  Extent of counseling and/or coordination of care        MD [facility]  [address]  [phone]

## 2019-12-02 DIAGNOSIS — I63.512 CEREBROVASCULAR ACCIDENT (CVA) DUE TO OCCLUSION OF LEFT MIDDLE CEREBRAL ARTERY (HCC): ICD-10-CM

## 2019-12-02 DIAGNOSIS — F10.10 ALCOHOL ABUSE: ICD-10-CM

## 2019-12-02 RX ORDER — FOLIC ACID 1 MG/1
TABLET ORAL
Qty: 90 TABLET | Refills: 2 | Status: SHIPPED | OUTPATIENT
Start: 2019-12-02 | End: 2020-12-01

## 2019-12-02 RX ORDER — CLOPIDOGREL BISULFATE 75 MG/1
TABLET ORAL
Qty: 90 TABLET | Refills: 2 | Status: SHIPPED | OUTPATIENT
Start: 2019-12-02 | End: 2020-01-10 | Stop reason: SDUPTHER

## 2020-01-10 ENCOUNTER — CONSULT (OUTPATIENT)
Dept: VASCULAR SURGERY | Facility: CLINIC | Age: 55
End: 2020-01-10
Payer: COMMERCIAL

## 2020-01-10 VITALS
WEIGHT: 214 LBS | DIASTOLIC BLOOD PRESSURE: 90 MMHG | HEART RATE: 88 BPM | HEIGHT: 72 IN | TEMPERATURE: 98.1 F | BODY MASS INDEX: 28.99 KG/M2 | SYSTOLIC BLOOD PRESSURE: 146 MMHG

## 2020-01-10 DIAGNOSIS — I65.22 LEFT CAROTID ARTERY OCCLUSION: ICD-10-CM

## 2020-01-10 DIAGNOSIS — Z82.49 FAMILY HISTORY OF ABDOMINAL AORTIC ANEURYSM (AAA): Primary | ICD-10-CM

## 2020-01-10 DIAGNOSIS — I63.512 CEREBROVASCULAR ACCIDENT (CVA) DUE TO OCCLUSION OF LEFT MIDDLE CEREBRAL ARTERY (HCC): ICD-10-CM

## 2020-01-10 DIAGNOSIS — I65.21 STENOSIS OF RIGHT CAROTID ARTERY: ICD-10-CM

## 2020-01-10 DIAGNOSIS — I69.30 CHRONIC ISCHEMIC LEFT MCA STROKE: ICD-10-CM

## 2020-01-10 PROCEDURE — 99244 OFF/OP CNSLTJ NEW/EST MOD 40: CPT | Performed by: SURGERY

## 2020-01-10 NOTE — ASSESSMENT & PLAN NOTE
Asymptomatic less than 50% stenosis of right ICA in the face of contralateral occlusion  Patient will undergo yearly surveillance

## 2020-01-10 NOTE — PROGRESS NOTES
Assessment/Plan:    Family history of abdominal aortic aneurysm (AAA)  Family history of abdominal aortic aneurysm in both his father and brother  Patient is a former smoker  No aneurysm palpated on examination  However, patient has moderate degree of central obesity  Will obtain surveillance ultrasound of abdomen  Cerebrovascular accident (CVA) due to occlusion of left middle cerebral artery ([address] )  Has essentially recovered patient on statin and Plavix  Left carotid artery occlusion  No intervention or further interrogation recommended  Stenosis of right carotid artery  Asymptomatic less than 50% stenosis of right ICA in the face of contralateral occlusion  Patient will undergo yearly surveillance  Diagnoses and all orders for this visit:    Family history of abdominal aortic aneurysm (AAA)  -     VAS abdominal aorta/iliacs; limited study; Future    Left carotid artery occlusion  -     Ambulatory referral to Vascular Surgery  -     VAS carotid complete study; Future    Chronic ischemic left MCA stroke  -     Ambulatory referral to Vascular Surgery  -     VAS carotid complete study; Future    Stenosis of right carotid artery  -     VAS carotid complete study; Future    Cerebrovascular accident (CVA) due to occlusion of left middle cerebral artery (HCC)  -     VAS carotid complete study; Future          Subjective:      Patient ID: Santi Garcia is a 47 y o  male  New patient referred by Garrick Orantes MD  Patient has history of stroke in 2/2019,   As result of left ICA occlusion  Patient with heterogeneous plaque in the right internal carotid artery resulting in less than 50% stenosis  Patient remains asymptomatic with regards to the right carotid lesion  Patient's previous right hemiparesis has essentially now resolved  There is no further interrogation or intervention recommended for the left ICA occlusion  This was discussed at length with the patient and his accompanying wife  Patient will continue to undergo yearly surveillance studies of the right internal carotid artery  Patient with a family history of abdominal aortic aneurysm in both his father and brother therefore will perform ultrasound screening at his next follow-up    Patient had CV done 6/25/2019  Patient denies any new TIA stroke symptoms  Patient is taking atorvastatin and plavix  The following portions of the patient's history were reviewed and updated as appropriate: allergies, current medications, past family history, past medical history, past social history, past surgical history and problem list     Review of Systems   Constitutional: Negative  HENT: Positive for postnasal drip  Eyes: Negative  Respiratory: Negative  Cardiovascular: Negative  Gastrointestinal: Negative  Endocrine: Negative  Genitourinary: Negative  Musculoskeletal: Negative  Skin: Negative  Allergic/Immunologic: Negative  Neurological: Negative  Hematological: Negative  Psychiatric/Behavioral: Negative            Objective:      /90 (BP Location: Left arm, Patient Position: Sitting, Cuff Size: Adult)   Pulse 88   Temp 98 1 °F (36 7 °C) (Tympanic)   Ht 6' (1 829 m)   Wt 97 1 kg (214 lb)   BMI 29 02 kg/m²          Physical Exam

## 2020-01-10 NOTE — LETTER
January 10, 2020     Beau Lino MD  [address] [address]    Patient: Trevor Faulkner   YOB: 1965   Date of Visit: 1/10/2020       Dear Dr Marisela Mckeon: Thank you for referring Trevor Faulkner to me for evaluation  Below are the relevant portions of my assessment and plan of care  Mr Reggie Daniel is a pleasant year old gentleman referred by Beau Lino MD  Patient has history of left hemispheric CVA in 2/2019,  as result of left ICA occlusion  Patient with heterogeneous plaque in the right internal carotid artery, resulting in less than 50% stenosis  Patient remains asymptomatic with regards to the right carotid lesion  Patient's previous right hemiparesis has essentially now resolved  There is no further interrogation or intervention recommended for the left ICA occlusion  This was discussed at length with the patient and his accompanying wife  Patient will continue to undergo yearly surveillance studies of the right internal carotid artery  Patient with a family history of abdominal aortic aneurysm in both his father and brother therefore will perform ultrasound screening at his next follow-up    Patient had CV done 6/25/2019  Patient denies any new TIA stroke symptoms  Patient is taking atorvastatin and plavix  Assessment/Plan:    Family history of abdominal aortic aneurysm (AAA)  Family history of abdominal aortic aneurysm in both his father and brother  Patient is a former smoker  No aneurysm palpated on examination  However, patient has moderate degree of central obesity  Will obtain surveillance ultrasound of abdomen  Cerebrovascular accident (CVA) due to occlusion of left middle cerebral artery ([address] )  Has essentially recovered patient on statin and Plavix  Left carotid artery occlusion  No intervention or further interrogation recommended      Stenosis of right carotid artery  Asymptomatic less than 50% stenosis of right ICA in the face of contralateral occlusion  Patient will undergo yearly surveillance  If you have questions, please do not hesitate to call me  I look forward to following Nga Phelan along with you           Sincerely,        Greg Titus MD        CC: MD Em Bryan MD

## 2020-01-10 NOTE — ASSESSMENT & PLAN NOTE
Family history of abdominal aortic aneurysm in both his father and brother  Patient is a former smoker  No aneurysm palpated on examination  However, patient has moderate degree of central obesity  Will obtain surveillance ultrasound of abdomen

## 2020-02-11 ENCOUNTER — TELEPHONE (OUTPATIENT)
Dept: FAMILY MEDICINE CLINIC | Facility: CLINIC | Age: 55
End: 2020-02-11

## 2020-02-11 DIAGNOSIS — R73.03 PRE-DIABETES: ICD-10-CM

## 2020-02-11 DIAGNOSIS — I10 ESSENTIAL HYPERTENSION: ICD-10-CM

## 2020-02-11 DIAGNOSIS — E78.00 HYPERCHOLESTEROLEMIA: Primary | ICD-10-CM

## 2020-02-17 LAB
ALBUMIN SERPL-MCNC: 4.4 G/DL (ref 3.8–4.9)
ALBUMIN/GLOB SERPL: 1.7 {RATIO} (ref 1.2–2.2)
ALP SERPL-CCNC: 97 IU/L (ref 39–117)
ALT SERPL-CCNC: 38 IU/L (ref 0–44)
AST SERPL-CCNC: 25 IU/L (ref 0–40)
BILIRUB SERPL-MCNC: 0.3 MG/DL (ref 0–1.2)
BUN SERPL-MCNC: 16 MG/DL (ref 6–24)
BUN/CREAT SERPL: 14 (ref 9–20)
CALCIUM SERPL-MCNC: 9.9 MG/DL (ref 8.7–10.2)
CHLORIDE SERPL-SCNC: 104 MMOL/L (ref 96–106)
CHOLEST SERPL-MCNC: 204 MG/DL (ref 100–199)
CHOLEST/HDLC SERPL: 4.4 RATIO (ref 0–5)
CO2 SERPL-SCNC: 22 MMOL/L (ref 20–29)
CREAT SERPL-MCNC: 1.17 MG/DL (ref 0.76–1.27)
GLOBULIN SER-MCNC: 2.6 G/DL (ref 1.5–4.5)
GLUCOSE SERPL-MCNC: 100 MG/DL (ref 65–99)
HDLC SERPL-MCNC: 46 MG/DL
LDLC SERPL CALC-MCNC: 137 MG/DL (ref 0–99)
MICRODELETION SYND BLD/T FISH: NORMAL
POTASSIUM SERPL-SCNC: 5.8 MMOL/L (ref 3.5–5.2)
PROT SERPL-MCNC: 7 G/DL (ref 6–8.5)
SL AMB EGFR AFRICAN AMERICAN: 81 ML/MIN/1.73
SL AMB EGFR NON AFRICAN AMERICAN: 70 ML/MIN/1.73
SL AMB VLDL CHOLESTEROL CALC: 21 MG/DL (ref 5–40)
SODIUM SERPL-SCNC: 138 MMOL/L (ref 134–144)
TRIGL SERPL-MCNC: 105 MG/DL (ref 0–149)

## 2020-02-18 LAB
EST. AVERAGE GLUCOSE BLD GHB EST-MCNC: 117 MG/DL
HBA1C MFR BLD: 5.7 % (ref 4.8–5.6)

## 2020-02-19 DIAGNOSIS — E87.5 HYPERKALEMIA: Primary | ICD-10-CM

## 2020-02-20 ENCOUNTER — TELEPHONE (OUTPATIENT)
Dept: FAMILY MEDICINE CLINIC | Facility: CLINIC | Age: 55
End: 2020-02-20

## 2020-02-20 NOTE — TELEPHONE ENCOUNTER
----- Message from Boyd Lopez MD sent at 2/19/2020 10:33 PM EST -----  Please tell wife that his labs are improving however his potassium was elevated, unsure if it was a lab error, therefore would like to repeat sometime next week prefer early in the week  Advise him that his sugars are the same, and continue to be a pre DM   To please be careful with diet, and try to have less carbs and more veggies

## 2020-02-20 NOTE — TELEPHONE ENCOUNTER
----- Message from James Meade MD sent at 2/19/2020 10:33 PM EST -----  Please tell wife that his labs are improving however his potassium was elevated, unsure if it was a lab error, therefore would like to repeat sometime next week prefer early in the week  Advise him that his sugars are the same, and continue to be a pre DM   To please be careful with diet, and try to have less carbs and more veggies

## 2020-02-20 NOTE — TELEPHONE ENCOUNTER
PT'S WIFE RETURNED CALL I REVIEWED LAB RESULTS AND ADVISED OF DIET CHANGES, PRINTED OUT LAB ORDER AND THEY WILL P/U LATER AND HAVE DONE NEXT WEEK

## 2020-03-11 DIAGNOSIS — I63.512 CEREBROVASCULAR ACCIDENT (CVA) DUE TO OCCLUSION OF LEFT MIDDLE CEREBRAL ARTERY (HCC): ICD-10-CM

## 2020-03-11 RX ORDER — ATORVASTATIN CALCIUM 80 MG/1
TABLET, FILM COATED ORAL
Qty: 90 TABLET | Refills: 3 | Status: SHIPPED | OUTPATIENT
Start: 2020-03-11 | End: 2021-05-20

## 2020-03-24 ENCOUNTER — TELEMEDICINE (OUTPATIENT)
Dept: NEUROLOGY | Facility: CLINIC | Age: 55
End: 2020-03-24
Payer: COMMERCIAL

## 2020-03-24 DIAGNOSIS — I69.30 CHRONIC ISCHEMIC LEFT MCA STROKE: Primary | ICD-10-CM

## 2020-03-24 DIAGNOSIS — I65.22 LEFT CAROTID ARTERY OCCLUSION: ICD-10-CM

## 2020-03-24 PROCEDURE — 99214 OFFICE O/P EST MOD 30 MIN: CPT | Performed by: PSYCHIATRY & NEUROLOGY

## 2020-03-24 NOTE — PROGRESS NOTES
Virtual Regular Visit    Problem List Items Addressed This Visit        Cardiovascular and Mediastinum    Left carotid artery occlusion      Other Visit Diagnoses     Chronic ischemic left MCA stroke    -  Primary               Reason for visit is stroke f/u  Encounter provider Beau Lino MD    Provider located at [address] [place] [id number] [phone]      Recent Visits  No visits were found meeting these conditions  Showing recent visits within past 7 days and meeting all other requirements     Future Appointments  No visits were found meeting these conditions  Showing future appointments within next 150 days and meeting all other requirements        After connecting through [employer], the patient was identified by name and date of birth  Trevor Faulkner was informed that this is a telemedicine visit and that the visit is being conducted through [employer] which may not be secure and therefore, might not be HIPAA-compliant  Other methods to assure confidentiality were taken  My computer and phone are away from any patient access  The following individuals were in the room with me and the patient informed NP, Radha Santoyo  He acknowledged consent and understanding of privacy and security of the video platform  The patient has agreed to participate and understands they can discontinue the visit at any time  Subjective  Trevor Faulkner is a 47 y o  male  right handed  M with PMH of HTN, stroke is on video call as follow up visit  He had presented to [place] on 2/10 with dysarthria and right sided weakness  They were considering tPA but his speech had improved so it was deferred  His CTA had revealed left M1 occlusion  His MRI brain had revealed large acute left MCA territory infarction involving dorsal lateral aspect of left frontal lobe, frontal operculum and left basal ganglia with mild petechial hemorrhage   His repeat CT brain 2 days later had revealed evolving hemorrhagic left mca infarction with no significant change  Clinically he had improved within minutes and then had continued to improve throughout his course so tPA or thrombectomy were not done  Patient has remained on Lipitor 80mg and plavix 75mg daily  Patient has no residual deficit from stroke  He has known left ica complete occlusion that is being followed by vascular surgery  He was last seen in Jan of 2020 by Dr Kiran Velasco to address left carotid occlusion and right asymptomatic <50% carotid stenosis  No intervention was needed at this time  They will follow with biannual carotid doppler  Patient works as a serna  He's able to perform his duties at baseline  He's compliant with his medications  Past Medical History:   Diagnosis Date    Hypertension     Stroke ([facility] [address] ) 02/10/2019       Past Surgical History:   Procedure Laterality Date    NO PAST SURGERIES         Current Outpatient Medications   Medication Sig Dispense Refill    atorvastatin (LIPITOR) 80 mg tablet TAKE ONE TABLET BY MOUTH EVERY DAY WITH DINNER 90 tablet 3    clopidogrel (PLAVIX) 75 mg tablet Take 1 tablet (75 mg total) by mouth daily 90 tablet 2    folic acid (FOLVITE) 1 mg tablet TAKE ONE TABLET BY MOUTH EVERY DAY (GENERIC FOR FOLVITE) 90 tablet 2    thiamine 100 MG tablet Take 1 tablet (100 mg total) by mouth daily 90 tablet 3     No current facility-administered medications for this visit  No Known Allergies    Review of Systems   All other systems reviewed and are negative  Physical Exam   Constitutional: He is oriented to person, place, and time  He appears well-developed and well-nourished  Neurological: He is alert and oriented to person, place, and time  No cranial nerve deficit  Coordination normal    Psychiatric: He has a normal mood and affect  His behavior is normal  Judgment and thought content normal          Diagnosis ICD-10-CM Associated Orders   1   Chronic ischemic left MCA stroke I69 30    2  Left carotid artery occlusion I65 22        Patient has remained stable  He is to resume lipitor 80mg and plavix 75mg for stroke prevention  He is to have regular f/u with vascular surgery for carotid stenosis  Strongly encouraged physical exercise, joining gym after risk of covid 19 subsides  I spent 15 minutes with the patient during this visit    Total time of care and coordination: 30 min

## 2020-06-10 ENCOUNTER — OFFICE VISIT (OUTPATIENT)
Dept: FAMILY MEDICINE CLINIC | Facility: CLINIC | Age: 55
End: 2020-06-10
Payer: COMMERCIAL

## 2020-06-10 VITALS
WEIGHT: 222 LBS | OXYGEN SATURATION: 98 % | SYSTOLIC BLOOD PRESSURE: 152 MMHG | HEART RATE: 79 BPM | HEIGHT: 72 IN | DIASTOLIC BLOOD PRESSURE: 102 MMHG | BODY MASS INDEX: 30.07 KG/M2

## 2020-06-10 DIAGNOSIS — I10 ESSENTIAL HYPERTENSION: Primary | ICD-10-CM

## 2020-06-10 DIAGNOSIS — E78.00 HYPERCHOLESTEROLEMIA: ICD-10-CM

## 2020-06-10 DIAGNOSIS — I65.21 STENOSIS OF RIGHT CAROTID ARTERY: ICD-10-CM

## 2020-06-10 DIAGNOSIS — I63.512 CEREBROVASCULAR ACCIDENT (CVA) DUE TO OCCLUSION OF LEFT MIDDLE CEREBRAL ARTERY (HCC): ICD-10-CM

## 2020-06-10 DIAGNOSIS — I65.22 LEFT CAROTID ARTERY OCCLUSION: ICD-10-CM

## 2020-06-10 PROCEDURE — 3080F DIAST BP >= 90 MM HG: CPT | Performed by: FAMILY MEDICINE

## 2020-06-10 PROCEDURE — 1036F TOBACCO NON-USER: CPT | Performed by: FAMILY MEDICINE

## 2020-06-10 PROCEDURE — 99214 OFFICE O/P EST MOD 30 MIN: CPT | Performed by: FAMILY MEDICINE

## 2020-06-10 PROCEDURE — 3077F SYST BP >= 140 MM HG: CPT | Performed by: FAMILY MEDICINE

## 2020-06-10 PROCEDURE — 3008F BODY MASS INDEX DOCD: CPT | Performed by: FAMILY MEDICINE

## 2020-06-10 RX ORDER — LOSARTAN POTASSIUM 25 MG/1
25 TABLET ORAL DAILY
Qty: 30 TABLET | Refills: 0 | Status: SHIPPED | OUTPATIENT
Start: 2020-06-10 | End: 2020-06-24 | Stop reason: SDUPTHER

## 2020-06-24 ENCOUNTER — OFFICE VISIT (OUTPATIENT)
Dept: FAMILY MEDICINE CLINIC | Facility: CLINIC | Age: 55
End: 2020-06-24
Payer: COMMERCIAL

## 2020-06-24 VITALS
DIASTOLIC BLOOD PRESSURE: 84 MMHG | SYSTOLIC BLOOD PRESSURE: 138 MMHG | WEIGHT: 221.6 LBS | RESPIRATION RATE: 16 BRPM | HEIGHT: 72 IN | TEMPERATURE: 97.5 F | BODY MASS INDEX: 30.02 KG/M2 | HEART RATE: 78 BPM | OXYGEN SATURATION: 97 %

## 2020-06-24 DIAGNOSIS — I65.22 LEFT CAROTID ARTERY OCCLUSION: ICD-10-CM

## 2020-06-24 DIAGNOSIS — I65.21 STENOSIS OF RIGHT CAROTID ARTERY: Primary | ICD-10-CM

## 2020-06-24 DIAGNOSIS — E78.00 HYPERCHOLESTEROLEMIA: ICD-10-CM

## 2020-06-24 DIAGNOSIS — I10 ESSENTIAL HYPERTENSION: ICD-10-CM

## 2020-06-24 DIAGNOSIS — I63.512 CEREBROVASCULAR ACCIDENT (CVA) DUE TO OCCLUSION OF LEFT MIDDLE CEREBRAL ARTERY (HCC): ICD-10-CM

## 2020-06-24 PROCEDURE — 99214 OFFICE O/P EST MOD 30 MIN: CPT | Performed by: FAMILY MEDICINE

## 2020-06-24 PROCEDURE — 1036F TOBACCO NON-USER: CPT | Performed by: FAMILY MEDICINE

## 2020-06-24 PROCEDURE — 3075F SYST BP GE 130 - 139MM HG: CPT | Performed by: FAMILY MEDICINE

## 2020-06-24 PROCEDURE — 3079F DIAST BP 80-89 MM HG: CPT | Performed by: FAMILY MEDICINE

## 2020-06-24 PROCEDURE — 3008F BODY MASS INDEX DOCD: CPT | Performed by: FAMILY MEDICINE

## 2020-06-24 RX ORDER — LOSARTAN POTASSIUM 25 MG/1
25 TABLET ORAL DAILY
Qty: 90 TABLET | Refills: 2 | Status: SHIPPED | OUTPATIENT
Start: 2020-06-24 | End: 2020-10-07 | Stop reason: SDUPTHER

## 2020-09-12 DIAGNOSIS — I63.512 CEREBROVASCULAR ACCIDENT (CVA) DUE TO OCCLUSION OF LEFT MIDDLE CEREBRAL ARTERY (HCC): ICD-10-CM

## 2020-09-12 RX ORDER — CLOPIDOGREL BISULFATE 75 MG/1
TABLET ORAL
Qty: 90 TABLET | Refills: 2 | Status: SHIPPED | OUTPATIENT
Start: 2020-09-12 | End: 2021-07-15 | Stop reason: HOSPADM

## 2020-09-30 ENCOUNTER — OFFICE VISIT (OUTPATIENT)
Dept: NEUROLOGY | Facility: CLINIC | Age: 55
End: 2020-09-30
Payer: COMMERCIAL

## 2020-09-30 VITALS
WEIGHT: 218 LBS | SYSTOLIC BLOOD PRESSURE: 181 MMHG | BODY MASS INDEX: 29.53 KG/M2 | HEIGHT: 72 IN | DIASTOLIC BLOOD PRESSURE: 115 MMHG | TEMPERATURE: 97.4 F | HEART RATE: 73 BPM

## 2020-09-30 DIAGNOSIS — I69.30 CHRONIC ISCHEMIC LEFT MCA STROKE: Primary | ICD-10-CM

## 2020-09-30 DIAGNOSIS — I65.22 LEFT CAROTID ARTERY OCCLUSION: ICD-10-CM

## 2020-09-30 DIAGNOSIS — E78.2 MIXED HYPERLIPIDEMIA: ICD-10-CM

## 2020-09-30 PROCEDURE — 99214 OFFICE O/P EST MOD 30 MIN: CPT | Performed by: PSYCHIATRY & NEUROLOGY

## 2020-09-30 NOTE — PROGRESS NOTES
Return NeuroOutpatient Note        Wilfredo Shaffer  [id number]  54 y o   1965       CC: carotid stenosis       History obtained from:  Patient     HPI/Subjective: Wilfredo Shaffer is a 53 yo M with PMH of HTN, stroke presents as f/u  He had presented to [place] on 2/10 with dysarthria and right sided weakness  They were considering tPA but his speech had improved so it was deferred  His CTA had revealed left M1 occlusion  His MRI brain had revealed large acute left MCA territory infarction involving dorsal lateral aspect of left frontal lobe, frontal operculum and left basal ganglia with mild petechial hemorrhage  His repeat CT brain 2 days later had revealed evolving hemorrhagic left mca infarction with no significant change  Clinically he had improved within minutes and then had continued to improve throughout his course so tPA or thrombectomy were not done    Patient has remained on Lipitor 80mg and plavix 75mg daily  He has known left ica complete occlusion that is being followed by vascular surgery  He was last seen in Jan of 2020 by Dr Lino Santillan to address left carotid occlusion and right asymptomatic <50% carotid stenosis  They will follow with biannual carotid doppler       He doesn't smoke anymore  He is compliant with his medications  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  He was asked to have sleep study done a year ago but didn't have it done  Denies any episodes of apnea  He thinks his snoring is better       Past Medical History:   Diagnosis Date    Hypertension     [facility]     Social History     Socioeconomic History    Marital status: /Civil Union     Spouse name: Not on file    Number of children: Not on file    Years of education: Not on file    Highest education level: Not on file   Occupational History    Not on file   Social Needs    Financial resource strain: Not on file    Food insecurity     Worry: Not on file     Inability: Not on file   [place] Transportation needs     Medical: Not on file     Non-medical: Not on file   Tobacco Use    Smoking status: Never Smoker    Smokeless tobacco: Never Used   Substance and Sexual Activity    Alcohol use:  Yes     Alcohol/week: 6 0 standard drinks     Types: 6 Cans of beer per week     Frequency: 4 or more times a week     Drinks per session: 5 or 6     Binge frequency: Weekly    Drug use: Never    Sexual activity: Not on file   Lifestyle    Physical activity     Days per week: Not on file     Minutes per session: Not on file    Stress: Not on file   Relationships    Social connections     Talks on phone: Not on file     Gets together: Not on file     Attends Ten[employer]ism service: Not on file     Active member of club or organization: Not on file     Attends meetings of clubs or organizations: Not on file     Relationship status: Not on file    Intimate partner violence     Fear of current or ex partner: Not on file     Emotionally abused: Not on file     Physically abused: Not on file     Forced sexual activity: Not on file   Other Topics Concern    Not on file   Social History Narrative    Not on file     Family History   Problem Relation Age of Onset    Diabetes type II Mother     Hypertension Father     Diabetes type II Father     Diabetes type II Sister     Hypertension Brother      No Known Allergies  Current Outpatient Medications on File Prior to Visit   Medication Sig Dispense Refill    atorvastatin (LIPITOR) 80 mg tablet TAKE ONE TABLET BY MOUTH EVERY DAY WITH DINNER 90 tablet 3    clopidogrel (PLAVIX) 75 mg tablet TAKE ONE TABLET BY MOUTH EVERY DAY 90 tablet 2    folic acid (FOLVITE) 1 mg tablet TAKE ONE TABLET BY MOUTH EVERY DAY (GENERIC FOR FOLVITE) 90 tablet 2    losartan (COZAAR) 25 mg tablet Take 1 tablet (25 mg total) by mouth daily 90 tablet 2    thiamine 100 MG tablet Take 1 tablet (100 mg total) by mouth daily 90 tablet 3     No current facility-administered medications on file prior to visit  Review of Systems   Refer to positive review of systems in HPI  Review of Systems    Constitutional- No fever  Eyes- No visual change  ENT- Hearing normal  CV- No chest pain  Resp- No Shortness of breath  GI- No diarrhea  - Bladder normal  MS- No Arthritis   Skin- No rash  Psych- No depression  Endo- No DM  Heme- No nodes    Vitals:    09/30/20 1516   BP: (!) 181/115   BP Location: Left arm   Patient Position: Sitting   Cuff Size: Adult   Pulse: 73   Temp: (!) 97 4 °F (36 3 °C)   Weight: 98 9 kg (218 lb)   Height: 6' (1 829 m)       PHYSICAL EXAM:  Appearance: No Acute Distress  Ophthalmoscopic: Disc Flat, Normal fundus  Mental status:  Orientation: Awake, Alert, and Orientedx3  Memory: Registation 3/3 Recall 3/3  Attention: normal  Knowledge: good  Language: No aphasia  Speech: No dysarthria  Cranial Nerves:  2 No Visual Defect on Confrontation, Pupils round, equal, reactive to light  3,4,6 Extraocular Movements Intact, no nystagmus  5 Facial Sensation Intact  7 No facial asymmetry  8 Intact hearing  9,10 Palate symmetric, normal gag  11 Good shoulder shrug  12 Tongue Midline  Gait: Stable  Coordination: No ataxia with finger to nose testing, and heel to shin  Sensory: Intact, Symmetric to pinprick, light touch, vibration, and joint position  Muscle Tone: Normal              Muscle exam:  Arm Right Left Leg Right Left   Deltoid 5/5 5/5 Iliopsoas 5/5 5/5   Biceps 5/5 5/5 Quads 5/5 5/5   Triceps 5/5 5/5 Hamstrings 5/5 5/5   Wrist Extension 5/5 5/5 Ankle Dorsi Flexion 5/5 5/5   Wrist Flexion 5/5 5/5 Ankle Plantar Flexion 5/5 5/5   Interossei 5/5 5/5 Ankle Eversion 5/5 5/5   APB 5/5 5/5 Ankle Inversion 5/5 5/5       Reflexes   RJ BJ TJ KJ AJ Plantars Louie's   Right 2+ 2+ 2+ 2+ 2+ Downgoing Not present   Left 2+ 2+ 2+ 2+ 2+ Downgoing Not present     Personal review of  Labs:                 Diagnoses and all orders for this visit:      1  Chronic ischemic left MCA stroke     2   Left carotid artery occlusion     3  Mixed hyperlipidemia           Patient has remained stable in terms of stroke  He currently has no residual sxs from stroke  He is to resume plavix 75mg and lipitor 80mg for stroke prevention  He does walk a lot as part of exercise  He has stopped smoking  He will have biannual carotid doppler by vascular surgery                 Total time of encounter:  30 min  More than 50% of the time was used in counseling and/or coordination of care  Extent of counseling and/or coordination of care        Tremaine Carpenter MD  [address] [facility]  [address]  [address]  [phone]

## 2020-10-07 ENCOUNTER — OFFICE VISIT (OUTPATIENT)
Dept: FAMILY MEDICINE CLINIC | Facility: CLINIC | Age: 55
End: 2020-10-07
Payer: COMMERCIAL

## 2020-10-07 VITALS
WEIGHT: 219.6 LBS | DIASTOLIC BLOOD PRESSURE: 90 MMHG | RESPIRATION RATE: 20 BRPM | HEIGHT: 72 IN | OXYGEN SATURATION: 95 % | HEART RATE: 80 BPM | BODY MASS INDEX: 29.74 KG/M2 | SYSTOLIC BLOOD PRESSURE: 156 MMHG | TEMPERATURE: 97.5 F

## 2020-10-07 DIAGNOSIS — E78.00 HYPERCHOLESTEROLEMIA: ICD-10-CM

## 2020-10-07 DIAGNOSIS — I65.21 STENOSIS OF RIGHT CAROTID ARTERY: ICD-10-CM

## 2020-10-07 DIAGNOSIS — I10 ESSENTIAL HYPERTENSION: ICD-10-CM

## 2020-10-07 DIAGNOSIS — I65.22 LEFT CAROTID ARTERY OCCLUSION: Primary | ICD-10-CM

## 2020-10-07 PROCEDURE — 99214 OFFICE O/P EST MOD 30 MIN: CPT | Performed by: FAMILY MEDICINE

## 2020-10-07 RX ORDER — LOSARTAN POTASSIUM 50 MG/1
50 TABLET ORAL DAILY
Qty: 90 TABLET | Refills: 2 | Status: SHIPPED | OUTPATIENT
Start: 2020-10-07 | End: 2020-10-16

## 2020-10-14 ENCOUNTER — HOSPITAL ENCOUNTER (OUTPATIENT)
Dept: RADIOLOGY | Facility: HOSPITAL | Age: 55
Discharge: HOME | End: 2020-10-14
Attending: SURGERY
Payer: COMMERCIAL

## 2020-10-14 DIAGNOSIS — I63.512 CEREBROVASCULAR ACCIDENT (CVA) DUE TO OCCLUSION OF LEFT MIDDLE CEREBRAL ARTERY (HCC): ICD-10-CM

## 2020-10-14 DIAGNOSIS — I65.21 STENOSIS OF RIGHT CAROTID ARTERY: ICD-10-CM

## 2020-10-14 DIAGNOSIS — I65.22 LEFT CAROTID ARTERY OCCLUSION: ICD-10-CM

## 2020-10-14 DIAGNOSIS — Z82.49 FAMILY HISTORY OF ABDOMINAL AORTIC ANEURYSM (AAA): ICD-10-CM

## 2020-10-14 DIAGNOSIS — I69.30 CHRONIC ISCHEMIC LEFT MCA STROKE: ICD-10-CM

## 2020-10-14 PROCEDURE — 93979 VASCULAR STUDY: CPT

## 2020-10-14 PROCEDURE — 93880 EXTRACRANIAL BILAT STUDY: CPT

## 2020-10-15 DIAGNOSIS — F10.10 ALCOHOL ABUSE: ICD-10-CM

## 2020-10-15 PROCEDURE — 93979 VASCULAR STUDY: CPT | Performed by: SURGERY

## 2020-10-15 PROCEDURE — 93880 EXTRACRANIAL BILAT STUDY: CPT | Performed by: SURGERY

## 2020-10-15 RX ORDER — THIAMINE HCL 100 MG
TABLET ORAL
Qty: 90 TABLET | Refills: 3 | Status: SHIPPED | OUTPATIENT
Start: 2020-10-15 | End: 2021-08-04 | Stop reason: SDUPTHER

## 2020-10-16 ENCOUNTER — TELEPHONE (OUTPATIENT)
Dept: FAMILY MEDICINE CLINIC | Facility: CLINIC | Age: 55
End: 2020-10-16

## 2020-10-16 ENCOUNTER — OFFICE VISIT (OUTPATIENT)
Dept: FAMILY MEDICINE CLINIC | Facility: CLINIC | Age: 55
End: 2020-10-16
Payer: COMMERCIAL

## 2020-10-16 VITALS
TEMPERATURE: 97.5 F | RESPIRATION RATE: 14 BRPM | BODY MASS INDEX: 29.26 KG/M2 | WEIGHT: 216 LBS | DIASTOLIC BLOOD PRESSURE: 92 MMHG | HEIGHT: 72 IN | SYSTOLIC BLOOD PRESSURE: 140 MMHG | HEART RATE: 95 BPM

## 2020-10-16 DIAGNOSIS — I63.512 CEREBROVASCULAR ACCIDENT (CVA) DUE TO OCCLUSION OF LEFT MIDDLE CEREBRAL ARTERY (HCC): ICD-10-CM

## 2020-10-16 DIAGNOSIS — I65.22 LEFT CAROTID ARTERY OCCLUSION: ICD-10-CM

## 2020-10-16 DIAGNOSIS — I10 ESSENTIAL HYPERTENSION: Primary | ICD-10-CM

## 2020-10-16 DIAGNOSIS — I65.21 STENOSIS OF RIGHT CAROTID ARTERY: ICD-10-CM

## 2020-10-16 PROCEDURE — 3725F SCREEN DEPRESSION PERFORMED: CPT | Performed by: FAMILY MEDICINE

## 2020-10-16 PROCEDURE — 99214 OFFICE O/P EST MOD 30 MIN: CPT | Performed by: FAMILY MEDICINE

## 2020-10-16 RX ORDER — LOSARTAN POTASSIUM 25 MG/1
75 TABLET ORAL DAILY
Qty: 270 TABLET | Refills: 2 | Status: SHIPPED | OUTPATIENT
Start: 2020-10-16 | End: 2020-10-16

## 2020-10-16 RX ORDER — LOSARTAN POTASSIUM 50 MG/1
TABLET ORAL
Qty: 135 TABLET | Refills: 2 | Status: SHIPPED | OUTPATIENT
Start: 2020-10-16 | End: 2020-12-30 | Stop reason: SDUPTHER

## 2020-10-23 ENCOUNTER — OFFICE VISIT (OUTPATIENT)
Dept: FAMILY MEDICINE CLINIC | Facility: CLINIC | Age: 55
End: 2020-10-23
Payer: COMMERCIAL

## 2020-10-23 VITALS
HEART RATE: 72 BPM | SYSTOLIC BLOOD PRESSURE: 142 MMHG | RESPIRATION RATE: 14 BRPM | TEMPERATURE: 97.3 F | BODY MASS INDEX: 29.39 KG/M2 | HEIGHT: 72 IN | DIASTOLIC BLOOD PRESSURE: 90 MMHG | WEIGHT: 217 LBS

## 2020-10-23 DIAGNOSIS — I10 ESSENTIAL HYPERTENSION: Primary | ICD-10-CM

## 2020-10-23 DIAGNOSIS — I65.21 STENOSIS OF RIGHT CAROTID ARTERY: ICD-10-CM

## 2020-10-23 DIAGNOSIS — I65.22 LEFT CAROTID ARTERY OCCLUSION: ICD-10-CM

## 2020-10-23 DIAGNOSIS — I63.512 CEREBROVASCULAR ACCIDENT (CVA) DUE TO OCCLUSION OF LEFT MIDDLE CEREBRAL ARTERY (HCC): ICD-10-CM

## 2020-10-23 PROCEDURE — 1036F TOBACCO NON-USER: CPT | Performed by: FAMILY MEDICINE

## 2020-10-23 PROCEDURE — 3077F SYST BP >= 140 MM HG: CPT | Performed by: FAMILY MEDICINE

## 2020-10-23 PROCEDURE — 3080F DIAST BP >= 90 MM HG: CPT | Performed by: FAMILY MEDICINE

## 2020-10-23 PROCEDURE — 99214 OFFICE O/P EST MOD 30 MIN: CPT | Performed by: FAMILY MEDICINE

## 2020-12-01 DIAGNOSIS — F10.10 ALCOHOL ABUSE: ICD-10-CM

## 2020-12-01 RX ORDER — FOLIC ACID 1 MG/1
TABLET ORAL
Qty: 90 TABLET | Refills: 2 | Status: SHIPPED | OUTPATIENT
Start: 2020-12-01 | End: 2021-08-26

## 2020-12-30 DIAGNOSIS — I10 ESSENTIAL HYPERTENSION: ICD-10-CM

## 2020-12-30 RX ORDER — LOSARTAN POTASSIUM 100 MG/1
100 TABLET ORAL DAILY
Qty: 90 TABLET | Refills: 2 | Status: SHIPPED | OUTPATIENT
Start: 2020-12-30 | End: 2020-12-31 | Stop reason: SDUPTHER

## 2020-12-31 DIAGNOSIS — I10 ESSENTIAL HYPERTENSION: ICD-10-CM

## 2020-12-31 RX ORDER — LOSARTAN POTASSIUM 100 MG/1
100 TABLET ORAL DAILY
Qty: 90 TABLET | Refills: 2 | Status: SHIPPED | OUTPATIENT
Start: 2020-12-31 | End: 2021-04-19

## 2021-01-04 ENCOUNTER — TELEPHONE (OUTPATIENT)
Dept: FAMILY MEDICINE CLINIC | Facility: CLINIC | Age: 56
End: 2021-01-04

## 2021-01-04 NOTE — TELEPHONE ENCOUNTER
Can we call the pharmacy and confirm that he got Losartan 100mg/ Coozar 100mgs  Then please let the patient know ?  And please CC me in convo

## 2021-01-04 NOTE — TELEPHONE ENCOUNTER
Called and spoke with Em  They confirmed that the RX that the patient picked up was in fact Losartan but the color is now green due to a  change  I called and spoke with Sue Lee to relay this information as well, to which she was appreciative of

## 2021-01-04 NOTE — TELEPHONE ENCOUNTER
----- Message from George Dior sent at 1/1/2021  8:21 AM EST -----  Regarding: Prescription Question  Contact: [phone]  Picked up the new med called in for the blood pressure and the pills are green and not white and he is refusing to take them until I confirm that they are correct  It says generic for cozaar and are green oval shaped imprinted with 47    Just need to confirm that this is the correct pill so that he can take it tomorrow  Thank you!   Darold Brittle

## 2021-04-14 DIAGNOSIS — Z00.00 PHYSICAL EXAM: ICD-10-CM

## 2021-04-14 DIAGNOSIS — E78.00 HYPERCHOLESTEROLEMIA: ICD-10-CM

## 2021-04-14 DIAGNOSIS — Z12.5 SCREENING PSA (PROSTATE SPECIFIC ANTIGEN): ICD-10-CM

## 2021-04-14 DIAGNOSIS — N40.1 BENIGN PROSTATIC HYPERPLASIA WITH URINARY FREQUENCY: ICD-10-CM

## 2021-04-14 DIAGNOSIS — I63.512 CEREBROVASCULAR ACCIDENT (CVA) DUE TO OCCLUSION OF LEFT MIDDLE CEREBRAL ARTERY (HCC): ICD-10-CM

## 2021-04-14 DIAGNOSIS — R35.0 BENIGN PROSTATIC HYPERPLASIA WITH URINARY FREQUENCY: ICD-10-CM

## 2021-04-14 DIAGNOSIS — R73.03 PRE-DIABETES: ICD-10-CM

## 2021-04-14 DIAGNOSIS — I10 ESSENTIAL HYPERTENSION: Primary | ICD-10-CM

## 2021-04-18 ENCOUNTER — TELEPHONE (OUTPATIENT)
Dept: FAMILY MEDICINE CLINIC | Facility: CLINIC | Age: 56
End: 2021-04-18

## 2021-04-18 LAB
ALBUMIN SERPL-MCNC: 4.4 G/DL (ref 3.8–4.9)
ALBUMIN/GLOB SERPL: 1.6 {RATIO} (ref 1.2–2.2)
ALP SERPL-CCNC: 105 IU/L (ref 39–117)
ALT SERPL-CCNC: 17 IU/L (ref 0–44)
AST SERPL-CCNC: 16 IU/L (ref 0–40)
BASOPHILS # BLD AUTO: 0 X10E3/UL (ref 0–0.2)
BASOPHILS NFR BLD AUTO: 1 %
BILIRUB SERPL-MCNC: 0.3 MG/DL (ref 0–1.2)
BUN SERPL-MCNC: 43 MG/DL (ref 6–24)
BUN/CREAT SERPL: 14 (ref 9–20)
CALCIUM SERPL-MCNC: 9.4 MG/DL (ref 8.7–10.2)
CHLORIDE SERPL-SCNC: 105 MMOL/L (ref 96–106)
CHOLEST SERPL-MCNC: 168 MG/DL (ref 100–199)
CHOLEST/HDLC SERPL: 5.1 RATIO (ref 0–5)
CO2 SERPL-SCNC: 21 MMOL/L (ref 20–29)
CREAT SERPL-MCNC: 3.04 MG/DL (ref 0.76–1.27)
EOSINOPHIL # BLD AUTO: 0.2 X10E3/UL (ref 0–0.4)
EOSINOPHIL NFR BLD AUTO: 2 %
ERYTHROCYTE [DISTWIDTH] IN BLOOD BY AUTOMATED COUNT: 13 % (ref 11.6–15.4)
EST. AVERAGE GLUCOSE BLD GHB EST-MCNC: 117 MG/DL
GLOBULIN SER-MCNC: 2.7 G/DL (ref 1.5–4.5)
GLUCOSE SERPL-MCNC: 92 MG/DL (ref 65–99)
HBA1C MFR BLD: 5.7 % (ref 4.8–5.6)
HCT VFR BLD AUTO: 34.2 % (ref 37.5–51)
HDLC SERPL-MCNC: 33 MG/DL
HGB BLD-MCNC: 11.4 G/DL (ref 13–17.7)
IMM GRANULOCYTES # BLD: 0 X10E3/UL (ref 0–0.1)
IMM GRANULOCYTES NFR BLD: 0 %
LDLC SERPL CALC-MCNC: 111 MG/DL (ref 0–99)
LYMPHOCYTES # BLD AUTO: 1.3 X10E3/UL (ref 0.7–3.1)
LYMPHOCYTES NFR BLD AUTO: 15 %
MCH RBC QN AUTO: 27.7 PG (ref 26.6–33)
MCHC RBC AUTO-ENTMCNC: 33.3 G/DL (ref 31.5–35.7)
MCV RBC AUTO: 83 FL (ref 79–97)
MICRODELETION SYND BLD/T FISH: NORMAL
MICRODELETION SYND BLD/T FISH: NORMAL
MONOCYTES # BLD AUTO: 0.8 X10E3/UL (ref 0.1–0.9)
MONOCYTES NFR BLD AUTO: 9 %
NEUTROPHILS # BLD AUTO: 6.5 X10E3/UL (ref 1.4–7)
NEUTROPHILS NFR BLD AUTO: 73 %
PLATELET # BLD AUTO: 258 X10E3/UL (ref 150–450)
POTASSIUM SERPL-SCNC: 5.4 MMOL/L (ref 3.5–5.2)
PROT SERPL-MCNC: 7.1 G/DL (ref 6–8.5)
PSA SERPL-MCNC: 0.8 NG/ML (ref 0–4)
RBC # BLD AUTO: 4.11 X10E6/UL (ref 4.14–5.8)
SL AMB EGFR AFRICAN AMERICAN: 25 ML/MIN/1.73
SL AMB EGFR NON AFRICAN AMERICAN: 22 ML/MIN/1.73
SL AMB VLDL CHOLESTEROL CALC: 24 MG/DL (ref 5–40)
SODIUM SERPL-SCNC: 139 MMOL/L (ref 134–144)
TRIGL SERPL-MCNC: 130 MG/DL (ref 0–149)
WBC # BLD AUTO: 8.9 X10E3/UL (ref 3.4–10.8)

## 2021-04-18 RX ORDER — TAMSULOSIN HYDROCHLORIDE 0.4 MG/1
0.4 CAPSULE ORAL
Qty: 30 CAPSULE | Refills: 2 | Status: SHIPPED | OUTPATIENT
Start: 2021-04-18 | End: 2021-06-06

## 2021-04-18 RX ORDER — AMLODIPINE BESYLATE 5 MG/1
5 TABLET ORAL DAILY
Qty: 30 TABLET | Refills: 0 | Status: SHIPPED | OUTPATIENT
Start: 2021-04-18 | End: 2021-04-19 | Stop reason: SDUPTHER

## 2021-04-18 RX ORDER — LOSARTAN POTASSIUM 100 MG/1
50 TABLET ORAL DAILY
Qty: 90 TABLET | Refills: 2 | Status: CANCELLED | OUTPATIENT
Start: 2021-04-18

## 2021-04-18 NOTE — TELEPHONE ENCOUNTER
Please call patient wife this morning and have him come in this afternoon for a BP check  Spoke to wife  Very concerned Cr at 3? After much discussion  Urinary leakage, and incontinence has been present for 6+ months  Patient didn't want to mention or see doctor  I am thinking prostate enlargement with obstruction vs losartan usage  Flomax started tonight  Cut Losartan to 50mgs from 100mgs  Start Norvasc 5mgs AM  Repeat BMP on Tuesday  If no improvement he will need to go to the ED  I expressed my concerned about this CR of 3  Told wife if anything develops to call us immediatly     Urology ASAP encourage to schedule for Monday

## 2021-04-19 ENCOUNTER — TELEMEDICINE (OUTPATIENT)
Dept: NEPHROLOGY | Facility: CLINIC | Age: 56
End: 2021-04-19
Payer: COMMERCIAL

## 2021-04-19 ENCOUNTER — OFFICE VISIT (OUTPATIENT)
Dept: FAMILY MEDICINE CLINIC | Facility: CLINIC | Age: 56
End: 2021-04-19
Payer: COMMERCIAL

## 2021-04-19 ENCOUNTER — TELEPHONE (OUTPATIENT)
Dept: NEPHROLOGY | Facility: CLINIC | Age: 56
End: 2021-04-19

## 2021-04-19 ENCOUNTER — RA CDI HCC (OUTPATIENT)
Dept: OTHER | Facility: HOSPITAL | Age: 56
End: 2021-04-19

## 2021-04-19 VITALS
BODY MASS INDEX: 28.61 KG/M2 | SYSTOLIC BLOOD PRESSURE: 138 MMHG | HEART RATE: 99 BPM | DIASTOLIC BLOOD PRESSURE: 90 MMHG | TEMPERATURE: 97.7 F | OXYGEN SATURATION: 98 % | HEIGHT: 72 IN | WEIGHT: 211.2 LBS | RESPIRATION RATE: 16 BRPM

## 2021-04-19 DIAGNOSIS — R79.89 ABNORMAL SERUM CREATININE LEVEL: ICD-10-CM

## 2021-04-19 DIAGNOSIS — N40.1 BENIGN PROSTATIC HYPERPLASIA WITH LOWER URINARY TRACT SYMPTOMS, SYMPTOM DETAILS UNSPECIFIED: ICD-10-CM

## 2021-04-19 DIAGNOSIS — I10 BENIGN ESSENTIAL HYPERTENSION: Primary | ICD-10-CM

## 2021-04-19 DIAGNOSIS — N17.9 AKI (ACUTE KIDNEY INJURY) (HCC): ICD-10-CM

## 2021-04-19 DIAGNOSIS — N40.1 BENIGN PROSTATIC HYPERPLASIA WITH POST-VOID DRIBBLING: ICD-10-CM

## 2021-04-19 DIAGNOSIS — E87.5 HYPERKALEMIA: ICD-10-CM

## 2021-04-19 DIAGNOSIS — N39.3 URINARY INCONTINENCE, MALE, STRESS: Primary | ICD-10-CM

## 2021-04-19 DIAGNOSIS — I10 ESSENTIAL HYPERTENSION: ICD-10-CM

## 2021-04-19 DIAGNOSIS — N39.43 BENIGN PROSTATIC HYPERPLASIA WITH POST-VOID DRIBBLING: ICD-10-CM

## 2021-04-19 LAB
SL AMB  POCT GLUCOSE, UA: NORMAL
SL AMB LEUKOCYTE ESTERASE,UA: 25
SL AMB POCT BILIRUBIN,UA: NORMAL
SL AMB POCT BLOOD,UA: NORMAL
SL AMB POCT CLARITY,UA: CLEAR
SL AMB POCT COLOR,UA: YELLOW
SL AMB POCT KETONES,UA: NORMAL
SL AMB POCT NITRITE,UA: NORMAL
SL AMB POCT PH,UA: 5
SL AMB POCT SPECIFIC GRAVITY,UA: 1.01
SL AMB POCT URINE PROTEIN: NORMAL
SL AMB POCT UROBILINOGEN: NORMAL

## 2021-04-19 PROCEDURE — 3075F SYST BP GE 130 - 139MM HG: CPT | Performed by: FAMILY MEDICINE

## 2021-04-19 PROCEDURE — 99214 OFFICE O/P EST MOD 30 MIN: CPT | Performed by: FAMILY MEDICINE

## 2021-04-19 PROCEDURE — 1036F TOBACCO NON-USER: CPT | Performed by: INTERNAL MEDICINE

## 2021-04-19 PROCEDURE — 3008F BODY MASS INDEX DOCD: CPT | Performed by: FAMILY MEDICINE

## 2021-04-19 PROCEDURE — 99244 OFF/OP CNSLTJ NEW/EST MOD 40: CPT | Performed by: INTERNAL MEDICINE

## 2021-04-19 PROCEDURE — 3080F DIAST BP >= 90 MM HG: CPT | Performed by: FAMILY MEDICINE

## 2021-04-19 PROCEDURE — 81002 URINALYSIS NONAUTO W/O SCOPE: CPT | Performed by: FAMILY MEDICINE

## 2021-04-19 RX ORDER — AMLODIPINE BESYLATE 10 MG/1
10 TABLET ORAL DAILY
Qty: 30 TABLET | Refills: 0 | Status: SHIPPED | OUTPATIENT
Start: 2021-04-19 | End: 2021-05-04 | Stop reason: SDUPTHER

## 2021-04-19 NOTE — PATIENT INSTRUCTIONS
1  )  Low 2 g sodium diet    2 )  Monitor weights at home    3 )  Avoid NSAIDs (ibuprofen, Motrin, Advil, Aleve, naproxen)    4 )  Monitor blood pressure at home, call if blood pressure greater than 150/90 persistently    5 ) I will plan to discuss all results including blood work, and/or imaging at our next visit, unless there is an urgent indication, in which case I will call you earlier  If you have any questions or concerns about your results, please feel free to call our office      6 ) Repeat Labs, US, See Urology    7 ) Stop Losartan    8 ) Low K diet

## 2021-04-19 NOTE — PROGRESS NOTES
[facility]  coding opportunities          Chart reviewed, no opportunity found: CHART REVIEWED, NO OPPORTUNITY FOUND              Patients insurance company: [employer] (Medicare and Commercial for [employer] and [employer])

## 2021-04-19 NOTE — TELEPHONE ENCOUNTER
Called and spoke with patient's wife Reji Lagunasner  Scheduled appointment this afternoon  Ivermectin Counseling:  Patient instructed to take medication on an empty stomach with a full glass of water.  Patient informed of potential adverse effects including but not limited to nausea, diarrhea, dizziness, itching, and swelling of the extremities or lymph nodes.  The patient verbalized understanding of the proper use and possible adverse effects of ivermectin.  All of the patient's questions and concerns were addressed.

## 2021-04-19 NOTE — PROGRESS NOTES
Virtual Regular Visit      Assessment/Plan:    Problem List Items Addressed This Visit        Cardiovascular and Mediastinum    Benign essential hypertension - Primary       Genitourinary    Benign prostatic hyperplasia with lower urinary tract symptoms    ARMANDO (acute kidney injury) ([facility] [address] )    Relevant Orders    Basic metabolic panel    Urinalysis with microscopic       Other    Hyperkalemia    Relevant Orders    Basic metabolic panel    Urinalysis with microscopic               Reason for visit is   Chief Complaint   Patient presents with    Virtual Regular Visit    Consult    Virtual Regular Visit        Encounter provider Zbigniew Hu MD    Provider located at [address] [place] [id number]      Recent Visits  Date Type Provider Dept   04/18/21 Telephone Carolyne Neal  [employer] [address] recent visits within past 7 days and meeting all other requirements     Today's Visits  Date Type Provider Dept   04/19/21 Telemedicine Zbigniew Hu MD [facility] Rene Feng   04/19/21 Office Visit Carolyne Neal  [facility] [address] today's visits and meeting all other requirements     Future Appointments  No visits were found meeting these conditions  Showing future appointments within next 150 days and meeting all other requirements        The patient was identified by name and date of birth  Kelly Jean was informed that this is a telemedicine visit and that the visit is being conducted through [facility] and patient was informed that this is a secure, HIPAA-compliant platform  He agrees to proceed     My office door was closed  No one else was in the room  He acknowledged consent and understanding of privacy and security of the video platform  The patient has agreed to participate and understands they can discontinue the visit at any time  Patient is aware this is a billable service       Johanna Camarillo Renee Cast is a 54 y o  male acute kidney injury with hyperkalemia   HPI     51-year-old male with history of hypertension, carotid stenosis, hyperlipidemia who presents for an urgent consultation for acute kidney injury and hyperkalemia  His baseline creatinine appears to fluctuate anywhere from 1-1 2 mg/dL  His creatinine done last week was elevated at greater than 3 mg/dL with a potassium of 5 4  Patient was seen earlier today by his primary care physician who stopped his losartan  Patient had been reporting weak urinary stream and does have a history of benign prostatic hypertrophy  He reports he still able to void urine but it is very weak  He has been set up for Urology evaluation this week  A renal ultrasound has also been set up for Thursday  He reports no NSAID use  No nausea no vomiting or diarrhea no constipation  He reports no gross hematuria  I discussed with him that if he reports no urine output and he should go to the emergency room immediately  If he exhibits symptoms of uremia she could emergency immediately  Will repeat his blood work on Thursday if the renal function worsens he will need to go the emergency room  Assessment/plan:    Acute kidney injury  --severe Stage III  --low threshold to send to the emergency room  --baseline creatinine 1-1 2 mg/dL  --if no urine output reported or have symptoms of uremia history to the emergency room immediately    --agree with discontinuing losartan  --check a renal ultrasound which is planned for Thursday  --urology appointment this week  --avoid NSAIDs  --encouraged maintaining good oral intake  --please avoid contrast studies  --if his repeat blood work shows worsening renal function he will need to go to the emergency room  --check urinalysis    Hypertension  --amlodipine increased, losartan discontinue due to acute kidney injury and mild hyperkalemia    Abnormal renal function test  --may have some underlying degree of mild renal dysfunction likely stage I versus stage II secondary to hypertension and possible chronic symptoms of urinary retention along with some age-related nephron loss  --baseline creatinine 1-1 2 mg/dL    Hyperkalemia  --mild  --educated low-potassium diet  --hold losartan  --check a renal ultrasound for acute urinary retention  --concurrent acute kidney injury    Past Medical History:   Diagnosis Date    Hypertension     Stroke ([facility] [address] ) 02/10/2019       Past Surgical History:   Procedure Laterality Date    NO PAST SURGERIES         Current Outpatient Medications   Medication Sig Dispense Refill    amLODIPine (NORVASC) 10 mg tablet Take 1 tablet (10 mg total) by mouth daily 30 tablet 0    atorvastatin (LIPITOR) 80 mg tablet TAKE ONE TABLET BY MOUTH EVERY DAY WITH DINNER 90 tablet 3    clopidogrel (PLAVIX) 75 mg tablet TAKE ONE TABLET BY MOUTH EVERY DAY 90 tablet 2    folic acid (FOLVITE) 1 mg tablet TAKE ONE TABLET BY MOUTH EVERY DAY (GENERIC FOR FOLVITE) 90 tablet 2    tamsulosin (FLOMAX) 0 4 mg Take 1 capsule (0 4 mg total) by mouth daily with dinner 30 capsule 2    thiamine (VITAMIN B1) 100 mg tablet TAKE ONE TABLET BY MOUTH EVERY DAY 90 tablet 3     No current facility-administered medications for this visit  No Known Allergies    Review of Systems   Constitutional: Negative for activity change and fever  Respiratory: Negative for cough, chest tightness, shortness of breath and wheezing  Cardiovascular: Negative for chest pain and leg swelling  Gastrointestinal: Negative for abdominal pain, diarrhea, nausea and vomiting  Endocrine: Negative for polyuria  Genitourinary: Positive for decreased urine volume, difficulty urinating and frequency  Negative for dysuria, flank pain and urgency  Skin: Negative for rash  Neurological: Negative for dizziness, syncope, light-headedness and headaches  Video Exam    There were no vitals filed for this visit      Physical Exam  Exam conducted with a chaperone present  Constitutional:       General: He is not in acute distress  Appearance: Normal appearance  He is not ill-appearing, toxic-appearing or diaphoretic  HENT:      Head: Normocephalic and atraumatic  Eyes:      General: No scleral icterus  Right eye: No discharge  Left eye: No discharge  Neck:      Musculoskeletal: Normal range of motion  Pulmonary:      Effort: No respiratory distress  Abdominal:      Tenderness: There is no guarding  Musculoskeletal:         General: No swelling  Skin:     Coloration: Skin is not jaundiced or pale  Findings: No bruising, erythema or lesion  Neurological:      Mental Status: He is alert and oriented to person, place, and time  Mental status is at baseline  Psychiatric:         Mood and Affect: Mood normal          Behavior: Behavior normal          Thought Content: Thought content normal          Judgment: Judgment normal           I spent 35 minutes directly with the patient during this visit      VIRTUAL VISIT DISCLAIMER    Dina Will acknowledges that he has consented to an online visit or consultation  He understands that the online visit is based solely on information provided by him, and that, in the absence of a face-to-face physical evaluation by the physician, the diagnosis he receives is both limited and provisional in terms of accuracy and completeness  This is not intended to replace a full medical face-to-face evaluation by the physician  Dina Will understands and accepts these terms

## 2021-04-19 NOTE — PROGRESS NOTES
Sofya Luna 1965 male MRN: [id number]    FAMILY PRACTICE OFFICE VISIT  [facility] - [address]      ASSESSMENT/PLAN  Sofya Luna is a 54 y o  male presents to the office for    Diagnoses and all orders for this visit:    Urinary incontinence, male, stress  -     POCT urine dip  -     US kidney and bladder with pvr; Future    Abnormal serum creatinine level  -     Ambulatory referral to Nephrology; Future  -     US kidney and bladder with pvr; Future    Essential hypertension  -     amLODIPine (NORVASC) 10 mg tablet; Take 1 tablet (10 mg total) by mouth daily    Benign prostatic hyperplasia with post-void dribbling      Prostate enlarge-> Recently started on Flomax; urine stream dribbling  Recommend  Continue Flomax till seen by the urologist     UA normal limits   Spoke with the nephrologist     Stopping losartan  Increase amlodipine to 10 mg ( strong chance we will add on a beta-blocker in the future explained to patient)  Sent for renal ultrasound with PVR   Patient will be keeping a close blood pressure log at home  Low-potassium diet discussed in great detail with the patient  This is likely post renal/obstructive reason for ARMANDO versus side effect to losartan  Patient has been symptomatic prior to losartan being initiated but did not disclose this until today  Future Appointments   Date Time Provider Benedicto Brown   4/22/2021  1:15  [facility] 2 Clover Dhillon [facility] 1237   5/3/2021  2:45 PM Ade Turk MD [facility]          SUBJECTIVE  CC: Blood Pressure Check (patient here for BP check and urinary incontinence) and Urinary Incontinence      HPI:  Sofya Luna is a 54 y o  male who presents for  A follow-up appointment  Yesterday notified the patient of abnormal blood work  At that time we had him decrease losartan to 50 mg and start amlodipine  5 mg  Patient states that he has had at least 1 year of leakage in urination    Did not notice a change with  Flomax  being initiated yesterday  Patient states that he has also noticed significant worsening symptoms in the last 8 months with incontinence  Patient did not disclose this in the past   Wife was the 1 that notified us of these new findings  Patient has deferred prostate exams in the past   Hypertension has been very well controlled on losartan 100 mg recently increased 1 year ago  Only drinks 1-2 beers occasionally  And that certain days he does not drink anything        Review of Systems   Constitutional: Positive for fatigue  Negative for activity change, appetite change, chills and fever  HENT: Negative for congestion  Respiratory: Negative for cough, chest tightness and shortness of breath  Cardiovascular: Negative for chest pain and leg swelling  Gastrointestinal: Negative for abdominal distention, abdominal pain, constipation, diarrhea, nausea and vomiting  Genitourinary: Positive for difficulty urinating  All other systems reviewed and are negative        Historical Information   The patient history was reviewed as follows:  Past Medical History:   Diagnosis Date    Hypertension     Stroke ([facility] ) 02/10/2019         Medications:     Current Outpatient Medications:     amLODIPine (NORVASC) 10 mg tablet, Take 1 tablet (10 mg total) by mouth daily, Disp: 30 tablet, Rfl: 0    atorvastatin (LIPITOR) 80 mg tablet, TAKE ONE TABLET BY MOUTH EVERY DAY WITH DINNER, Disp: 90 tablet, Rfl: 3    clopidogrel (PLAVIX) 75 mg tablet, TAKE ONE TABLET BY MOUTH EVERY DAY, Disp: 90 tablet, Rfl: 2    folic acid (FOLVITE) 1 mg tablet, TAKE ONE TABLET BY MOUTH EVERY DAY (GENERIC FOR FOLVITE), Disp: 90 tablet, Rfl: 2    tamsulosin (FLOMAX) 0 4 mg, Take 1 capsule (0 4 mg total) by mouth daily with dinner, Disp: 30 capsule, Rfl: 2    thiamine (VITAMIN B1) 100 mg tablet, TAKE ONE TABLET BY MOUTH EVERY DAY, Disp: 90 tablet, Rfl: 3    No Known Allergies    OBJECTIVE  Vitals:   Vitals:    04/19/21 1353   BP: 138/90   BP Location: Left arm   Patient Position: Sitting   Cuff Size: Standard   Pulse: 99   Resp: 16   Temp: 97 7 °F (36 5 °C)   TempSrc: Temporal   SpO2: 98%   Weight: 95 8 kg (211 lb 3 2 oz)   Height: 6' (1 829 m)         Physical Exam  Vitals signs reviewed  Constitutional:       Appearance: Normal appearance  He is well-developed  HENT:      Head: Normocephalic and atraumatic  Eyes:      Conjunctiva/sclera: Conjunctivae normal       Pupils: Pupils are equal, round, and reactive to light  Neck:      Musculoskeletal: Normal range of motion and neck supple  Cardiovascular:      Rate and Rhythm: Normal rate and regular rhythm  Heart sounds: Normal heart sounds  Pulmonary:      Effort: Pulmonary effort is normal  No respiratory distress  Breath sounds: Normal breath sounds  Genitourinary:     Prostate: Enlarged  Musculoskeletal: Normal range of motion  Skin:     General: Skin is warm  Capillary Refill: Capillary refill takes less than 2 seconds  Neurological:      General: No focal deficit present  Mental Status: He is alert and oriented to person, place, and time  Psychiatric:         Mood and Affect: Mood normal          Behavior: Behavior normal          Thought Content:  Thought content normal          Judgment: Judgment normal                     Wendie Hobbs MD,   [facility]  4/19/2021

## 2021-04-20 ENCOUNTER — TELEPHONE (OUTPATIENT)
Dept: FAMILY MEDICINE CLINIC | Facility: CLINIC | Age: 56
End: 2021-04-20

## 2021-04-20 NOTE — TELEPHONE ENCOUNTER
Patient woke up this morning already had 2 trips to the   [address] [place] [address]   1st trip was just dribbling of urine   2nd trip was a full stream with needing to force toward the end  Patient understands that if his blood work shows elevation on Thursday he is to report to the emergency room or if prior to that his urine continues to be like the 1st trip that he went this morning that he has to go directly to the emergency room tonight    He understands

## 2021-04-23 ENCOUNTER — TELEPHONE (OUTPATIENT)
Dept: FAMILY MEDICINE CLINIC | Facility: CLINIC | Age: 56
End: 2021-04-23

## 2021-04-23 LAB
APPEARANCE UR: CLEAR
BACTERIA URNS QL MICRO: NORMAL
BILIRUB UR QL STRIP: NEGATIVE
BUN SERPL-MCNC: 42 MG/DL (ref 6–24)
BUN/CREAT SERPL: 16 (ref 9–20)
CALCIUM SERPL-MCNC: 10 MG/DL (ref 8.7–10.2)
CHLORIDE SERPL-SCNC: 103 MMOL/L (ref 96–106)
CO2 SERPL-SCNC: 21 MMOL/L (ref 20–29)
COLOR UR: YELLOW
CREAT SERPL-MCNC: 2.59 MG/DL (ref 0.76–1.27)
EPI CELLS #/AREA URNS HPF: NORMAL /HPF (ref 0–10)
GLUCOSE SERPL-MCNC: 91 MG/DL (ref 65–99)
GLUCOSE UR QL: NEGATIVE
HGB UR QL STRIP: NEGATIVE
KETONES UR QL STRIP: NEGATIVE
LEUKOCYTE ESTERASE UR QL STRIP: NEGATIVE
MICRO URNS: NORMAL
MICRO URNS: NORMAL
NITRITE UR QL STRIP: NEGATIVE
PH UR STRIP: 6 [PH] (ref 5–7.5)
POTASSIUM SERPL-SCNC: 4.7 MMOL/L (ref 3.5–5.2)
PROT UR QL STRIP: NEGATIVE
RBC #/AREA URNS HPF: NORMAL /HPF (ref 0–2)
SL AMB EGFR AFRICAN AMERICAN: 31 ML/MIN/1.73
SL AMB EGFR NON AFRICAN AMERICAN: 27 ML/MIN/1.73
SODIUM SERPL-SCNC: 140 MMOL/L (ref 134–144)
SP GR UR: 1.01 (ref 1–1.03)
UROBILINOGEN UR STRIP-ACNC: 0.2 MG/DL (ref 0.2–1)
WBC #/AREA URNS HPF: NORMAL /HPF (ref 0–5)

## 2021-04-23 NOTE — TELEPHONE ENCOUNTER
Patient called today, BP monitor 2 hrs after meds in the AM/ PM  Urination slowly improving   Keep Flomax on, repeat eval in 1 month with Urology  Urology is stating urinary retention vs bladder neuropathy

## 2021-04-27 ENCOUNTER — TELEPHONE (OUTPATIENT)
Dept: NEPHROLOGY | Facility: CLINIC | Age: 56
End: 2021-04-27

## 2021-04-27 ENCOUNTER — DOCUMENTATION (OUTPATIENT)
Dept: NEPHROLOGY | Facility: CLINIC | Age: 56
End: 2021-04-27

## 2021-04-27 DIAGNOSIS — R79.89 ELEVATED SERUM CREATININE: Primary | ICD-10-CM

## 2021-04-27 LAB
EXT BILIRUBIN, UA: NORMAL
EXT BLOOD, UA: NORMAL
EXT COLOR, UA: YELLOW
EXT GLUCOSE BLD: 91
EXT GLUCOSE, UA: NORMAL
EXT KETONES: NORMAL
EXT NITRITE, UA: NORMAL
EXT PH, UA: 6
EXT PROTEIN, UA: NORMAL
EXT SPECIFIC GRAVITY, UA: 1.01
EXT UROBILINOGEN: 0.2
EXTERNAL BACTERIA (UA): NORMAL
EXTERNAL BUN: 42
EXTERNAL CALCIUM: 10
EXTERNAL CHLORIDE: 103
EXTERNAL CO2: 21
EXTERNAL CREATININE: 2.59
EXTERNAL EGFR: 27
EXTERNAL POTASSIUM: 4.7
EXTERNAL RBC (UA): NORMAL
EXTERNAL SODIUM: 140
EXTERNAL WBC (UA): NORMAL
WBC # BLD EST: NORMAL 10*3/UL

## 2021-04-27 NOTE — TELEPHONE ENCOUNTER
Follow up appt made, lab order mailed to pt  Wife aware       ----- Message from Maurice Ma MD sent at 4/27/2021  9:52 AM EDT -----  I would make a follow-up in 2 weeks and maybe repeat his BMP again prior to that visit  Thank you  ----- Message -----  From: Lidia Noel  Sent: 4/27/2021   9:13 AM EDT  To: Maurice Ma MD      Pt had labs 4/22/21  (logged on flowsheet)  CR better, 2 59  We did not schedule any followup or repeat labs until you reviewed these  What do you want to do?

## 2021-05-03 ENCOUNTER — TELEPHONE (OUTPATIENT)
Dept: FAMILY MEDICINE CLINIC | Facility: CLINIC | Age: 56
End: 2021-05-03

## 2021-05-03 DIAGNOSIS — R79.89 ABNORMAL SERUM CREATININE LEVEL: Primary | ICD-10-CM

## 2021-05-03 NOTE — TELEPHONE ENCOUNTER
Called and wife said he is going Thursday again for labs and to see the kidney specialist, wants to know if its really necessary

## 2021-05-03 NOTE — TELEPHONE ENCOUNTER
Oh that perfect   I didn't see the labs order by the kidney specilist  That is perfect no need for labs

## 2021-05-04 ENCOUNTER — OFFICE VISIT (OUTPATIENT)
Dept: FAMILY MEDICINE CLINIC | Facility: CLINIC | Age: 56
End: 2021-05-04
Payer: COMMERCIAL

## 2021-05-04 VITALS
RESPIRATION RATE: 16 BRPM | SYSTOLIC BLOOD PRESSURE: 154 MMHG | BODY MASS INDEX: 29.2 KG/M2 | DIASTOLIC BLOOD PRESSURE: 84 MMHG | WEIGHT: 215.6 LBS | HEART RATE: 86 BPM | TEMPERATURE: 98.2 F | HEIGHT: 72 IN | OXYGEN SATURATION: 98 %

## 2021-05-04 DIAGNOSIS — N39.3 URINARY INCONTINENCE, MALE, STRESS: ICD-10-CM

## 2021-05-04 DIAGNOSIS — I10 ESSENTIAL HYPERTENSION: Primary | ICD-10-CM

## 2021-05-04 DIAGNOSIS — N40.1 BENIGN PROSTATIC HYPERPLASIA WITH LOWER URINARY TRACT SYMPTOMS, SYMPTOM DETAILS UNSPECIFIED: ICD-10-CM

## 2021-05-04 DIAGNOSIS — R79.89 ABNORMAL SERUM CREATININE LEVEL: ICD-10-CM

## 2021-05-04 PROCEDURE — 99214 OFFICE O/P EST MOD 30 MIN: CPT | Performed by: FAMILY MEDICINE

## 2021-05-04 RX ORDER — AMLODIPINE BESYLATE 10 MG/1
10 TABLET ORAL DAILY
Qty: 90 TABLET | Refills: 0 | Status: SHIPPED | OUTPATIENT
Start: 2021-05-04 | End: 2021-06-18 | Stop reason: SDUPTHER

## 2021-05-04 NOTE — PROGRESS NOTES
Marisela Emmanuel 1965 male MRN: [id number]    [address] [facility]  Follow-up Visit    ASSESSMENT/PLAN  Marisela Emmanuel is a 54 y o  male  presents to the office for     Diagnoses and all orders for this visit:    Essential hypertension  -     metoprolol tartrate (LOPRESSOR) 25 mg tablet; Take 1 tablet (25 mg total) by mouth daily at bedtime  -     amLODIPine (NORVASC) 10 mg tablet; Take 1 tablet (10 mg total) by mouth daily    Urinary incontinence, male, stress    Benign prostatic hyperplasia with lower urinary tract symptoms, symptom details unspecified    Abnormal serum creatinine level     Continue on Amlodipine 10 mgs in AM; start Metoprolol 25 mgs at bedtime  If no improvement will increase to BID  Wife will bring in log next Monday  Urinary Incontinence 2/2 to BPH vs Neuropathy of the bladder, recommend  Continue Flomax, however symptoms aren't still improving will likely need adjustment to meds if CR doesn't respond  Patient with repeat BMP on Thursday per neurology      Disposition: Return to the office in 1 months    Health Maintence:             Future Appointments   Date Time Provider Benedicto Brown   5/10/2021 12:30 PM Jocelyn Rouse MD [address] [place] [address]   6/4/2021  8:00 AM Federica Villela MD [facility]          SUBJECTIVE  CC: Blood Pressure Check (patient here for BP check)      HPI:  Marisela Emmanuel is a 54 y o  male  presenting to the office for a follow up on BP  Patient recently had elevated creatinine level and has been seen by Nephrology Neurology  We stopped his losartan 100 mg and switch him to amlodipine 10 mg  Unfortunately his blood pressures continue  To not be well controlled  States that when he works he can get normal reading  Night time is worse then morning  LOG scanned in  Urine stream improving but not at its baseline  Taking flomax per urology  Might need an antispasmodic because of  His history of stroke per urology      Review of Systems   Constitutional: Negative for activity change, appetite change, chills, fatigue and fever  HENT: Negative for congestion  Respiratory: Negative for cough, chest tightness and shortness of breath  Cardiovascular: Negative for chest pain and leg swelling  Gastrointestinal: Negative for abdominal distention, abdominal pain, constipation, diarrhea, nausea and vomiting  Genitourinary: Positive for difficulty urinating  All other systems reviewed and are negative        Historical Information   The patient history was reviewed as follows:    Past Medical History:   Diagnosis Date    Hypertension     Stroke ([facility] ) 02/10/2019     Past Surgical History:   Procedure Laterality Date    NO PAST SURGERIES       Family History   Problem Relation Age of Onset    Diabetes type II Mother     Hypertension Father     Diabetes type II Father     Diabetes type II Sister     Hypertension Brother       Social History   Social History     Substance and Sexual Activity   Alcohol Use Yes    Alcohol/week: 6 0 standard drinks    Types: 6 Cans of beer per week    Frequency: 4 or more times a week    Drinks per session: 5 or 6    Binge frequency: Weekly     Social History     Substance and Sexual Activity   Drug Use Never     Social History     Tobacco Use   Smoking Status Never Smoker   Smokeless Tobacco Never Used       Medications:     Current Outpatient Medications:     amLODIPine (NORVASC) 10 mg tablet, Take 1 tablet (10 mg total) by mouth daily, Disp: 90 tablet, Rfl: 0    atorvastatin (LIPITOR) 80 mg tablet, TAKE ONE TABLET BY MOUTH EVERY DAY WITH DINNER, Disp: 90 tablet, Rfl: 3    clopidogrel (PLAVIX) 75 mg tablet, TAKE ONE TABLET BY MOUTH EVERY DAY, Disp: 90 tablet, Rfl: 2    folic acid (FOLVITE) 1 mg tablet, TAKE ONE TABLET BY MOUTH EVERY DAY (GENERIC FOR FOLVITE), Disp: 90 tablet, Rfl: 2    tamsulosin (FLOMAX) 0 4 mg, Take 1 capsule (0 4 mg total) by mouth daily with dinner, Disp: 30 capsule, Rfl: 2    thiamine (VITAMIN B1) 100 mg tablet, TAKE ONE TABLET BY MOUTH EVERY DAY, Disp: 90 tablet, Rfl: 3    metoprolol tartrate (LOPRESSOR) 25 mg tablet, Take 1 tablet (25 mg total) by mouth daily at bedtime, Disp: 30 tablet, Rfl: 0  No Known Allergies    OBJECTIVE    Vitals:   Vitals:    05/04/21 1517   BP: 154/84   BP Location: Left arm   Patient Position: Sitting   Cuff Size: Standard   Pulse: 86   Resp: 16   Temp: 98 2 °F (36 8 °C)   TempSrc: Temporal   SpO2: 98%   Weight: 97 8 kg (215 lb 9 6 oz)   Height: 6' (1 829 m)           Physical Exam  Vitals signs reviewed  Constitutional:       Appearance: He is well-developed  HENT:      Head: Normocephalic and atraumatic  Eyes:      Conjunctiva/sclera: Conjunctivae normal       Pupils: Pupils are equal, round, and reactive to light  Neck:      Musculoskeletal: Normal range of motion and neck supple  Cardiovascular:      Rate and Rhythm: Normal rate and regular rhythm  Heart sounds: Normal heart sounds  Pulmonary:      Effort: Pulmonary effort is normal  No respiratory distress  Breath sounds: Normal breath sounds  Musculoskeletal: Normal range of motion  Skin:     General: Skin is warm  Capillary Refill: Capillary refill takes less than 2 seconds  Neurological:      Mental Status: He is alert and oriented to person, place, and time  Psychiatric:         Mood and Affect: Mood normal          Thought Content:  Thought content normal             Genny Ventura MD  [address] [facility]

## 2021-05-06 LAB
BUN SERPL-MCNC: 30 MG/DL (ref 6–24)
BUN/CREAT SERPL: 10 (ref 9–20)
CALCIUM SERPL-MCNC: 9.7 MG/DL (ref 8.7–10.2)
CHLORIDE SERPL-SCNC: 107 MMOL/L (ref 96–106)
CO2 SERPL-SCNC: 22 MMOL/L (ref 20–29)
CREAT SERPL-MCNC: 3 MG/DL (ref 0.76–1.27)
GLUCOSE SERPL-MCNC: 95 MG/DL (ref 65–99)
POTASSIUM SERPL-SCNC: 4.8 MMOL/L (ref 3.5–5.2)
SL AMB EGFR AFRICAN AMERICAN: 26 ML/MIN/1.73
SL AMB EGFR NON AFRICAN AMERICAN: 22 ML/MIN/1.73
SODIUM SERPL-SCNC: 143 MMOL/L (ref 134–144)

## 2021-05-10 ENCOUNTER — OFFICE VISIT (OUTPATIENT)
Dept: NEPHROLOGY | Facility: CLINIC | Age: 56
End: 2021-05-10
Payer: COMMERCIAL

## 2021-05-10 VITALS
SYSTOLIC BLOOD PRESSURE: 158 MMHG | DIASTOLIC BLOOD PRESSURE: 74 MMHG | HEIGHT: 72 IN | BODY MASS INDEX: 29.8 KG/M2 | WEIGHT: 220 LBS

## 2021-05-10 DIAGNOSIS — N17.9 AKI (ACUTE KIDNEY INJURY) (HCC): Primary | ICD-10-CM

## 2021-05-10 PROCEDURE — 99214 OFFICE O/P EST MOD 30 MIN: CPT | Performed by: INTERNAL MEDICINE

## 2021-05-10 NOTE — PATIENT INSTRUCTIONS
1  )  Low 2 g sodium diet    2 )  Monitor weights at home    3 )  Avoid NSAIDs (ibuprofen, Motrin, Advil, Aleve, naproxen)    4 )  Monitor blood pressure at home, call if blood pressure greater than 150/90 persistently    5 ) I will plan to discuss all results including blood work, and/or imaging at our next visit, unless there is an urgent indication, in which case I will call you earlier  If you have any questions or concerns about your results, please feel free to call our office      6 ) If you notice you stop making urine, go to ER

## 2021-05-10 NOTE — PROGRESS NOTES
NEPHROLOGY OFFICE VISIT   Radha Justin 54 y o  male MRN: [id number]  5/10/2021    Reason for Visit:  Acute kidney injury    History of Present Illness (HPI):    I had the pleasure of seeing Efren Stnaley today in the renal clinic for the continued management of acute kidney injury  Since our last visit, there has been no ER visits or hospitilizations  He currently has no complaints at this time and is feeling well  Patient denies any chest pain, shortness of breath and swelling  The last blood work was done on last week, which we have reviewed together  Was seen by Urology for weak urinary stream and frequency which has been going on for the past 8 months  Was seen by Urology for the 1st time on April 22nd  Was started on Flomax  Office bladder scan showed a postvoid residual of 453 mL, PSA was normal at 0 8  At her last telemedicine visit his creatinine improved to 2 5 mg/dL but is now worsening to 3 mg/dL  Hyperkalemia now resolved  Off angiotensin receptor blocker  Patient is urinating approximately half a cup which would be approximately 200-250 cc and is going 10-12 times a day  He initiated tamsulosin and reported some improvement but then worsening of his symptoms with no improvement of his urinary stream   I told him to call the urologist to let him know about the medication not being effective  ASSESSMENT and PLAN:    Acute kidney injury  --severe Stage III  --baseline creatinine 1-1 2 mg/dL  --if no urine output reported or have symptoms of uremia history to the emergency room immediately  --agree with discontinuing losartan  --check a dedicated renal ultrasound  --elevated  mL  --continued follow-up with Urology    I asked him to call the urologist let them know that the tamsulosin is no longer working  --avoid NSAIDs  --encouraged maintaining good oral intake  --please avoid contrast studies  --if his repeat blood work shows worsening renal function he will need to go to the emergency room  --urinalysis showed no evidence of microscopic hematuria or proteinuria     Hypertension  --continue amlodipine, currently off losartan  --urinary retention can also cause elevated blood pressure     Abnormal renal function test  --may have some underlying degree of mild renal dysfunction likely stage I versus stage II secondary to hypertension and possible chronic symptoms of urinary retention along with some age-related nephron loss  --baseline creatinine 1-1 2 mg/dL     Hyperkalemia  --resolved  --secondary to acute kidney injury in the setting of acute urinary retention  --off angiotensin receptor blocker      PATIENT INSTRUCTIONS:    Patient Instructions   1 )  Low 2 g sodium diet    2 )  Monitor weights at home    3 )  Avoid NSAIDs (ibuprofen, Motrin, Advil, Aleve, naproxen)    4 )  Monitor blood pressure at home, call if blood pressure greater than 150/90 persistently    5 ) I will plan to discuss all results including blood work, and/or imaging at our next visit, unless there is an urgent indication, in which case I will call you earlier  If you have any questions or concerns about your results, please feel free to call our office  6 ) If you notice you stop making urine, go to ER              Orders Placed This Encounter   Procedures    US kidney and bladder     Standing Status:   Future     Standing Expiration Date:   5/10/2025     Scheduling Instructions:      13 years and Up - 1)  Full bladder required  2)  Please drink 24-32 oz of water 1 hour prior to appointment time  3)  No voiding 1 hour prior to appointment time  "Prep required if being scheduled in conjunction withother studies, refer to those examination's Preps first before scheduling  All patients for US Kidney and Bladder they must drink 24 oz of water 60 minutes before your scheduled appointment time  This test requiresyou to have a FULL bladder   Please do not urinate before your test             Please bring your insurance cards, a form of photo ID and a list of your medications with you  Arrive 15 minutes prior to yourappointment time in order to register  If you need to have lab work or a urinalysis, please do this AFTER your ultrasound  To schedulethis appointment, please contact Central Scheduling at ([phone]  Order Specific Question:   Is a Renal Artery Doppler also being requested in addition to the Kidney/Renal ultrasound ? Answer:   No    Basic metabolic panel     This is a patient instruction: Patient fasting for 8 hours or longer recommended  Standing Status:   Future     Number of Occurrences:   1     Standing Expiration Date:   5/10/2022       OBJECTIVE:  Current Weight: Weight - Scale: 99 8 kg (220 lb)  Vitals:    05/10/21 1227   Weight: 99 8 kg (220 lb)   Height: 6' (1 829 m)    Body mass index is 29 84 kg/m²  REVIEW OF SYSTEMS:    Review of Systems   Constitutional: Negative for activity change and fever  Respiratory: Negative for cough, chest tightness, shortness of breath and wheezing  Cardiovascular: Negative for chest pain and leg swelling  Gastrointestinal: Negative for abdominal pain, diarrhea, nausea and vomiting  Endocrine: Negative for polyuria  Genitourinary: Positive for difficulty urinating and frequency  Negative for dysuria, flank pain and urgency  Skin: Negative for rash  Neurological: Negative for dizziness, syncope, light-headedness and headaches  PHYSICAL EXAM:      Physical Exam  Vitals signs and nursing note reviewed  Exam conducted with a chaperone present  Constitutional:       General: He is not in acute distress  Appearance: He is well-developed  HENT:      Head: Normocephalic and atraumatic  Eyes:      General: No scleral icterus  Conjunctiva/sclera: Conjunctivae normal       Pupils: Pupils are equal, round, and reactive to light  Neck:      Musculoskeletal: Normal range of motion and neck supple  Cardiovascular:      Rate and Rhythm: Normal rate and regular rhythm  Heart sounds: S1 normal and S2 normal  No murmur  No friction rub  No gallop  Pulmonary:      Effort: Pulmonary effort is normal  No respiratory distress  Breath sounds: Normal breath sounds  No wheezing or rales  Abdominal:      Palpations: Abdomen is soft  Tenderness: There is no abdominal tenderness  There is no rebound  Musculoskeletal: Normal range of motion  Skin:     Findings: No rash  Neurological:      Mental Status: He is alert and oriented to person, place, and time     Psychiatric:         Behavior: Behavior normal          Medications:    Current Outpatient Medications:     amLODIPine (NORVASC) 10 mg tablet, Take 1 tablet (10 mg total) by mouth daily, Disp: 90 tablet, Rfl: 0    atorvastatin (LIPITOR) 80 mg tablet, TAKE ONE TABLET BY MOUTH EVERY DAY WITH DINNER, Disp: 90 tablet, Rfl: 3    clopidogrel (PLAVIX) 75 mg tablet, TAKE ONE TABLET BY MOUTH EVERY DAY, Disp: 90 tablet, Rfl: 2    folic acid (FOLVITE) 1 mg tablet, TAKE ONE TABLET BY MOUTH EVERY DAY (GENERIC FOR FOLVITE), Disp: 90 tablet, Rfl: 2    metoprolol tartrate (LOPRESSOR) 25 mg tablet, Take 1 tablet (25 mg total) by mouth daily at bedtime, Disp: 30 tablet, Rfl: 0    tamsulosin (FLOMAX) 0 4 mg, Take 1 capsule (0 4 mg total) by mouth daily with dinner, Disp: 30 capsule, Rfl: 2    thiamine (VITAMIN B1) 100 mg tablet, TAKE ONE TABLET BY MOUTH EVERY DAY, Disp: 90 tablet, Rfl: 3    Laboratory Results:  Results from last 7 days   Lab Units 05/06/21  0715   POTASSIUM mmol/L 4 8   CHLORIDE mmol/L 107*   CO2 mmol/L 22   BUN mg/dL 30*   CREATININE mg/dL 3 00*       Results for orders placed or performed in visit on 29/37/09   Basic metabolic panel   Result Value Ref Range    Glucose, Random 95 65 - 99 mg/dL    BUN 30 (H) 6 - 24 mg/dL    Creatinine 3 00 (H) 0 76 - 1 27 mg/dL    eGFR Non African American 22 (L) >59 mL/min/1 73    eGFR  26 (L) >59 mL/min/1 73    SL AMB BUN/CREATININE RATIO 10 9 - 20    Sodium 143 134 - 144 mmol/L    Potassium 4 8 3 5 - 5 2 mmol/L    Chloride 107 (H) 96 - 106 mmol/L    CO2 22 20 - 29 mmol/L    CALCIUM 9 7 8 7 - 10 2 mg/dL

## 2021-05-17 ENCOUNTER — HOSPITAL ENCOUNTER (OUTPATIENT)
Dept: RADIOLOGY | Facility: HOSPITAL | Age: 56
Discharge: HOME | End: 2021-05-17
Attending: INTERNAL MEDICINE
Payer: COMMERCIAL

## 2021-05-17 DIAGNOSIS — N17.9 AKI (ACUTE KIDNEY INJURY) (HCC): ICD-10-CM

## 2021-05-17 PROCEDURE — 76770 US EXAM ABDO BACK WALL COMP: CPT

## 2021-05-20 DIAGNOSIS — I63.512 CEREBROVASCULAR ACCIDENT (CVA) DUE TO OCCLUSION OF LEFT MIDDLE CEREBRAL ARTERY (HCC): ICD-10-CM

## 2021-05-20 RX ORDER — ATORVASTATIN CALCIUM 80 MG/1
TABLET, FILM COATED ORAL
Qty: 90 TABLET | Refills: 3 | Status: SHIPPED | OUTPATIENT
Start: 2021-05-20 | End: 2021-05-21 | Stop reason: SDUPTHER

## 2021-05-20 RX ORDER — ATORVASTATIN CALCIUM 80 MG/1
TABLET, FILM COATED ORAL
Qty: 90 TABLET | Refills: 3 | Status: SHIPPED | OUTPATIENT
Start: 2021-05-20 | End: 2021-05-20

## 2021-05-21 DIAGNOSIS — I63.512 CEREBROVASCULAR ACCIDENT (CVA) DUE TO OCCLUSION OF LEFT MIDDLE CEREBRAL ARTERY (HCC): ICD-10-CM

## 2021-05-21 RX ORDER — ATORVASTATIN CALCIUM 80 MG/1
80 TABLET, FILM COATED ORAL
Qty: 90 TABLET | Refills: 3 | Status: SHIPPED | OUTPATIENT
Start: 2021-05-21 | End: 2022-05-14

## 2021-05-21 NOTE — TELEPHONE ENCOUNTER
I have called this in almost 3 times this week    Are we able to: See why they keep sending me the same request over and over again

## 2021-05-22 ENCOUNTER — TELEPHONE (OUTPATIENT)
Dept: FAMILY MEDICINE CLINIC | Facility: CLINIC | Age: 56
End: 2021-05-22

## 2021-05-24 ENCOUNTER — TELEPHONE (OUTPATIENT)
Dept: OTHER | Facility: HOSPITAL | Age: 56
End: 2021-05-24

## 2021-05-24 NOTE — TELEPHONE ENCOUNTER
Renal    Spoke to wife about renal US results  Severe bilateral hydronephrosis, chronic bladder outlet obstruction  Has an appointment with Urology today  I told her he will likely require rosales catheter placement to help relieve obstruction, and possible hospitalization

## 2021-06-04 ENCOUNTER — OFFICE VISIT (OUTPATIENT)
Dept: FAMILY MEDICINE CLINIC | Facility: CLINIC | Age: 56
End: 2021-06-04
Payer: COMMERCIAL

## 2021-06-04 VITALS
SYSTOLIC BLOOD PRESSURE: 132 MMHG | BODY MASS INDEX: 27.63 KG/M2 | TEMPERATURE: 97.5 F | OXYGEN SATURATION: 100 % | HEIGHT: 72 IN | HEART RATE: 79 BPM | WEIGHT: 204 LBS | RESPIRATION RATE: 16 BRPM | DIASTOLIC BLOOD PRESSURE: 80 MMHG

## 2021-06-04 DIAGNOSIS — N40.1 BENIGN PROSTATIC HYPERPLASIA WITH LOWER URINARY TRACT SYMPTOMS, SYMPTOM DETAILS UNSPECIFIED: ICD-10-CM

## 2021-06-04 DIAGNOSIS — I10 ESSENTIAL HYPERTENSION: Primary | ICD-10-CM

## 2021-06-04 DIAGNOSIS — R79.89 ABNORMAL SERUM CREATININE LEVEL: ICD-10-CM

## 2021-06-04 PROCEDURE — 99214 OFFICE O/P EST MOD 30 MIN: CPT | Performed by: FAMILY MEDICINE

## 2021-06-05 DIAGNOSIS — R79.89 ABNORMAL SERUM CREATININE LEVEL: Primary | ICD-10-CM

## 2021-06-05 LAB
BUN SERPL-MCNC: 35 MG/DL (ref 6–24)
BUN/CREAT SERPL: 11 (ref 9–20)
CALCIUM SERPL-MCNC: 9.8 MG/DL (ref 8.7–10.2)
CHLORIDE SERPL-SCNC: 107 MMOL/L (ref 96–106)
CO2 SERPL-SCNC: 19 MMOL/L (ref 20–29)
CREAT SERPL-MCNC: 3.17 MG/DL (ref 0.76–1.27)
GLUCOSE SERPL-MCNC: 100 MG/DL (ref 65–99)
MICRODELETION SYND BLD/T FISH: NORMAL
POTASSIUM SERPL-SCNC: 5.4 MMOL/L (ref 3.5–5.2)
SL AMB EGFR AFRICAN AMERICAN: 24 ML/MIN/1.73
SL AMB EGFR NON AFRICAN AMERICAN: 21 ML/MIN/1.73
SODIUM SERPL-SCNC: 143 MMOL/L (ref 134–144)

## 2021-06-06 RX ORDER — DOXAZOSIN 2 MG/1
4 TABLET ORAL
COMMUNITY
End: 2021-08-26

## 2021-06-07 ENCOUNTER — TELEPHONE (OUTPATIENT)
Dept: FAMILY MEDICINE CLINIC | Facility: CLINIC | Age: 56
End: 2021-06-07

## 2021-06-07 DIAGNOSIS — I10 ESSENTIAL HYPERTENSION: ICD-10-CM

## 2021-06-07 NOTE — PROGRESS NOTES
Luis M Lr 1965 male MRN: [id number]    [facility] PRACTICE OFFICE VISIT  [facility] - [address]      ASSESSMENT/PLAN  Luis M Lr is a 54 y o  male presents to the office for     Diagnoses and all orders for this visit:    Essential hypertension  -     metoprolol tartrate (LOPRESSOR) 25 mg tablet; Take 1 tablet (25 mg total) by mouth every 12 (twelve) hours    Abnormal serum creatinine level  -     Basic metabolic panel; Future  -     Basic metabolic panel    Benign prostatic hyperplasia with lower urinary tract symptoms, symptom details unspecified    Other orders  -     [facility] Kidney Stone Panel       Hypertension:  - increase Metoprolol to 25 mgs BID to help with BP goal  Hx of CVA range goal [phone] systolic  Family aware to continue taking Bp at home to monitor  Continue amlodipine 10 mg daily   -Reviewed BP log  - Encourage low NA diet  - Encourage exercise  BPH and Abnormal creatinine Pre renal vs obstruction; being managed by Nephro and Urology  BW performed here at the office  GREAT job with weight loss  ADDENDUM: Daughter came in for her check up  And was advised that he is taking Doxazosin 2mg and will be switching to 4mg soon  Advised wife  Take metoprolol 25mgs daily rather then BID given with the increase to 4mg this will help his BP  Disposition: Return to the office in 1 month if symptoms worsen  Future Appointments   Date Time Provider [facility] Stephanie   7/2/2021  9:45 AM Nelida Jarvis MD [address] [place] [address]   7/9/2021  3:30 PM Cristina Mancuso MD [facility]          SUBJECTIVE  CC: Blood Pressure Check (patient here for blood pressure check)      HPI:  Luis M Lr is a 54 y o  male who presents for a blood pressure check  Hypertension: Patient currently taking medications as prescribed without any s/e or concerns  Does take BP at home   Currently denies any dizziness, headache, visual changes, weakness, dyspnea, chest pain, palpitations, confusion  Home log at home HIGH at bedtime 160/90; sometimes also in the AM  Patient states he isn't urinating normal still  Was just switched to an oral medication that we do not know what it is called  KNows its 2 mg  Patient no longer drinking ETOH  Being managed by nephrology and urology at this time  Denies any other complaints or concerns at this time    Review of Systems   Constitutional: Negative for activity change, appetite change, chills, fatigue and fever  HENT: Negative for congestion  Respiratory: Negative for cough, chest tightness and shortness of breath  Cardiovascular: Negative for chest pain and leg swelling  Gastrointestinal: Negative for abdominal distention, abdominal pain, constipation, diarrhea, nausea and vomiting  Genitourinary: Positive for difficulty urinating  All other systems reviewed and are negative        Historical Information   The patient history was reviewed as follows:  Past Medical History:   Diagnosis Date    Hypertension     [facility]         Medications:     Current Outpatient Medications:     amLODIPine (NORVASC) 10 mg tablet, Take 1 tablet (10 mg total) by mouth daily, Disp: 90 tablet, Rfl: 0    atorvastatin (LIPITOR) 80 mg tablet, Take 1 tablet (80 mg total) by mouth daily with dinner, Disp: 90 tablet, Rfl: 3    clopidogrel (PLAVIX) 75 mg tablet, TAKE ONE TABLET BY MOUTH EVERY DAY, Disp: 90 tablet, Rfl: 2    folic acid (FOLVITE) 1 mg tablet, TAKE ONE TABLET BY MOUTH EVERY DAY (GENERIC FOR FOLVITE), Disp: 90 tablet, Rfl: 2    metoprolol tartrate (LOPRESSOR) 25 mg tablet, Take 1 tablet (25 mg total) by mouth every 12 (twelve) hours, Disp: 60 tablet, Rfl: 1    tamsulosin (FLOMAX) 0 4 mg, Take 1 capsule (0 4 mg total) by mouth daily with dinner, Disp: 30 capsule, Rfl: 2    thiamine (VITAMIN B1) 100 mg tablet, TAKE ONE TABLET BY MOUTH EVERY DAY, Disp: 90 tablet, Rfl: 3    No Known Allergies    OBJECTIVE  Vitals:   Vitals: 06/04/21 0802   BP: 132/80   BP Location: Left arm   Patient Position: Sitting   Cuff Size: Standard   Pulse: 79   Resp: 16   Temp: 97 5 °F (36 4 °C)   TempSrc: Temporal   SpO2: 100%   Weight: 92 5 kg (204 lb)   Height: 6' (1 829 m)         Physical Exam  Vitals signs reviewed  Constitutional:       Appearance: Normal appearance  He is well-developed  HENT:      Head: Normocephalic and atraumatic  Eyes:      Conjunctiva/sclera: Conjunctivae normal       Pupils: Pupils are equal, round, and reactive to light  Neck:      Musculoskeletal: Normal range of motion and neck supple  Cardiovascular:      Rate and Rhythm: Normal rate and regular rhythm  Heart sounds: Normal heart sounds  Pulmonary:      Effort: Pulmonary effort is normal  No respiratory distress  Breath sounds: Normal breath sounds  Musculoskeletal: Normal range of motion  Skin:     General: Skin is warm  Capillary Refill: Capillary refill takes less than 2 seconds  Neurological:      General: No focal deficit present  Mental Status: He is alert and oriented to person, place, and time  Psychiatric:         Mood and Affect: Mood normal          Behavior: Behavior normal          Thought Content:  Thought content normal          Judgment: Judgment normal                     Gail Razo MD,   [facility]  6/6/2021

## 2021-06-07 NOTE — TELEPHONE ENCOUNTER
Pts wife wants to speak to you regarding husbands BP it was 129/63 which she stated is on the lower side  Pt is to start  4mg Cardura tonight  Pts wife just wants to make sure it is ok

## 2021-06-07 NOTE — TELEPHONE ENCOUNTER
I need him to cut the amlodipine in 1/2 to 5mgs  And start the Cardura 4mgs  Given stroke history we need him around [phone] systolic per his neurologist  So they were right in calling us today  Recommend monitoring BP like he has been doing

## 2021-06-08 ENCOUNTER — TELEPHONE (OUTPATIENT)
Dept: FAMILY MEDICINE CLINIC | Facility: CLINIC | Age: 56
End: 2021-06-08

## 2021-06-09 ENCOUNTER — HOSPITAL ENCOUNTER (INPATIENT)
Facility: HOSPITAL | Age: 56
LOS: 5 days | Discharge: HOME | DRG: 308 | End: 2021-06-14
Attending: EMERGENCY MEDICINE | Admitting: FAMILY MEDICINE
Payer: COMMERCIAL

## 2021-06-09 ENCOUNTER — APPOINTMENT (EMERGENCY)
Dept: RADIOLOGY | Facility: HOSPITAL | Age: 56
DRG: 308 | End: 2021-06-09
Payer: COMMERCIAL

## 2021-06-09 ENCOUNTER — TELEPHONE (OUTPATIENT)
Dept: FAMILY MEDICINE CLINIC | Facility: CLINIC | Age: 56
End: 2021-06-09

## 2021-06-09 DIAGNOSIS — N39.43 BENIGN PROSTATIC HYPERPLASIA WITH POST-VOID DRIBBLING: ICD-10-CM

## 2021-06-09 DIAGNOSIS — N13.30 HYDRONEPHROSIS, UNSPECIFIED HYDRONEPHROSIS TYPE: ICD-10-CM

## 2021-06-09 DIAGNOSIS — N17.9 AKI (ACUTE KIDNEY INJURY) (HCC): Primary | ICD-10-CM

## 2021-06-09 DIAGNOSIS — R33.8 OTHER RETENTION OF URINE: ICD-10-CM

## 2021-06-09 DIAGNOSIS — K59.00 CONSTIPATION: ICD-10-CM

## 2021-06-09 DIAGNOSIS — R79.89 ABNORMAL SERUM CREATININE LEVEL: Primary | ICD-10-CM

## 2021-06-09 DIAGNOSIS — N32.0 BLADDER OUTLET OBSTRUCTION: ICD-10-CM

## 2021-06-09 DIAGNOSIS — N39.3 URINARY INCONTINENCE, MALE, STRESS: ICD-10-CM

## 2021-06-09 DIAGNOSIS — N40.1 BENIGN PROSTATIC HYPERPLASIA WITH POST-VOID DRIBBLING: ICD-10-CM

## 2021-06-09 DIAGNOSIS — N13.30 HYDRONEPHROSIS: ICD-10-CM

## 2021-06-09 DIAGNOSIS — R33.8 ACUTE URINARY RETENTION: ICD-10-CM

## 2021-06-09 PROBLEM — D64.9 ANEMIA: Status: ACTIVE | Noted: 2021-06-09

## 2021-06-09 LAB
ALBUMIN SERPL BCP-MCNC: 3.5 G/DL (ref 3.5–5)
ALP SERPL-CCNC: 105 U/L (ref 46–116)
ALT SERPL W P-5'-P-CCNC: 26 U/L (ref 12–78)
ANION GAP SERPL CALCULATED.3IONS-SCNC: 11 MMOL/L (ref 4–13)
APTT PPP: 31 SECONDS (ref 23–37)
AST SERPL W P-5'-P-CCNC: 18 U/L (ref 5–45)
BACTERIA UR QL AUTO: NORMAL /HPF
BASOPHILS # BLD AUTO: 0.02 THOUSANDS/ΜL (ref 0–0.1)
BASOPHILS NFR BLD AUTO: 0 % (ref 0–1)
BILIRUB SERPL-MCNC: 0.31 MG/DL (ref 0.2–1)
BILIRUB UR QL STRIP: NEGATIVE
BUN SERPL-MCNC: 44 MG/DL (ref 5–25)
BUN SERPL-MCNC: 45 MG/DL (ref 6–24)
BUN/CREAT SERPL: 12 (ref 9–20)
CALCIUM SERPL-MCNC: 9.1 MG/DL (ref 8.3–10.1)
CALCIUM SERPL-MCNC: 9.7 MG/DL (ref 8.7–10.2)
CHLORIDE SERPL-SCNC: 105 MMOL/L (ref 96–106)
CHLORIDE SERPL-SCNC: 107 MMOL/L (ref 100–108)
CLARITY UR: CLEAR
CO2 SERPL-SCNC: 18 MMOL/L (ref 20–29)
CO2 SERPL-SCNC: 23 MMOL/L (ref 21–32)
COLOR UR: ABNORMAL
CREAT SERPL-MCNC: 3.63 MG/DL (ref 0.76–1.27)
CREAT SERPL-MCNC: 3.69 MG/DL (ref 0.6–1.3)
EOSINOPHIL # BLD AUTO: 0.1 THOUSAND/ΜL (ref 0–0.61)
EOSINOPHIL NFR BLD AUTO: 2 % (ref 0–6)
ERYTHROCYTE [DISTWIDTH] IN BLOOD BY AUTOMATED COUNT: 12.5 % (ref 11.6–15.1)
GFR SERPL CREATININE-BSD FRML MDRD: 17 ML/MIN/1.73SQ M
GLUCOSE SERPL-MCNC: 86 MG/DL (ref 65–99)
GLUCOSE SERPL-MCNC: 93 MG/DL (ref 65–140)
GLUCOSE UR STRIP-MCNC: NEGATIVE MG/DL
HCT VFR BLD AUTO: 29.5 % (ref 36.5–49.3)
HGB BLD-MCNC: 9.7 G/DL (ref 12–17)
HGB UR QL STRIP.AUTO: NEGATIVE
IMM GRANULOCYTES # BLD AUTO: 0.02 THOUSAND/UL (ref 0–0.2)
IMM GRANULOCYTES NFR BLD AUTO: 0 % (ref 0–2)
INR PPP: 1.03 (ref 0.84–1.19)
KETONES UR STRIP-MCNC: NEGATIVE MG/DL
LEUKOCYTE ESTERASE UR QL STRIP: ABNORMAL
LYMPHOCYTES # BLD AUTO: 1.4 THOUSANDS/ΜL (ref 0.6–4.47)
LYMPHOCYTES NFR BLD AUTO: 21 % (ref 14–44)
MCH RBC QN AUTO: 28.2 PG (ref 26.8–34.3)
MCHC RBC AUTO-ENTMCNC: 32.9 G/DL (ref 31.4–37.4)
MCV RBC AUTO: 86 FL (ref 82–98)
MICRODELETION SYND BLD/T FISH: NORMAL
MONOCYTES # BLD AUTO: 0.48 THOUSAND/ΜL (ref 0.17–1.22)
MONOCYTES NFR BLD AUTO: 7 % (ref 4–12)
NEUTROPHILS # BLD AUTO: 4.59 THOUSANDS/ΜL (ref 1.85–7.62)
NEUTS SEG NFR BLD AUTO: 70 % (ref 43–75)
NITRITE UR QL STRIP: NEGATIVE
NON-SQ EPI CELLS URNS QL MICRO: NORMAL /HPF
NRBC BLD AUTO-RTO: 0 /100 WBCS
PH UR STRIP.AUTO: 6 [PH]
PLATELET # BLD AUTO: 220 THOUSANDS/UL (ref 149–390)
PMV BLD AUTO: 9.3 FL (ref 8.9–12.7)
POTASSIUM SERPL-SCNC: 4.8 MMOL/L (ref 3.5–5.3)
POTASSIUM SERPL-SCNC: 5.2 MMOL/L (ref 3.5–5.2)
PROT SERPL-MCNC: 7 G/DL (ref 6.4–8.2)
PROT UR STRIP-MCNC: NEGATIVE MG/DL
PROTHROMBIN TIME: 13.4 SECONDS (ref 11.6–14.5)
RBC # BLD AUTO: 3.44 MILLION/UL (ref 3.88–5.62)
RBC #/AREA URNS AUTO: NORMAL /HPF
SARS-COV-2 RNA RESP QL NAA+PROBE: NEGATIVE
SL AMB EGFR AFRICAN AMERICAN: 21 ML/MIN/1.73
SL AMB EGFR NON AFRICAN AMERICAN: 18 ML/MIN/1.73
SODIUM SERPL-SCNC: 140 MMOL/L (ref 134–144)
SODIUM SERPL-SCNC: 141 MMOL/L (ref 136–145)
SP GR UR STRIP.AUTO: 1.01 (ref 1–1.03)
UROBILINOGEN UR QL STRIP.AUTO: 0.2 E.U./DL
WBC # BLD AUTO: 6.61 THOUSAND/UL (ref 4.31–10.16)
WBC #/AREA URNS AUTO: NORMAL /HPF

## 2021-06-09 PROCEDURE — U0005 INFEC AGEN DETEC AMPLI PROBE: HCPCS | Performed by: SPECIALIST

## 2021-06-09 PROCEDURE — 85610 PROTHROMBIN TIME: CPT | Performed by: EMERGENCY MEDICINE

## 2021-06-09 PROCEDURE — 99223 1ST HOSP IP/OBS HIGH 75: CPT | Performed by: FAMILY MEDICINE

## 2021-06-09 PROCEDURE — G1004 CDSM NDSC: HCPCS

## 2021-06-09 PROCEDURE — 36415 COLL VENOUS BLD VENIPUNCTURE: CPT | Performed by: EMERGENCY MEDICINE

## 2021-06-09 PROCEDURE — 81001 URINALYSIS AUTO W/SCOPE: CPT | Performed by: EMERGENCY MEDICINE

## 2021-06-09 PROCEDURE — 99285 EMERGENCY DEPT VISIT HI MDM: CPT | Performed by: EMERGENCY MEDICINE

## 2021-06-09 PROCEDURE — 99285 EMERGENCY DEPT VISIT HI MDM: CPT

## 2021-06-09 PROCEDURE — 85025 COMPLETE CBC W/AUTO DIFF WBC: CPT | Performed by: EMERGENCY MEDICINE

## 2021-06-09 PROCEDURE — 85730 THROMBOPLASTIN TIME PARTIAL: CPT | Performed by: EMERGENCY MEDICINE

## 2021-06-09 PROCEDURE — 51798 US URINE CAPACITY MEASURE: CPT

## 2021-06-09 PROCEDURE — 74176 CT ABD & PELVIS W/O CONTRAST: CPT

## 2021-06-09 PROCEDURE — 80053 COMPREHEN METABOLIC PANEL: CPT | Performed by: EMERGENCY MEDICINE

## 2021-06-09 PROCEDURE — U0003 INFECTIOUS AGENT DETECTION BY NUCLEIC ACID (DNA OR RNA); SEVERE ACUTE RESPIRATORY SYNDROME CORONAVIRUS 2 (SARS-COV-2) (CORONAVIRUS DISEASE [COVID-19]), AMPLIFIED PROBE TECHNIQUE, MAKING USE OF HIGH THROUGHPUT TECHNOLOGIES AS DESCRIBED BY CMS-2020-01-R: HCPCS | Performed by: SPECIALIST

## 2021-06-09 PROCEDURE — 99245 OFF/OP CONSLTJ NEW/EST HI 55: CPT | Performed by: INTERNAL MEDICINE

## 2021-06-09 RX ORDER — HEPARIN SODIUM 5000 [USP'U]/ML
5000 INJECTION, SOLUTION INTRAVENOUS; SUBCUTANEOUS EVERY 8 HOURS SCHEDULED
Status: DISCONTINUED | OUTPATIENT
Start: 2021-06-09 | End: 2021-06-14 | Stop reason: HOSPADM

## 2021-06-09 RX ORDER — ATORVASTATIN CALCIUM 80 MG/1
80 TABLET, FILM COATED ORAL
Status: DISCONTINUED | OUTPATIENT
Start: 2021-06-09 | End: 2021-06-14 | Stop reason: HOSPADM

## 2021-06-09 RX ORDER — FOLIC ACID 1 MG/1
1 TABLET ORAL DAILY
Status: DISCONTINUED | OUTPATIENT
Start: 2021-06-10 | End: 2021-06-14 | Stop reason: HOSPADM

## 2021-06-09 RX ORDER — LANOLIN ALCOHOL/MO/W.PET/CERES
100 CREAM (GRAM) TOPICAL DAILY
Status: DISCONTINUED | OUTPATIENT
Start: 2021-06-10 | End: 2021-06-14 | Stop reason: HOSPADM

## 2021-06-09 RX ORDER — CLOPIDOGREL BISULFATE 75 MG/1
75 TABLET ORAL DAILY
Status: DISCONTINUED | OUTPATIENT
Start: 2021-06-10 | End: 2021-06-14 | Stop reason: HOSPADM

## 2021-06-09 RX ORDER — SODIUM CHLORIDE, SODIUM GLUCONATE, SODIUM ACETATE, POTASSIUM CHLORIDE, MAGNESIUM CHLORIDE, SODIUM PHOSPHATE, DIBASIC, AND POTASSIUM PHOSPHATE .53; .5; .37; .037; .03; .012; .00082 G/100ML; G/100ML; G/100ML; G/100ML; G/100ML; G/100ML; G/100ML
60 INJECTION, SOLUTION INTRAVENOUS CONTINUOUS
Status: DISCONTINUED | OUTPATIENT
Start: 2021-06-09 | End: 2021-06-13

## 2021-06-09 RX ORDER — DOXAZOSIN 2 MG/1
4 TABLET ORAL
Status: DISCONTINUED | OUTPATIENT
Start: 2021-06-09 | End: 2021-06-14 | Stop reason: HOSPADM

## 2021-06-09 RX ORDER — AMLODIPINE BESYLATE 5 MG/1
5 TABLET ORAL DAILY
Status: DISCONTINUED | OUTPATIENT
Start: 2021-06-10 | End: 2021-06-14 | Stop reason: HOSPADM

## 2021-06-09 RX ADMIN — DOXAZOSIN 4 MG: 2 TABLET ORAL at 22:33

## 2021-06-09 RX ADMIN — HEPARIN SODIUM 5000 UNITS: 5000 INJECTION INTRAVENOUS; SUBCUTANEOUS at 22:33

## 2021-06-09 RX ADMIN — METOPROLOL TARTRATE 25 MG: 25 TABLET, FILM COATED ORAL at 22:33

## 2021-06-09 RX ADMIN — HEPARIN SODIUM 5000 UNITS: 5000 INJECTION INTRAVENOUS; SUBCUTANEOUS at 17:34

## 2021-06-09 RX ADMIN — ATORVASTATIN CALCIUM 80 MG: 80 TABLET ORAL at 17:34

## 2021-06-09 NOTE — ASSESSMENT & PLAN NOTE
Continue Norvasc, cardura and Lopressor    Continue to hold losartan in the setting of acute kidney injury

## 2021-06-09 NOTE — ED PROVIDER NOTES
History  Chief Complaint   Patient presents with    Abnormal Lab     Patient had blood work done on Monday and today was called and sent to ED for creat  3 6     55 yo male sent in by his PCP and nephrologist for worsening Cr  Had bloodwork done 3 days ago  Has h/o bladder outlet obstruction and hydronephrosis over the last few months  Initially did well with medicine prescribed by Dr Isadora Brittle but now getting worse again  No fever, cough, vomiting, diarrhea, pain  Did just finish abx 2 days ago for UTI  History provided by:  Patient and spouse   used: No        Prior to Admission Medications   Prescriptions Last Dose Informant Patient Reported? Taking?    amLODIPine (NORVASC) 10 mg tablet 6/9/2021 at 0730 Self No Yes   Sig: Take 1 tablet (10 mg total) by mouth daily   Patient taking differently: Take 5 mg by mouth daily    atorvastatin (LIPITOR) 80 mg tablet 6/8/2021 at Unknown time  No Yes   Sig: Take 1 tablet (80 mg total) by mouth daily with dinner   clopidogrel (PLAVIX) 75 mg tablet 6/9/2021 at 0730 Self No Yes   Sig: TAKE ONE TABLET BY MOUTH EVERY DAY   doxazosin (CARDURA) 2 mg tablet 6/8/2021 at Unknown time  Yes Yes   Sig: Take 4 mg by mouth daily at bedtime    folic acid (FOLVITE) 1 mg tablet 6/9/2021 at 0730 Self No Yes   Sig: TAKE ONE TABLET BY MOUTH EVERY DAY (GENERIC FOR FOLVITE)   metoprolol tartrate (LOPRESSOR) 25 mg tablet 6/8/2021 at Unknown time  No Yes   Sig: Take 1 tablet (25 mg total) by mouth every 12 (twelve) hours   Patient taking differently: Take 25 mg by mouth daily at bedtime    thiamine (VITAMIN B1) 100 mg tablet 6/9/2021 at 0730 Self No Yes   Sig: TAKE ONE TABLET BY MOUTH EVERY DAY      Facility-Administered Medications: None       Past Medical History:   Diagnosis Date    Hypertension     [facility] ) 02/10/2019       Past Surgical History:   Procedure Laterality Date    NO PAST SURGERIES         Family History   Problem Relation Age of Onset    Diabetes type II Mother     Hypertension Father     Diabetes type II Father     Diabetes type II Sister     Hypertension Brother      I have reviewed and agree with the history as documented  E-Cigarette/Vaping    E-Cigarette Use Never User      E-Cigarette/Vaping Substances     Social History     Tobacco Use    Smoking status: Never Smoker    Smokeless tobacco: Never Used   Substance Use Topics    Alcohol use: Yes     Alcohol/week: 6 0 standard drinks     Types: 6 Cans of beer per week     Frequency: 4 or more times a week     Drinks per session: 5 or 6     Binge frequency: Weekly    Drug use: Never       Review of Systems   Constitutional: Negative  Negative for chills and fever  HENT: Negative  Negative for congestion and sore throat  Eyes: Negative  Respiratory: Negative  Negative for cough and shortness of breath  Cardiovascular: Negative  Negative for chest pain and leg swelling  Gastrointestinal: Negative  Negative for abdominal pain, diarrhea, nausea and vomiting  Genitourinary: Positive for difficulty urinating  Negative for dysuria, flank pain and hematuria  Musculoskeletal: Negative  Negative for back pain and myalgias  Skin: Negative  Negative for rash and wound  Neurological: Negative  Negative for dizziness and headaches  Psychiatric/Behavioral: Negative  Negative for confusion and hallucinations  The patient is not nervous/anxious  All other systems reviewed and are negative  Physical Exam  Physical Exam  Vitals signs and nursing note reviewed  Constitutional:       General: He is not in acute distress  Appearance: He is well-developed  He is not ill-appearing, toxic-appearing or diaphoretic  HENT:      Head: Normocephalic and atraumatic  Eyes:      General: No scleral icterus  Conjunctiva/sclera: Conjunctivae normal    Neck:      Musculoskeletal: Neck supple  Cardiovascular:      Rate and Rhythm: Normal rate and regular rhythm  Heart sounds: Normal heart sounds  No murmur  Pulmonary:      Effort: Pulmonary effort is normal  No respiratory distress  Breath sounds: Normal breath sounds  Chest:      Chest wall: No tenderness  Abdominal:      General: Bowel sounds are normal  There is no distension  Palpations: Abdomen is soft  Tenderness: There is no abdominal tenderness  Musculoskeletal: Normal range of motion  General: No tenderness or deformity  Skin:     General: Skin is warm and dry  Coloration: Skin is not pale  Findings: No erythema or rash  Neurological:      General: No focal deficit present  Mental Status: He is alert and oriented to person, place, and time  Cranial Nerves: No cranial nerve deficit     Psychiatric:         Behavior: Behavior normal          Vital Signs  ED Triage Vitals [06/09/21 1148]   Temperature Pulse Respirations Blood Pressure SpO2   97 5 °F (36 4 °C) 75 18 135/84 97 %      Temp Source Heart Rate Source Patient Position - Orthostatic VS BP Location FiO2 (%)   Tympanic Monitor Sitting Right arm --      Pain Score       --           Vitals:    06/09/21 1148   BP: 135/84   Pulse: 75   Patient Position - Orthostatic VS: Sitting         Visual Acuity      ED Medications  Medications - No data to display    Diagnostic Studies  Results Reviewed     Procedure Component Value Units Date/Time    Urine Microscopic [[id number]]  (Normal) Collected: 06/09/21 1305    Lab Status: Final result Specimen: Urine, Clean Catch Updated: 06/09/21 1326     RBC, UA None Seen /hpf      WBC, UA 2-4 /hpf      Epithelial Cells Occasional /hpf      Bacteria, UA Occasional /hpf     UA (URINE) with reflex to Scope [[id number]]  (Abnormal) Collected: 06/09/21 1305    Lab Status: Final result Specimen: Urine, Clean Catch Updated: 06/09/21 1312     Color, UA Light Yellow     Clarity, UA Clear     Specific Gravity, UA 1 010     pH, UA 6 0     Leukocytes, UA Trace     Nitrite, UA Negative Protein, UA Negative mg/dl      Glucose, UA Negative mg/dl      Ketones, UA Negative mg/dl      Urobilinogen, UA 0 2 E U /dl      Bilirubin, UA Negative     Blood, UA Negative    Comprehensive metabolic panel [[id number]]  (Abnormal) Collected: 06/09/21 1221    Lab Status: Final result Specimen: Blood from Arm, Left Updated: 06/09/21 1253     Sodium 141 mmol/L      Potassium 4 8 mmol/L      Chloride 107 mmol/L      CO2 23 mmol/L      ANION GAP 11 mmol/L      BUN 44 mg/dL      Creatinine 3 69 mg/dL      Glucose 93 mg/dL      Calcium 9 1 mg/dL      AST 18 U/L      ALT 26 U/L      Alkaline Phosphatase 105 U/L      Total Protein 7 0 g/dL      Albumin 3 5 g/dL      Total Bilirubin 0 31 mg/dL      eGFR 17 ml/min/1 73sq m     Narrative:      [facility] guidelines for Chronic Kidney Disease (CKD):     Stage 1 with normal or high GFR (GFR > 90 mL/min/1 73 square meters)    Stage 2 Mild CKD (GFR = 60-89 mL/min/1 73 square meters)    Stage 3A Moderate CKD (GFR = 45-59 mL/min/1 73 square meters)    Stage 3B Moderate CKD (GFR = 30-44 mL/min/1 73 square meters)    Stage 4 Severe CKD (GFR = 15-29 mL/min/1 73 square meters)    Stage 5 End Stage CKD (GFR <15 mL/min/1 73 square meters)  Note: GFR calculation is accurate only with a steady state creatinine    Protime-INR [[id number]]  (Normal) Collected: 06/09/21 1221    Lab Status: Final result Specimen: Blood from Arm, Left Updated: 06/09/21 1239     Protime 13 4 seconds      INR 1 03    APTT [[id number]]  (Normal) Collected: 06/09/21 1221    Lab Status: Final result Specimen: Blood from Arm, Left Updated: 06/09/21 1239     PTT 31 seconds     CBC and differential [[id number]]  (Abnormal) Collected: 06/09/21 1221    Lab Status: Final result Specimen: Blood from Arm, Left Updated: 06/09/21 1227     WBC 6 61 Thousand/uL      RBC 3 44 Million/uL      Hemoglobin 9 7 g/dL      Hematocrit 29 5 %      MCV 86 fL      MCH 28 2 pg      MCHC 32 9 g/dL      RDW 12 5 % MPV 9 3 fL      Platelets 390 Thousands/uL      nRBC 0 /100 WBCs      Neutrophils Relative 70 %      Immat GRANS % 0 %      Lymphocytes Relative 21 %      Monocytes Relative 7 %      Eosinophils Relative 2 %      Basophils Relative 0 %      Neutrophils Absolute 4 59 Thousands/µL      Immature Grans Absolute 0 02 Thousand/uL      Lymphocytes Absolute 1 40 Thousands/µL      Monocytes Absolute 0 48 Thousand/µL      Eosinophils Absolute 0 10 Thousand/µL      Basophils Absolute 0 02 Thousands/µL                  CT abdomen pelvis wo contrast   Final Result by Meg Wallace MD (06/09 1432)      Severe bladder distention and moderate diffuse wall thickening in keeping with outlet obstruction  Moderate bilateral hydroureteronephrosis  The study was marked in [facility] for immediate notification  Workstation performed: [id number]                    Procedures  Procedures         ED Course                                           MDM  Number of Diagnoses or Management Options  ARMANDO (acute kidney injury) [facility]):   Bladder outlet obstruction:   Hydronephrosis:   Diagnosis management comments: Bladder scan 200ml, pt  Able to urinate 100ml but bladder scan still showing 200ml  I ordered rosales but Nurse unable to pass regular of [place] rosales  Discussed with Dr Julia Matthews and Dr Faustino Whiting   Will wait for CT report  1 - Dr Julia Matthews paged, left message for him to call ER - he will be in a little after 5 to place rosales  Will admit        Disposition  Final diagnoses:   ARMANDO (acute kidney injury) ([facility] )   Bladder outlet obstruction   Hydronephrosis     Time reflects when diagnosis was documented in both MDM as applicable and the Disposition within this note     Time User Action Codes Description Comment    3/9/5125  3:00 PM Darlene RAINES Add [N08 2] ARMNADO (acute kidney injury) ([facility] )     8/4/8946  2:23 PM Darlene RAINES Add [A38 1] Bladder outlet obstruction     6/0/7064  6:29 PM Karina Gurrola Add [Q31 51] Hydronephrosis       ED Disposition     ED Disposition Condition Date/Time Comment    Admit Stable Wed Jun 9, 2021  2:39 PM Case was discussed with **Dr Yasir Conti, Dr Ankur Brush, hospitalist* and the patient's admission status       Follow-up Information    None         Patient's Medications   Discharge Prescriptions    No medications on file     No discharge procedures on file      PDMP Review     None          ED Provider  Electronically Signed by           Mare Caputo MD  13/38/75 6104

## 2021-06-09 NOTE — ED NOTES
16 [place] rosales catheter insertion attempted, unsuccessful  Second RN attempted a 12 [place] coude catheter insertion and was also unsuccessful  Physician made aware and at bedside  Per physician let patient take a break while calling OR to see if they have smaller coude sizes available        Liya Ferrera RN  06/09/21 5930

## 2021-06-09 NOTE — ASSESSMENT & PLAN NOTE
Hemoglobin continues to remain low around 9  Hemoglobin about 2 years ago was 14  Patient will need outpatient screening colonoscopy and stool occult blood

## 2021-06-09 NOTE — ASSESSMENT & PLAN NOTE
Creatinine was 3 69 upon admission  Baseline creatinine of 1-1 2  Patient has no urine output and also no uremia symptoms  Renal ultrasound from May 17 showed distended bladder with diffuse bladder wall thickening with small bladder diverticula  CT scan of the abdomen pelvis showed severe bladder distention, moderate diffuse wall thickening with moderate bilateral hydroureteronephrosis  Patient was started on isolate at 100 mL/hour  Creatinine continues to remain elevated  Follow-up BMP in a m   With IV hydration

## 2021-06-09 NOTE — ASSESSMENT & PLAN NOTE
On imaging, patient noted to have findings suggestive of bladder outlet obstruction with moderate hydronephrosis bilaterally  [place] catheter placement attempts in the emergency room and by Urology were unsuccessful  Per ER notes, bladder scan showed 200 mL  Patient underwent cystoscopy with DVIU and placement of [place] catheter

## 2021-06-09 NOTE — H&P
History and Physical - [facility]    Patient Information: Mariia Centeno 54 y o  male MRN: [id number]  Unit/Bed#: DORCAS Encounter: [id number]  Admitting Physician: Darren Muhammad DO  PCP: Ainsley Krishna MD  Date of Admission:  06/09/21        Hospital Problem List:     Principal Problem:    ARMANDO (acute kidney injury) [facility])  Active Problems:    Bladder outlet obstruction    Cerebrovascular accident (CVA) due to occlusion of left middle cerebral artery ([facility] [address] )    Benign essential hypertension    Anemia      Assessment/Plan:    * ARMANDO (acute kidney injury) ([facility] [address] )  Assessment & Plan  Creatinine of 3 69 on lab work today in the ER  Baseline creatinine of 1-1 2  Nephrology was consulted by the ER and plan is to start isolate at 100 mL/hour once Dale catheter is placed  Repeat lab work in a m   CT scan as noted below    Bladder outlet obstruction  Assessment & Plan  On imaging, patient noted to have findings suggestive of bladder outlet obstruction with moderate hydronephrosis bilaterally  Dale catheter placement attempt was made in the ER but unable to do so  Urology has been consulted and Dr Yeni Mack will be in later today to place Dale  Monitor output  Per ER notes, bladder scan showed 200 mL    Anemia  Assessment & Plan  Hemoglobin of 9 7, possibly due to worsening kidney disease  Repeat lab work in a m  Check iron panel    Benign essential hypertension  Assessment & Plan  Continue Norvasc and Lopressor  Off losartan    Cerebrovascular accident (CVA) due to occlusion of left middle cerebral artery (HCC)  Assessment & Plan  History of CVA  Continue statin, Plavix          VTE Prophylaxis: Heparin  / sequential compression device   Code Status:  Full code    Anticipated Length of Stay:  Patient will be admitted on an Inpatient basis with an anticipated length of stay of atleast 2 midnights     Justification for Hospital Stay:  Acute kidney injury, bladder outlet obstruction    Total Time for Visit, including Counseling / Coordination of Care: 45 minutes  Greater than 50% of this total time spent on direct patient counseling and coordination of care  Chief Complaint:     Abnormal Lab (Patient had blood work done on Monday and today was called and sent to ED for creat  3 6)    History of Present Illness:    Mariia Centeno is a 54 y o  male who presents with worsening renal function on outpatient lab work  Patient was advised by his PCP to come to the ER  Patient with history of bladder outlet obstruction over the last few months and has been having worsening renal function and following up with Urology and Nephrology  Patient was initially doing well with prescribed medication but now worsening again  Reports a weak stream and urinary frequency  Finished antibiotics for UTI about 2 days ago    Review of Systems:    Review of Systems   Constitutional: Negative for appetite change, chills and fever  HENT: Negative for congestion and trouble swallowing  Eyes: Negative for photophobia and visual disturbance  Respiratory: Negative for chest tightness and shortness of breath  Cardiovascular: Negative for chest pain and leg swelling  Gastrointestinal: Negative for abdominal pain, diarrhea, nausea and vomiting  Genitourinary: Positive for difficulty urinating and frequency  Negative for dysuria, flank pain and hematuria  Musculoskeletal: Negative for back pain  Skin: Negative for wound  Neurological: Negative for syncope and weakness  Hematological: Does not bruise/bleed easily  Psychiatric/Behavioral: Negative for agitation  Past Medical and Surgical History:     Past Medical History:   Diagnosis Date    Hypertension     Stroke ([facility] [address] ) 02/10/2019       Past Surgical History:   Procedure Laterality Date    NO PAST SURGERIES         Meds/Allergies:    PTA meds:   Prior to Admission Medications   Prescriptions Last Dose Informant Patient Reported? Taking?    amLODIPine (NORVASC) 10 mg tablet 6/9/2021 at 0730 Self No Yes   Sig: Take 1 tablet (10 mg total) by mouth daily   Patient taking differently: Take 5 mg by mouth daily    atorvastatin (LIPITOR) 80 mg tablet 6/8/2021 at Unknown time  No Yes   Sig: Take 1 tablet (80 mg total) by mouth daily with dinner   clopidogrel (PLAVIX) 75 mg tablet 6/9/2021 at 0730 Self No Yes   Sig: TAKE ONE TABLET BY MOUTH EVERY DAY   doxazosin (CARDURA) 2 mg tablet 6/8/2021 at Unknown time  Yes Yes   Sig: Take 4 mg by mouth daily at bedtime    folic acid (FOLVITE) 1 mg tablet 6/9/2021 at 0730 Self No Yes   Sig: TAKE ONE TABLET BY MOUTH EVERY DAY (GENERIC FOR FOLVITE)   metoprolol tartrate (LOPRESSOR) 25 mg tablet 6/8/2021 at Unknown time  No Yes   Sig: Take 1 tablet (25 mg total) by mouth every 12 (twelve) hours   Patient taking differently: Take 25 mg by mouth daily at bedtime    thiamine (VITAMIN B1) 100 mg tablet 6/9/2021 at 0730 Self No Yes   Sig: TAKE ONE TABLET BY MOUTH EVERY DAY      Facility-Administered Medications: None       Allergies: No Known Allergies  History:     Marital Status: /Civil Union     Substance Use History:   Social History     Substance and Sexual Activity   Alcohol Use Yes    Alcohol/week: 6 0 standard drinks    Types: 6 Cans of beer per week    Frequency: 4 or more times a week    Drinks per session: 5 or 6    Binge frequency: Weekly     Social History     Tobacco Use   Smoking Status Never Smoker   Smokeless Tobacco Never Used     Social History     Substance and Sexual Activity   Drug Use Never       Family History:    Family History   Problem Relation Age of Onset    Diabetes type II Mother     Hypertension Father     Diabetes type II Father     Diabetes type II Sister     Hypertension Brother        Physical Exam:     Vitals:   Blood Pressure: 169/91 (06/09/21 1607)  Pulse: 78 (06/09/21 1607)  Temperature: 97 5 °F (36 4 °C) (06/09/21 1148)  Temp Source: Tympanic (06/09/21 1148)  Respirations: 18 (06/09/21 1607)  Weight - Scale: 92 1 kg (203 lb) (06/09/21 1148)  SpO2: 98 % (06/09/21 1607)    Physical Exam  Constitutional:       General: He is not in acute distress  Appearance: He is well-developed  He is not diaphoretic  HENT:      Head: Normocephalic and atraumatic  Eyes:      General:         Right eye: No discharge  Left eye: No discharge  Cardiovascular:      Rate and Rhythm: Normal rate and regular rhythm  Pulmonary:      Effort: Pulmonary effort is normal  No respiratory distress  Breath sounds: Normal breath sounds  No wheezing or rales  Abdominal:      General: Bowel sounds are normal  There is no distension  Palpations: Abdomen is soft  Tenderness: There is no abdominal tenderness  Musculoskeletal:      Right lower leg: No edema  Left lower leg: No edema  Comments: No CVA tenderness noted bilaterally   Neurological:      Mental Status: He is alert and oriented to person, place, and time  Lab Results: I have personally reviewed pertinent reports  Results from last 7 days   Lab Units 06/09/21  1221   WBC Thousand/uL 6 61   HEMOGLOBIN g/dL 9 7*   HEMATOCRIT % 29 5*   PLATELETS Thousands/uL 220   NEUTROS PCT % 70   LYMPHS PCT % 21   MONOS PCT % 7   EOS PCT % 2     Results from last 7 days   Lab Units 06/09/21  1221   POTASSIUM mmol/L 4 8   CHLORIDE mmol/L 107   CO2 mmol/L 23   BUN mg/dL 44*   CREATININE mg/dL 3 69*   CALCIUM mg/dL 9 1   ALK PHOS U/L 105   ALT U/L 26   AST U/L 18     Results from last 7 days   Lab Units 06/09/21  1221   INR  1 03       Imaging: I have personally reviewed pertinent reports        Ct Abdomen Pelvis Wo Contrast    Result Date: 6/9/2021  Narrative: CT ABDOMEN AND PELVIS WITHOUT IV CONTRAST INDICATION:   Hydronephrosis hydronephrosis, bladder outlet obstruction, ARMANDO    "63-year-old male with a history of urinary retention and BPH, hypertension and stroke who was admitted due to worsening renal function secondary to urinary obstruction" COMPARISON:  Renal ultrasound 5/17/2021  TECHNIQUE:  CT examination of the abdomen and pelvis was performed without intravenous contrast   Axial, sagittal, and coronal 2D reformatted images were created from the source data and submitted for interpretation  Radiation dose length product (DLP) for this visit:  525 19 mGy-cm   This examination, like all CT scans performed in the [facility], was performed utilizing techniques to minimize radiation dose exposure, including the use of iterative  reconstruction and automated exposure control  Enteric contrast was administered  FINDINGS: ABDOMEN LOWER CHEST:  No clinically significant abnormality identified in the visualized lower chest  LIVER/BILIARY TREE:  Unremarkable  GALLBLADDER:  No calcified gallstones  No pericholecystic inflammatory change  SPLEEN:  Unremarkable  PANCREAS:  Unremarkable  ADRENAL GLANDS:  Unremarkable  KIDNEYS/URETERS:  Moderate bilateral hydroureteronephrosis  No obstructive ureteral calculi  No perinephric collection  STOMACH AND BOWEL:  There is colonic diverticulosis without evidence of acute diverticulitis  APPENDIX:  Noninflamed  ABDOMINOPELVIC CAVITY:  No ascites  No pneumoperitoneum  No lymphadenopathy  VESSELS:  Atherosclerotic changes are present  No evidence of aneurysm  PELVIS REPRODUCTIVE ORGANS:  Prostamegaly with a TURP defect  URINARY BLADDER:  Diffuse bladder distention and circumferential wall thickening indicating outlet obstruction  ABDOMINAL WALL/INGUINAL REGIONS:  Unremarkable  OSSEOUS STRUCTURES:  No acute fracture or destructive osseous lesion  Impression: Severe bladder distention and moderate diffuse wall thickening in keeping with outlet obstruction  Moderate bilateral hydroureteronephrosis  The study was marked in [facility] for immediate notification   Workstation performed: [id number]     Us Kidney And Bladder    Result Date: 5/22/2021  Narrative: RENAL ULTRASOUND INDICATION:   N17 9: Acute kidney failure, unspecified  COMPARISON: None TECHNIQUE:   Ultrasound of the retroperitoneum was performed with a curvilinear transducer utilizing volumetric sweeps and still imaging techniques  FINDINGS: KIDNEYS: Symmetric and normal size  Right kidney:  11 3 cm  Left kidney:  13 1 cm  Right kidney Normal echogenicity and contour  No suspicious masses detected  Severe hydronephrosis  No shadowing calculi  No perinephric fluid collections  Left kidney Normal echogenicity and contour  No suspicious masses detected  Severe hydronephrosis  No shadowing calculi  No perinephric fluid collections  URETERS: Nonvisualized  BLADDER: Distended  Diffuse bladder wall thickening with small bladder diverticula noted  Prominent debris noted within the bladder  Correlate with urinalysis  Bilateral ureteral jets detected  Marked post void residual urine volume  Estimated volume is approximately 600 mL  Additionally, persistent severe hydronephrosis on post void imaging  Impression: Findings consistent with chronic bladder outlet obstruction including prominent post void residual bladder volume, severe bilateral hydroureteronephrosis as well as bladder wall thickening/diverticula  There is also debris noted within the bladder  Correlate with urinalysis  Workstation performed: [id number]       CT abdomen pelvis wo contrast   Final Result      Severe bladder distention and moderate diffuse wall thickening in keeping with outlet obstruction  Moderate bilateral hydroureteronephrosis  The study was marked in [facility] for immediate notification  Workstation performed: [id number]             EKG, Pathology, and Other Studies Reviewed on Admission:   · No ekg noted    Allscripts/EPIC Records Reviewed: Yes     ** Please Note: "This note has been constructed using a voice recognition system  Therefore there may be syntax, spelling, and/or grammatical errors   Please call if you have any questions  "**

## 2021-06-09 NOTE — TELEPHONE ENCOUNTER
Patient has been sent directly to the emergency room  I did review the case with his nephrologist   Patient will require Dale catheterization  To give some bladder rest   Recommend imaging repeated to be sure that the hydronephrosis did not worsen  Patient will likely need admission spoke with the  Emergency room physician making them aware of his case       patient has a history of normal kidney function this just only has occurred in the last 6 months please see all other notes for review

## 2021-06-09 NOTE — TELEPHONE ENCOUNTER
Em called to verify that patient should be taking flomax with doxazosin 4mg prescribed by Dr Yasir Conti

## 2021-06-09 NOTE — CONSULTS
[address] Falk 54 y o  male MRN: [id number]  Unit/Bed#: ED 04 Encounter: [id number]    ASSESSMENT and PLAN:    80-year-old male with a history of urinary retention and BPH, hypertension and stroke who was admitted due to worsening renal function secondary to urinary obstruction  Acute kidney injury  --severe Stage III  --baseline creatinine 1-1 2 mg/dL  --if no urine output reported or have symptoms of uremia history to the emergency room immediately  --continue to hold losartan  --renal ultrasound from May 17th:  Distended bladder with diffuse bladder wall thickening with small bladder diverticula  Marked postvoid residual urine volume of approximately 600 mL, persistent and severe hydronephrosis bilaterally  --follow-up noncontrast CT scan  --urology consulted  --avoid NSAIDs  --decreased oral intake  --discussed with the ER physician  --once Dale catheter inserted can start fluids in the form of isolate at 100 cc/hour    Awaiting the placement of Dale catheter 1st before starting fluids to prevent worsening urinary retention  --Dale catheter attempted to be passed but was unable to successfully replaced  --discussed also with the patient's primary care physician     Hypertension  --continue amlodipine, currently off losartan  --urinary retention can also cause elevated blood pressure     Abnormal renal function test  --may have some underlying degree of mild renal dysfunction likely stage I versus stage II secondary to hypertension and possible chronic symptoms of urinary retention along with some age-related nephron loss  --baseline creatinine 1-1 2 mg/dL     Hyperkalemia  --resolved  --secondary to urinary tract obstruction resulting in acute kidney injury    SUMMARY OF RECOMMENDATIONS:  Please see above    HISTORY OF PRESENT ILLNESS:  Requesting Physician: Dallas Dickson MD  Reason for Consult:  Acute kidney injury    Radha Justin is a 54 y o  male who was admitted to [facility] after presenting with worsening renal function  A renal consultation is requested today for assistance in the management of acute kidney injury  Patient is being followed by me for worsening renal function secondary to urinary obstruction in the setting of severe bilateral hydronephrosis secondary to urinary retention and BPH  He is also being followed by Urology as an outpatient  Was recently on Flomax which had been stopped and remains on an alpha 2 blocker  He reports he does not void a lot but he does void frequently  His ultrasound from mid May showed severe and significant bilateral hydronephrosis  His renal function continues to remain abnormal and has worsened up to 3 6 mg/dL  Last few days he has had poor oral intake  He has also had slightly raised potassium levels which currently is now normal   Dale catheter was attempted to be placed emergency room but unsuccessfully placed  PAST MEDICAL HISTORY:  Past Medical History:   Diagnosis Date    Hypertension     Stroke ([facility] [address] ) 02/10/2019       PAST SURGICAL HISTORY:  Past Surgical History:   Procedure Laterality Date    NO PAST SURGERIES         ALLERGIES:  No Known Allergies    SOCIAL HISTORY:  Social History     Substance and Sexual Activity   Alcohol Use Yes    Alcohol/week: 6 0 standard drinks    Types: 6 Cans of beer per week    Frequency: 4 or more times a week    Drinks per session: 5 or 6    Binge frequency: Weekly     Social History     Substance and Sexual Activity   Drug Use Never     Social History     Tobacco Use   Smoking Status Never Smoker   Smokeless Tobacco Never Used       FAMILY HISTORY:  Family History   Problem Relation Age of Onset    Diabetes type II Mother     Hypertension Father     Diabetes type II Father     Diabetes type II Sister     Hypertension Brother        MEDICATIONS:  No current facility-administered medications for this encounter       Current Outpatient Medications:    amLODIPine (NORVASC) 10 mg tablet, Take 1 tablet (10 mg total) by mouth daily (Patient taking differently: Take 5 mg by mouth daily ), Disp: 90 tablet, Rfl: 0    atorvastatin (LIPITOR) 80 mg tablet, Take 1 tablet (80 mg total) by mouth daily with dinner, Disp: 90 tablet, Rfl: 3    clopidogrel (PLAVIX) 75 mg tablet, TAKE ONE TABLET BY MOUTH EVERY DAY, Disp: 90 tablet, Rfl: 2    doxazosin (CARDURA) 2 mg tablet, Take 4 mg by mouth daily at bedtime , Disp: , Rfl:     folic acid (FOLVITE) 1 mg tablet, TAKE ONE TABLET BY MOUTH EVERY DAY (GENERIC FOR FOLVITE), Disp: 90 tablet, Rfl: 2    metoprolol tartrate (LOPRESSOR) 25 mg tablet, Take 1 tablet (25 mg total) by mouth every 12 (twelve) hours (Patient taking differently: Take 25 mg by mouth daily at bedtime ), Disp: 60 tablet, Rfl: 1    thiamine (VITAMIN B1) 100 mg tablet, TAKE ONE TABLET BY MOUTH EVERY DAY, Disp: 90 tablet, Rfl: 3    REVIEW OF SYSTEMS:  Constitutional: Negative for fatigue, anorexia, fever, chills, diaphoresis  HENT: Negative for postnasal drip  Eyes: Negative for visual disturbance  Respiratory: Negative for cough, shortness of breath and wheezing  Cardiovascular: Negative for chest pain, palpitations and leg swelling  Gastrointestinal: Negative for abdominal pain, constipation, diarrhea, nausea and vomiting  Genitourinary: No dysuria, hematuria  Endocrine: Negative for polyuria  Musculoskeletal: Negative for arthralgias, back pain and joint swelling  Skin: Negative for rash  Neurological: Negative for focal weakness, headaches, dizziness  Hematological: Negative for easy bruising or bleeding  Psychiatric/Behavioral: Negative for confusion and sleep disturbance  All the systems were reviewed and were negative except as documented on the HPI      PHYSICAL EXAM:  Current Weight: Weight - Scale: 92 1 kg (203 lb)  First Weight: Weight - Scale: 92 1 kg (203 lb)  Vitals:    06/09/21 1148   BP: 135/84   BP Location: Right arm   Pulse: 75 Resp: 18   Temp: 97 5 °F (36 4 °C)   TempSrc: Tympanic   SpO2: 97%   Weight: 92 1 kg (203 lb)       Intake/Output Summary (Last 24 hours) at 6/9/2021 1417  Last data filed at 6/9/2021 1303  Gross per 24 hour   Intake --   Output 100 ml   Net -100 ml     Physical Exam  Vitals signs and nursing note reviewed  Constitutional:       General: He is not in acute distress  Appearance: He is well-developed  He is not diaphoretic  HENT:      Head: Normocephalic and atraumatic  Eyes:      General: No scleral icterus  Pupils: Pupils are equal, round, and reactive to light  Neck:      Musculoskeletal: Normal range of motion and neck supple  Cardiovascular:      Rate and Rhythm: Normal rate and regular rhythm  Heart sounds: Normal heart sounds  No murmur  No friction rub  No gallop  Pulmonary:      Effort: Pulmonary effort is normal  No respiratory distress  Breath sounds: Normal breath sounds  No wheezing or rales  Chest:      Chest wall: No tenderness  Abdominal:      General: Bowel sounds are normal  There is no distension  Palpations: Abdomen is soft  Tenderness: There is no abdominal tenderness  There is no rebound  Musculoskeletal: Normal range of motion  Skin:     Findings: No rash  Neurological:      Mental Status: He is alert and oriented to person, place, and time             Invasive Devices:      Lab Results:   Results from last 7 days   Lab Units 06/09/21  1221 06/08/21  0000 06/04/21  0830   WBC Thousand/uL 6 61  --   --    HEMOGLOBIN g/dL 9 7*  --   --    HEMATOCRIT % 29 5*  --   --    PLATELETS Thousands/uL 220  --   --    POTASSIUM mmol/L 4 8 5 2 5 4*   CHLORIDE mmol/L 107 105 107*   CO2 mmol/L 23 18* 19*   BUN mg/dL 44* 45* 35*   CREATININE mg/dL 3 69* 3 63* 3 17*   CALCIUM mg/dL 9 1  --   --    ALK PHOS U/L 105  --   --    ALT U/L 26  --   --    AST U/L 18  --   --

## 2021-06-09 NOTE — CONSULTS
H&P Exam - Urology       Patient: Rosalee Mccarty   : 1965 Sex: male   MRN: [id number]     CSN: [id number]      History of Present Illness   HPI:  Rosalee Mccarty is a 54 y o  male known to me with history of BPH on Cardura 2 mg tabs sent into the ER by his nephrologist with rising creatinine from 3 1-3 6 over the weekend undergoing ER postvoid residual 200 cc CT scan abdomen pelvis confirms moderate bilateral hydro with a distended bladder attempted Dale placement by the staff unsuccessful urologic consult called for patient seen at the bedside at this time no urinary complaints state he is voiding freely with no suprapubic pain just voided prior to my arrival feeling fine with wife present postvoid residual done at the bedside confirms 200 cc        Review of Systems:   Constitutional:  Negative for activity change, fever, chills and diaphoresis  HENT: Negative for hearing loss and trouble swallowing  Eyes: Negative for itching and visual disturbance  Respiratory: Negative for chest tightness and shortness of breath  Cardiovascular: Negative for chest pain, edema  Gastrointestinal: Negative for abdominal distention, na abdominal pain, constipation, diarrhea, Nausea and vomiting  Genitourinary: Negative for decreased urine volume, difficulty urinating, dysuria, enuresis, frequency, hematuria and urgency  Musculoskeletal: Negative for gait problem and myalgias  Neurological: Negative for dizziness and headaches  Hematological: Does not bruise/bleed easily         Historical Information   Past Medical History:   Diagnosis Date    Hypertension     [facility]     Past Surgical History:   Procedure Laterality Date    NO PAST SURGERIES       Social History   Social History     Substance and Sexual Activity   Alcohol Use Yes    Alcohol/week: 6 0 standard drinks    Types: 6 Cans of beer per week    Frequency: 4 or more times a week    Drinks per session: 5 or 6    Binge frequency: Weekly     Social History     Substance and Sexual Activity   Drug Use Never     Social History     Tobacco Use   Smoking Status Never Smoker   Smokeless Tobacco Never Used     Family History:   Family History   Problem Relation Age of Onset    Diabetes type II Mother     Hypertension Father     Diabetes type II Father     Diabetes type II Sister     Hypertension Brother        Meds/Allergies   Medications Prior to Admission   Medication    amLODIPine (NORVASC) 10 mg tablet    atorvastatin (LIPITOR) 80 mg tablet    clopidogrel (PLAVIX) 75 mg tablet    doxazosin (CARDURA) 2 mg tablet    folic acid (FOLVITE) 1 mg tablet    metoprolol tartrate (LOPRESSOR) 25 mg tablet    thiamine (VITAMIN B1) 100 mg tablet     No Known Allergies    Objective   Vitals: /88   Pulse 73   Temp 98 °F (36 7 °C)   Resp 17   Wt 92 1 kg (203 lb)   SpO2 98%   BMI 27 53 kg/m²     Physical Exam:  General Alert awake   Normocephalic atraumatic PERRLA  Lungs clear bilaterally  Cardiac normal S1 normal S2  Abdomen soft, flank pain  Bladder slightly distended  Two normal testicles  Extremities no edema    I/O last 24 hours:   In: -   Out: 100 [Urine:100]    Invasive Devices     Peripheral Intravenous Line            Peripheral IV 02/13/19 Right Forearm 847 days                    Lab Results: CBC:   Lab Results   Component Value Date    WBC 6 61 06/09/2021    HGB 9 7 (L) 06/09/2021    HCT 29 5 (L) 06/09/2021    MCV 86 06/09/2021     06/09/2021    MCH 28 2 06/09/2021    MCHC 32 9 06/09/2021    RDW 12 5 06/09/2021    MPV 9 3 06/09/2021    NRBC 0 06/09/2021     CMP:   Lab Results   Component Value Date     06/09/2021     06/08/2021    CO2 23 06/09/2021    CO2 18 (L) 06/08/2021    BUN 44 (H) 06/09/2021    BUN 45 (H) 06/08/2021    CREATININE 3 69 (H) 06/09/2021    CALCIUM 9 1 06/09/2021    AST 18 06/09/2021    AST 16 04/17/2021    ALT 26 06/09/2021    ALT 17 04/17/2021    ALKPHOS 105 06/09/2021    EGFR 17 06/09/2021     Urinalysis:   Lab Results   Component Value Date    COLORU Light Yellow 06/09/2021    CLARITYU Clear 06/09/2021    SPECGRAV 1 010 06/09/2021    PHUR 6 0 06/09/2021    PHUR 6 0 04/22/2021    LEUKOCYTESUR Trace (A) 06/09/2021    NITRITE Negative 06/09/2021    GLUCOSEU Negative 06/09/2021    KETONESU Negative 06/09/2021    BILIRUBINUR Negative 06/09/2021    BLOODU Negative 06/09/2021     Urine Culture: No results found for: URINECX  PSA:   Lab Results   Component Value Date    PSA 0 8 04/17/2021           Assessment/ Plan:  Acute renal failure on chronic renal failure  Bilateral hydro  Postvoid residuals questionable urinary tension  Patient voiding without any difficulty or pain at this time  Difficult catheterization unable to place catheter at the bedside appears to have 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

## 2021-06-09 NOTE — PLAN OF CARE
Problem: Potential for Falls  Goal: Patient will remain free of falls  Description: INTERVENTIONS:  - Assess patient frequently for physical needs  -  Identify cognitive and physical deficits and behaviors that affect risk of falls    -  [place] fall precautions as indicated by assessment   - Educate patient/family on patient safety including physical limitations  - Instruct patient to call for assistance with activity based on assessment  - Modify environment to reduce risk of injury  - Consider OT/PT consult to assist with strengthening/mobility  Outcome: Progressing     Problem: GENITOURINARY - ADULT  Goal: Absence of urinary retention  Description: INTERVENTIONS:  - Assess patients ability to void and empty bladder  - Monitor I/O  - Bladder scan as needed  - Discuss with physician/AP medications to alleviate retention as needed  - Discuss catheterization for long term situations as appropriate  Outcome: Progressing  Goal: Urinary catheter remains patent  Description: INTERVENTIONS:  - Assess patency of urinary catheter  - If patient has a chronic rosales, consider changing catheter if non-functioning  - Follow guidelines for intermittent irrigation of non-functioning urinary catheter  Outcome: Progressing     Problem: PAIN - ADULT  Goal: Verbalizes/displays adequate comfort level or baseline comfort level  Description: Interventions:  - Encourage patient to monitor pain and request assistance  - Assess pain using appropriate pain scale  - Administer analgesics based on type and severity of pain and evaluate response  - Implement non-pharmacological measures as appropriate and evaluate response  - Consider cultural and social influences on pain and pain management  - Notify physician/advanced practitioner if interventions unsuccessful or patient reports new pain  Outcome: Progressing

## 2021-06-09 NOTE — TELEPHONE ENCOUNTER
Pharmacist said your order for flomax is up for a refill and she wants to make sure you are aware he is taking the doxazosin with the flomax and if not she wont refill this med , please advise  Tc/cma

## 2021-06-09 NOTE — TELEPHONE ENCOUNTER
He isn't  He stopped flomax  And is only taking Doxazosin  HE was sent to the ED  So all meds are going to be changed

## 2021-06-09 NOTE — PLAN OF CARE
Problem: Potential for Falls  Goal: Patient will remain free of falls  Description: INTERVENTIONS:  - Assess patient frequently for physical needs  -  Identify cognitive and physical deficits and behaviors that affect risk of falls    -  [place] fall precautions as indicated by assessment   - Educate patient/family on patient safety including physical limitations  - Instruct patient to call for assistance with activity based on assessment  - Modify environment to reduce risk of injury  - Consider OT/PT consult to assist with strengthening/mobility  Outcome: Progressing     Problem: GENITOURINARY - ADULT  Goal: Absence of urinary retention  Description: INTERVENTIONS:  - Assess patients ability to void and empty bladder  - Monitor I/O  - Bladder scan as needed  - Discuss with physician/AP medications to alleviate retention as needed  - Discuss catheterization for long term situations as appropriate  Outcome: Progressing  Goal: Urinary catheter remains patent  Description: INTERVENTIONS:  - Assess patency of urinary catheter  - If patient has a chronic rosales, consider changing catheter if non-functioning  - Follow guidelines for intermittent irrigation of non-functioning urinary catheter  Outcome: Progressing     Problem: PAIN - ADULT  Goal: Verbalizes/displays adequate comfort level or baseline comfort level  Description: Interventions:  - Encourage patient to monitor pain and request assistance  - Assess pain using appropriate pain scale  - Administer analgesics based on type and severity of pain and evaluate response  - Implement non-pharmacological measures as appropriate and evaluate response  - Consider cultural and social influences on pain and pain management  - Notify physician/advanced practitioner if interventions unsuccessful or patient reports new pain  Outcome: Progressing

## 2021-06-10 ENCOUNTER — ANESTHESIA EVENT (INPATIENT)
Dept: PERIOP | Facility: HOSPITAL | Age: 56
DRG: 308 | End: 2021-06-10
Payer: COMMERCIAL

## 2021-06-10 ENCOUNTER — ANESTHESIA (INPATIENT)
Dept: PERIOP | Facility: HOSPITAL | Age: 56
DRG: 308 | End: 2021-06-10
Payer: COMMERCIAL

## 2021-06-10 ENCOUNTER — APPOINTMENT (INPATIENT)
Dept: RADIOLOGY | Facility: HOSPITAL | Age: 56
DRG: 308 | End: 2021-06-10
Payer: COMMERCIAL

## 2021-06-10 PROBLEM — R33.8 ACUTE URINARY RETENTION: Status: ACTIVE | Noted: 2021-06-10

## 2021-06-10 LAB
ANION GAP SERPL CALCULATED.3IONS-SCNC: 11 MMOL/L (ref 4–13)
BUN SERPL-MCNC: 43 MG/DL (ref 5–25)
CALCIUM SERPL-MCNC: 9.2 MG/DL (ref 8.3–10.1)
CHLORIDE SERPL-SCNC: 109 MMOL/L (ref 100–108)
CO2 SERPL-SCNC: 22 MMOL/L (ref 21–32)
CREAT SERPL-MCNC: 3.57 MG/DL (ref 0.6–1.3)
ERYTHROCYTE [DISTWIDTH] IN BLOOD BY AUTOMATED COUNT: 12.4 % (ref 11.6–15.1)
FERRITIN SERPL-MCNC: 382 NG/ML (ref 8–388)
GFR SERPL CREATININE-BSD FRML MDRD: 18 ML/MIN/1.73SQ M
GLUCOSE SERPL-MCNC: 93 MG/DL (ref 65–140)
HCT VFR BLD AUTO: 30.6 % (ref 36.5–49.3)
HGB BLD-MCNC: 9.8 G/DL (ref 12–17)
IRON SATN MFR SERPL: 13 %
IRON SERPL-MCNC: 34 UG/DL (ref 65–175)
MAGNESIUM SERPL-MCNC: 1.9 MG/DL (ref 1.6–2.6)
MCH RBC QN AUTO: 27.8 PG (ref 26.8–34.3)
MCHC RBC AUTO-ENTMCNC: 32 G/DL (ref 31.4–37.4)
MCV RBC AUTO: 87 FL (ref 82–98)
PLATELET # BLD AUTO: 223 THOUSANDS/UL (ref 149–390)
PMV BLD AUTO: 9.3 FL (ref 8.9–12.7)
POTASSIUM SERPL-SCNC: 4.7 MMOL/L (ref 3.5–5.3)
RBC # BLD AUTO: 3.52 MILLION/UL (ref 3.88–5.62)
SODIUM SERPL-SCNC: 142 MMOL/L (ref 136–145)
TIBC SERPL-MCNC: 261 UG/DL (ref 250–450)
WBC # BLD AUTO: 6.15 THOUSAND/UL (ref 4.31–10.16)

## 2021-06-10 PROCEDURE — 80048 BASIC METABOLIC PNL TOTAL CA: CPT | Performed by: FAMILY MEDICINE

## 2021-06-10 PROCEDURE — 83735 ASSAY OF MAGNESIUM: CPT | Performed by: FAMILY MEDICINE

## 2021-06-10 PROCEDURE — 99232 SBSQ HOSP IP/OBS MODERATE 35: CPT | Performed by: INTERNAL MEDICINE

## 2021-06-10 PROCEDURE — 83550 IRON BINDING TEST: CPT | Performed by: FAMILY MEDICINE

## 2021-06-10 PROCEDURE — 0TQD8ZZ REPAIR URETHRA, VIA NATURAL OR ARTIFICIAL OPENING ENDOSCOPIC: ICD-10-PCS | Performed by: SPECIALIST

## 2021-06-10 PROCEDURE — 82728 ASSAY OF FERRITIN: CPT | Performed by: FAMILY MEDICINE

## 2021-06-10 PROCEDURE — 85027 COMPLETE CBC AUTOMATED: CPT | Performed by: FAMILY MEDICINE

## 2021-06-10 PROCEDURE — 83540 ASSAY OF IRON: CPT | Performed by: FAMILY MEDICINE

## 2021-06-10 PROCEDURE — C1769 GUIDE WIRE: HCPCS | Performed by: SPECIALIST

## 2021-06-10 PROCEDURE — 74450 X-RAY URETHRA/BLADDER: CPT

## 2021-06-10 RX ORDER — FENTANYL CITRATE/PF 50 MCG/ML
50 SYRINGE (ML) INJECTION
Status: DISCONTINUED | OUTPATIENT
Start: 2021-06-10 | End: 2021-06-10 | Stop reason: HOSPADM

## 2021-06-10 RX ORDER — ONDANSETRON 2 MG/ML
INJECTION INTRAMUSCULAR; INTRAVENOUS AS NEEDED
Status: DISCONTINUED | OUTPATIENT
Start: 2021-06-10 | End: 2021-06-10

## 2021-06-10 RX ORDER — ONDANSETRON 2 MG/ML
4 INJECTION INTRAMUSCULAR; INTRAVENOUS EVERY 6 HOURS PRN
Status: DISCONTINUED | OUTPATIENT
Start: 2021-06-10 | End: 2021-06-14 | Stop reason: HOSPADM

## 2021-06-10 RX ORDER — LEVOFLOXACIN 5 MG/ML
500 INJECTION, SOLUTION INTRAVENOUS EVERY 24 HOURS
Status: COMPLETED | OUTPATIENT
Start: 2021-06-11 | End: 2021-06-11

## 2021-06-10 RX ORDER — MIDAZOLAM HYDROCHLORIDE 2 MG/2ML
INJECTION, SOLUTION INTRAMUSCULAR; INTRAVENOUS AS NEEDED
Status: DISCONTINUED | OUTPATIENT
Start: 2021-06-10 | End: 2021-06-10

## 2021-06-10 RX ORDER — ONDANSETRON 2 MG/ML
4 INJECTION INTRAMUSCULAR; INTRAVENOUS ONCE AS NEEDED
Status: DISCONTINUED | OUTPATIENT
Start: 2021-06-10 | End: 2021-06-10 | Stop reason: HOSPADM

## 2021-06-10 RX ORDER — GLYCOPYRROLATE 0.2 MG/ML
INJECTION INTRAMUSCULAR; INTRAVENOUS AS NEEDED
Status: DISCONTINUED | OUTPATIENT
Start: 2021-06-10 | End: 2021-06-10

## 2021-06-10 RX ORDER — MAGNESIUM HYDROXIDE 1200 MG/15ML
LIQUID ORAL AS NEEDED
Status: DISCONTINUED | OUTPATIENT
Start: 2021-06-10 | End: 2021-06-10 | Stop reason: HOSPADM

## 2021-06-10 RX ORDER — FENTANYL CITRATE 50 UG/ML
INJECTION, SOLUTION INTRAMUSCULAR; INTRAVENOUS AS NEEDED
Status: DISCONTINUED | OUTPATIENT
Start: 2021-06-10 | End: 2021-06-10

## 2021-06-10 RX ORDER — SODIUM CHLORIDE 9 MG/ML
INJECTION, SOLUTION INTRAVENOUS CONTINUOUS PRN
Status: DISCONTINUED | OUTPATIENT
Start: 2021-06-10 | End: 2021-06-10

## 2021-06-10 RX ORDER — GLYCINE 1.5 G/100ML
SOLUTION IRRIGATION AS NEEDED
Status: DISCONTINUED | OUTPATIENT
Start: 2021-06-10 | End: 2021-06-10 | Stop reason: HOSPADM

## 2021-06-10 RX ORDER — MAGNESIUM HYDROXIDE/ALUMINUM HYDROXICE/SIMETHICONE 120; 1200; 1200 MG/30ML; MG/30ML; MG/30ML
30 SUSPENSION ORAL EVERY 6 HOURS PRN
Status: DISCONTINUED | OUTPATIENT
Start: 2021-06-10 | End: 2021-06-14 | Stop reason: HOSPADM

## 2021-06-10 RX ORDER — TRAMADOL HYDROCHLORIDE 50 MG/1
50 TABLET ORAL 2 TIMES DAILY PRN
Status: DISCONTINUED | OUTPATIENT
Start: 2021-06-10 | End: 2021-06-14 | Stop reason: HOSPADM

## 2021-06-10 RX ORDER — LIDOCAINE HYDROCHLORIDE 10 MG/ML
INJECTION, SOLUTION EPIDURAL; INFILTRATION; INTRACAUDAL; PERINEURAL AS NEEDED
Status: DISCONTINUED | OUTPATIENT
Start: 2021-06-10 | End: 2021-06-10

## 2021-06-10 RX ORDER — TRAMADOL HYDROCHLORIDE 50 MG/1
50 TABLET ORAL EVERY 6 HOURS PRN
Status: DISCONTINUED | OUTPATIENT
Start: 2021-06-10 | End: 2021-06-10 | Stop reason: DRUGHIGH

## 2021-06-10 RX ORDER — SODIUM CHLORIDE, SODIUM LACTATE, POTASSIUM CHLORIDE, CALCIUM CHLORIDE 600; 310; 30; 20 MG/100ML; MG/100ML; MG/100ML; MG/100ML
100 INJECTION, SOLUTION INTRAVENOUS CONTINUOUS
Status: DISCONTINUED | OUTPATIENT
Start: 2021-06-10 | End: 2021-06-10

## 2021-06-10 RX ORDER — PROPOFOL 10 MG/ML
INJECTION, EMULSION INTRAVENOUS AS NEEDED
Status: DISCONTINUED | OUTPATIENT
Start: 2021-06-10 | End: 2021-06-10

## 2021-06-10 RX ORDER — LEVOFLOXACIN 5 MG/ML
500 INJECTION, SOLUTION INTRAVENOUS ONCE
Status: COMPLETED | OUTPATIENT
Start: 2021-06-10 | End: 2021-06-10

## 2021-06-10 RX ADMIN — THIAMINE HCL TAB 100 MG 100 MG: 100 TAB at 08:50

## 2021-06-10 RX ADMIN — ATORVASTATIN CALCIUM 80 MG: 80 TABLET ORAL at 16:09

## 2021-06-10 RX ADMIN — DOXAZOSIN 4 MG: 2 TABLET ORAL at 21:50

## 2021-06-10 RX ADMIN — HEPARIN SODIUM 5000 UNITS: 5000 INJECTION INTRAVENOUS; SUBCUTANEOUS at 15:12

## 2021-06-10 RX ADMIN — TRAMADOL HYDROCHLORIDE 50 MG: 50 TABLET, FILM COATED ORAL at 16:09

## 2021-06-10 RX ADMIN — MIDAZOLAM 2 MG: 1 INJECTION INTRAMUSCULAR; INTRAVENOUS at 10:39

## 2021-06-10 RX ADMIN — FENTANYL CITRATE 50 MCG: 50 INJECTION, SOLUTION INTRAMUSCULAR; INTRAVENOUS at 10:47

## 2021-06-10 RX ADMIN — METOPROLOL TARTRATE 25 MG: 25 TABLET, FILM COATED ORAL at 21:51

## 2021-06-10 RX ADMIN — FENTANYL CITRATE 50 MCG: 50 INJECTION, SOLUTION INTRAMUSCULAR; INTRAVENOUS at 10:42

## 2021-06-10 RX ADMIN — SODIUM CHLORIDE: 0.9 INJECTION, SOLUTION INTRAVENOUS at 10:35

## 2021-06-10 RX ADMIN — ONDANSETRON 4 MG: 2 INJECTION INTRAMUSCULAR; INTRAVENOUS at 10:50

## 2021-06-10 RX ADMIN — GLYCOPYRROLATE 0.2 MG: 0.2 INJECTION, SOLUTION INTRAMUSCULAR; INTRAVENOUS at 10:58

## 2021-06-10 RX ADMIN — PROPOFOL 200 MG: 10 INJECTION, EMULSION INTRAVENOUS at 10:42

## 2021-06-10 RX ADMIN — HEPARIN SODIUM 5000 UNITS: 5000 INJECTION INTRAVENOUS; SUBCUTANEOUS at 21:50

## 2021-06-10 RX ADMIN — LIDOCAINE HYDROCHLORIDE 50 MG: 10 INJECTION, SOLUTION EPIDURAL; INFILTRATION; INTRACAUDAL; PERINEURAL at 10:42

## 2021-06-10 RX ADMIN — AMLODIPINE BESYLATE 5 MG: 5 TABLET ORAL at 08:50

## 2021-06-10 RX ADMIN — LEVOFLOXACIN: 5 INJECTION, SOLUTION INTRAVENOUS at 10:45

## 2021-06-10 RX ADMIN — SODIUM CHLORIDE, SODIUM GLUCONATE, SODIUM ACETATE, POTASSIUM CHLORIDE, MAGNESIUM CHLORIDE, SODIUM PHOSPHATE, DIBASIC, AND POTASSIUM PHOSPHATE 100 ML/HR: .53; .5; .37; .037; .03; .012; .00082 INJECTION, SOLUTION INTRAVENOUS at 15:12

## 2021-06-10 RX ADMIN — FOLIC ACID 1 MG: 1 TABLET ORAL at 08:50

## 2021-06-10 NOTE — ASSESSMENT & PLAN NOTE
Patient had low urine output and Dale catheter could not be placed in the emergency room or by the Urology  Patient underwent cystoscopy with retrograde ureterogram with direct internal urethrotomy-patient noted to have severe stricture with false passage in the bulbar urethra and DVIU was performed  Patient had right ureteroscopy with stricture opening noted  Patient had [address] drainage left by Urology which has to be left in place for 1 week

## 2021-06-10 NOTE — PROGRESS NOTES
Progress Note - Urology      Patient: Law Owusu   : 1965 Sex: male   MRN: [id number]     CSN: [id number]  Unit/Bed#: OR POOL     SUBJECTIVE:   Patient in preop area  Still voiding  No suprapubic pain  Creatinine stabilizing trending down      Objective   Vitals: /87 (BP Location: Left arm)   Pulse 65   Temp 97 7 °F (36 5 °C) (Oral)   Resp 18   Ht 6' (1 829 m)   Wt 92 1 kg (203 lb)   SpO2 97%   BMI 27 53 kg/m²     I/O last 24 hours:   In: -   Out: 800 [Urine:800]      Physical Exam:   General Alert awake   Normocephalic atraumatic PERRLA  Lungs clear bilaterally  Cardiac normal S1 normal S2  Abdomen soft, flank pain  Extremities no edema      Lab Results: CBC:   Lab Results   Component Value Date    WBC 6 15 06/10/2021    HGB 9 8 (L) 06/10/2021    HCT 30 6 (L) 06/10/2021    MCV 87 06/10/2021     06/10/2021    MCH 27 8 06/10/2021    MCHC 32 0 06/10/2021    RDW 12 4 06/10/2021    MPV 9 3 06/10/2021    NRBC 0 2021     CMP:   Lab Results   Component Value Date     (H) 06/10/2021     2021    CO2 22 06/10/2021    CO2 18 (L) 2021    BUN 43 (H) 06/10/2021    BUN 45 (H) 2021    CREATININE 3 57 (H) 06/10/2021    CALCIUM 9 2 06/10/2021    AST 18 2021    AST 16 2021    ALT 26 2021    ALT 17 2021    ALKPHOS 105 2021    EGFR 18 06/10/2021     Urinalysis:   Lab Results   Component Value Date    COLORU Light Yellow 2021    CLARITYU Clear 2021    SPECGRAV 1 010 2021    PHUR 6 0 2021    PHUR 6 0 2021    LEUKOCYTESUR Trace (A) 2021    NITRITE Negative 2021    GLUCOSEU Negative 2021    KETONESU Negative 2021    BILIRUBINUR Negative 2021    BLOODU Negative 2021     Urine Culture: No results found for: URINECX  PSA:   Lab Results   Component Value Date    PSA 0 8 2021         Assessment/ Plan:  Cysto/DVIU/retrograde pyelograms possible fluoroscopic urethra          John Liner Ely Vale MD

## 2021-06-10 NOTE — ANESTHESIA POSTPROCEDURE EVALUATION
Post-Op Assessment Note    CV Status:  Stable    Pain management: adequate     Mental Status:  Alert and awake   Hydration Status:  Euvolemic   PONV Controlled:  Controlled   Airway Patency:  Patent      Post Op Vitals Reviewed: Yes            No complications documented      BP   134/81   Temp      Pulse  80   Resp   20   SpO2   99

## 2021-06-10 NOTE — DISCHARGE INSTRUCTIONS
#1 no heavy straining or lifting above 10 pounds for 2 weeks    #2 call office fevers, chills, or worsening blood in the urine  #3 Patient has follow-up Dr Porfirio Antonio next Wednesday June 16th 3:30 p m  Patient to keep Dale catheter for 10-14 days DC home with Dael to leg bag    Mylene WEBER  [address] [facility] [place]  [address] [place] [address]  [phone]  8:30 AM to 4:30 PM  Monday through Friday    [place] office  [phone]  [place], [address] [place] [address]  [phone]  1:00 to 5:00 PM  Wednesday

## 2021-06-10 NOTE — PROGRESS NOTES
NEPHROLOGY PROGRESS NOTE   Viet Fraser 54 y o  male MRN: [id number]  Unit/Bed#: [address] [place] [address] Encounter: [id number]  Reason for Consult: ARMANDO      SUMMARY:    60-year-old male with a history of urinary retention and BPH, hypertension and stroke who was admitted due to worsening renal function secondary to urinary obstruction  ASSESSMENT and PLAN:    Acute kidney injury  --severe Stage III  --baseline creatinine 1-1 2 mg/dL  --if no urine output reported or have symptoms of uremia history to the emergency room immediately  --continue to hold losartan  --renal ultrasound from May 17th:  Distended bladder with diffuse bladder wall thickening with small bladder diverticula  Marked postvoid residual urine volume of approximately 600 mL, persistent and severe hydronephrosis bilaterally  --CT scan again shows severe bladder distention and moderate diffuse wall thickening as well as moderate bilateral hydroureteronephrosis  --urology consulted  --avoid NSAIDs  --decreased oral intake  --currently not on intravenous fluids as he currently has no Dale catheter and already has severe obstruction    --patient for the OR today for placement of Dale catheter as well as cystoscopy  --once Dale catheter in place can start intravenous fluids  --discussed with hospitalist  --renal function remains unchanged     Hypertension  --continue amlodipine, currently off losartan  --urinary retention can also cause elevated blood pressure     Abnormal renal function test  --may have some underlying degree of mild renal dysfunction likely stage I versus stage II secondary to hypertension and possible chronic symptoms of urinary retention along with some age-related nephron loss  --baseline creatinine 1-1 2 mg/dL     Hyperkalemia  --resolved  --secondary to urinary tract obstruction resulting in acute kidney injury      SUBJECTIVE / INTERVAL HISTORY:    No chest pain or shortness of breath    OBJECTIVE:  Current Weight: Weight - Scale: 92 1 kg (203 lb)  Vitals:    06/09/21 2022 06/09/21 2236 06/10/21 0513 06/10/21 0730   BP:  143/86 140/86 140/87   BP Location:    Left arm   Pulse:  75 72 65   Resp:  20 18 18   Temp:  97 9 °F (36 6 °C) 97 6 °F (36 4 °C) 97 7 °F (36 5 °C)   TempSrc:   Oral Oral   SpO2:  97% 96% 97%   Weight:       Height: 6' (1 829 m)          Intake/Output Summary (Last 24 hours) at 6/10/2021 0942  Last data filed at 6/10/2021 0854  Gross per 24 hour   Intake --   Output 800 ml   Net -800 ml       Review of Systems:    12 point ROS has been reviewed  Physical Exam  Vitals signs and nursing note reviewed  Constitutional:       General: He is not in acute distress  Appearance: He is well-developed  He is not diaphoretic  HENT:      Head: Normocephalic and atraumatic  Eyes:      General: No scleral icterus  Pupils: Pupils are equal, round, and reactive to light  Neck:      Musculoskeletal: Normal range of motion and neck supple  Cardiovascular:      Rate and Rhythm: Normal rate and regular rhythm  Heart sounds: Normal heart sounds  No murmur  No friction rub  No gallop  Pulmonary:      Effort: Pulmonary effort is normal  No respiratory distress  Breath sounds: Normal breath sounds  No wheezing or rales  Chest:      Chest wall: No tenderness  Abdominal:      General: Bowel sounds are normal  There is no distension  Palpations: Abdomen is soft  Tenderness: There is no abdominal tenderness  There is no rebound  Musculoskeletal: Normal range of motion  Skin:     Findings: No rash  Neurological:      Mental Status: He is alert and oriented to person, place, and time           Medications:    Current Facility-Administered Medications:     amLODIPine (NORVASC) tablet 5 mg, 5 mg, Oral, Daily, Tricia Revankar, DO, 5 mg at 06/10/21 0850    atorvastatin (LIPITOR) tablet 80 mg, 80 mg, Oral, Daily With Dinner, Tricia Revankar, DO, 80 mg at 06/09/21 1734    clopidogrel (PLAVIX) tablet 75 mg, 75 mg, Oral, Daily, Tricia Revankar, DO, Stopped at 06/10/21 0850    doxazosin (CARDURA) tablet 4 mg, 4 mg, Oral, HS, Tricia Revankar, DO, 4 mg at 27/62/64 9582    folic acid (FOLVITE) tablet 1 mg, 1 mg, Oral, Daily, Tricia Revankar, DO, 1 mg at 06/10/21 0850    heparin (porcine) subcutaneous injection 5,000 Units, 5,000 Units, Subcutaneous, Q8H [address], Stopped at 06/10/21 0600 **AND** Platelet count, , , Once, Tricia Revankar, DO    metoprolol tartrate (LOPRESSOR) tablet 25 mg, 25 mg, Oral, HS, Tricia Revankar, DO, 25 mg at 06/09/21 2233    multi-electrolyte (PLASMALYTE-A/ISOLYTE-S PH 7 4) IV solution, 100 mL/hr, Intravenous, Continuous, Tricia Revankar, DO    thiamine tablet 100 mg, 100 mg, Oral, Daily, Tricia Revankar, DO, 100 mg at 06/10/21 0850    Laboratory Results:  Results from last 7 days   Lab Units 06/10/21  0511 06/09/21  1221 06/08/21  0000 06/04/21  0830   WBC Thousand/uL 6 15 6 61  --   --    HEMOGLOBIN g/dL 9 8* 9 7*  --   --    HEMATOCRIT % 30 6* 29 5*  --   --    PLATELETS Thousands/uL 223 220  --   --    POTASSIUM mmol/L 4 7 4 8 5 2 5 4*   CHLORIDE mmol/L 109* 107 105 107*   CO2 mmol/L 22 23 18* 19*   BUN mg/dL 43* 44* 45* 35*   CREATININE mg/dL 3 57* 3 69* 3 63* 3 17*   CALCIUM mg/dL 9 2 9 1  --   --    MAGNESIUM mg/dL 1 9  --   --   --

## 2021-06-10 NOTE — ANESTHESIA PREPROCEDURE EVALUATION
Procedure:  CYSTOSCOPY WITH RETROGRADE PYELOGRAM/internal urethrotomy (N/A Bladder)    Relevant Problems   CARDIO   (+) Benign essential hypertension   (+) Hypercholesterolemia   (+) Hypertension      ENDO (within normal limits)      /RENAL   (+) ARMANDO (acute kidney injury) ([facility] [address] )   (+) Benign prostatic hyperplasia with lower urinary tract symptoms      HEMATOLOGY   (+) Anemia      NEURO/PSYCH   (+) Cerebrovascular accident (CVA) due to occlusion of left middle cerebral artery (HCC)      PULMONARY (within normal limits)        Physical Exam    Airway    Mallampati score: II  TM Distance: >3 FB  Neck ROM: full     Dental   Comment: Very poor dental condition with multiple missing and broken teeth ,     Cardiovascular  Rhythm: regular, Rate: normal,     Pulmonary  Breath sounds clear to auscultation,     Other Findings        Anesthesia Plan  ASA Score- 2     Anesthesia Type- general with ASA Monitors  Additional Monitors:   Airway Plan: LMA  Plan Factors-Exercise tolerance (METS): >4 METS  Chart reviewed  Existing labs reviewed  Patient summary reviewed  Patient is not a current smoker  Induction- intravenous  Postoperative Plan- Plan for postoperative opioid use  Planned trial extubation    Informed Consent- Anesthetic plan and risks discussed with patient  I personally reviewed this patient with the CRNA  Discussed and agreed on the Anesthesia Plan with the CRNA Gerhardt Sep

## 2021-06-10 NOTE — UTILIZATION REVIEW
Initial Clinical Review    Admission: Date/Time/Statement:   Admission Orders (From admission, onward)     Ordered        06/09/21 1548  Inpatient Admission  Once                   Orders Placed This Encounter   Procedures    Inpatient Admission     Standing Status:   Standing     Number of Occurrences:   1     Order Specific Question:   Level of Care     Answer:   Med Surg [16]     Order Specific Question:   Estimated length of stay     Answer:   More than 2 Midnights     Order Specific Question:   Certification     Answer:   I certify that inpatient services are medically necessary for this patient for a duration of greater than two midnights  See H&P and MD Progress Notes for additional information about the patient's course of treatment  ED Arrival Information     Expected Arrival Acuity Means of Arrival Escorted By Service Admission Type    6/9/2021 09:47 6/9/2021 11:34 Urgent Walk-In Spouse Hospitalist Urgent    Arrival Complaint    abnormal creat level        Chief Complaint   Patient presents with    Abnormal Lab     Patient had blood work done on Monday and today was called and sent to ED for creat  3 6       Initial Presentation:   54 y o  male PMH HTN Stroke,who presents with worsening renal function on outpatient lab work  Patient was advised by his PCP to come to the ER  Patient with history of bladder outlet obstruction over the last few months and has been having worsening renal function and following up with Urology and Nephrology  Patient was initially doing well with prescribed medication but now worsening again  Reports a weak stream and urinary frequency  Finished antibiotics for UTI about 2 days ago  In ED bun/cr 44/3 69 ,h/h 9 7/29 5  CT abdomen pelvis showing severe bladder distention and moderate diffuse wall thickening in keeping with outlet obstruction  modearte b/l hydroureteronephrosis     NEPHROLOGY CONSULT  Severe stage III baseline 1 -1 2 ,cr 3 69 hold losartan, once rosales catheter inserted can start fluids Isolate 100hr  Rosales unable to be passed waiting for urology  UROLOGY CONSULT  Acute renal failure on chronic renal failure  Bilateral hydro  Postvoid residuals questionable urinary tension  Patient voiding without any difficulty or pain at this time  Difficult catheterization unable to place catheter at the bedside appears to have 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  NPO after midnight  To OR tomorrow  Exam under anesthesia cystoscopy possible internal urethrotomy cysto retrogrades    Date: 6/10/21   Day 2:   Nephrology currently not on IVF , for the OR today for placement of Rosales catheter as well as cystoscopy , once rosales placed start fluids  Renal function remains unchanged    OR   Procedure  Cysto retrograde urethrogram direct internal urethrotomy fluoroscopic cystogram  Operative Findings:  Severe stricture with false passage noted in the bulbar urethra attempts at passing a wire through the cystoscope were un successful retrograde urethrogram confirmed extravasation of contrast in the bulbar urethra with no contrast into the bladder  Rigid ureteroscopy with stricture opening found  DVIU performed  [facility] drainage must leave in for at least 1 week  Attending  ARMANDO Patient was started on isolate at 100 mL/hour  Creatinine continues to remain elevated  Follow-up BMP in a m  With IV hydration  Bladder outlet obstruction s/p cystoscopy with DVIU and placement of [place] catheter  Anemia will need outpatient colonoscopy screening stool occult blood   htn continue norvasc, cardura ,and lopressor conitnue hold losartan    cva continue statu plavix  ED Triage Vitals   Temperature Pulse Respirations Blood Pressure SpO2   06/09/21 1148 06/09/21 1148 06/09/21 1148 06/09/21 1148 06/09/21 1148   97 5 °F (36 4 °C) 75 18 135/84 97 %      Temp Source Heart Rate Source Patient Position - Orthostatic VS BP Location FiO2 (%)   06/09/21 1148 06/09/21 1148 06/09/21 1148 06/09/21 1148 --   Tympanic Monitor Sitting Right arm       Pain Score       06/09/21 1627       No Pain          Wt Readings from Last 1 Encounters:   06/09/21 92 1 kg (203 lb)     Additional Vital Signs:   06/10/21 07:30:24  97 7 °F (36 5 °C)  65  18  140/87  105  97 %  None (Room air)  --  Lying   06/10/21 05:13:35  97 6 °F (36 4 °C)  72  18  140/86  104  96 %  None (Room air)  --  --   06/09/21 22:36:29  97 9 °F (36 6 °C)  75  20  143/86  105  97 %  --  --  --   06/09/21 19:09:45  98 3 °F (36 8 °C)  85  17  165/99  121  97 %  --  --  --   06/09/21 16:28:08  98 °F (36 7 °C)  73  17  159/88  112  98 %             Pertinent Labs/Diagnostic Test Results:   6/9/21 Ct abd pelvis Severe bladder distention and moderate diffuse wall thickening in keeping with outlet obstruction  Moderate bilateral hydroureteronephrosis     Results from last 7 days   Lab Units 06/09/21  1933   SARS-COV-2  Negative     Results from last 7 days   Lab Units 06/10/21  0511 06/09/21  1221   WBC Thousand/uL 6 15 6 61   HEMOGLOBIN g/dL 9 8* 9 7*   HEMATOCRIT % 30 6* 29 5*   PLATELETS Thousands/uL 223 220   NEUTROS ABS Thousands/µL  --  4 59     Results from last 7 days   Lab Units 06/10/21  0511 06/09/21  1221 06/08/21  0000 06/04/21  0830   SODIUM mmol/L 142 141 140 143   POTASSIUM mmol/L 4 7 4 8 5 2 5 4*   CHLORIDE mmol/L 109* 107 105 107*   CO2 mmol/L 22 23 18* 19*   ANION GAP mmol/L 11 11  --   --    BUN mg/dL 43* 44* 45* 35*   CREATININE mg/dL 3 57* 3 69* 3 63* 3 17*   EGFR ml/min/1 73sq m 18 17  --   --    CALCIUM mg/dL 9 2 9 1  --   --    MAGNESIUM mg/dL 1 9  --   --   --      Results from last 7 days   Lab Units 06/09/21  1221   AST U/L 18   ALT U/L 26   ALK PHOS U/L 105   TOTAL PROTEIN g/dL 7 0   ALBUMIN g/dL 3 5   TOTAL BILIRUBIN mg/dL 0 31     Results from last 7 days   Lab Units 06/10/21  0511 06/09/21  1221 06/08/21  0000 06/04/21  0830   GLUCOSE RANDOM mg/dL 93 93 86 100*     Results from last 7 days   Lab Units 06/09/21  1221 PROTIME seconds 13 4   INR  1 03   PTT seconds 31     Results from last 7 days   Lab Units 06/10/21  0511   FERRITIN ng/mL 382     Results from last 7 days   Lab Units 06/09/21  1305   CLARITY UA  Clear   COLOR UA  Light Yellow   SPEC GRAV UA  1 010   PH UA  6 0   GLUCOSE UA mg/dl Negative   KETONES UA mg/dl Negative   BLOOD UA  Negative   PROTEIN UA mg/dl Negative   NITRITE UA  Negative   BILIRUBIN UA  Negative   UROBILINOGEN UA E U /dl 0 2   LEUKOCYTES UA  Trace*   WBC UA /hpf 2-4   RBC UA /hpf None Seen   BACTERIA UA /hpf Occasional   EPITHELIAL CELLS WET PREP /hpf Occasional         Past Medical History:   Diagnosis Date    Hypertension     Stroke ([facility] ) 02/10/2019     Present on Admission:   ARMANDO (acute kidney injury) ([facility] )   Benign essential hypertension   Cerebrovascular accident (CVA) due to occlusion of left middle cerebral artery (HCC)   Bladder outlet obstruction   Anemia      Admitting Diagnosis: Hydronephrosis [N13 30]  Abnormal laboratory test result [R89 9]  ARMANDO (acute kidney injury) ([place] [address] ) [N17 9]  Bladder outlet obstruction [N32 0]  Age/Sex: 54 y o  male  Admission Orders:  gmf  FL urethrocystogram retrograde  Bmp plt  Bladder scan  I/o  Irrigate catheter x1  Scheduled Medications:  amLODIPine, 5 mg, Oral, Daily  atorvastatin, 80 mg, Oral, Daily With Dinner  clopidogrel, 75 mg, Oral, Daily  doxazosin, 4 mg, Oral, HS  folic acid, 1 mg, Oral, Daily  heparin (porcine), 5,000 Units, Subcutaneous, Q8H [address]  [START ON 6/11/2021] levofloxacin, 500 mg, Intravenous, Q24H  metoprolol tartrate, 25 mg, Oral, HS  thiamine, 100 mg, Oral, Daily      Continuous IV Infusions:  multi-electrolyte, 100 mL/hr, Intravenous, Continuous      PRN Meds:  aluminum-magnesium hydroxide-simethicone, 30 mL, Oral, Q6H PRN  ondansetron, 4 mg, Intravenous, Q6H PRN        IP CONSULT TO NEPHROLOGY  IP CONSULT TO UROLOGY    Network Utilization Review Department  ATTENTION: Please call with any questions or concerns to [phone] and carefully listen to the prompts so that you are directed to the right person  All voicemails are confidential   Rosemary Jeremiah all requests for admission clinical reviews, approved or denied determinations and any other requests to dedicated fax number below belonging to the campus where the patient is receiving treatment   List of dedicated fax numbers for the Facilities:  [address] DENIALS (Administrative/Medical Necessity) [phone]   [address] (Maternity/NICU/Pediatrics) [phone]   [address] [place] [address] Dr Mary Nelsonel [place] [address] [id number] [address] [facility] [facility] [phone]

## 2021-06-10 NOTE — PROGRESS NOTES
Zack 45  Progress Note Christ Licona 1965, 54 y o  male MRN: [id number]  Unit/Bed#: 2 [place] [address] Encounter: [id number]  Primary Care Provider: Vivi Bains MD   Date and time admitted to hospital: 6/9/2021 11:56 AM    * ARMANDO (acute kidney injury) [facility])  Assessment & Plan  Creatinine was 3 69 upon admission  Baseline creatinine of 1-1 2  Patient has no urine output and also no uremia symptoms  Renal ultrasound from May 17 showed distended bladder with diffuse bladder wall thickening with small bladder diverticula  CT scan of the abdomen pelvis showed severe bladder distention, moderate diffuse wall thickening with moderate bilateral hydroureteronephrosis  Patient was started on isolate at 100 mL/hour  Creatinine continues to remain elevated  Follow-up BMP in a m  With IV hydration    Acute urinary retention  Assessment & Plan  Patient had low urine output and Dale catheter could not be placed in the emergency room or by the Urology  Patient underwent cystoscopy with retrograde ureterogram with direct internal urethrotomy-patient noted to have severe stricture with false passage in the bulbar urethra and DVIU was performed  Patient had right ureteroscopy with stricture opening noted  Patient had [address] drainage left by Urology which has to be left in place for 1 week      Bladder outlet obstruction  Assessment & Plan  On imaging, patient noted to have findings suggestive of bladder outlet obstruction with moderate hydronephrosis bilaterally  Dale catheter placement attempts in the emergency room and by Urology were unsuccessful  Per ER notes, bladder scan showed 200 mL  Patient underwent cystoscopy with DVIU and placement of [place] catheter    Anemia  Assessment & Plan  Hemoglobin continues to remain low around 9  Hemoglobin about 2 years ago was 14  Patient will need outpatient screening colonoscopy and stool occult blood    Benign essential hypertension  Assessment & Plan  Continue Norvasc, cardura and Lopressor  Continue to hold losartan in the setting of acute kidney injury    Cerebrovascular accident (CVA) due to occlusion of left middle cerebral artery (HCC)  Assessment & Plan  History of CVA  Continue statin, Plavix      VTE Pharmacologic Prophylaxis:   Pharmacologic: Heparin  Mechanical VTE Prophylaxis in Place: Yes    Patient Centered Rounds: I have performed bedside rounds with nursing staff today  Discussions with Specialists or Other Care Team Provider: Dr Luis Avendaño and Discussions with Family / Patient: *yes    Time Spent for Care: 45 minutes  More than 50% of total time spent on counseling and coordination of care as described above  Current Length of Stay: 1 day(s)    Current Patient Status: Inpatient   Certification Statement: The patient will continue to require additional inpatient hospital stay due to Acute kidney injury, urinary retention    Discharge Plan:  Home    Code Status: Level 1 - Full Code      Subjective:   Patient still cannot urinate  Denies any abdominal pain, nausea or vomiting    Objective:     Vitals:   Temp (24hrs), Av 9 °F (36 6 °C), Min:97 4 °F (36 3 °C), Max:98 3 °F (36 8 °C)    Temp:  [97 4 °F (36 3 °C)-98 3 °F (36 8 °C)] 97 4 °F (36 3 °C)  HR:  [65-85] 72  Resp:  [17-20] 18  BP: (123-169)/(77-99) 140/90  SpO2:  [96 %-98 %] 96 %  Body mass index is 27 53 kg/m²  Input and Output Summary (last 24 hours): Intake/Output Summary (Last 24 hours) at 6/10/2021 1253  Last data filed at 6/10/2021 1147  Gross per 24 hour   Intake 300 ml   Output 800 ml   Net -500 ml       Physical Exam:     Physical Exam  Constitutional:       General: He is not in acute distress  HENT:      Head: Normocephalic and atraumatic  Eyes:      Conjunctiva/sclera: Conjunctivae normal       Pupils: Pupils are equal, round, and reactive to light  Neck:      Musculoskeletal: Normal range of motion and neck supple     Cardiovascular:      Rate and Rhythm: Normal rate and regular rhythm  Heart sounds: Normal heart sounds  Pulmonary:      Effort: Pulmonary effort is normal  No respiratory distress  Breath sounds: No wheezing, rhonchi or rales  Chest:      Chest wall: No tenderness  Abdominal:      General: Bowel sounds are normal  There is no distension  Palpations: Abdomen is soft  Tenderness: There is no abdominal tenderness  There is no guarding or rebound  Skin:     General: Skin is warm and dry  Findings: No rash  Neurological:      Mental Status: He is alert  Cranial Nerves: No cranial nerve deficit  Additional Data:     Labs:    Results from last 7 days   Lab Units 06/10/21  0511 06/09/21  1221   WBC Thousand/uL 6 15 6 61   HEMOGLOBIN g/dL 9 8* 9 7*   HEMATOCRIT % 30 6* 29 5*   PLATELETS Thousands/uL 223 220   NEUTROS PCT %  --  70   LYMPHS PCT %  --  21   MONOS PCT %  --  7   EOS PCT %  --  2     Results from last 7 days   Lab Units 06/10/21  0511 06/09/21  1221   POTASSIUM mmol/L 4 7 4 8   CHLORIDE mmol/L 109* 107   CO2 mmol/L 22 23   BUN mg/dL 43* 44*   CREATININE mg/dL 3 57* 3 69*   CALCIUM mg/dL 9 2 9 1   ALK PHOS U/L  --  105   ALT U/L  --  26   AST U/L  --  18     Results from last 7 days   Lab Units 06/09/21  1221   INR  1 03       * I Have Reviewed All Lab Data Listed Above  * Additional Pertinent Lab Tests Reviewed:  [place] [facility] Admission Reviewed    Imaging:    Imaging Reports Reviewed Today Include:  CT abdomen pelvis    Recent Cultures (last 7 days):           Last 24 Hours Medication List:   Current Facility-Administered Medications   Medication Dose Route Frequency Provider Last Rate    aluminum-magnesium hydroxide-simethicone  30 mL Oral Q6H PRN Marvin Espitia MD      amLODIPine  5 mg Oral Daily Marvin Espitia MD      atorvastatin  80 mg Oral Daily With Hannah Malik MD      clopidogrel  75 mg Oral Daily Marvin Espitia MD      doxazosin  4 mg Oral HS Eder Obrien MD      folic acid  1 mg Oral Daily Eder Obrien MD      heparin (porcine)  5,000 Units Subcutaneous [facility] Eder Obrien MD      [START ON 6/11/2021] levofloxacin  500 mg Intravenous Q24H Eder Obrien MD      metoprolol tartrate  25 mg Oral HS Eder Obrien MD      multi-electrolyte  100 mL/hr Intravenous Continuous Eder Obrien MD      ondansetron  4 mg Intravenous Q6H PRN Eder Obrien MD      thiamine  100 mg Oral Daily Eder Obrien MD          Today, Patient Was Seen By: Jennifer Rogers MD    ** Please Note: Dictation voice to text software may have been used in the creation of this document   **

## 2021-06-11 LAB
ANION GAP SERPL CALCULATED.3IONS-SCNC: 9 MMOL/L (ref 4–13)
BUN SERPL-MCNC: 40 MG/DL (ref 5–25)
CALCIUM SERPL-MCNC: 8.6 MG/DL (ref 8.3–10.1)
CHLORIDE SERPL-SCNC: 108 MMOL/L (ref 100–108)
CO2 SERPL-SCNC: 24 MMOL/L (ref 21–32)
CREAT SERPL-MCNC: 3.27 MG/DL (ref 0.6–1.3)
GFR SERPL CREATININE-BSD FRML MDRD: 20 ML/MIN/1.73SQ M
GLUCOSE SERPL-MCNC: 92 MG/DL (ref 65–140)
POTASSIUM SERPL-SCNC: 4.9 MMOL/L (ref 3.5–5.3)
SODIUM SERPL-SCNC: 141 MMOL/L (ref 136–145)

## 2021-06-11 PROCEDURE — 80048 BASIC METABOLIC PNL TOTAL CA: CPT | Performed by: INTERNAL MEDICINE

## 2021-06-11 PROCEDURE — 99232 SBSQ HOSP IP/OBS MODERATE 35: CPT | Performed by: INTERNAL MEDICINE

## 2021-06-11 RX ADMIN — ATORVASTATIN CALCIUM 80 MG: 80 TABLET ORAL at 16:25

## 2021-06-11 RX ADMIN — SODIUM CHLORIDE, SODIUM GLUCONATE, SODIUM ACETATE, POTASSIUM CHLORIDE, MAGNESIUM CHLORIDE, SODIUM PHOSPHATE, DIBASIC, AND POTASSIUM PHOSPHATE 100 ML/HR: .53; .5; .37; .037; .03; .012; .00082 INJECTION, SOLUTION INTRAVENOUS at 00:30

## 2021-06-11 RX ADMIN — THIAMINE HCL TAB 100 MG 100 MG: 100 TAB at 08:54

## 2021-06-11 RX ADMIN — DOXAZOSIN 4 MG: 2 TABLET ORAL at 21:17

## 2021-06-11 RX ADMIN — SODIUM CHLORIDE, SODIUM GLUCONATE, SODIUM ACETATE, POTASSIUM CHLORIDE, MAGNESIUM CHLORIDE, SODIUM PHOSPHATE, DIBASIC, AND POTASSIUM PHOSPHATE 100 ML/HR: .53; .5; .37; .037; .03; .012; .00082 INJECTION, SOLUTION INTRAVENOUS at 20:58

## 2021-06-11 RX ADMIN — LEVOFLOXACIN 500 MG: 5 INJECTION, SOLUTION INTRAVENOUS at 11:05

## 2021-06-11 RX ADMIN — FOLIC ACID 1 MG: 1 TABLET ORAL at 08:54

## 2021-06-11 RX ADMIN — HEPARIN SODIUM 5000 UNITS: 5000 INJECTION INTRAVENOUS; SUBCUTANEOUS at 05:15

## 2021-06-11 RX ADMIN — ONDANSETRON 4 MG: 2 INJECTION INTRAMUSCULAR; INTRAVENOUS at 09:19

## 2021-06-11 RX ADMIN — ONDANSETRON 4 MG: 2 INJECTION INTRAMUSCULAR; INTRAVENOUS at 16:24

## 2021-06-11 RX ADMIN — HEPARIN SODIUM 5000 UNITS: 5000 INJECTION INTRAVENOUS; SUBCUTANEOUS at 21:17

## 2021-06-11 RX ADMIN — METOPROLOL TARTRATE 25 MG: 25 TABLET, FILM COATED ORAL at 21:17

## 2021-06-11 RX ADMIN — CLOPIDOGREL BISULFATE 75 MG: 75 TABLET ORAL at 08:54

## 2021-06-11 RX ADMIN — AMLODIPINE BESYLATE 5 MG: 5 TABLET ORAL at 08:54

## 2021-06-11 NOTE — ASSESSMENT & PLAN NOTE
Hemoglobin continues to remain low around 9  Hemoglobin about 2 years ago was 14  Patient will need outpatient screening colonoscopy and stool occult blood  Iron panel showed iron level of 34, TIBC of 261

## 2021-06-11 NOTE — UTILIZATION REVIEW
Continued Stay Review    Date: 6/11/21                        Current Patient Class: inpatient  Current Level of Care: med surg  HPI:55 y o  male initially admitted on 6/9/21  Assessment/Plan:   S/p cysto DVIU Rosales catheter severe stricture disease bulbar urethra, urology following, IVABT continued  Creatinine trending downward with good output since rosales placed  Episode vomiting this morning, IV zofran given, IVF maintained       Vital Signs:   /77   Pulse 59   Temp 97 5 °F (36 4 °C)   Resp 16   Ht 6' (1 829 m)   Wt 92 1 kg (203 lb)   SpO2 97%   BMI 27 53 kg/m²      Intake/Output Summary (Last 24 hours) at 6/11/2021 1019  Last data filed at 6/11/2021 0601  Gross per 24 hour   Intake 1540 ml   Output 2950 ml   Net -1410 ml     Pertinent Labs/Diagnostic Results:   Results from last 7 days   Lab Units 06/09/21  1933   SARS-COV-2  Negative     Results from last 7 days   Lab Units 06/10/21  0511 06/09/21  1221   WBC Thousand/uL 6 15 6 61   HEMOGLOBIN g/dL 9 8* 9 7*   HEMATOCRIT % 30 6* 29 5*   PLATELETS Thousands/uL 223 220   NEUTROS ABS Thousands/µL  --  4 59     Results from last 7 days   Lab Units 06/11/21  0507 06/10/21  0511 06/09/21  1221 06/08/21  0000   SODIUM mmol/L 141 142 141 140   POTASSIUM mmol/L 4 9 4 7 4 8 5 2   CHLORIDE mmol/L 108 109* 107 105   CO2 mmol/L 24 22 23 18*   ANION GAP mmol/L 9 11 11  --    BUN mg/dL 40* 43* 44* 45*   CREATININE mg/dL 3 27* 3 57* 3 69* 3 63*   EGFR ml/min/1 73sq m 20 18 17  --    CALCIUM mg/dL 8 6 9 2 9 1  --    MAGNESIUM mg/dL  --  1 9  --   --      Results from last 7 days   Lab Units 06/09/21  1221   AST U/L 18   ALT U/L 26   ALK PHOS U/L 105   TOTAL PROTEIN g/dL 7 0   ALBUMIN g/dL 3 5   TOTAL BILIRUBIN mg/dL 0 31     Results from last 7 days   Lab Units 06/11/21  0507 06/10/21  0511 06/09/21  1221 06/08/21  0000   GLUCOSE RANDOM mg/dL 92 93 93 86     Results from last 7 days   Lab Units 06/09/21  1221   PROTIME seconds 13 4   INR  1 03   PTT seconds 31 Results from last 7 days   Lab Units 06/10/21  0511   FERRITIN ng/mL 382     Results from last 7 days   Lab Units 06/09/21  1305   CLARITY UA  Clear   COLOR UA  Light Yellow   SPEC GRAV UA  1 010   PH UA  6 0   GLUCOSE UA mg/dl Negative   KETONES UA mg/dl Negative   BLOOD UA  Negative   PROTEIN UA mg/dl Negative   NITRITE UA  Negative   BILIRUBIN UA  Negative   UROBILINOGEN UA E U /dl 0 2   LEUKOCYTES UA  Trace*   WBC UA /hpf 2-4   RBC UA /hpf None Seen   BACTERIA UA /hpf Occasional   EPITHELIAL CELLS WET PREP /hpf Occasional     Medications:   amLODIPine, 5 mg, Oral, Daily  atorvastatin, 80 mg, Oral, Daily With Dinner  clopidogrel, 75 mg, Oral, Daily  doxazosin, 4 mg, Oral, HS  folic acid, 1 mg, Oral, Daily  heparin (porcine), 5,000 Units, Subcutaneous, Q8H [place]  levofloxacin, 500 mg, Intravenous, Q24H  metoprolol tartrate, 25 mg, Oral, HS  thiamine, 100 mg, Oral, Daily    Continuous IV Infusions:  multi-electrolyte, 100 mL/hr, Intravenous, Continuous    PRN Meds:  aluminum-magnesium hydroxide-simethicone, 30 mL, Oral, Q6H PRN  ondansetron, 4 mg, Intravenous, Q6H PRN-used x1, 6/11  traMADol, 50 mg, Oral, BID PRN    Discharge Plan: d    Network Utilization Review Department  ATTENTION: Please call with any questions or concerns to [phone] and carefully listen to the prompts so that you are directed to the right person  All voicemails are confidential   [facility] all requests for admission clinical reviews, approved or denied determinations and any other requests to dedicated fax number below belonging to the campus where the patient is receiving treatment   List of dedicated fax numbers for the Facilities:  [address] DENIALS (Administrative/Medical Necessity) [phone]   [address] (Maternity/NICU/Pediatrics) 261 [facility] [address]  [address] [place] [id number] [place] [address] [address] [place] [address] [phone]

## 2021-06-11 NOTE — PLAN OF CARE
Problem: Potential for Falls  Goal: Patient will remain free of falls  Description: INTERVENTIONS:  - Assess patient frequently for physical needs  -  Identify cognitive and physical deficits and behaviors that affect risk of falls    -  Lambert fall precautions as indicated by assessment   - Educate patient/family on patient safety including physical limitations  - Instruct patient to call for assistance with activity based on assessment  - Modify environment to reduce risk of injury  - Consider OT/PT consult to assist with strengthening/mobility  Outcome: Progressing     Problem: GENITOURINARY - ADULT  Goal: Absence of urinary retention  Description: INTERVENTIONS:  - Assess patients ability to void and empty bladder  - Monitor I/O  - Bladder scan as needed  - Discuss with physician/AP medications to alleviate retention as needed  - Discuss catheterization for long term situations as appropriate  Outcome: Progressing  Goal: Urinary catheter remains patent  Description: INTERVENTIONS:  - Assess patency of urinary catheter  - If patient has a chronic rosales, consider changing catheter if non-functioning  - Follow guidelines for intermittent irrigation of non-functioning urinary catheter  Outcome: Progressing     Problem: PAIN - ADULT  Goal: Verbalizes/displays adequate comfort level or baseline comfort level  Description: Interventions:  - Encourage patient to monitor pain and request assistance  - Assess pain using appropriate pain scale  - Administer analgesics based on type and severity of pain and evaluate response  - Implement non-pharmacological measures as appropriate and evaluate response  - Consider cultural and social influences on pain and pain management  - Notify physician/advanced practitioner if interventions unsuccessful or patient reports new pain  Outcome: Progressing

## 2021-06-11 NOTE — ASSESSMENT & PLAN NOTE
On imaging, patient noted to have findings suggestive of bladder outlet obstruction with moderate hydronephrosis bilaterally  Dale catheter placement attempts in the emergency room and by Urology were unsuccessful  Per ER notes, bladder scan showed 200 mL  Patient underwent cystoscopy with DVIU and placement of [place] catheter  Patient to be discharged with Dale catheter with outpatient follow-up with Urology in 1 week

## 2021-06-11 NOTE — ASSESSMENT & PLAN NOTE
Creatinine was 3 69 upon admission  Baseline creatinine of 1-1 2  Patient has no urine output and also no uremia symptoms  Renal ultrasound from May 17 showed distended bladder with diffuse bladder wall thickening with small bladder diverticula  CT scan of the abdomen pelvis showed severe bladder distention, moderate diffuse wall thickening with moderate bilateral hydroureteronephrosis  Patient has been on Isolyte 100 mL/hour which will be continued    Patient is having good urine output  Creatinine improving with IV hydration

## 2021-06-11 NOTE — PLAN OF CARE
Problem: Potential for Falls  Goal: Patient will remain free of falls  Description: INTERVENTIONS:  - Assess patient frequently for physical needs  -  Identify cognitive and physical deficits and behaviors that affect risk of falls    -  Geff fall precautions as indicated by assessment   - Educate patient/family on patient safety including physical limitations  - Instruct patient to call for assistance with activity based on assessment  - Modify environment to reduce risk of injury  - Consider OT/PT consult to assist with strengthening/mobility  Outcome: Progressing     Problem: GENITOURINARY - ADULT  Goal: Absence of urinary retention  Description: INTERVENTIONS:  - Assess patients ability to void and empty bladder  - Monitor I/O  - Bladder scan as needed  - Discuss with physician/AP medications to alleviate retention as needed  - Discuss catheterization for long term situations as appropriate  Outcome: Progressing  Goal: Urinary catheter remains patent  Description: INTERVENTIONS:  - Assess patency of urinary catheter  - If patient has a chronic rosales, consider changing catheter if non-functioning  - Follow guidelines for intermittent irrigation of non-functioning urinary catheter  Outcome: Progressing     Problem: PAIN - ADULT  Goal: Verbalizes/displays adequate comfort level or baseline comfort level  Description: Interventions:  - Encourage patient to monitor pain and request assistance  - Assess pain using appropriate pain scale  - Administer analgesics based on type and severity of pain and evaluate response  - Implement non-pharmacological measures as appropriate and evaluate response  - Consider cultural and social influences on pain and pain management  - Notify physician/advanced practitioner if interventions unsuccessful or patient reports new pain  Outcome: Progressing

## 2021-06-11 NOTE — ASSESSMENT & PLAN NOTE
Hemoglobin continues to remain low around 9  Hemoglobin about 2 years ago was 14  Patient will need outpatient screening colonoscopy and stool occult blood  Iron panel showed iron level of 34, TIBC of 261 ABDOMINAL PAIN

## 2021-06-11 NOTE — PROGRESS NOTES
Zack 45  Progress Note Lelan Arch 1965, 54 y o  male MRN: [id number]  Unit/Bed#: [address] [place] [address] Encounter: [id number]  Primary Care Provider: Yee Andujar MD   Date and time admitted to hospital: 6/9/2021 11:56 AM    * ARMANDO (acute kidney injury) [facility])  Assessment & Plan  Creatinine was 3 69 upon admission  Baseline creatinine of 1-1 2  Patient has no urine output and also no uremia symptoms  Renal ultrasound from May 17 showed distended bladder with diffuse bladder wall thickening with small bladder diverticula  CT scan of the abdomen pelvis showed severe bladder distention, moderate diffuse wall thickening with moderate bilateral hydroureteronephrosis  Patient has been on Isolyte 100 mL/hour which will be continued  Patient is having good urine output  Creatinine improving with IV hydration    Acute urinary retention  Assessment & Plan  Patient had low urine output and Dale catheter could not be placed in the emergency room or by the Urology  Patient underwent cystoscopy with retrograde ureterogram with direct internal urethrotomy-patient noted to have severe stricture with false passage in the bulbar urethra and DVIU was performed  Patient had right ureteroscopy with stricture opening noted  Patient had [address] drainage left by Urology which has to be left in place for 1 week      Bladder outlet obstruction  Assessment & Plan  On imaging, patient noted to have findings suggestive of bladder outlet obstruction with moderate hydronephrosis bilaterally  Dale catheter placement attempts in the emergency room and by Urology were unsuccessful  Per ER notes, bladder scan showed 200 mL  Patient underwent cystoscopy with DVIU and placement of [place] catheter  Patient to be discharged with Dale catheter with outpatient follow-up with Urology in 1 week    Anemia  Assessment & Plan  Hemoglobin continues to remain low around 9  Hemoglobin about 2 years ago was 14  Patient will need outpatient screening colonoscopy and stool occult blood  Iron panel showed iron level of 34, TIBC of 261    Benign essential hypertension  Assessment & Plan  Continue Norvasc, cardura and Lopressor  Continue to hold losartan in the setting of acute kidney injury    Cerebrovascular accident (CVA) due to occlusion of left middle cerebral artery (HCC)  Assessment & Plan  History of CVA  Continue statin, Plavix        VTE Pharmacologic Prophylaxis:   Pharmacologic: Heparin  Mechanical VTE Prophylaxis in Place: Yes    Patient Centered Rounds: I have performed bedside rounds with nursing staff today  Discussions with Specialists or Other Care Team Provider: yes    Education and Discussions with Family / Patient: yes    Time Spent for Care: 45 minutes  More than 50% of total time spent on counseling and coordination of care as described above  Current Length of Stay: 2 day(s)    Current Patient Status: Inpatient   Certification Statement: The patient will continue to require additional inpatient hospital stay due to Acute kidney injury    Discharge Plan: Home    Code Status: Level 1 - Full Code      Subjective:   Patient denies any abdominal pain  Patient had small amount of bleeding around the Dale catheter site with some discomfort    Objective:     Vitals:   Temp (24hrs), Av 7 °F (36 5 °C), Min:97 5 °F (36 4 °C), Max:97 9 °F (36 6 °C)    Temp:  [97 5 °F (36 4 °C)-97 9 °F (36 6 °C)] 97 7 °F (36 5 °C)  HR:  [59-68] 64  Resp:  [16-18] 18  BP: (121-124)/(76-83) 124/76  SpO2:  [97 %-98 %] 98 %  Body mass index is 27 53 kg/m²  Input and Output Summary (last 24 hours): Intake/Output Summary (Last 24 hours) at 2021 1725  Last data filed at 2021 1255  Gross per 24 hour   Intake 2240 ml   Output 3900 ml   Net -1660 ml       Physical Exam:     Physical Exam  Constitutional:       General: He is not in acute distress  HENT:      Head: Normocephalic and atraumatic     Eyes: Conjunctiva/sclera: Conjunctivae normal       Pupils: Pupils are equal, round, and reactive to light  Neck:      Musculoskeletal: Normal range of motion and neck supple  Cardiovascular:      Rate and Rhythm: Normal rate and regular rhythm  Heart sounds: Normal heart sounds  Pulmonary:      Effort: Pulmonary effort is normal  No respiratory distress  Breath sounds: No wheezing, rhonchi or rales  Chest:      Chest wall: No tenderness  Abdominal:      General: Bowel sounds are normal  There is no distension  Palpations: Abdomen is soft  Tenderness: There is no abdominal tenderness  There is no guarding or rebound  Skin:     General: Skin is warm and dry  Findings: No rash  Neurological:      Mental Status: He is alert  Cranial Nerves: No cranial nerve deficit  Additional Data:     Labs:    Results from last 7 days   Lab Units 06/10/21  0511 06/09/21  1221   WBC Thousand/uL 6 15 6 61   HEMOGLOBIN g/dL 9 8* 9 7*   HEMATOCRIT % 30 6* 29 5*   PLATELETS Thousands/uL 223 220   NEUTROS PCT %  --  70   LYMPHS PCT %  --  21   MONOS PCT %  --  7   EOS PCT %  --  2     Results from last 7 days   Lab Units 06/11/21  0507  06/09/21  1221   POTASSIUM mmol/L 4 9   < > 4 8   CHLORIDE mmol/L 108   < > 107   CO2 mmol/L 24   < > 23   BUN mg/dL 40*   < > 44*   CREATININE mg/dL 3 27*   < > 3 69*   CALCIUM mg/dL 8 6   < > 9 1   ALK PHOS U/L  --   --  105   ALT U/L  --   --  26   AST U/L  --   --  18    < > = values in this interval not displayed  Results from last 7 days   Lab Units 06/09/21  1221   INR  1 03       * I Have Reviewed All Lab Data Listed Above  * Additional Pertinent Lab Tests Reviewed:  [place] 66 Admission Reviewed        Recent Cultures (last 7 days):           Last 24 Hours Medication List:   Current Facility-Administered Medications   Medication Dose Route Frequency Provider Last Rate    aluminum-magnesium hydroxide-simethicone  30 mL Oral Q6H PRN Jamilah Rodriguez, MD      amLODIPine  5 mg Oral Daily Jamilah Rodriguez, MD      atorvastatin  80 mg Oral Daily With Gabbie Ortega MD      clopidogrel  75 mg Oral Daily Jamilah Rodriguez, MD      doxazosin  4 mg Oral HS Jamilah Rodriguez, MD      folic acid  1 mg Oral Daily Jamilah Rodriguez, MD      heparin (porcine)  5,000 Units Subcutaneous [facility] Jamilah Rodriguez, MD      metoprolol tartrate  25 mg Oral HS Jamilah Rodriguez, MD      multi-electrolyte  100 mL/hr Intravenous Continuous Jamilah Rodriguez,  mL/hr (06/11/21 0030)    ondansetron  4 mg Intravenous Q6H PRN Jamilah Rodriguez, MD      thiamine  100 mg Oral Daily Jamilah Rodriguez MD      traMADol  50 mg Oral BID PRN Manuela Cali MD          Today, Patient Was Seen By: Manuela Cali MD    ** Please Note: Dictation voice to text software may have been used in the creation of this document   **

## 2021-06-11 NOTE — ASSESSMENT & PLAN NOTE
Patient had low urine output and Dale catheter could not be placed in the emergency room or by the Urology  Patient underwent cystoscopy with retrograde ureterogram with direct internal urethrotomy-patient noted to have severe stricture with false passage in the bulbar urethra and DVIU was performed  Patient had right ureteroscopy with stricture opening noted  Patient had [address] drainage left by Urology which has to be left in place for 1 week

## 2021-06-11 NOTE — PROGRESS NOTES
Progress Note - Urology      Patient: Francis Rojas   : 1965 Sex: male   MRN: [id number]     CSN: [id number]  Unit/Bed#: [address] [place] [address]     SUBJECTIVE:   No complaints  Status post cysto DVIU Dale catheter severe stricture disease bulbar urethra      Objective   Vitals: /77   Pulse 59   Temp 97 5 °F (36 4 °C)   Resp 16   Ht 6' (1 829 m)   Wt 92 1 kg (203 lb)   SpO2 97%   BMI 27 53 kg/m²     I/O last 24 hours: In: 6254 [P O :240;  I V :1200; IV Piggyback:100]  Out: 3300 [Urine:3300]      Physical Exam:   General Alert awake   Normocephalic atraumatic PERRLA  Lungs clear bilaterally  Cardiac normal S1 normal S2  Abdomen soft, flank pain  Extremities no edema      Lab Results: CBC:   Lab Results   Component Value Date    WBC 6 15 06/10/2021    HGB 9 8 (L) 06/10/2021    HCT 30 6 (L) 06/10/2021    MCV 87 06/10/2021     06/10/2021    MCH 27 8 06/10/2021    MCHC 32 0 06/10/2021    RDW 12 4 06/10/2021    MPV 9 3 06/10/2021    NRBC 0 2021     CMP:   Lab Results   Component Value Date     2021     2021    CO2 24 2021    CO2 18 (L) 2021    BUN 40 (H) 2021    BUN 45 (H) 2021    CREATININE 3 27 (H) 2021    CALCIUM 8 6 2021    AST 18 2021    AST 16 2021    ALT 26 2021    ALT 17 2021    ALKPHOS 105 2021    EGFR 20 2021     Urinalysis:   Lab Results   Component Value Date    COLORU Light Yellow 2021    CLARITYU Clear 2021    SPECGRAV 1 010 2021    PHUR 6 0 2021    PHUR 6 0 2021    LEUKOCYTESUR Trace (A) 2021    NITRITE Negative 2021    GLUCOSEU Negative 2021    KETONESU Negative 2021    BILIRUBINUR Negative 2021    BLOODU Negative 2021     Urine Culture: No results found for: URINECX  PSA:   Lab Results   Component Value Date    PSA 0 8 2021         Assessment/ Plan:  Urinary tension  Severe stricture disease  Creatinine trending down  DC home with Dale catheter [facility]  See in the office next week          Candy Mooney MD

## 2021-06-11 NOTE — ASSESSMENT & PLAN NOTE
Patient had low urine output and Dale catheter could not be placed in the emergency room or by the Urology  Patient underwent cystoscopy with retrograde ureterogram with direct internal urethrotomy-patient noted to have severe stricture with false passage in the bulbar urethra and DVIU was performed  Patient had right ureteroscopy with stricture opening noted  Patient had [address] drainage left by Urology which has to be left in place for 1 week

## 2021-06-11 NOTE — CASE MANAGEMENT
Per chart review and rounds with nursing and Dr Rob Velasco pt is reported to be independent with no apparent discharge needs at this time

## 2021-06-11 NOTE — DISCHARGE SUMMARY
Zakc 45  Discharge- Sofya Luna 1965, 54 y o  male MRN: [id number]  Unit/Bed#: [address] [place] [address] Encounter: [id number]  Primary Care Provider: Ade Turk MD   Date and time admitted to hospital: 6/9/2021 11:56 AM    * ARMANDO (acute kidney injury) [facility])  Assessment & Plan  Creatinine was 3 69 upon admission  Baseline creatinine of 1-1 2  Patient has no urine output and also no uremia symptoms  Renal ultrasound from May 17 showed distended bladder with diffuse bladder wall thickening with small bladder diverticula  CT scan of the abdomen pelvis showed severe bladder distention, moderate diffuse wall thickening with moderate bilateral hydroureteronephrosis  Patient has been on Isolyte 100 mL/hour which will be continued  Patient is having good urine output  Creatinine improving with IV hydration    Acute urinary retention  Assessment & Plan  Patient had low urine output and Dale catheter could not be placed in the emergency room or by the Urology  Patient underwent cystoscopy with retrograde ureterogram with direct internal urethrotomy-patient noted to have severe stricture with false passage in the bulbar urethra and DVIU was performed  Patient had right ureteroscopy with stricture opening noted  Patient had [address] drainage left by Urology which has to be left in place for 1 week      Bladder outlet obstruction  Assessment & Plan  On imaging, patient noted to have findings suggestive of bladder outlet obstruction with moderate hydronephrosis bilaterally  Dale catheter placement attempts in the emergency room and by Urology were unsuccessful  Per ER notes, bladder scan showed 200 mL  Patient underwent cystoscopy with DVIU and placement of [place] catheter  Patient to be discharged with Dale catheter with outpatient follow-up with Urology in 1 week    Anemia  Assessment & Plan  Hemoglobin continues to remain low around 9  Hemoglobin about 2 years ago was 14  Patient will need outpatient screening colonoscopy and stool occult blood  Iron panel showed iron level of 34, TIBC of 261    Benign essential hypertension  Assessment & Plan  Continue Norvasc, cardura and Lopressor  Continue to hold losartan in the setting of acute kidney injury    Cerebrovascular accident (CVA) due to occlusion of left middle cerebral artery (HCC)  Assessment & Plan  History of CVA  Continue statin, Plavix        VTE Pharmacologic Prophylaxis:   Pharmacologic: Heparin  Mechanical VTE Prophylaxis in Place: Yes    Patient Centered Rounds: I have performed bedside rounds with nursing staff today  Discussions with Specialists or Other Care Team Provider: Yes    Education and Discussions with Family / Patient: yes    Time Spent for Care: 40 minutes  More than 50% of total time spent on counseling and coordination of care as described above  Current Length of Stay: 2 day(s)    Current Patient Status: Inpatient   Certification Statement: The patient will continue to require additional inpatient hospital stay due to Acute kidney injury    Discharge Plan: Home    Code Status: Level 1 - Full Code      Subjective:   Patient has minimal discomfort in the penile area  Denies any abdominal pain or flank pain    Objective:     Vitals:   Temp (24hrs), Av 7 °F (36 5 °C), Min:97 5 °F (36 4 °C), Max:97 9 °F (36 6 °C)    Temp:  [97 5 °F (36 4 °C)-97 9 °F (36 6 °C)] 97 7 °F (36 5 °C)  HR:  [59-68] 64  Resp:  [16-18] 18  BP: (121-124)/(76-83) 124/76  SpO2:  [97 %-98 %] 98 %  Body mass index is 27 53 kg/m²  Input and Output Summary (last 24 hours): Intake/Output Summary (Last 24 hours) at 2021 1717  Last data filed at 2021 1255  Gross per 24 hour   Intake 2240 ml   Output 3900 ml   Net -1660 ml       Physical Exam:     Physical Exam  Constitutional:       General: He is not in acute distress  HENT:      Head: Normocephalic and atraumatic     Eyes:      Conjunctiva/sclera: Conjunctivae normal  Pupils: Pupils are equal, round, and reactive to light  Neck:      Musculoskeletal: Normal range of motion and neck supple  Cardiovascular:      Rate and Rhythm: Normal rate and regular rhythm  Heart sounds: Normal heart sounds  Pulmonary:      Effort: Pulmonary effort is normal  No respiratory distress  Breath sounds: No wheezing, rhonchi or rales  Chest:      Chest wall: No tenderness  Abdominal:      General: Bowel sounds are normal  There is no distension  Palpations: Abdomen is soft  Tenderness: There is no abdominal tenderness  There is no guarding or rebound  Genitourinary:     Comments: Small amount of blood around the penis  Skin:     General: Skin is warm and dry  Findings: No rash  Neurological:      Mental Status: He is alert  Cranial Nerves: No cranial nerve deficit  Additional Data:     Labs:    Results from last 7 days   Lab Units 06/10/21  0511 06/09/21  1221   WBC Thousand/uL 6 15 6 61   HEMOGLOBIN g/dL 9 8* 9 7*   HEMATOCRIT % 30 6* 29 5*   PLATELETS Thousands/uL 223 220   NEUTROS PCT %  --  70   LYMPHS PCT %  --  21   MONOS PCT %  --  7   EOS PCT %  --  2     Results from last 7 days   Lab Units 06/11/21  0507  06/09/21  1221   POTASSIUM mmol/L 4 9   < > 4 8   CHLORIDE mmol/L 108   < > 107   CO2 mmol/L 24   < > 23   BUN mg/dL 40*   < > 44*   CREATININE mg/dL 3 27*   < > 3 69*   CALCIUM mg/dL 8 6   < > 9 1   ALK PHOS U/L  --   --  105   ALT U/L  --   --  26   AST U/L  --   --  18    < > = values in this interval not displayed  Results from last 7 days   Lab Units 06/09/21  1221   INR  1 03       * I Have Reviewed All Lab Data Listed Above  * Additional Pertinent Lab Tests Reviewed:  [facility]        Recent Cultures (last 7 days):           Last 24 Hours Medication List:   Current Facility-Administered Medications   Medication Dose Route Frequency Provider Last Rate    aluminum-magnesium hydroxide-simethicone  30 mL Oral Q6H PRN Carl Cutler MD      amLODIPine  5 mg Oral Daily Carl Cutler MD      atorvastatin  80 mg Oral Daily With Lynnette Lea MD      clopidogrel  75 mg Oral Daily Carl Cutler MD      doxazosin  4 mg Oral HS Carl Cutler MD      folic acid  1 mg Oral Daily Carl Cutler MD      heparin (porcine)  5,000 Units Subcutaneous [facility] Carl Cutler MD      metoprolol tartrate  25 mg Oral HS Carl Cutler MD      multi-electrolyte  100 mL/hr Intravenous Continuous Carl Cutler  mL/hr (06/11/21 0030)    ondansetron  4 mg Intravenous Q6H PRN Carl Cutler MD      thiamine  100 mg Oral Daily Carl Cutler MD      traMADol  50 mg Oral BID PRN Lux Tanner MD          Today, Patient Was Seen By: Lux Tanner MD    ** Please Note: Dictation voice to text software may have been used in the creation of this document   **

## 2021-06-11 NOTE — PROGRESS NOTES
NEPHROLOGY PROGRESS NOTE   Jo Cazares 54 y o  male MRN: [id number]  Unit/Bed#: 2 [place] [address] Encounter: [id number]  Reason for Consult: ARMANDO      SUMMARY:    51-year-old male with a history of urinary retention and BPH, hypertension and stroke who was admitted due to worsening renal function secondary to urinary obstruction      ASSESSMENT and PLAN:     Acute kidney injury  --severe Stage III  --baseline creatinine 1-1 2 mg/dL  --if no urine output reported or have symptoms of uremia history to the emergency room immediately  --continue to hold losartan  --renal ultrasound from May 17th:  Distended bladder with diffuse bladder wall thickening with small bladder diverticula   Marked postvoid residual urine volume of approximately 600 mL, persistent and severe hydronephrosis bilaterally  --CT scan again shows severe bladder distention and moderate diffuse wall thickening as well as moderate bilateral hydroureteronephrosis  --urology on board  --avoid NSAIDs  --had some vomiting this morning  --currently on isolate at 100 cc/hours  --excellent urine output since  --Dale catheter in place  --renal function improving crime a creatinine trending down     Hypertension  --continue amlodipine, currently off losartan  --urinary retention can also cause elevated blood pressure  --currently normotensive     Abnormal renal function test  --may have some underlying degree of mild renal dysfunction likely stage I versus stage II secondary to hypertension and possible chronic symptoms of urinary retention along with some age-related nephron loss  --baseline creatinine 1-1 2 mg/dL     Hyperkalemia  --resolved    Urinary retention  --status post cystoscopy with placement of a Dale catheter  --severe stricture disease  --urology monitoring following      SUBJECTIVE / INTERVAL HISTORY:    Has some vomiting after breakfast this morning    But no chest pain or shortness of breath    OBJECTIVE:  Current Weight: Weight - Scale: 92 1 kg (203 lb)  Vitals:    06/10/21 2049 06/10/21 2150 06/10/21 2151 06/11/21 0827   BP: 124/83 123/77 123/77 121/77   BP Location: Left arm      Pulse: 68  68 59   Resp: 16      Temp: 97 9 °F (36 6 °C)   97 5 °F (36 4 °C)   TempSrc: Oral      SpO2: 98%   97%   Weight:       Height:           Intake/Output Summary (Last 24 hours) at 6/11/2021 1019  Last data filed at 6/11/2021 0601  Gross per 24 hour   Intake 1540 ml   Output 2950 ml   Net -1410 ml       Review of Systems:    12 point ROS has been reviewed  Physical Exam  Vitals signs and nursing note reviewed  Constitutional:       General: He is not in acute distress  Appearance: He is well-developed  HENT:      Head: Normocephalic and atraumatic  Eyes:      General: No scleral icterus  Conjunctiva/sclera: Conjunctivae normal       Pupils: Pupils are equal, round, and reactive to light  Neck:      Musculoskeletal: Normal range of motion and neck supple  Cardiovascular:      Rate and Rhythm: Normal rate and regular rhythm  Heart sounds: S1 normal and S2 normal  No murmur  No friction rub  No gallop  Pulmonary:      Effort: Pulmonary effort is normal  No respiratory distress  Breath sounds: Normal breath sounds  No wheezing or rales  Abdominal:      General: Bowel sounds are normal       Palpations: Abdomen is soft  Tenderness: There is no abdominal tenderness  There is no rebound  Musculoskeletal: Normal range of motion  Skin:     Findings: No rash  Neurological:      Mental Status: He is alert and oriented to person, place, and time     Psychiatric:         Behavior: Behavior normal          Medications:    Current Facility-Administered Medications:     aluminum-magnesium hydroxide-simethicone (MYLANTA) oral suspension 30 mL, 30 mL, Oral, Q6H PRN, Kervin Cannon MD    amLODIPine (NORVASC) tablet 5 mg, 5 mg, Oral, Daily, Kervin Cannon MD, 5 mg at 06/11/21 0854    atorvastatin (LIPITOR) tablet 80 mg, 80 mg, Oral, Daily With José Blackman MD, 80 mg at 06/10/21 1609    clopidogrel (PLAVIX) tablet 75 mg, 75 mg, Oral, Daily, Olimpia Hi MD, 75 mg at 06/11/21 0854    doxazosin (CARDURA) tablet 4 mg, 4 mg, Oral, HS, Olimpia Hi MD, 4 mg at 08/25/89 9105    folic acid (FOLVITE) tablet 1 mg, 1 mg, Oral, Daily, Olimpia Hi MD, 1 mg at 06/11/21 0854    heparin (porcine) subcutaneous injection 5,000 Units, 5,000 Units, Subcutaneous, Q8H [facility] & half-way, 5,000 Units at 06/11/21 0515 **AND** Platelet count, , , Once, Olimpia Hi MD    [facility]) IVPB (premix in dextrose) 500 mg 100 mL, 500 mg, Intravenous, Q24H, Olimpia Hi MD    metoprolol tartrate (LOPRESSOR) tablet 25 mg, 25 mg, Oral, HS, Olimpia Hi MD, 25 mg at 06/10/21 2151    multi-electrolyte (PLASMALYTE-A/ISOLYTE-S PH 7 4) IV solution, 100 mL/hr, Intravenous, Continuous, Olimpia Hi MD, Last Rate: 100 mL/hr at 06/11/21 0030, 100 mL/hr at 06/11/21 0030    ondansetron (ZOFRAN) injection 4 mg, 4 mg, Intravenous, Q6H PRN, Olimpia Hi MD, 4 mg at 06/11/21 0919    thiamine tablet 100 mg, 100 mg, Oral, Daily, Olimpia Hi MD, 100 mg at 06/11/21 0854    traMADol (ULTRAM) tablet 50 mg, 50 mg, Oral, BID PRN, Maureen Patterson MD, 50 mg at 06/10/21 1609    Laboratory Results:  Results from last 7 days   Lab Units 06/11/21  0507 06/10/21  0511 06/09/21  1221 06/08/21  0000   WBC Thousand/uL  --  6 15 6 61  --    HEMOGLOBIN g/dL  --  9 8* 9 7*  --    HEMATOCRIT %  --  30 6* 29 5*  --    PLATELETS Thousands/uL  --  223 220  --    POTASSIUM mmol/L 4 9 4 7 4 8 5 2   CHLORIDE mmol/L 108 109* 107 105   CO2 mmol/L 24 22 23 18*   BUN mg/dL 40* 43* 44* 45*   CREATININE mg/dL 3 27* 3 57* 3 69* 3 63*   CALCIUM mg/dL 8 6 9 2 9 1  --    MAGNESIUM mg/dL  --  1 9  --   --

## 2021-06-12 LAB
ANION GAP SERPL CALCULATED.3IONS-SCNC: 9 MMOL/L (ref 4–13)
BUN SERPL-MCNC: 32 MG/DL (ref 5–25)
CALCIUM SERPL-MCNC: 8.8 MG/DL (ref 8.3–10.1)
CHLORIDE SERPL-SCNC: 107 MMOL/L (ref 100–108)
CO2 SERPL-SCNC: 26 MMOL/L (ref 21–32)
CREAT SERPL-MCNC: 3 MG/DL (ref 0.6–1.3)
GFR SERPL CREATININE-BSD FRML MDRD: 22 ML/MIN/1.73SQ M
GLUCOSE SERPL-MCNC: 88 MG/DL (ref 65–140)
POTASSIUM SERPL-SCNC: 4.2 MMOL/L (ref 3.5–5.3)
SODIUM SERPL-SCNC: 142 MMOL/L (ref 136–145)

## 2021-06-12 PROCEDURE — 80048 BASIC METABOLIC PNL TOTAL CA: CPT | Performed by: INTERNAL MEDICINE

## 2021-06-12 PROCEDURE — 99232 SBSQ HOSP IP/OBS MODERATE 35: CPT | Performed by: INTERNAL MEDICINE

## 2021-06-12 RX ADMIN — SODIUM CHLORIDE, SODIUM GLUCONATE, SODIUM ACETATE, POTASSIUM CHLORIDE, MAGNESIUM CHLORIDE, SODIUM PHOSPHATE, DIBASIC, AND POTASSIUM PHOSPHATE 60 ML/HR: .53; .5; .37; .037; .03; .012; .00082 INJECTION, SOLUTION INTRAVENOUS at 21:47

## 2021-06-12 RX ADMIN — AMLODIPINE BESYLATE 5 MG: 5 TABLET ORAL at 08:42

## 2021-06-12 RX ADMIN — HEPARIN SODIUM 5000 UNITS: 5000 INJECTION INTRAVENOUS; SUBCUTANEOUS at 21:47

## 2021-06-12 RX ADMIN — DOXAZOSIN 4 MG: 2 TABLET ORAL at 21:47

## 2021-06-12 RX ADMIN — ATORVASTATIN CALCIUM 80 MG: 80 TABLET ORAL at 16:28

## 2021-06-12 RX ADMIN — THIAMINE HCL TAB 100 MG 100 MG: 100 TAB at 08:40

## 2021-06-12 RX ADMIN — FOLIC ACID 1 MG: 1 TABLET ORAL at 08:40

## 2021-06-12 RX ADMIN — SODIUM CHLORIDE, SODIUM GLUCONATE, SODIUM ACETATE, POTASSIUM CHLORIDE, MAGNESIUM CHLORIDE, SODIUM PHOSPHATE, DIBASIC, AND POTASSIUM PHOSPHATE 100 ML/HR: .53; .5; .37; .037; .03; .012; .00082 INJECTION, SOLUTION INTRAVENOUS at 05:52

## 2021-06-12 RX ADMIN — CLOPIDOGREL BISULFATE 75 MG: 75 TABLET ORAL at 08:40

## 2021-06-12 RX ADMIN — HEPARIN SODIUM 5000 UNITS: 5000 INJECTION INTRAVENOUS; SUBCUTANEOUS at 14:02

## 2021-06-12 RX ADMIN — ONDANSETRON 4 MG: 2 INJECTION INTRAMUSCULAR; INTRAVENOUS at 07:55

## 2021-06-12 RX ADMIN — HEPARIN SODIUM 5000 UNITS: 5000 INJECTION INTRAVENOUS; SUBCUTANEOUS at 05:44

## 2021-06-12 RX ADMIN — METOPROLOL TARTRATE 25 MG: 25 TABLET, FILM COATED ORAL at 21:47

## 2021-06-12 NOTE — PLAN OF CARE
Problem: Potential for Falls  Goal: Patient will remain free of falls  Description: INTERVENTIONS:  - Assess patient frequently for physical needs  -  Identify cognitive and physical deficits and behaviors that affect risk of falls    -  [place] fall precautions as indicated by assessment   - Educate patient/family on patient safety including physical limitations  - Instruct patient to call for assistance with activity based on assessment  - Modify environment to reduce risk of injury  - Consider OT/PT consult to assist with strengthening/mobility  Outcome: Progressing     Problem: GENITOURINARY - ADULT  Goal: Absence of urinary retention  Description: INTERVENTIONS:  - Assess patients ability to void and empty bladder  - Monitor I/O  - Bladder scan as needed  - Discuss with physician/AP medications to alleviate retention as needed  - Discuss catheterization for long term situations as appropriate  Outcome: Progressing  Goal: Urinary catheter remains patent  Description: INTERVENTIONS:  - Assess patency of urinary catheter  - If patient has a chronic rosales, consider changing catheter if non-functioning  - Follow guidelines for intermittent irrigation of non-functioning urinary catheter  Outcome: Progressing     Problem: PAIN - ADULT  Goal: Verbalizes/displays adequate comfort level or baseline comfort level  Description: Interventions:  - Encourage patient to monitor pain and request assistance  - Assess pain using appropriate pain scale  - Administer analgesics based on type and severity of pain and evaluate response  - Implement non-pharmacological measures as appropriate and evaluate response  - Consider cultural and social influences on pain and pain management  - Notify physician/advanced practitioner if interventions unsuccessful or patient reports new pain  Outcome: Progressing

## 2021-06-12 NOTE — PROGRESS NOTES
Zack 45  Progress Note Rob Swartz 1965, 54 y o  male MRN: [id number]  Unit/Bed#: 2 [place] [address] Encounter: [id number]  Primary Care Provider: Wendie Hobbs MD   Date and time admitted to hospital: 6/9/2021 11:56 AM    * ARMANDO (acute kidney injury) [facility])  Assessment & Plan  Creatinine was 3 69 upon admission  Baseline creatinine of 1-1 2  Patient initially had no urine output but did not have any uremia symptoms either  Patient is having excellent urine output after placement of Dale catheter  Renal ultrasound from May 17 showed distended bladder with diffuse bladder wall thickening with small bladder diverticula  CT scan of the abdomen pelvis showed severe bladder distention, moderate diffuse wall thickening with moderate bilateral hydroureteronephrosis  Patient has been on Isolyte 100 mL/hour which will be continued  Patient is having good urine output  Creatinine improving with IV hydration which will be discontinued tomorrow if creatinine continues to improve    Acute urinary retention  Assessment & Plan  Patient had low urine output and Dale catheter could not be placed in the emergency room or by the Urology  Patient underwent cystoscopy with retrograde ureterogram with direct internal urethrotomy-patient noted to have severe stricture with false passage in the bulbar urethra and DVIU was performed  Patient had right ureteroscopy with stricture opening noted  Patient had [address] drainage left by Urology which has to be left in place for 1 week    Patient also noted to have minimal hematuria    Bladder outlet obstruction  Assessment & Plan  On imaging, patient noted to have findings suggestive of bladder outlet obstruction with moderate hydronephrosis bilaterally  Dale catheter placement attempts in the emergency room and by Urology were unsuccessful  Per ER notes, bladder scan showed 200 mL  Patient underwent cystoscopy with DVIU and placement of [place] catheter  Patient to be discharged with Dale catheter with outpatient follow-up with Urology in 1 week    Anemia  Assessment & Plan  Hemoglobin continues to remain low around 9  Hemoglobin about 2 years ago was 14  Patient will need outpatient screening colonoscopy and stool occult blood  Iron panel showed iron level of 34, TIBC of 261    Benign essential hypertension  Assessment & Plan  Continue Norvasc, cardura and Lopressor  Continue to hold losartan in the setting of acute kidney injury    Cerebrovascular accident (CVA) due to occlusion of left middle cerebral artery (HCC)  Assessment & Plan  History of CVA  Continue statin, Plavix        VTE Pharmacologic Prophylaxis:   Pharmacologic: Heparin  Mechanical VTE Prophylaxis in Place: Yes    Patient Centered Rounds: I have performed bedside rounds with nursing staff today  Discussions with Specialists or Other Care Team Provider: Yes    Education and Discussions with Family / Patient: Yes    Time Spent for Care: 45 minutes  More than 50% of total time spent on counseling and coordination of care as described above  Current Length of Stay: 3 day(s)    Current Patient Status: Inpatient   Certification Statement: The patient will continue to require additional inpatient hospital stay due to Acute kidney injury    Discharge Plan:  Home    Code Status: Level 1 - Full Code      Subjective:   Patient had minimal nausea this morning  Patient denies any abdominal pain  Patient has minimal amount of bleeding around the Dale catheter  Objective:     Vitals:   Temp (24hrs), Av 9 °F (36 6 °C), Min:97 7 °F (36 5 °C), Max:98 1 °F (36 7 °C)    Temp:  [97 7 °F (36 5 °C)-98 1 °F (36 7 °C)] 97 9 °F (36 6 °C)  HR:  [64-75] 73  Resp:  [18-20] 20  BP: (124-140)/(76-79) 138/79  SpO2:  [97 %-98 %] 97 %  Body mass index is 27 53 kg/m²  Input and Output Summary (last 24 hours):        Intake/Output Summary (Last 24 hours) at 2021 1228  Last data filed at 2021 1013  Gross per 24 hour   Intake 3755 ml   Output 3550 ml   Net 205 ml       Physical Exam:     Physical Exam  Constitutional:       General: He is not in acute distress  HENT:      Head: Normocephalic and atraumatic  Eyes:      Conjunctiva/sclera: Conjunctivae normal       Pupils: Pupils are equal, round, and reactive to light  Neck:      Musculoskeletal: Normal range of motion and neck supple  Cardiovascular:      Rate and Rhythm: Normal rate and regular rhythm  Heart sounds: Normal heart sounds  Pulmonary:      Effort: Pulmonary effort is normal  No respiratory distress  Breath sounds: No wheezing, rhonchi or rales  Chest:      Chest wall: No tenderness  Abdominal:      General: Bowel sounds are normal  There is no distension  Palpations: Abdomen is soft  Tenderness: There is no abdominal tenderness  There is no guarding or rebound  Genitourinary:     Comments: Dale with light pink urine  Skin:     General: Skin is warm and dry  Findings: No rash  Neurological:      Mental Status: He is alert  Cranial Nerves: No cranial nerve deficit  Additional Data:     Labs:    Results from last 7 days   Lab Units 06/10/21  0511 06/09/21  1221   WBC Thousand/uL 6 15 6 61   HEMOGLOBIN g/dL 9 8* 9 7*   HEMATOCRIT % 30 6* 29 5*   PLATELETS Thousands/uL 223 220   NEUTROS PCT %  --  70   LYMPHS PCT %  --  21   MONOS PCT %  --  7   EOS PCT %  --  2     Results from last 7 days   Lab Units 06/12/21  0547  06/09/21  1221   POTASSIUM mmol/L 4 2   < > 4 8   CHLORIDE mmol/L 107   < > 107   CO2 mmol/L 26   < > 23   BUN mg/dL 32*   < > 44*   CREATININE mg/dL 3 00*   < > 3 69*   CALCIUM mg/dL 8 8   < > 9 1   ALK PHOS U/L  --   --  105   ALT U/L  --   --  26   AST U/L  --   --  18    < > = values in this interval not displayed  Results from last 7 days   Lab Units 06/09/21  1221   INR  1 03       * I Have Reviewed All Lab Data Listed Above    * Additional Pertinent Lab Tests Reviewed: Eugenia 66 Admission Reviewed        Recent Cultures (last 7 days):           Last 24 Hours Medication List:   Current Facility-Administered Medications   Medication Dose Route Frequency Provider Last Rate    aluminum-magnesium hydroxide-simethicone  30 mL Oral Q6H PRN Abrahan Aldrich MD      amLODIPine  5 mg Oral Daily Abrahan Aldrich MD      atorvastatin  80 mg Oral Daily With Grace Dentno MD      clopidogrel  75 mg Oral Daily Abrahan Aldrich MD      doxazosin  4 mg Oral HS Abrahan Aldrich MD      folic acid  1 mg Oral Daily Abrahan Aldrich MD      heparin (porcine)  5,000 Units Subcutaneous [facility] Abrahan Aldrich MD      metoprolol tartrate  25 mg Oral HS Abrahan Aldrich MD      multi-electrolyte  60 mL/hr Intravenous Continuous Steve Morrow MD 60 mL/hr (06/12/21 1013)    ondansetron  4 mg Intravenous Q6H PRN Abrahan Aldrich MD      thiamine  100 mg Oral Daily Abrahan Aldrich MD      traMADol  50 mg Oral BID PRN Francis Kerr MD          Today, Patient Was Seen By: Francis Kerr MD    ** Please Note: Dictation voice to text software may have been used in the creation of this document   **

## 2021-06-12 NOTE — PROGRESS NOTES
Progress Note - Urology      Patient: Peggy Huizar   : 1965 Sex: male   MRN: [id number]     CSN: [id number]  Unit/Bed#: [address] [place] [address]     SUBJECTIVE:   No complaints from Dale  ATN clearing      Objective   Vitals: /79 (BP Location: Left arm)   Pulse 73   Temp 97 9 °F (36 6 °C) (Oral)   Resp 20   Ht 6' (1 829 m)   Wt 92 1 kg (203 lb)   SpO2 97%   BMI 27 53 kg/m²     I/O last 24 hours:   In: 0338 [P O :475; I V :3280]  Out: 3550 [Urine:3550]      Physical Exam:   General Alert awake   Normocephalic atraumatic PERRLA  Lungs clear bilaterally  Cardiac normal S1 normal S2  Abdomen soft, flank pain  Urine clear   Extremities no edema      Lab Results: CBC:   Lab Results   Component Value Date    WBC 6 15 06/10/2021    HGB 9 8 (L) 06/10/2021    HCT 30 6 (L) 06/10/2021    MCV 87 06/10/2021     06/10/2021    MCH 27 8 06/10/2021    MCHC 32 0 06/10/2021    RDW 12 4 06/10/2021    MPV 9 3 06/10/2021    NRBC 0 2021     CMP:   Lab Results   Component Value Date     2021     2021    CO2 26 2021    CO2 18 (L) 2021    BUN 32 (H) 2021    BUN 45 (H) 2021    CREATININE 3 00 (H) 2021    CALCIUM 8 8 2021    AST 18 2021    AST 16 2021    ALT 26 2021    ALT 17 2021    ALKPHOS 105 2021    EGFR 22 2021     Urinalysis:   Lab Results   Component Value Date    COLORU Light Yellow 2021    CLARITYU Clear 2021    SPECGRAV 1 010 2021    PHUR 6 0 2021    PHUR 6 0 2021    LEUKOCYTESUR Trace (A) 2021    NITRITE Negative 2021    GLUCOSEU Negative 2021    KETONESU Negative 2021    BILIRUBINUR Negative 2021    BLOODU Negative 2021     Urine Culture: No results found for: URINECX  PSA:   Lab Results   Component Value Date    PSA 0 8 2021         Assessment/ Plan:  Urinary retention  Urethral stricture disease  Prostate obstruction  Continue Dale  IV fluids  ATN clearing  Can DC home with Suyapa will see in the office next week          Phu Bustillo MD

## 2021-06-12 NOTE — UTILIZATION REVIEW
Continued Stay Review    Date: 10- 12-21                        Current Patient Class: inpatient  Current Level of Care: med surg    HPI:55 y o  male initially admitted on 6-9-21 for acute kidney injury  With baseline 1 0 to 1/2  Assessment/Plan:     Post day 2 cysto / ureteroscopy   Patient's urinary symptoms improved, output excellent after placement of rosales  Iv hydration will be discontinued 6-13 if creatinine continues to improve  Hematuria minimal on sq heparin  Creatinine 3 0 and bun 32  Vital Signs:       Date/Time  Temp  Pulse  Resp  BP  MAP   SpO2  O2 Device   06/12/21 08:42:08  97 9 °F (36 6 °C)  73  20  138/79  99  97 %  None (Room air)   06/12/21 0755  --  75  --  --  --  97 %           Pertinent Labs/Diagnostic Results:       Anesthesia Start Date/Time: 06/10/21 1038   Procedure: CYSTOSCOPY WITH RETROGRADE PYELOGRAM/internal urethrotomy, DVIU, URETHROGRAM, CYSTOGRAM  (N/A Bladder)   Anesthesia type: general   Diagnosis: Acute urinary retention [R33 8]   Pre-op diagnosis: Acute urinary retention [R33 8]     Procedure  Cysto retrograde urethrogram direct internal urethrotomy fluoroscopic cystogram    Estimated Blood Loss:   Minimal     Operative Findings:  Severe stricture with false passage noted in the bulbar urethra attempts at passing a wire through the cystoscope were un successful retrograde urethrogram confirmed extravasation of contrast in the bulbar urethra with no contrast into the bladder  Rigid ureteroscopy with stricture opening found  DVIU performed  [facility] drainage must leave in for at least 1 week     Complications:   None      Results from last 7 days   Lab Units 06/09/21  1933   SARS-COV-2  Negative     Results from last 7 days   Lab Units 06/10/21  0511 06/09/21  1221   WBC Thousand/uL 6 15 6 61   HEMOGLOBIN g/dL 9 8* 9 7*   HEMATOCRIT % 30 6* 29 5*   PLATELETS Thousands/uL 223 220   NEUTROS ABS Thousands/µL  --  4 59         Results from last 7 days   Lab Units 06/12/21  0547 06/11/21  0507 06/10/21  0511 06/09/21  1221 06/08/21  0000   SODIUM mmol/L 142 141 142 141 140   POTASSIUM mmol/L 4 2 4 9 4 7 4 8 5 2   CHLORIDE mmol/L 107 108 109* 107 105   CO2 mmol/L 26 24 22 23 18*   ANION GAP mmol/L 9 9 11 11  --    BUN mg/dL 32* 40* 43* 44* 45*   CREATININE mg/dL 3 00* 3 27* 3 57* 3 69* 3 63*   EGFR ml/min/1 73sq m 22 20 18 17  --    CALCIUM mg/dL 8 8 8 6 9 2 9 1  --    MAGNESIUM mg/dL  --   --  1 9  --   --      Results from last 7 days   Lab Units 06/09/21  1221   AST U/L 18   ALT U/L 26   ALK PHOS U/L 105   TOTAL PROTEIN g/dL 7 0   ALBUMIN g/dL 3 5   TOTAL BILIRUBIN mg/dL 0 31         Results from last 7 days   Lab Units 06/12/21  0547 06/11/21  0507 06/10/21  0511 06/09/21  1221 06/08/21  0000   GLUCOSE RANDOM mg/dL 88 92 93 93 86       Results from last 7 days   Lab Units 06/09/21  1221   PROTIME seconds 13 4   INR  1 03   PTT seconds 31       Results from last 7 days   Lab Units 06/10/21  0511   FERRITIN ng/mL 382       Results from last 7 days   Lab Units 06/09/21  1305   CLARITY UA  Clear   COLOR UA  Light Yellow   SPEC GRAV UA  1 010   PH UA  6 0   GLUCOSE UA mg/dl Negative   KETONES UA mg/dl Negative   BLOOD UA  Negative   PROTEIN UA mg/dl Negative   NITRITE UA  Negative   BILIRUBIN UA  Negative   UROBILINOGEN UA E U /dl 0 2   LEUKOCYTES UA  Trace*   WBC UA /hpf 2-4   RBC UA /hpf None Seen   BACTERIA UA /hpf Occasional   EPITHELIAL CELLS WET PREP /hpf Occasional       Scheduled Medications:  amLODIPine, 5 mg, Oral, Daily  atorvastatin, 80 mg, Oral, Daily With Dinner  clopidogrel, 75 mg, Oral, Daily  doxazosin, 4 mg, Oral, HS  folic acid, 1 mg, Oral, Daily  heparin (porcine), 5,000 Units, Subcutaneous, Q8H MAXIMINO  metoprolol tartrate, 25 mg, Oral, HS  thiamine, 100 mg, Oral, Daily      Continuous IV Infusions:  multi-electrolyte, 60 mL/hr, Intravenous, Continuous      PRN Meds:  aluminum-magnesium hydroxide-simethicone, 30 mL, Oral, Q6H PRN  ondansetron, 4 mg, Intravenous, Q6H PRN  traMADol, 50 mg, Oral, BID PRN        Discharge Plan: to be determined     Network Utilization Review Department  ATTENTION: Please call with any questions or concerns to [phone] and carefully listen to the prompts so that you are directed to the right person  All voicemails are confidential   Cherise Woo all requests for admission clinical reviews, approved or denied determinations and any other requests to dedicated fax number below belonging to the campus where the patient is receiving treatment   List of dedicated fax numbers for the Facilities:  [address] DENIALS (Administrative/Medical Necessity) [phone]   [address] (Maternity/NICU/Pediatrics) [phone]   [address] [facility] [id number] [place] [address] [address] [facility] [place] [address] [phone]

## 2021-06-12 NOTE — PROGRESS NOTES
NEPHROLOGY PROGRESS NOTE   Christy Ugalde 54 y o  male MRN: [id number]  Unit/Bed#: [address] [place] [address] Encounter: [id number]  Reason for Consult: ARMANDO      SUMMARY:    55-year-old male with a history of urinary retention and BPH, hypertension and stroke who was admitted due to worsening renal function secondary to urinary obstruction      ASSESSMENT and PLAN:     Acute kidney injury  --severe Stage III  --baseline creatinine 1-1 2 mg/dL  --if no urine output reported or have symptoms of uremia history to the emergency room immediately    --continue to hold losartan  --renal ultrasound from May 17th:  Distended bladder with diffuse bladder wall thickening with small bladder diverticula   Marked postvoid residual urine volume of approximately 600 mL, persistent and severe hydronephrosis bilaterally  --CT scan again shows severe bladder distention and moderate diffuse wall thickening as well as moderate bilateral hydroureteronephrosis  --urology on board  --avoid NSAIDs  --vomiting this morning but had some mild nausea  --reduce isolate to 60 cc/hour  --excellent urine output since  --Dale catheter in place  --renal function improving crime a creatinine trending down  --plan to discontinue fluids tomorrow morning     Hypertension  --continue amlodipine, currently off losartan  --urinary retention can also cause elevated blood pressure  --currently normotensive     Abnormal renal function test  --may have some underlying degree of mild renal dysfunction likely stage I versus stage II secondary to hypertension and possible chronic symptoms of urinary retention along with some age-related nephron loss  --baseline creatinine 1-1 2 mg/dL     Hyperkalemia  --resolved     Urinary retention  --status post cystoscopy with placement of a Dale catheter  --severe stricture disease  --urology monitoring following      SUBJECTIVE / INTERVAL HISTORY:    No chest pain or shortness of breath clinically feeling better today    OBJECTIVE:  Current Weight: Weight - Scale: 92 1 kg (203 lb)  Vitals:    06/11/21 1530 06/11/21 2116 06/12/21 0755 06/12/21 0842   BP: 124/76 140/78  138/79   BP Location:    Left arm   Pulse: 64 70 75 73   Resp: 18 20  20   Temp: 97 7 °F (36 5 °C) 98 1 °F (36 7 °C)  97 9 °F (36 6 °C)   TempSrc:    Oral   SpO2: 98% 97% 97% 97%   Weight:       Height:           Intake/Output Summary (Last 24 hours) at 6/12/2021 1036  Last data filed at 6/12/2021 1013  Gross per 24 hour   Intake 3755 ml   Output 3550 ml   Net 205 ml       Review of Systems:    12 point ROS has been reviewed  Physical Exam  Vitals signs and nursing note reviewed  Constitutional:       General: He is not in acute distress  Appearance: He is well-developed  HENT:      Head: Normocephalic and atraumatic  Eyes:      General: No scleral icterus  Conjunctiva/sclera: Conjunctivae normal       Pupils: Pupils are equal, round, and reactive to light  Neck:      Musculoskeletal: Normal range of motion and neck supple  Cardiovascular:      Rate and Rhythm: Normal rate and regular rhythm  Heart sounds: S1 normal and S2 normal  No murmur  No friction rub  No gallop  Pulmonary:      Effort: Pulmonary effort is normal  No respiratory distress  Breath sounds: Normal breath sounds  No wheezing or rales  Abdominal:      General: Bowel sounds are normal       Palpations: Abdomen is soft  Tenderness: There is no abdominal tenderness  There is no rebound  Musculoskeletal: Normal range of motion  Skin:     Findings: No rash  Neurological:      Mental Status: He is alert and oriented to person, place, and time     Psychiatric:         Behavior: Behavior normal          Medications:    Current Facility-Administered Medications:     aluminum-magnesium hydroxide-simethicone (MYLANTA) oral suspension 30 mL, 30 mL, Oral, Q6H PRN, Deepika Bah MD    amLODIPine (NORVASC) tablet 5 mg, 5 mg, Oral, Daily, Deepika Bah MD, 5 mg at 06/12/21 0842    atorvastatin (LIPITOR) tablet 80 mg, 80 mg, Oral, Daily With Janee Ramos MD, 80 mg at 06/11/21 1625    clopidogrel (PLAVIX) tablet 75 mg, 75 mg, Oral, Daily, Jamilah Rodriguez MD, 75 mg at 06/12/21 0840    doxazosin (CARDURA) tablet 4 mg, 4 mg, Oral, HS, Jamilah Rodriguez MD, 4 mg at 70/29/71 3138    folic acid (FOLVITE) tablet 1 mg, 1 mg, Oral, Daily, Jamilah Rodriguez MD, 1 mg at 06/12/21 0840    heparin (porcine) subcutaneous injection 5,000 Units, 5,000 Units, Subcutaneous, Q8H [address], 5,000 Units at 06/12/21 0544 **AND** Platelet count, , , Once, Jamilah Rodriguez MD    metoprolol tartrate (LOPRESSOR) tablet 25 mg, 25 mg, Oral, HS, Jamilah Rodriguez MD, 25 mg at 06/11/21 2117    multi-electrolyte (PLASMALYTE-A/ISOLYTE-S PH 7 4) IV solution, 60 mL/hr, Intravenous, Continuous, Cristina Mancuso MD, Last Rate: 60 mL/hr at 06/12/21 1013, 60 mL/hr at 06/12/21 1013    ondansetron (ZOFRAN) injection 4 mg, 4 mg, Intravenous, Q6H PRN, Jamilah Rodriguez MD, 4 mg at 06/12/21 0755    thiamine tablet 100 mg, 100 mg, Oral, Daily, Jamilah Rodriguez MD, 100 mg at 06/12/21 0840    traMADol (ULTRAM) tablet 50 mg, 50 mg, Oral, BID PRN, Manuela Cali MD, 50 mg at 06/10/21 1609    Laboratory Results:  Results from last 7 days   Lab Units 06/12/21  0547 06/11/21  0507 06/10/21  0511 06/09/21  1221 06/08/21  0000   WBC Thousand/uL  --   --  6 15 6 61  --    HEMOGLOBIN g/dL  --   --  9 8* 9 7*  --    HEMATOCRIT %  --   --  30 6* 29 5*  --    PLATELETS Thousands/uL  --   --  223 220  --    POTASSIUM mmol/L 4 2 4 9 4 7 4 8 5 2   CHLORIDE mmol/L 107 108 109* 107 105   CO2 mmol/L 26 24 22 23 18*   BUN mg/dL 32* 40* 43* 44* 45*   CREATININE mg/dL 3 00* 3 27* 3 57* 3 69* 3 63*   CALCIUM mg/dL 8 8 8 6 9 2 9 1  --    MAGNESIUM mg/dL  --   --  1 9  --   --

## 2021-06-12 NOTE — ASSESSMENT & PLAN NOTE
Patient had low urine output and Dale catheter could not be placed in the emergency room or by the Urology  Patient underwent cystoscopy with retrograde ureterogram with direct internal urethrotomy-patient noted to have severe stricture with false passage in the bulbar urethra and DVIU was performed  Patient had right ureteroscopy with stricture opening noted  Patient had [address] drainage left by Urology which has to be left in place for 1 week    Patient also noted to have minimal hematuria

## 2021-06-12 NOTE — PLAN OF CARE
Problem: Potential for Falls  Goal: Patient will remain free of falls  Description: INTERVENTIONS:  - Assess patient frequently for physical needs  -  Identify cognitive and physical deficits and behaviors that affect risk of falls    -  [place] fall precautions as indicated by assessment   - Educate patient/family on patient safety including physical limitations  - Instruct patient to call for assistance with activity based on assessment  - Modify environment to reduce risk of injury  - Consider OT/PT consult to assist with strengthening/mobility  Outcome: Progressing     Problem: GENITOURINARY - ADULT  Goal: Absence of urinary retention  Description: INTERVENTIONS:  - Assess patients ability to void and empty bladder  - Monitor I/O  - Bladder scan as needed  - Discuss with physician/AP medications to alleviate retention as needed  - Discuss catheterization for long term situations as appropriate  Outcome: Progressing  Goal: Urinary catheter remains patent  Description: INTERVENTIONS:  - Assess patency of urinary catheter  - If patient has a chronic rosales, consider changing catheter if non-functioning  - Follow guidelines for intermittent irrigation of non-functioning urinary catheter  Outcome: Progressing     Problem: PAIN - ADULT  Goal: Verbalizes/displays adequate comfort level or baseline comfort level  Description: Interventions:  - Encourage patient to monitor pain and request assistance  - Assess pain using appropriate pain scale  - Administer analgesics based on type and severity of pain and evaluate response  - Implement non-pharmacological measures as appropriate and evaluate response  - Consider cultural and social influences on pain and pain management  - Notify physician/advanced practitioner if interventions unsuccessful or patient reports new pain  Outcome: Progressing

## 2021-06-12 NOTE — ASSESSMENT & PLAN NOTE
On imaging, patient noted to have findings suggestive of bladder outlet obstruction with moderate hydronephrosis bilaterally  Dale catheter placement attempts in the emergency room and by Urology were unsuccessful  Per ER notes, bladder scan showed 200 mL  Patient underwent cystoscopy with DVIU and placement of [place] catheter  Patient to be discharged with Dale catheter with outpatient follow-up with Urology in 1 week

## 2021-06-12 NOTE — ASSESSMENT & PLAN NOTE
Creatinine was 3 69 upon admission  Baseline creatinine of 1-1 2  Patient initially had no urine output but did not have any uremia symptoms either  Patient is having excellent urine output after placement of Dale catheter  Renal ultrasound from May 17 showed distended bladder with diffuse bladder wall thickening with small bladder diverticula  CT scan of the abdomen pelvis showed severe bladder distention, moderate diffuse wall thickening with moderate bilateral hydroureteronephrosis  Patient has been on Isolyte 100 mL/hour which will be continued    Patient is having good urine output  Creatinine improving with IV hydration which will be discontinued tomorrow if creatinine continues to improve

## 2021-06-13 LAB
ANION GAP SERPL CALCULATED.3IONS-SCNC: 8 MMOL/L (ref 4–13)
ANION GAP SERPL CALCULATED.3IONS-SCNC: 8 MMOL/L (ref 4–13)
BUN SERPL-MCNC: 31 MG/DL (ref 5–25)
BUN SERPL-MCNC: 32 MG/DL (ref 5–25)
CALCIUM SERPL-MCNC: 8.7 MG/DL (ref 8.3–10.1)
CALCIUM SERPL-MCNC: 8.8 MG/DL (ref 8.3–10.1)
CHLORIDE SERPL-SCNC: 104 MMOL/L (ref 100–108)
CHLORIDE SERPL-SCNC: 108 MMOL/L (ref 100–108)
CO2 SERPL-SCNC: 26 MMOL/L (ref 21–32)
CO2 SERPL-SCNC: 26 MMOL/L (ref 21–32)
CREAT SERPL-MCNC: 3.05 MG/DL (ref 0.6–1.3)
CREAT SERPL-MCNC: 3.33 MG/DL (ref 0.6–1.3)
ERYTHROCYTE [DISTWIDTH] IN BLOOD BY AUTOMATED COUNT: 12.5 % (ref 11.6–15.1)
GFR SERPL CREATININE-BSD FRML MDRD: 20 ML/MIN/1.73SQ M
GFR SERPL CREATININE-BSD FRML MDRD: 22 ML/MIN/1.73SQ M
GLUCOSE SERPL-MCNC: 149 MG/DL (ref 65–140)
GLUCOSE SERPL-MCNC: 86 MG/DL (ref 65–140)
HCT VFR BLD AUTO: 29.8 % (ref 36.5–49.3)
HGB BLD-MCNC: 9.7 G/DL (ref 12–17)
MCH RBC QN AUTO: 28.2 PG (ref 26.8–34.3)
MCHC RBC AUTO-ENTMCNC: 32.6 G/DL (ref 31.4–37.4)
MCV RBC AUTO: 87 FL (ref 82–98)
PLATELET # BLD AUTO: 219 THOUSANDS/UL (ref 149–390)
PMV BLD AUTO: 9 FL (ref 8.9–12.7)
POTASSIUM SERPL-SCNC: 4 MMOL/L (ref 3.5–5.3)
POTASSIUM SERPL-SCNC: 4.3 MMOL/L (ref 3.5–5.3)
RBC # BLD AUTO: 3.44 MILLION/UL (ref 3.88–5.62)
SODIUM SERPL-SCNC: 138 MMOL/L (ref 136–145)
SODIUM SERPL-SCNC: 142 MMOL/L (ref 136–145)
WBC # BLD AUTO: 6.35 THOUSAND/UL (ref 4.31–10.16)

## 2021-06-13 PROCEDURE — 99232 SBSQ HOSP IP/OBS MODERATE 35: CPT | Performed by: INTERNAL MEDICINE

## 2021-06-13 PROCEDURE — 85027 COMPLETE CBC AUTOMATED: CPT | Performed by: INTERNAL MEDICINE

## 2021-06-13 PROCEDURE — 80048 BASIC METABOLIC PNL TOTAL CA: CPT | Performed by: INTERNAL MEDICINE

## 2021-06-13 RX ADMIN — AMLODIPINE BESYLATE 5 MG: 5 TABLET ORAL at 09:05

## 2021-06-13 RX ADMIN — CLOPIDOGREL BISULFATE 75 MG: 75 TABLET ORAL at 09:05

## 2021-06-13 RX ADMIN — DOXAZOSIN 4 MG: 2 TABLET ORAL at 21:00

## 2021-06-13 RX ADMIN — HEPARIN SODIUM 5000 UNITS: 5000 INJECTION INTRAVENOUS; SUBCUTANEOUS at 21:00

## 2021-06-13 RX ADMIN — FOLIC ACID 1 MG: 1 TABLET ORAL at 09:05

## 2021-06-13 RX ADMIN — HEPARIN SODIUM 5000 UNITS: 5000 INJECTION INTRAVENOUS; SUBCUTANEOUS at 05:28

## 2021-06-13 RX ADMIN — METOPROLOL TARTRATE 25 MG: 25 TABLET, FILM COATED ORAL at 21:00

## 2021-06-13 RX ADMIN — HEPARIN SODIUM 5000 UNITS: 5000 INJECTION INTRAVENOUS; SUBCUTANEOUS at 15:36

## 2021-06-13 RX ADMIN — ATORVASTATIN CALCIUM 80 MG: 80 TABLET ORAL at 16:15

## 2021-06-13 RX ADMIN — THIAMINE HCL TAB 100 MG 100 MG: 100 TAB at 09:05

## 2021-06-13 NOTE — PROGRESS NOTES
Progress Note - Urology      Patient: Viet Fraser   : 1965 Sex: male   MRN: [id number]     CSN: [id number]  Unit/Bed#: [address] [place] [address]     SUBJECTIVE:   No complaint  [place]  clear      Objective   Vitals: /78   Pulse 73   Temp 97 9 °F (36 6 °C)   Resp 18   Ht 6' (1 829 m)   Wt 92 1 kg (203 lb)   SpO2 96%   BMI 27 53 kg/m²     I/O last 24 hours: In: 1081 7 [P O :725;  I V :356 7]  Out: 3100 [Urine:3100]      Physical Exam:   General Alert awake   Normocephalic atraumatic PERRLA  Lungs clear bilaterally  Cardiac normal S1 normal S2  Abdomen soft, flank pain  Extremities no edema      Lab Results: CBC:   Lab Results   Component Value Date    WBC 6 35 2021    HGB 9 7 (L) 2021    HCT 29 8 (L) 2021    MCV 87 2021     2021    MCH 28 2 2021    MCHC 32 6 2021    RDW 12 5 2021    MPV 9 0 2021    NRBC 0 2021     CMP:   Lab Results   Component Value Date     2021     2021    CO2 26 2021    CO2 18 (L) 2021    BUN 32 (H) 2021    BUN 45 (H) 2021    CREATININE 3 05 (H) 2021    CALCIUM 8 8 2021    AST 18 2021    AST 16 2021    ALT 26 2021    ALT 17 2021    ALKPHOS 105 2021    EGFR 22 2021     Urinalysis:   Lab Results   Component Value Date    COLORU Light Yellow 2021    CLARITYU Clear 2021    SPECGRAV 1 010 2021    PHUR 6 0 2021    PHUR 6 0 2021    LEUKOCYTESUR Trace (A) 2021    NITRITE Negative 2021    GLUCOSEU Negative 2021    KETONESU Negative 2021    BILIRUBINUR Negative 2021    BLOODU Negative 2021     Urine Culture: No results found for: URINECX  PSA:   Lab Results   Component Value Date    PSA 0 8 2021         Assessment/ Plan:  Urinary retention  Home rosales to see this week  In office          Kaci Neumann MD

## 2021-06-13 NOTE — PROGRESS NOTES
NEPHROLOGY PROGRESS NOTE   Rosalee Mccarty 54 y o  male MRN: [id number]  Unit/Bed#: [address] [place] [address] Encounter: [id number]  Reason for Consult: ARMANDO      SUMMARY:    70-year-old male with a history of urinary retention and BPH, hypertension and stroke who was admitted due to worsening renal function secondary to urinary obstruction      ASSESSMENT and PLAN:     Acute kidney injury  --severe Stage III  --baseline creatinine 1-1 2 mg/dL  --continue to hold losartan  --renal ultrasound from May 17th:  Distended bladder with diffuse bladder wall thickening with small bladder diverticula   Marked postvoid residual urine volume of approximately 600 mL, persistent and severe hydronephrosis bilaterally  --CT scan again shows severe bladder distention and moderate diffuse wall thickening as well as moderate bilateral hydroureteronephrosis  --urology on board  --avoid NSAIDs  --will plan to discontinue fluids after completion his L and monitor with oral intake, encourage free water intake and good p o  Intake  Reports no further nausea  --excellent urine output   --Dale catheter in place, reddish urine noted  --creatinine centrally has plateaued at 3 0 mg/dL  Could could be a component of concurrent acute tubular necrosis as well but another concern could be I do not suspect that he will return back to his baseline given how long he has had this obstruction for at least over 2 months  He could leave off with a higher baseline creatinine    --will monitor off fluids  --BMP this afternoon and tomorrow morning     Hypertension  --continue amlodipine, currently off losartan  --urinary retention can also cause elevated blood pressure  --currently normotensive     Abnormal renal function test  --may have some underlying degree of mild renal dysfunction likely stage I versus stage II secondary to hypertension and possible chronic symptoms of urinary retention along with some age-related nephron loss  --baseline creatinine 1-1 2 mg/dL     Hyperkalemia  --resolved     Urinary retention  --status post cystoscopy with placement of a Dale catheter  --severe stricture disease  --urology monitoring following    Anemia  --patient reported some blood in the stool but nothing major  --check stool occult      SUBJECTIVE / INTERVAL HISTORY:    No chest pain or shortness of breath tolerating oral intake  No nausea no vomiting    OBJECTIVE:  Current Weight: Weight - Scale: 92 1 kg (203 lb)  Vitals:    06/12/21 0842 06/12/21 1518 06/12/21 2144 06/13/21 0823   BP: 138/79 140/83 148/87 127/78   BP Location: Left arm  Right arm    Pulse: 73 66 70 73   Resp: 20 16 18 18   Temp: 97 9 °F (36 6 °C) 97 8 °F (36 6 °C) 97 7 °F (36 5 °C) 97 9 °F (36 6 °C)   TempSrc: Oral  Oral    SpO2: 97% 98% 96% 96%   Weight:       Height:           Intake/Output Summary (Last 24 hours) at 6/13/2021 1041  Last data filed at 6/13/2021 0501  Gross per 24 hour   Intake 255 ml   Output 3100 ml   Net -2845 ml       Review of Systems:    12 point ROS has been reviewed  Physical Exam  Vitals and nursing note reviewed  Constitutional:       General: He is not in acute distress  Appearance: He is well-developed  HENT:      Head: Normocephalic and atraumatic  Eyes:      General: No scleral icterus  Conjunctiva/sclera: Conjunctivae normal       Pupils: Pupils are equal, round, and reactive to light  Cardiovascular:      Rate and Rhythm: Normal rate and regular rhythm  Heart sounds: S1 normal and S2 normal  No murmur heard  No friction rub  No gallop  Pulmonary:      Effort: Pulmonary effort is normal  No respiratory distress  Breath sounds: Normal breath sounds  No wheezing or rales  Abdominal:      General: Bowel sounds are normal       Palpations: Abdomen is soft  Tenderness: There is no abdominal tenderness  There is no rebound  Musculoskeletal:         General: Normal range of motion        Cervical back: Normal range of motion and neck supple  Skin:     Findings: No rash  Neurological:      Mental Status: He is alert and oriented to person, place, and time     Psychiatric:         Behavior: Behavior normal          Medications:    Current Facility-Administered Medications:     aluminum-magnesium hydroxide-simethicone (MYLANTA) oral suspension 30 mL, 30 mL, Oral, Q6H PRN, Grabiel Kendall MD    amLODIPine (NORVASC) tablet 5 mg, 5 mg, Oral, Daily, Grabiel Kendall MD, 5 mg at 06/13/21 0905    atorvastatin (LIPITOR) tablet 80 mg, 80 mg, Oral, Daily With Maria Alejandra Harris MD, 80 mg at 06/12/21 1628    clopidogrel (PLAVIX) tablet 75 mg, 75 mg, Oral, Daily, Grabiel Kendall MD, 75 mg at 06/13/21 0905    doxazosin (CARDURA) tablet 4 mg, 4 mg, Oral, HS, Grabiel Kendall MD, 4 mg at 09/58/53 3854    folic acid (FOLVITE) tablet 1 mg, 1 mg, Oral, Daily, Grabiel Kendall MD, 1 mg at 06/13/21 0905    heparin (porcine) subcutaneous injection 5,000 Units, 5,000 Units, Subcutaneous, Q8H [facility], 5,000 Units at 06/13/21 0528 **AND** Platelet count, , , Once, Grabiel Kendall MD    metoprolol tartrate (LOPRESSOR) tablet 25 mg, 25 mg, Oral, HS, Grabiel Kendall MD, 25 mg at 06/12/21 2147    ondansetron (ZOFRAN) injection 4 mg, 4 mg, Intravenous, Q6H PRN, Grabiel Kendall MD, 4 mg at 06/12/21 0755    thiamine tablet 100 mg, 100 mg, Oral, Daily, Grabiel Kendall MD, 100 mg at 06/13/21 0905    traMADol (ULTRAM) tablet 50 mg, 50 mg, Oral, BID PRN, January Morales MD, 50 mg at 06/10/21 1609    Laboratory Results:  Results from last 7 days   Lab Units 06/13/21  0528 06/12/21  0547 06/11/21  0507 06/10/21  0511 06/09/21  1221 06/08/21  0000   WBC Thousand/uL 6 35  --   --  6 15 6 61  --    HEMOGLOBIN g/dL 9 7*  --   --  9 8* 9 7*  --    HEMATOCRIT % 29 8*  --   --  30 6* 29 5*  --    PLATELETS Thousands/uL 219  --   --  223 220  --    POTASSIUM mmol/L 4 3 4 2 4 9 4 7 4 8 5 2   CHLORIDE mmol/L 108 107 108 109* 107 105   CO2 mmol/L 26 26 24 22 23 18*   BUN mg/dL 32* 32* 40* 43* 44* 45*   CREATININE mg/dL 3 05* 3 00* 3 27* 3 57* 3 69* 3 63*   CALCIUM mg/dL 8 8 8 8 8 6 9 2 9 1  --    MAGNESIUM mg/dL  --   --   --  1 9  --   --

## 2021-06-13 NOTE — PLAN OF CARE
Problem: GENITOURINARY - ADULT  Goal: Absence of urinary retention  Description: INTERVENTIONS:  - Assess patients ability to void and empty bladder  - Monitor I/O  - Bladder scan as needed  - Discuss with physician/AP medications to alleviate retention as needed  - Discuss catheterization for long term situations as appropriate  Outcome: Progressing  Goal: Urinary catheter remains patent  Description: INTERVENTIONS:  - Assess patency of urinary catheter  - If patient has a chronic rosales, consider changing catheter if non-functioning  - Follow guidelines for intermittent irrigation of non-functioning urinary catheter  Outcome: Progressing     Problem: PAIN - ADULT  Goal: Verbalizes/displays adequate comfort level or baseline comfort level  Description: Interventions:  - Encourage patient to monitor pain and request assistance  - Assess pain using appropriate pain scale  - Administer analgesics based on type and severity of pain and evaluate response  - Implement non-pharmacological measures as appropriate and evaluate response  - Consider cultural and social influences on pain and pain management  - Notify physician/advanced practitioner if interventions unsuccessful or patient reports new pain  Outcome: Progressing

## 2021-06-13 NOTE — ASSESSMENT & PLAN NOTE
Creatinine was 3 69 upon admission  Baseline creatinine of 1-1 2  Patient initially had no urine output but did not have any uremia symptoms either  Patient is having excellent urine output after placement of [place] catheter  Renal ultrasound from May 17 showed distended bladder with diffuse bladder wall thickening with small bladder diverticula  CT scan of the abdomen pelvis showed severe bladder distention, moderate diffuse wall thickening with moderate bilateral hydroureteronephrosis  Patient has been on isolate at 100 mL/hour which was decreased to 60 mL/hour  Creatinine improved initially with IV hydration but has been stable around 3  Patient has good urine output   IV fluids will be discontinued today with follow-up BMP this afternoon 9 again is a m  Patient might have a component of ATN or patient's kidney function might not recover as he had prolonged obstruction

## 2021-06-13 NOTE — ASSESSMENT & PLAN NOTE
Patient had low urine output and Dale catheter could not be placed in the emergency room or by the Urology  Patient underwent cystoscopy with retrograde ureterogram with direct internal urethrotomy-patient noted to have severe stricture with false passage in the bulbar urethra and DVIU was performed  Patient had right ureteroscopy with stricture opening noted  Patient had [address] drainage left by Urology which has to be left in place for 1 week    Patient also noted to have minimal hematuria which is improved

## 2021-06-13 NOTE — UTILIZATION REVIEW
Continued Stay Review    Date:  6-13-21                       Current Patient Class: inpatient  Current Level of Care: med surg    HPI:55 y o  male initially admitted on 6-9-21 for acute kidney injury,urinary retention, bph, stroke  Assessment/Plan:     Urologist plans to send patient home with rosales and follow up one week post discharge  Nephrology plans to discontinue fluids and monitor po intake  Urine appears reddish  Creatinine has plateaued at 3 0 possibly related to tubular necrosis  Continue to trend bmp  Hemoglobin remains 9 7  recommend outpatient gi follow up with colonoscopy        Vital Signs:       Vitals:    06/12/21 1518 06/12/21 2144 06/13/21 0823 06/13/21 1502   BP: 140/83 148/87 127/78 126/82   BP Location:  Right arm     Pulse: 66 70 73 70   Resp: 16 18 18 17   Temp: 97 8 °F (36 6 °C) 97 7 °F (36 5 °C) 97 9 °F (36 6 °C) 98 1 °F (36 7 °C)   TempSrc:  Oral Oral    SpO2: 98% 96% 96% 97%   Weight:       Height:           Pertinent Labs/Diagnostic Results:       Results from last 7 days   Lab Units 06/09/21  1933   SARS-COV-2  Negative     Results from last 7 days   Lab Units 06/13/21  0528 06/10/21  0511 06/09/21  1221   WBC Thousand/uL 6 35 6 15 6 61   HEMOGLOBIN g/dL 9 7* 9 8* 9 7*   HEMATOCRIT % 29 8* 30 6* 29 5*   PLATELETS Thousands/uL 219 223 220   NEUTROS ABS Thousands/µL  --   --  4 59         Results from last 7 days   Lab Units 06/13/21  0528 06/12/21  0547 06/11/21  0507 06/10/21  0511 06/09/21  1221   SODIUM mmol/L 142 142 141 142 141   POTASSIUM mmol/L 4 3 4 2 4 9 4 7 4 8   CHLORIDE mmol/L 108 107 108 109* 107   CO2 mmol/L 26 26 24 22 23   ANION GAP mmol/L 8 9 9 11 11   BUN mg/dL 32* 32* 40* 43* 44*   CREATININE mg/dL 3 05* 3 00* 3 27* 3 57* 3 69*   EGFR ml/min/1 73sq m 22 22 20 18 17   CALCIUM mg/dL 8 8 8 8 8 6 9 2 9 1   MAGNESIUM mg/dL  --   --   --  1 9  --      Results from last 7 days   Lab Units 06/09/21  1221   AST U/L 18   ALT U/L 26   ALK PHOS U/L 105   TOTAL PROTEIN g/dL 7 0   ALBUMIN g/dL 3 5   TOTAL BILIRUBIN mg/dL 0 31         Results from last 7 days   Lab Units 06/13/21  0528 06/12/21  0547 06/11/21  0507 06/10/21  0511 06/09/21  1221 06/08/21  0000   GLUCOSE RANDOM mg/dL 86 88 92 93 93 86       Results from last 7 days   Lab Units 06/09/21  1221   PROTIME seconds 13 4   INR  1 03   PTT seconds 31     Results from last 7 days   Lab Units 06/10/21  0511   FERRITIN ng/mL 382     Results from last 7 days   Lab Units 06/09/21  1305   CLARITY UA  Clear   COLOR UA  Light Yellow   SPEC GRAV UA  1 010   PH UA  6 0   GLUCOSE UA mg/dl Negative   KETONES UA mg/dl Negative   BLOOD UA  Negative   PROTEIN UA mg/dl Negative   NITRITE UA  Negative   BILIRUBIN UA  Negative   UROBILINOGEN UA E U /dl 0 2   LEUKOCYTES UA  Trace*   WBC UA /hpf 2-4   RBC UA /hpf None Seen   BACTERIA UA /hpf Occasional   EPITHELIAL CELLS WET PREP /hpf Occasional       Scheduled Medications:  amLODIPine, 5 mg, Oral, Daily  atorvastatin, 80 mg, Oral, Daily With Dinner  clopidogrel, 75 mg, Oral, Daily  doxazosin, 4 mg, Oral, HS  folic acid, 1 mg, Oral, Daily  heparin (porcine), 5,000 Units, Subcutaneous, Q8H MAXIMINO  metoprolol tartrate, 25 mg, Oral, HS  thiamine, 100 mg, Oral, Daily      Continuous IV Infusions:     PRN Meds:  aluminum-magnesium hydroxide-simethicone, 30 mL, Oral, Q6H PRN  ondansetron, 4 mg, Intravenous, Q6H PRN  traMADol, 50 mg, Oral, BID PRN        Discharge Plan: to be determined     Network Utilization Review Department  ATTENTION: Please call with any questions or concerns to [phone] and carefully listen to the prompts so that you are directed to the right person  All voicemails are confidential   Lauren Bee all requests for admission clinical reviews, approved or denied determinations and any other requests to dedicated fax number below belonging to the campus where the patient is receiving treatment   List of dedicated fax numbers for the Facilities:  [address] [place] [address] DENIALS (Administrative/Medical Necessity) [phone]   [address] (Maternity/NICU/Pediatrics) [address] [facility],[address] Floor [address] [id number] [place] [address] [address] [facility] [address] [facility] [phone]

## 2021-06-13 NOTE — PLAN OF CARE
Problem: GENITOURINARY - ADULT  Goal: Absence of urinary retention  Description: INTERVENTIONS:  - Assess patients ability to void and empty bladder  - Monitor I/O  - Bladder scan as needed  - Discuss with physician/AP medications to alleviate retention as needed  - Discuss catheterization for long term situations as appropriate  Outcome: Progressing  Goal: Urinary catheter remains patent  Description: INTERVENTIONS:  - Assess patency of urinary catheter  - If patient has a chronic rosales, consider changing catheter if non-functioning  - Follow guidelines for intermittent irrigation of non-functioning urinary catheter  Outcome: Progressing

## 2021-06-13 NOTE — PROGRESS NOTES
Zack 45  Progress Note Mamadou Perez 1965, 54 y o  male MRN: [id number]  Unit/Bed#: [address] [place] [address] Encounter: [id number]  Primary Care Provider: Sunny Baldwin MD   Date and time admitted to hospital: 6/9/2021 11:56 AM    * ARMANDO (acute kidney injury) [facility])  Assessment & Plan  Creatinine was 3 69 upon admission  Baseline creatinine of 1-1 2  Patient initially had no urine output but did not have any uremia symptoms either  Patient is having excellent urine output after placement of Dale catheter  Renal ultrasound from May 17 showed distended bladder with diffuse bladder wall thickening with small bladder diverticula  CT scan of the abdomen pelvis showed severe bladder distention, moderate diffuse wall thickening with moderate bilateral hydroureteronephrosis  Patient has been on isolate at 100 mL/hour which was decreased to 60 mL/hour  Creatinine improved initially with IV hydration but has been stable around 3  Patient has good urine output   IV fluids will be discontinued today with follow-up BMP this afternoon 9 again is a m  Patient might have a component of ATN or patient's kidney function might not recover as he had prolonged obstruction  Acute urinary retention  Assessment & Plan  Patient had low urine output and Dale catheter could not be placed in the emergency room or by the Urology  Patient underwent cystoscopy with retrograde ureterogram with direct internal urethrotomy-patient noted to have severe stricture with false passage in the bulbar urethra and DVIU was performed  Patient had right ureteroscopy with stricture opening noted  Patient had [address] drainage left by Urology which has to be left in place for 1 week    Patient also noted to have minimal hematuria which is improved    Bladder outlet obstruction  Assessment & Plan  On imaging, patient noted to have findings suggestive of bladder outlet obstruction with moderate hydronephrosis bilaterally  Dale catheter placement attempts in the emergency room and by Urology were unsuccessful  Per ER notes, bladder scan showed 200 mL  Patient underwent cystoscopy with DVIU and placement of [place] catheter  Patient to be discharged with Dale catheter with outpatient follow-up with Urology in 1 week  Patient has mild hematuria which is improving    Anemia  Assessment & Plan  Hemoglobin continues to remain low around 9  Hemoglobin about 2 years ago was 14  Patient will need outpatient screening colonoscopy and stool occult blood  Iron panel showed iron level of 34, TIBC of 261    Benign essential hypertension  Assessment & Plan  Continue Norvasc, cardura and Lopressor  Losartan has been on hold in the setting of acute kidney injury    Cerebrovascular accident (CVA) due to occlusion of left middle cerebral artery (HCC)  Assessment & Plan  History of CVA  Continue statin, Plavix    VTE Pharmacologic Prophylaxis:   Pharmacologic: Enoxaparin (Lovenox)  Mechanical VTE Prophylaxis in Place: Yes    Patient Centered Rounds: I have performed bedside rounds with nursing staff today  Discussions with Specialists or Other Care Team Provider:  Yes    Education and Discussions with Family / Patient: yes    Time Spent for Care: 45 minutes  More than 50% of total time spent on counseling and coordination of care as described above  Current Length of Stay: 4 day(s)    Current Patient Status: Inpatient   Certification Statement: The patient will continue to require additional inpatient hospital stay due to Acute kidney injury    Discharge Plan:  Home    Code Status: Level 1 - Full Code      Subjective:   Patient denies any abdominal pain, nausea or vomiting  Small amount of bleeding around the catheter site  Did not have a bowel movement in 3 days  Passing flatus      Objective:     Vitals:   Temp (24hrs), Av 8 °F (36 6 °C), Min:97 7 °F (36 5 °C), Max:97 9 °F (36 6 °C)    Temp:  [97 7 °F (36 5 °C)-97 9 °F (36 6 °C)] 97 9 °F (36 6 °C)  HR:  [66-73] 73  Resp:  [16-18] 18  BP: (127-148)/(78-87) 127/78  SpO2:  [96 %-98 %] 96 %  Body mass index is 27 53 kg/m²  Input and Output Summary (last 24 hours): Intake/Output Summary (Last 24 hours) at 6/13/2021 1316  Last data filed at 6/13/2021 0501  Gross per 24 hour   Intake 255 ml   Output 3100 ml   Net -2845 ml       Physical Exam:     Physical Exam  Constitutional:       General: He is not in acute distress  HENT:      Head: Normocephalic and atraumatic  Eyes:      Conjunctiva/sclera: Conjunctivae normal       Pupils: Pupils are equal, round, and reactive to light  Cardiovascular:      Rate and Rhythm: Normal rate and regular rhythm  Heart sounds: Normal heart sounds  Pulmonary:      Effort: Pulmonary effort is normal  No respiratory distress  Breath sounds: No wheezing, rhonchi or rales  Chest:      Chest wall: No tenderness  Abdominal:      General: Bowel sounds are normal  There is no distension  Palpations: Abdomen is soft  Tenderness: There is no abdominal tenderness  There is no guarding or rebound  Genitourinary:     Comments: Dale with light pink colored urine  Musculoskeletal:      Cervical back: Normal range of motion and neck supple  Skin:     General: Skin is warm and dry  Findings: No rash  Neurological:      Mental Status: He is alert  Cranial Nerves: No cranial nerve deficit           Additional Data:     Labs:    Results from last 7 days   Lab Units 06/13/21  0528 06/09/21  1221   WBC Thousand/uL 6 35 6 61   HEMOGLOBIN g/dL 9 7* 9 7*   HEMATOCRIT % 29 8* 29 5*   PLATELETS Thousands/uL 219 220   NEUTROS PCT %  --  70   LYMPHS PCT %  --  21   MONOS PCT %  --  7   EOS PCT %  --  2     Results from last 7 days   Lab Units 06/13/21  0528 06/09/21  1221   POTASSIUM mmol/L 4 3 4 8   CHLORIDE mmol/L 108 107   CO2 mmol/L 26 23   BUN mg/dL 32* 44*   CREATININE mg/dL 3 05* 3 69*   CALCIUM mg/dL 8 8 9 1   ALK PHOS U/L  --  105   ALT U/L  --  26   AST U/L  --  18     Results from last 7 days   Lab Units 06/09/21  1221   INR  1 03       * I Have Reviewed All Lab Data Listed Above  * Additional Pertinent Lab Tests Reviewed: Eugenia Basurto Admission Reviewed        Recent Cultures (last 7 days):           Last 24 Hours Medication List:   Current Facility-Administered Medications   Medication Dose Route Frequency Provider Last Rate    aluminum-magnesium hydroxide-simethicone  30 mL Oral Q6H PRN Enzo Lagos MD      amLODIPine  5 mg Oral Daily Enzo Lagos MD      atorvastatin  80 mg Oral Daily With Mayi Mckeon MD      clopidogrel  75 mg Oral Daily Enzo Lagos MD      doxazosin  4 mg Oral HS Enzo Lagos MD      folic acid  1 mg Oral Daily Enzo Lagos MD      heparin (porcine)  5,000 Units Subcutaneous [facility] Lagos MD      metoprolol tartrate  25 mg Oral HS Enzo Lagos MD      ondansetron  4 mg Intravenous Q6H PRN Enzo Lagos MD      thiamine  100 mg Oral Daily Enzo Lagos MD      traMADol  50 mg Oral BID PRN Nicole Marinelli MD          Today, Patient Was Seen By: Nicole Marinelli MD    ** Please Note: Dictation voice to text software may have been used in the creation of this document   **

## 2021-06-13 NOTE — ASSESSMENT & PLAN NOTE
On imaging, patient noted to have findings suggestive of bladder outlet obstruction with moderate hydronephrosis bilaterally  Dale catheter placement attempts in the emergency room and by Urology were unsuccessful  Per ER notes, bladder scan showed 200 mL  Patient underwent cystoscopy with DVIU and placement of [place] catheter  Patient to be discharged with Dale catheter with outpatient follow-up with Urology in 1 week  Patient has mild hematuria which is improving

## 2021-06-13 NOTE — ASSESSMENT & PLAN NOTE
Continue Norvasc, cardura and Lopressor    Losartan has been on hold in the setting of acute kidney injury

## 2021-06-14 VITALS
OXYGEN SATURATION: 95 % | SYSTOLIC BLOOD PRESSURE: 125 MMHG | TEMPERATURE: 98 F | DIASTOLIC BLOOD PRESSURE: 79 MMHG | HEART RATE: 72 BPM | RESPIRATION RATE: 16 BRPM | WEIGHT: 203 LBS | HEIGHT: 72 IN | BODY MASS INDEX: 27.5 KG/M2

## 2021-06-14 LAB
ANION GAP SERPL CALCULATED.3IONS-SCNC: 7 MMOL/L (ref 4–13)
BUN SERPL-MCNC: 31 MG/DL (ref 5–25)
CALCIUM SERPL-MCNC: 8.7 MG/DL (ref 8.3–10.1)
CHLORIDE SERPL-SCNC: 106 MMOL/L (ref 100–108)
CO2 SERPL-SCNC: 26 MMOL/L (ref 21–32)
CREAT SERPL-MCNC: 3.02 MG/DL (ref 0.6–1.3)
GFR SERPL CREATININE-BSD FRML MDRD: 22 ML/MIN/1.73SQ M
GLUCOSE SERPL-MCNC: 87 MG/DL (ref 65–140)
POTASSIUM SERPL-SCNC: 4 MMOL/L (ref 3.5–5.3)
SODIUM SERPL-SCNC: 139 MMOL/L (ref 136–145)

## 2021-06-14 PROCEDURE — 80048 BASIC METABOLIC PNL TOTAL CA: CPT | Performed by: INTERNAL MEDICINE

## 2021-06-14 PROCEDURE — 99239 HOSP IP/OBS DSCHRG MGMT >30: CPT | Performed by: INTERNAL MEDICINE

## 2021-06-14 PROCEDURE — 99232 SBSQ HOSP IP/OBS MODERATE 35: CPT | Performed by: INTERNAL MEDICINE

## 2021-06-14 RX ORDER — DOCUSATE SODIUM 100 MG/1
100 CAPSULE, LIQUID FILLED ORAL 2 TIMES DAILY
Qty: 10 CAPSULE | Refills: 0 | Status: SHIPPED | OUTPATIENT
Start: 2021-06-14 | End: 2021-08-04

## 2021-06-14 RX ORDER — METOPROLOL SUCCINATE 25 MG/1
25 TABLET, EXTENDED RELEASE ORAL
Status: DISCONTINUED | OUTPATIENT
Start: 2021-06-14 | End: 2021-06-14 | Stop reason: HOSPADM

## 2021-06-14 RX ORDER — DOCUSATE SODIUM 100 MG/1
100 CAPSULE, LIQUID FILLED ORAL 2 TIMES DAILY
Status: DISCONTINUED | OUTPATIENT
Start: 2021-06-14 | End: 2021-06-14 | Stop reason: HOSPADM

## 2021-06-14 RX ORDER — POLYETHYLENE GLYCOL 3350 17 G/17G
17 POWDER, FOR SOLUTION ORAL DAILY
Qty: 10 EACH | Refills: 0 | Status: SHIPPED | OUTPATIENT
Start: 2021-06-15 | End: 2021-08-04

## 2021-06-14 RX ORDER — POLYETHYLENE GLYCOL 3350 17 G/17G
17 POWDER, FOR SOLUTION ORAL DAILY
Status: DISCONTINUED | OUTPATIENT
Start: 2021-06-14 | End: 2021-06-14 | Stop reason: HOSPADM

## 2021-06-14 RX ADMIN — HEPARIN SODIUM 5000 UNITS: 5000 INJECTION INTRAVENOUS; SUBCUTANEOUS at 05:01

## 2021-06-14 RX ADMIN — IRON SUCROSE 300 MG: 20 INJECTION, SOLUTION INTRAVENOUS at 09:55

## 2021-06-14 RX ADMIN — AMLODIPINE BESYLATE 5 MG: 5 TABLET ORAL at 08:14

## 2021-06-14 RX ADMIN — CLOPIDOGREL BISULFATE 75 MG: 75 TABLET ORAL at 08:14

## 2021-06-14 RX ADMIN — FOLIC ACID 1 MG: 1 TABLET ORAL at 08:14

## 2021-06-14 RX ADMIN — DOCUSATE SODIUM 100 MG: 100 CAPSULE, LIQUID FILLED ORAL at 08:50

## 2021-06-14 RX ADMIN — THIAMINE HCL TAB 100 MG 100 MG: 100 TAB at 08:14

## 2021-06-14 RX ADMIN — POLYETHYLENE GLYCOL 3350 17 G: 17 POWDER, FOR SOLUTION ORAL at 08:49

## 2021-06-14 NOTE — ASSESSMENT & PLAN NOTE
Hemoglobin continues to remain low around 9  Hemoglobin about 2 years ago was 14  Patient will need outpatient screening colonoscopy and stool occult blood  Iron panel showed iron level of 34, TIBC of 261  Patient received a dose of Venofer before discharge

## 2021-06-14 NOTE — PROGRESS NOTES
Progress Note - Urology      Patient: Radha Jusitn   : 1965 Sex: male   MRN: [id number]     CSN: [id number]  Unit/Bed#: [address] [place] [address]     SUBJECTIVE:   No complaints      Objective   Vitals: /89 (BP Location: Right arm)   Pulse 60   Temp 97 8 °F (36 6 °C) (Oral)   Resp 17   Ht 6' (1 829 m)   Wt 92 1 kg (203 lb)   SpO2 97%   BMI 27 53 kg/m²     I/O last 24 hours:   In: 300 [P O :300]  Out: 3250 [Urine:3250]      Physical Exam:   General Alert awake   Normocephalic atraumatic PERRLA  Lungs clear bilaterally  Cardiac normal S1 normal S2  Abdomen soft, flank pain  Extremities no edema      Lab Results: CBC:   Lab Results   Component Value Date    WBC 6 35 2021    HGB 9 7 (L) 2021    HCT 29 8 (L) 2021    MCV 87 2021     2021    MCH 28 2 2021    MCHC 32 6 2021    RDW 12 5 2021    MPV 9 0 2021    NRBC 0 2021     CMP:   Lab Results   Component Value Date     2021     2021    CO2 26 2021    CO2 18 (L) 2021    BUN 31 (H) 2021    BUN 45 (H) 2021    CREATININE 3 02 (H) 2021    CALCIUM 8 7 2021    AST 18 2021    AST 16 2021    ALT 26 2021    ALT 17 2021    ALKPHOS 105 2021    EGFR 22 2021     Urinalysis:   Lab Results   Component Value Date    COLORU Light Yellow 2021    CLARITYU Clear 2021    SPECGRAV 1 010 2021    PHUR 6 0 2021    PHUR 6 0 2021    LEUKOCYTESUR Trace (A) 2021    NITRITE Negative 2021    GLUCOSEU Negative 2021    KETONESU Negative 2021    BILIRUBINUR Negative 2021    BLOODU Negative 2021     Urine Culture: No results found for: URINECX  PSA:   Lab Results   Component Value Date    PSA 0 8 2021         Assessment/ Plan:  Urinary retention  Creatine trending down  Home connie Oneal MD

## 2021-06-14 NOTE — NURSING NOTE
IV removed  No bleeding noted  Patient transferred from rosales drainage bag to leg bag by charge RN  Patient provided with rosales care supplies  AVS reviewed with patient and wife  No questions at this time  Patient handed prescription for BMP  Refused wheelchair  Patient left the floor with wife and RN  Steady gait noted

## 2021-06-14 NOTE — ASSESSMENT & PLAN NOTE
Patient had low urine output and Dale catheter could not be placed in the emergency room or by the Urology  Patient underwent cystoscopy with retrograde ureterogram with direct internal urethrotomy-patient noted to have severe stricture with false passage in the bulbar urethra and DVIU was performed  Patient had right ureteroscopy with stricture opening noted  Patient had [address] drainage left by Urology which has to be left in place for 1 week  Patient did have mild hematuria which has since resolved

## 2021-06-14 NOTE — DISCHARGE SUMMARY
Zack 45  Discharge- Eric Anayeli 1965, 54 y o  male MRN: [id number]  Unit/Bed#: [address] [place] [address] Encounter: [id number]  Primary Care Provider: Felix Rouse MD   Date and time admitted to hospital: 6/9/2021 11:56 AM    * ARMANDO (acute kidney injury) [facility])  Assessment & Plan  Creatinine was 3 69 upon admission  Baseline creatinine of 1-1 2  Patient initially had no urine output but did not have any uremia symptoms either  Patient is having excellent urine output after placement of Dale catheter  Renal ultrasound from May 17 showed distended bladder with diffuse bladder wall thickening with small bladder diverticula  CT scan of the abdomen pelvis showed severe bladder distention, moderate diffuse wall thickening with moderate bilateral hydroureteronephrosis  Patient has been on isolate at 100 mL/hour which was decreased to 60 mL/hour  Creatinine improved initially with IV hydration but has been stable around 3  Patient has good urine output   IV fluids were discontinued yesterday  Creatinine has stabilized around 3 range  Likely 2nd obstructive uropathy  Follow-up BMP in 5 days with outpatient follow-up with Nephrology    Acute urinary retention  Assessment & Plan  Patient had low urine output and Dale catheter could not be placed in the emergency room or by the Urology  Patient underwent cystoscopy with retrograde ureterogram with direct internal urethrotomy-patient noted to have severe stricture with false passage in the bulbar urethra and DVIU was performed  Patient had right ureteroscopy with stricture opening noted  Patient had [address] drainage left by Urology which has to be left in place for 1 week  Patient did have mild hematuria which has since resolved      Bladder outlet obstruction  Assessment & Plan  On imaging, patient noted to have findings suggestive of bladder outlet obstruction with moderate hydronephrosis bilaterally  Dale catheter placement attempts in the emergency room and by Urology were unsuccessful  Per ER notes, bladder scan showed 200 mL  Patient underwent cystoscopy with DVIU and placement of [place] catheter  Patient be discharged with Dale catheter with outpatient follow-up with Urology later this week  Patient had mild hematuria postprocedure which has resolved  Anemia  Assessment & Plan  Hemoglobin continues to remain low around 9  Hemoglobin about 2 years ago was 14  Patient will need outpatient screening colonoscopy and stool occult blood  Iron panel showed iron level of 34, TIBC of 261  Patient received a dose of Venofer before discharge    Benign essential hypertension  Assessment & Plan  Continue Norvasc, cardura and Lopressor  Cerebrovascular accident (CVA) due to occlusion of left middle cerebral artery (HCC)  Assessment & Plan  History of CVA  Continue statin, Plavix      Discharging Physician / Practitioner: Drew Carter MD  PCP: Gideon Maciel MD  Admission Date:   Admission Orders (From admission, onward)     Ordered        06/09/21 1548  Inpatient Admission  Once         06/09/21 1505  Place in Observation  Once                   Discharge Date: 06/14/21    Medical Problems     Resolved Problems  Date Reviewed: 6/14/2021    None                Consultations During Hospital Stay:  · Nephrology, Urology    Procedures Performed:   · Cystoscopy showed severe stricture in the bulbar urethra and patient underwent DVIU with placement of [place] catheter       Outpatient Tests Requested:  · BMP in 5 days  Outpatient follow-up with Urology and Nephrology    Complications:  None    Reason for Admission:  Abnormal lab    Hospital Course: Rodney Haskins is a 54 y o  male patient with past medical history of stroke, hypertension who originally presented to the hospital on 6/9/2021 due to worsening kidney function outpatient blood work    Patient has known history of bladder outlet obstruction and patient reports he had difficulty urinating  Patient finished antibiotics for UTI about 2 weeks prior to admission  Dale could not be placed in the emergency room  Patient noted to have elevated creatinine of 3 6 upon admission  Later patient underwent cystoscopy and was noted to have severe urethral stricture and had urethrotomy performed following which 20 [place] catheter was left in place  Patient received IV hydration and creatinine improved to 3 range  Creatinine continued remained stable around 3 without any further improvement after that  Postprocedure patient had mild hematuria which resolved  Patient continued to have good urine output without any significant drop in hemoglobin with stable creatinine  Nephrology recommended discharge home with outpatient follow-up BMP in 1 week  Patient will continue with Dale catheter for 1 week for removal       Please see above list of diagnoses and related plan for additional information  Condition at Discharge: stable     Discharge Day Visit / Exam:     Subjective:  Patient denies any abdominal pain, pain near the catheter site  Patient did not have a bowel movement in 3 days  But is passing flatus  Vitals: Blood Pressure: 125/79 (06/14/21 0814)  Pulse: 72 (06/14/21 0814)  Temperature: 98 °F (36 7 °C) (06/14/21 0814)  Temp Source: Oral (06/14/21 0814)  Respirations: 16 (06/14/21 0814)  Height: 6' (182 9 cm) (06/09/21 2022)  Weight - Scale: 92 1 kg (203 lb) (06/09/21 1148)  SpO2: 95 % (06/14/21 0814)  Exam:   Physical Exam  Constitutional:       Appearance: Normal appearance  HENT:      Head: Normocephalic and atraumatic  Eyes:      Extraocular Movements: Extraocular movements intact  Pupils: Pupils are equal, round, and reactive to light  Cardiovascular:      Rate and Rhythm: Normal rate and regular rhythm  Heart sounds: No murmur heard  No gallop  Pulmonary:      Effort: Pulmonary effort is normal       Breath sounds: Normal breath sounds  Abdominal:      General: Bowel sounds are normal       Palpations: Abdomen is soft  Tenderness: There is no abdominal tenderness  Genitourinary:     Comments: Dale with clear urine  Musculoskeletal:         General: No swelling or deformity  Normal range of motion  Cervical back: Normal range of motion and neck supple  Skin:     General: Skin is warm and dry  Neurological:      General: No focal deficit present  Mental Status: He is alert  Discharge instructions/Information to patient and family:   See after visit summary for information provided to patient and family  Provisions for Follow-Up Care:  See after visit summary for information related to follow-up care and any pertinent home health orders  Disposition:     Home  Planned Readmission: No     Discharge Statement:  I spent 35 minutes discharging the patient  This time was spent on the day of discharge  I had direct contact with the patient on the day of discharge  Greater than 50% of the total time was spent examining patient, answering all patient questions, arranging and discussing plan of care with patient as well as directly providing post-discharge instructions  Additional time then spent on discharge activities  Discharge Medications:  See after visit summary for reconciled discharge medications provided to patient and family        ** Please Note: This note has been constructed using a voice recognition system **

## 2021-06-14 NOTE — PROGRESS NOTES
[address] [facility] NOTE   Avis Negrete 54 y o  male MRN: [id number]  Unit/Bed#: 2 [place] [address] Encounter: [id number]  Reason for Consult: ARMANDO    ASSESSMENT and PLAN:  1  Acute kidney injury (POA):  · Admitted with a creatinine of 3 63 on June 8, 2021  · Etiology is felt to be due to obstructive uropathy  · Renal function is slowly improving  · Creatinine is down to 3 02 today  2  Hypertension:  · BP is controlled  · Continue amlodipine 5 mg daily, doxazosin 4 mg daily  · Change metoprolol tartrate to succinate 25 mg daily  3  Urinary retention:  · Due to urethral stricture s/p DVIU and rosales placement on 6/10/21  · Follow up with urology  4  Anemia:  · Iron saturation is 13% - give Venofer 300 mg IV x 1 today  DISPOSITION:  · Stable for discharge from renal standpoint  · Change metoprolol tartrate to succinate 25 mg daily  · Give Venofer 300 mg IV x 1    · Repeat BMP on Thursday, June 17  · We will arrange for nephrology follow-up  The above plan was discussed with Dr Felecia Morin  SUBJECTIVE / 24H INTERVAL HISTORY:  Feels okay  No CP or SOB  Good appetite  OBJECTIVE:  Current Weight: Weight - Scale: 92 1 kg (203 lb)  Vitals:    06/13/21 2302 06/13/21 2319 06/14/21 0750 06/14/21 0814   BP: 129/81 135/89  125/79   BP Location:  Right arm     Pulse: 62 60 80 72   Resp: 17 17     Temp: 97 9 °F (36 6 °C) 97 8 °F (36 6 °C)  98 °F (36 7 °C)   TempSrc:  Oral     SpO2: 95% 97% 94% 95%   Weight:       Height:           Intake/Output Summary (Last 24 hours) at 6/14/2021 0901  Last data filed at 6/14/2021 9106  Gross per 24 hour   Intake 300 ml   Output 3250 ml   Net -2950 ml     General: conscious, cooperative, no distress  Skin: dry  Eyes: pink conjunctivae  ENT: moist mucous membranes  Chest/Lungs: equal chest expansion, clear breath sounds    CVS: distinct heart sounds, normal rate, regular rhythm, no rub  Abdomen: soft, non tender, non distended, normal bowel sounds  Extremities: no edema  : (+) rosales catheter  Neuro: awake, alert     Psych: appropriate affect    Medications:    Current Facility-Administered Medications:     aluminum-magnesium hydroxide-simethicone (MYLANTA) oral suspension 30 mL, 30 mL, Oral, Q6H PRN, Javier Feliciano MD    amLODIPine (NORVASC) tablet 5 mg, 5 mg, Oral, Daily, Javier Feliciano MD, 5 mg at 06/14/21 0814    atorvastatin (LIPITOR) tablet 80 mg, 80 mg, Oral, Daily With Junior Mehta MD, 80 mg at 06/13/21 1615    clopidogrel (PLAVIX) tablet 75 mg, 75 mg, Oral, Daily, Javier Feliciano MD, 75 mg at 06/14/21 0814    docusate sodium (COLACE) capsule 100 mg, 100 mg, Oral, BID, Albertina Garcia MD, 100 mg at 06/14/21 0850    doxazosin (CARDURA) tablet 4 mg, 4 mg, Oral, HS, Javier Feliciano MD, 4 mg at 49/65/14 6346    folic acid (FOLVITE) tablet 1 mg, 1 mg, Oral, Daily, Javier Feliciano MD, 1 mg at 06/14/21 0814    heparin (porcine) subcutaneous injection 5,000 Units, 5,000 Units, Subcutaneous, Q8H [address], 5,000 Units at 06/14/21 0501 **AND** Platelet count, , , Once, Javier Feliciano MD    metoprolol tartrate (LOPRESSOR) tablet 25 mg, 25 mg, Oral, HS, Javier Feliciano MD, 25 mg at 06/13/21 2100    ondansetron (ZOFRAN) injection 4 mg, 4 mg, Intravenous, Q6H PRN, Javier Feliciano MD, 4 mg at 06/12/21 0755    polyethylene glycol (MIRALAX) packet 17 g, 17 g, Oral, Daily, Albertina Garcia MD, 17 g at 06/14/21 0849    thiamine tablet 100 mg, 100 mg, Oral, Daily, Javier Feliciano MD, 100 mg at 06/14/21 0814    traMADol (ULTRAM) tablet 50 mg, 50 mg, Oral, BID PRN, Albertina Garcia MD, 50 mg at 06/10/21 1609    Laboratory Results:  Results from last 7 days   Lab Units 06/14/21  0507 06/13/21  1715 06/13/21  0528 06/12/21  0547 06/11/21  0507 06/10/21  0511 06/09/21  1221   WBC Thousand/uL  --   --  6 35  --   --  6 15 6 61   HEMOGLOBIN g/dL  --   --  9 7*  --   --  9 8* 9 7*   HEMATOCRIT %  --   --  29 8*  --   --  30 6* 29 5*   PLATELETS Thousands/uL  -- --  219  --   --  223 220   POTASSIUM mmol/L 4 0 4 0 4 3 4 2 4 9 4 7 4 8   CHLORIDE mmol/L 106 104 108 107 108 109* 107   CO2 mmol/L 26 26 26 26 24 22 23   BUN mg/dL 31* 31* 32* 32* 40* 43* 44*   CREATININE mg/dL 3 02* 3 33* 3 05* 3 00* 3 27* 3 57* 3 69*   CALCIUM mg/dL 8 7 8 7 8 8 8 8 8 6 9 2 9 1   MAGNESIUM mg/dL  --   --   --   --   --  1 9  --

## 2021-06-14 NOTE — ASSESSMENT & PLAN NOTE
Creatinine was 3 69 upon admission  Baseline creatinine of 1-1 2  Patient initially had no urine output but did not have any uremia symptoms either  Patient is having excellent urine output after placement of Dale catheter  Renal ultrasound from May 17 showed distended bladder with diffuse bladder wall thickening with small bladder diverticula  CT scan of the abdomen pelvis showed severe bladder distention, moderate diffuse wall thickening with moderate bilateral hydroureteronephrosis  Patient has been on isolate at 100 mL/hour which was decreased to 60 mL/hour  Creatinine improved initially with IV hydration but has been stable around 3  Patient has good urine output   IV fluids were discontinued yesterday  Creatinine has stabilized around 3 range    Likely 2nd obstructive uropathy  Follow-up BMP in 5 days with outpatient follow-up with Nephrology

## 2021-06-14 NOTE — ASSESSMENT & PLAN NOTE
On imaging, patient noted to have findings suggestive of bladder outlet obstruction with moderate hydronephrosis bilaterally  Dale catheter placement attempts in the emergency room and by Urology were unsuccessful  Per ER notes, bladder scan showed 200 mL  Patient underwent cystoscopy with DVIU and placement of [facility] catheter  Patient be discharged with Dale catheter with outpatient follow-up with Urology later this week  Patient had mild hematuria postprocedure which has resolved

## 2021-06-14 NOTE — UTILIZATION REVIEW
Continued Stay Review    Date: 10- 12-21                        Current Patient Class: inpatient  Current Level of Care: med surg    HPI:55 y o  male initially admitted on 6-9-21 for acute kidney injury  With baseline 1 0 to 1/2  Assessment/Plan:     Post day 2 cysto / ureteroscopy   Patient's urinary symptoms improved, output excellent after placement of rosales  Iv hydration will be discontinued 6-13 if creatinine continues to improve  Hematuria minimal on sq heparin  Creatinine 3 0 and bun 32  Vital Signs:       Date/Time  Temp  Pulse  Resp  BP  MAP   SpO2  O2 Device   06/12/21 08:42:08  97 9 °F (36 6 °C)  73  20  138/79  99  97 %  None (Room air)   06/12/21 0755  --  75  --  --  --  97 %           Pertinent Labs/Diagnostic Results:       Anesthesia Start Date/Time: 06/10/21 1038   Procedure: CYSTOSCOPY WITH RETROGRADE PYELOGRAM/internal urethrotomy, DVIU, URETHROGRAM, CYSTOGRAM  (N/A Bladder)   Anesthesia type: general   Diagnosis: Acute urinary retention [R33 8]   Pre-op diagnosis: Acute urinary retention [R33 8]     Procedure  Cysto retrograde urethrogram direct internal urethrotomy fluoroscopic cystogram    Estimated Blood Loss:   Minimal     Operative Findings:  Severe stricture with false passage noted in the bulbar urethra attempts at passing a wire through the cystoscope were un successful retrograde urethrogram confirmed extravasation of contrast in the bulbar urethra with no contrast into the bladder  Rigid ureteroscopy with stricture opening found  DVIU performed  [facility] drainage must leave in for at least 1 week     Complications:   None      Results from last 7 days   Lab Units 06/09/21  1933   SARS-COV-2  Negative     Results from last 7 days   Lab Units 06/13/21  0528 06/10/21  0511 06/09/21  1221   WBC Thousand/uL 6 35 6 15 6 61   HEMOGLOBIN g/dL 9 7* 9 8* 9 7*   HEMATOCRIT % 29 8* 30 6* 29 5*   PLATELETS Thousands/uL 219 223 220   NEUTROS ABS Thousands/µL  --   --  4 59 Results from last 7 days   Lab Units 06/14/21  0507 06/13/21  1715 06/13/21  0528 06/12/21  0547 06/11/21  0507 06/10/21  0511   SODIUM mmol/L 139 138 142 142 141 142   POTASSIUM mmol/L 4 0 4 0 4 3 4 2 4 9 4 7   CHLORIDE mmol/L 106 104 108 107 108 109*   CO2 mmol/L 26 26 26 26 24 22   ANION GAP mmol/L 7 8 8 9 9 11   BUN mg/dL 31* 31* 32* 32* 40* 43*   CREATININE mg/dL 3 02* 3 33* 3 05* 3 00* 3 27* 3 57*   EGFR ml/min/1 73sq m 22 20 22 22 20 18   CALCIUM mg/dL 8 7 8 7 8 8 8 8 8 6 9 2   MAGNESIUM mg/dL  --   --   --   --   --  1 9     Results from last 7 days   Lab Units 06/09/21  1221   AST U/L 18   ALT U/L 26   ALK PHOS U/L 105   TOTAL PROTEIN g/dL 7 0   ALBUMIN g/dL 3 5   TOTAL BILIRUBIN mg/dL 0 31         Results from last 7 days   Lab Units 06/14/21  0507 06/13/21  1715 06/13/21  0528 06/12/21  0547 06/11/21  0507 06/10/21  0511 06/09/21  1221 06/08/21  0000   GLUCOSE RANDOM mg/dL 87 149* 86 88 92 93 93 86       Results from last 7 days   Lab Units 06/09/21  1221   PROTIME seconds 13 4   INR  1 03   PTT seconds 31       Results from last 7 days   Lab Units 06/10/21  0511   FERRITIN ng/mL 382       Results from last 7 days   Lab Units 06/09/21  1305   CLARITY UA  Clear   COLOR UA  Light Yellow   SPEC GRAV UA  1 010   PH UA  6 0   GLUCOSE UA mg/dl Negative   KETONES UA mg/dl Negative   BLOOD UA  Negative   PROTEIN UA mg/dl Negative   NITRITE UA  Negative   BILIRUBIN UA  Negative   UROBILINOGEN UA E U /dl 0 2   LEUKOCYTES UA  Trace*   WBC UA /hpf 2-4   RBC UA /hpf None Seen   BACTERIA UA /hpf Occasional   EPITHELIAL CELLS WET PREP /hpf Occasional       Scheduled Medications:  amLODIPine, 5 mg, Oral, Daily  atorvastatin, 80 mg, Oral, Daily With Dinner  clopidogrel, 75 mg, Oral, Daily  docusate sodium, 100 mg, Oral, BID  doxazosin, 4 mg, Oral, HS  folic acid, 1 mg, Oral, Daily  heparin (porcine), 5,000 Units, Subcutaneous, Q8H MAXIMINO  metoprolol succinate, 25 mg, Oral, HS  polyethylene glycol, 17 g, Oral, Daily  thiamine, 100 mg, Oral, Daily      Continuous IV Infusions:     PRN Meds:  aluminum-magnesium hydroxide-simethicone, 30 mL, Oral, Q6H PRN  ondansetron, 4 mg, Intravenous, Q6H PRN  traMADol, 50 mg, Oral, BID PRN        Discharge Plan: to be determined     Network Utilization Review Department  ATTENTION: Please call with any questions or concerns to [phone] and carefully listen to the prompts so that you are directed to the right person  All voicemails are confidential   Stephanie Umanzor all requests for admission clinical reviews, approved or denied determinations and any other requests to dedicated fax number below belonging to the campus where the patient is receiving treatment   List of dedicated fax numbers for the Facilities:  [address] DENIALS (Administrative/Medical Necessity) [phone]   [address] (Maternity/NICU/Pediatrics) [phone]   [address] [address] [place] [address] [place] [address] [facility] [facility] [phone]

## 2021-06-15 ENCOUNTER — TRANSITIONAL CARE MANAGEMENT (OUTPATIENT)
Dept: FAMILY MEDICINE CLINIC | Facility: CLINIC | Age: 56
End: 2021-06-15

## 2021-06-15 NOTE — UTILIZATION REVIEW
Notification of Discharge   This is a Notification of Discharge from our facility [address]  Please be advised that this patient has been discharge from our facility  Below you will find the admission and discharge date and time including the patients disposition  UTILIZATION REVIEW CONTACT:  Jani Chinchilla  Utilization   Network Utilization Review Department  Phone: [phone] x carefully listen to the prompts  All voicemails are confidential   Email: [email or web address]     PHYSICIAN ADVISORY SERVICES:  FOR [id number] REVIEW - MEDICAL NECESSITY DENIAL  Phone: [phone]  Fax: [phone]  Email: [email or web address]     PRESENTATION DATE: 6/9/2021 11:56 AM  OBERVATION ADMISSION DATE:   INPATIENT ADMISSION DATE: 6/9/21  3:48 PM   DISCHARGE DATE: 6/14/2021  1:50 PM  DISPOSITION: Home/Self Care Home/Self Care      IMPORTANT INFORMATION:  Send all requests for admission clinical reviews, approved or denied determinations and any other requests to dedicated fax number below belonging to the campus where the patient is receiving treatment   List of dedicated fax numbers:  [address] DENIALS (Administrative/Medical Necessity) [phone]   [address] (Maternity/NICU/Pediatrics) [phone]   Dana Verduzco [phone]   [facility] [phone]   Becka Miguel [phone]   [facility] [address] [phone]   [facility]  [phone]   [address] [place] [address], [facility]  [address] [facility] [address] [facility] [phone]

## 2021-06-18 ENCOUNTER — DOCUMENTATION (OUTPATIENT)
Dept: NEPHROLOGY | Facility: CLINIC | Age: 56
End: 2021-06-18

## 2021-06-18 ENCOUNTER — OFFICE VISIT (OUTPATIENT)
Dept: FAMILY MEDICINE CLINIC | Facility: CLINIC | Age: 56
End: 2021-06-18
Payer: COMMERCIAL

## 2021-06-18 VITALS
BODY MASS INDEX: 26.52 KG/M2 | DIASTOLIC BLOOD PRESSURE: 76 MMHG | HEART RATE: 83 BPM | TEMPERATURE: 97.9 F | WEIGHT: 195.8 LBS | SYSTOLIC BLOOD PRESSURE: 120 MMHG | HEIGHT: 72 IN | RESPIRATION RATE: 16 BRPM | OXYGEN SATURATION: 100 %

## 2021-06-18 DIAGNOSIS — E87.5 HYPERKALEMIA: ICD-10-CM

## 2021-06-18 DIAGNOSIS — I10 ESSENTIAL HYPERTENSION: ICD-10-CM

## 2021-06-18 DIAGNOSIS — N17.9 AKI (ACUTE KIDNEY INJURY) (HCC): ICD-10-CM

## 2021-06-18 DIAGNOSIS — R33.9 URINARY RETENTION: ICD-10-CM

## 2021-06-18 DIAGNOSIS — N32.0 BLADDER OUTLET OBSTRUCTION: Primary | ICD-10-CM

## 2021-06-18 LAB
BUN SERPL-MCNC: 30 MG/DL (ref 6–24)
BUN/CREAT SERPL: 11 (ref 9–20)
CALCIUM SERPL-MCNC: 9.7 MG/DL (ref 8.7–10.2)
CHLORIDE SERPL-SCNC: 105 MMOL/L (ref 96–106)
CO2 SERPL-SCNC: 23 MMOL/L (ref 20–29)
CREAT SERPL-MCNC: 2.77 MG/DL (ref 0.76–1.27)
EXT GLUCOSE BLD: 92
EXTERNAL BUN: 30
EXTERNAL CALCIUM: 9.7
EXTERNAL CHLORIDE: 105
EXTERNAL CO2: 23
EXTERNAL CREATININE: 2.77
EXTERNAL EGFR: 25
EXTERNAL POTASSIUM: 4.5
EXTERNAL SODIUM: 139
GLUCOSE SERPL-MCNC: 92 MG/DL (ref 65–99)
POTASSIUM SERPL-SCNC: 4.5 MMOL/L (ref 3.5–5.2)
SL AMB  POCT GLUCOSE, UA: ABNORMAL
SL AMB EGFR AFRICAN AMERICAN: 28 ML/MIN/1.73
SL AMB EGFR NON AFRICAN AMERICAN: 25 ML/MIN/1.73
SL AMB LEUKOCYTE ESTERASE,UA: 500
SL AMB POCT BILIRUBIN,UA: ABNORMAL
SL AMB POCT BLOOD,UA: 250
SL AMB POCT CLARITY,UA: CLEAR
SL AMB POCT COLOR,UA: YELLOW
SL AMB POCT KETONES,UA: ABNORMAL
SL AMB POCT NITRITE,UA: ABNORMAL
SL AMB POCT PH,UA: 5
SL AMB POCT SPECIFIC GRAVITY,UA: 1.01
SL AMB POCT URINE PROTEIN: 500
SL AMB POCT UROBILINOGEN: ABNORMAL
SODIUM SERPL-SCNC: 139 MMOL/L (ref 134–144)

## 2021-06-18 PROCEDURE — 81002 URINALYSIS NONAUTO W/O SCOPE: CPT | Performed by: FAMILY MEDICINE

## 2021-06-18 PROCEDURE — 99495 TRANSJ CARE MGMT MOD F2F 14D: CPT | Performed by: FAMILY MEDICINE

## 2021-06-18 RX ORDER — AMLODIPINE BESYLATE 2.5 MG/1
2.5 TABLET ORAL DAILY
Qty: 30 TABLET | Refills: 1 | Status: SHIPPED | OUTPATIENT
Start: 2021-06-18 | End: 2021-06-21

## 2021-06-18 NOTE — PROGRESS NOTES
TRANSITION OF CARE OFFICE VISIT  [facility]    NAME: Maria Teresa Mendoza  AGE: 54 y o  SEX: male  : 1965     DATE: 2021     Assessment and Plan:     Diagnoses and all orders for this visit:    Bladder outlet obstruction    Urinary retention    ARMANDO (acute kidney injury) ([facility] [address] )    Hyperkalemia    Essential hypertension  -     amLODIPine (NORVASC) 2 5 mg tablet; Take 1 tablet (2 5 mg total) by mouth daily  -     metoprolol tartrate (LOPRESSOR) 25 mg tablet; Take 1 tablet (25 mg total) by mouth daily      Called [facility] for a verbal on CR-> 2 77 IMPROVEMENT  Set to   Have fully removed at urologist on Monday  Urine culture sent today off of bag  Leuks positive blood positive likely secondary to bladder outlet syndrome   patient not feeling any different   hyperkalemia resolved currently at 4 5   Hypertension being over treated now that he is taking Cardura 4 mg at bedtime  Recommend that we switch amlodipine to 2 5 from 5 mg  And switch metoprolol to the a m  strong chance that the patient will be taken off of amlodipine if he continues to have low readings  Counseling:     · Medication Side Effects: Adverse side effects of medications were reviewed with the patient/guardian today  · I have spent 30 minutes with Patient and family today in which greater than 50% of this time was spent in counseling/coordination of care regarding Diagnostic results, Prognosis, Risks and benefits of tx options, Intructions for management and Risk factor reductions    · Barriers to treatment include: No identified barriers     Transitional Care Management Review:     Maria Teresa Mendoza is a 54 y o  male here for TCM follow-up    During the TCM phone call patient stated:    TCM Call (since 2021)     Date and time call was made  6/15/2021  9:40 AM    Date of Admission  21    Date of discharge  21    Diagnosis  Cystoscopy ARMANDO and rosales cath    Disposition  Home    Were the patients medications reviewed and updated  Yes    Current Symptoms  None      TCM Call (since 5/18/2021)     Post hospital issues  None    Should patient be enrolled in anticoag monitoring? No    Scheduled for follow up? Yes    Did you obtain your prescribed medications  Yes    Do you need help managing your prescriptions or medications  No    Is transportation to your appointment needed  Yes    I have advised the patient to call PCP with any new or worsening symptoms  CKaprallpn    Living Arrangements  Spouse or Significiant other    Are you recieving any outpatient services  No    Are you recieving home care services  No    Are you using any community resources  No    Current waiver services  No    Have you fallen in the last 12 months  No    Interperter language line needed  No           HPI:       66-year-old male with a history of a stroke, hypertension presented to the hospital on June 9, 2021 secondary to worsening kidney function  Unfortunately after multiple rosales attempts catheter  Was not able to be passed  Patient underwent a cystoscopy and noticed to ureter stricture and had a urethrotomy with 20 [place] catheter  Patient had aggressive IV hydration and creatinine improved to 3 and was discharged  Just recently went to the lab yesterday but the results have not returned  Patient is scheduled to see his urologist on Monday for removal of the rosales  Patient states that his blood pressure has been on the lower range of normal when he is waking up and has been having lightheadedness and dizziness    This all started once he had the addition of 4 mg of medication at bedtime  The following portions of the patient's history were reviewed and updated as appropriate: allergies, current medications, past family history, past medical history, past social history, past surgical history and problem list      Review of Systems:     Review of Systems   Constitutional: Negative for activity change, appetite change, chills, fatigue and fever  HENT: Negative for congestion  Respiratory: Negative for cough, chest tightness and shortness of breath  Cardiovascular: Negative for chest pain and leg swelling  Gastrointestinal: Negative for abdominal distention, abdominal pain, constipation, diarrhea, nausea and vomiting  Neurological: Positive for dizziness and light-headedness  All other systems reviewed and are negative  Problem List:     Patient Active Problem List   Diagnosis    Cerebrovascular accident (CVA) due to occlusion of left middle cerebral artery ([facility] [address] )    Alcohol abuse    Hypertension    Hypercholesterolemia    Benign essential hypertension    Abnormal glucose    Stenosis of right carotid artery    Family history of abdominal aortic aneurysm (AAA)    Left carotid artery occlusion    Benign prostatic hyperplasia with lower urinary tract symptoms    ARMANDO (acute kidney injury) ([facility] [address] )    Hyperkalemia    Bladder outlet obstruction    Anemia    Acute urinary retention        Objective:     /76 (BP Location: Left arm, Patient Position: Sitting, Cuff Size: Standard)   Pulse 83   Temp 97 9 °F (36 6 °C) (Temporal)   Resp 16   Ht 6' (1 829 m)   Wt 88 8 kg (195 lb 12 8 oz)   SpO2 100%   BMI 26 56 kg/m²     Physical Exam  Vitals reviewed  Constitutional:       Appearance: He is well-developed  HENT:      Head: Normocephalic and atraumatic  Eyes:      Conjunctiva/sclera: Conjunctivae normal       Pupils: Pupils are equal, round, and reactive to light  Cardiovascular:      Rate and Rhythm: Normal rate and regular rhythm  Heart sounds: Normal heart sounds, S1 normal and S2 normal  No murmur heard  Pulmonary:      Effort: Pulmonary effort is normal  No respiratory distress  Breath sounds: Normal breath sounds  No wheezing  Abdominal:      Comments: Dale in place  Normal yellow color, no bloody tinge   Musculoskeletal:         General: Normal range of motion  Cervical back: Normal range of motion and neck supple  Skin:     General: Skin is warm  Neurological:      Mental Status: He is alert and oriented to person, place, and time  Psychiatric:         Speech: Speech normal          Behavior: Behavior normal          Thought Content: Thought content normal          Judgment: Judgment normal          Laboratory Results: I have personally reviewed the pertinent laboratory results/reports     Radiology/Other Diagnostic Testing Results: I have personally reviewed pertinent reports  CT abdomen pelvis wo contrast    Result Date: 6/9/2021  CT ABDOMEN AND PELVIS WITHOUT IV CONTRAST INDICATION:   Hydronephrosis hydronephrosis, bladder outlet obstruction, ARMANDO    "42-year-old male with a history of urinary retention and BPH, hypertension and stroke who was admitted due to worsening renal function secondary to urinary obstruction" COMPARISON:  Renal ultrasound 5/17/2021  TECHNIQUE:  CT examination of the abdomen and pelvis was performed without intravenous contrast   Axial, sagittal, and coronal 2D reformatted images were created from the source data and submitted for interpretation  Radiation dose length product (DLP) for this visit:  525 19 mGy-cm   This examination, like all CT scans performed in the [facility], was performed utilizing techniques to minimize radiation dose exposure, including the use of iterative  reconstruction and automated exposure control  Enteric contrast was administered  FINDINGS: ABDOMEN LOWER CHEST:  No clinically significant abnormality identified in the visualized lower chest  LIVER/BILIARY TREE:  Unremarkable  GALLBLADDER:  No calcified gallstones  No pericholecystic inflammatory change  SPLEEN:  Unremarkable  PANCREAS:  Unremarkable  ADRENAL GLANDS:  Unremarkable  KIDNEYS/URETERS:  Moderate bilateral hydroureteronephrosis  No obstructive ureteral calculi  No perinephric collection   STOMACH AND BOWEL:  There is colonic diverticulosis without evidence of acute diverticulitis  APPENDIX:  Noninflamed  ABDOMINOPELVIC CAVITY:  No ascites  No pneumoperitoneum  No lymphadenopathy  VESSELS:  Atherosclerotic changes are present  No evidence of aneurysm  PELVIS REPRODUCTIVE ORGANS:  Prostamegaly with a TURP defect  URINARY BLADDER:  Diffuse bladder distention and circumferential wall thickening indicating outlet obstruction  ABDOMINAL WALL/INGUINAL REGIONS:  Unremarkable  OSSEOUS STRUCTURES:  No acute fracture or destructive osseous lesion  Severe bladder distention and moderate diffuse wall thickening in keeping with outlet obstruction  Moderate bilateral hydroureteronephrosis  The study was marked in [facility] for immediate notification  Workstation performed: [id number]     FL urethrocystogram retrograde    Result Date: 6/14/2021  C-ARM - INDICATION: R33 8: Other retention of urine  Procedure guidance  COMPARISON:  None TECHNIQUE: FLUOROSCOPY TIME:   79 5 seconds 8 FLUOROSCOPIC IMAGES FINDINGS: Fluoroscopic guidance provided for procedure guidance  Osseous and soft tissue detail limited by technique  Fluoroscopic guidance provided for procedure guidance  Please refer to the separate procedure notes for additional details   Workstation performed: [id number]        Current Medications:     Outpatient Medications Prior to Visit   Medication Sig Dispense Refill    atorvastatin (LIPITOR) 80 mg tablet Take 1 tablet (80 mg total) by mouth daily with dinner 90 tablet 3    clopidogrel (PLAVIX) 75 mg tablet TAKE ONE TABLET BY MOUTH EVERY DAY 90 tablet 2    docusate sodium (COLACE) 100 mg capsule Take 1 capsule (100 mg total) by mouth 2 (two) times a day 10 capsule 0    doxazosin (CARDURA) 2 mg tablet Take 4 mg by mouth daily at bedtime       folic acid (FOLVITE) 1 mg tablet TAKE ONE TABLET BY MOUTH EVERY DAY (GENERIC FOR FOLVITE) 90 tablet 2    polyethylene glycol (MIRALAX) 17 g packet Take 17 g by mouth daily 10 each 0    thiamine (VITAMIN B1) 100 mg tablet TAKE ONE TABLET BY MOUTH EVERY DAY 90 tablet 3    amLODIPine (NORVASC) 10 mg tablet Take 1 tablet (10 mg total) by mouth daily (Patient taking differently: Take 5 mg by mouth daily ) 90 tablet 0    metoprolol tartrate (LOPRESSOR) 25 mg tablet Take 1 tablet (25 mg total) by mouth every 12 (twelve) hours (Patient taking differently: Take 25 mg by mouth daily at bedtime ) 60 tablet 1     No facility-administered medications prior to visit         Ayden Doran MD  [address] [facility] [address]

## 2021-06-20 LAB
BACTERIA UR CULT: NORMAL
Lab: NO GROWTH

## 2021-06-21 ENCOUNTER — TELEPHONE (OUTPATIENT)
Dept: FAMILY MEDICINE CLINIC | Facility: CLINIC | Age: 56
End: 2021-06-21

## 2021-06-21 ENCOUNTER — DOCUMENTATION (OUTPATIENT)
Dept: NEPHROLOGY | Facility: CLINIC | Age: 56
End: 2021-06-21

## 2021-06-21 NOTE — TELEPHONE ENCOUNTER
Dr Hernandez :    Patient's wife called asking if you could give her a call?   She would like to discuss his appointment that he had today with urologist

## 2021-06-21 NOTE — TELEPHONE ENCOUNTER
EMAIL BMP order to wife    I spoke a got a hold of the patient's wife  I spoke to her in great detail about her   I requested them to repeat a BMP  Her that the patient will continue to have the Dale in for several weeks until able to be seen in the OR

## 2021-06-21 NOTE — PROGRESS NOTES
Pt wife advised that CR is now at 2 77 (6/17/21); slowly improving  Per Dr Reen Decker, to repeat BMP prior to office followup  7/9/21  Lab order mailed to pt  Pt is scheduled for urologic procedure with Dr Thomas Dejesus on 7/15/21  OK to move up if there is a cancellation per Dr Rene Decker   Pt wife will advise  Dr Nedra Ruiz office accordingly

## 2021-07-02 ENCOUNTER — OFFICE VISIT (OUTPATIENT)
Dept: FAMILY MEDICINE CLINIC | Facility: CLINIC | Age: 56
End: 2021-07-02
Payer: COMMERCIAL

## 2021-07-02 VITALS
DIASTOLIC BLOOD PRESSURE: 72 MMHG | WEIGHT: 202 LBS | HEIGHT: 72 IN | SYSTOLIC BLOOD PRESSURE: 114 MMHG | TEMPERATURE: 97.3 F | OXYGEN SATURATION: 100 % | BODY MASS INDEX: 27.36 KG/M2 | RESPIRATION RATE: 16 BRPM | HEART RATE: 60 BPM

## 2021-07-02 DIAGNOSIS — N17.9 AKI (ACUTE KIDNEY INJURY) (HCC): ICD-10-CM

## 2021-07-02 DIAGNOSIS — I10 BENIGN ESSENTIAL HYPERTENSION: Primary | ICD-10-CM

## 2021-07-02 DIAGNOSIS — N32.0 BLADDER OUTLET OBSTRUCTION: ICD-10-CM

## 2021-07-02 DIAGNOSIS — I65.21 STENOSIS OF RIGHT CAROTID ARTERY: ICD-10-CM

## 2021-07-02 PROCEDURE — 99214 OFFICE O/P EST MOD 30 MIN: CPT | Performed by: FAMILY MEDICINE

## 2021-07-02 PROCEDURE — 1036F TOBACCO NON-USER: CPT | Performed by: FAMILY MEDICINE

## 2021-07-02 PROCEDURE — 3078F DIAST BP <80 MM HG: CPT | Performed by: FAMILY MEDICINE

## 2021-07-02 PROCEDURE — 3074F SYST BP LT 130 MM HG: CPT | Performed by: FAMILY MEDICINE

## 2021-07-02 NOTE — PROGRESS NOTES
Mansoor Ansari 1965 male MRN: [id number]    FAMILY PRACTICE OFFICE VISIT  [facility] - [facility]      ASSESSMENT/PLAN  Mansoor Ansari is a 64 y o  male presents to the office for    Diagnoses and all orders for this visit:    Benign essential hypertension    Stenosis of right carotid artery    Bladder outlet obstruction    ARMANDO (acute kidney injury) ([address] )        went ahead and called lab Corps we got preliminary reading on his blood work  Creatinine continues to be slowly improving  Patient is scheduled to see Nephrology next week  Patient has a history of stenosis of the right carotid  Continue on Plavix and atorvastatin  Hypertension unfortunately given his history of stroke we would like to set his goal between want [phone] systolic  STOP metoprolol  Keep close log for 1 week and drop of to review         Future Appointments   Date Time Provider Benedicto Brown   7/8/2021  9:30 AM [facility]   7/9/2021  3:30 PM Andres Nix MD [facility]   10/12/2021  2:45 PM Obdulio Mendez MD [facility]          SUBJECTIVE  CC: Follow-up (patient here for Bp follow up) and Blood Pressure Check      HPI:  Mansoor Ansari is a 64 y o  male who presents for  A follow-up appointment  Patient states his blood pressure has been running on the lower side  At times when he wakes up he does feel lightheaded and dizzy  Patient states that he has been taking metoprolol along with Cardura 2 mg  Has never establish with a cardiologist was always being managed by vascular and Neurology  Patient has denied any neurological symptoms  Continues to be compliant with his atorvastatin and Plavix  Just recently had blood work on Wednesday  Review of Systems   Constitutional: Negative for activity change, appetite change, chills, fatigue and fever  HENT: Negative for congestion  Respiratory: Negative for cough, chest tightness and shortness of breath  Cardiovascular: Negative for chest pain and leg swelling  Gastrointestinal: Negative for abdominal distention, abdominal pain, constipation, diarrhea, nausea and vomiting  Neurological: Positive for light-headedness  All other systems reviewed and are negative  Historical Information   The patient history was reviewed as follows:  Past Medical History:   Diagnosis Date    Hypertension     Stroke ([facility] [address] ) 02/10/2019         Medications:     Current Outpatient Medications:     atorvastatin (LIPITOR) 80 mg tablet, Take 1 tablet (80 mg total) by mouth daily with dinner, Disp: 90 tablet, Rfl: 3    clopidogrel (PLAVIX) 75 mg tablet, TAKE ONE TABLET BY MOUTH EVERY DAY, Disp: 90 tablet, Rfl: 2    docusate sodium (COLACE) 100 mg capsule, Take 1 capsule (100 mg total) by mouth 2 (two) times a day, Disp: 10 capsule, Rfl: 0    doxazosin (CARDURA) 2 mg tablet, Take 4 mg by mouth daily at bedtime , Disp: , Rfl:     folic acid (FOLVITE) 1 mg tablet, TAKE ONE TABLET BY MOUTH EVERY DAY (GENERIC FOR FOLVITE), Disp: 90 tablet, Rfl: 2    polyethylene glycol (MIRALAX) 17 g packet, Take 17 g by mouth daily, Disp: 10 each, Rfl: 0    thiamine (VITAMIN B1) 100 mg tablet, TAKE ONE TABLET BY MOUTH EVERY DAY, Disp: 90 tablet, Rfl: 3    No Known Allergies    OBJECTIVE  Vitals:   Vitals:    07/02/21 0949   BP: 114/72   BP Location: Left arm   Patient Position: Sitting   Cuff Size: Standard   Pulse: 60   Resp: 16   Temp: (!) 97 3 °F (36 3 °C)   TempSrc: Temporal   SpO2: 100%   Weight: 91 6 kg (202 lb)   Height: 6' (1 829 m)         Physical Exam  Vitals reviewed  Constitutional:       Appearance: Normal appearance  He is well-developed  HENT:      Head: Normocephalic and atraumatic  Eyes:      Conjunctiva/sclera: Conjunctivae normal       Pupils: Pupils are equal, round, and reactive to light  Cardiovascular:      Rate and Rhythm: Normal rate and regular rhythm  Heart sounds: Normal heart sounds     Pulmonary: Effort: Pulmonary effort is normal  No respiratory distress  Breath sounds: Normal breath sounds  Musculoskeletal:         General: Normal range of motion  Cervical back: Normal range of motion and neck supple  Skin:     General: Skin is warm  Capillary Refill: Capillary refill takes less than 2 seconds  Neurological:      General: No focal deficit present  Mental Status: He is alert and oriented to person, place, and time  Cranial Nerves: No cranial nerve deficit  Motor: No weakness  Gait: Gait normal    Psychiatric:         Mood and Affect: Mood normal          Behavior: Behavior normal          Thought Content:  Thought content normal          Judgment: Judgment normal                     Cooper Garcia MD,   [facility]  7/2/2021

## 2021-07-08 NOTE — PRE-PROCEDURE INSTRUCTIONS
My Surgical Experience    The following information was developed to assist you to prepare for your operation  What do I need to do before coming to the hospital?   Arrange for a responsible person to drive you to and from the hospital    Arrange care for your children at home  Children are not allowed in the recovery areas of the hospital   Plan to wear clothing that is easy to put on and take off  If you are having shoulder surgery, wear a shirt that buttons or zippers in the front  Bathing  o Shower the evening before and the morning of your surgery with an antibacterial soap  Please refer to the Pre Op Showering Instructions for Surgery Patients Sheet   o Remove nail polish and all body piercing jewelry  o Do not shave any body part for at least 24 hours before surgery-this includes face, arms, legs and upper body  Food  o Nothing to eat or drink after midnight the night before your surgery  This includes candy and chewing gum  o Exception: If your surgery is after 12:00pm (noon), you may have clear liquids such as 7-Up®, ginger ale, apple or cranberry juice, Jell-O®, water, or clear broth until 8:00 am  o Do not drink milk or juice with pulp on the morning before surgery  o Do not drink alcohol 24 hours before surgery  Medicine  o Follow instructions you received from your surgeon about which medicines you may take on the day of surgery  o If instructed to take medicine on the morning of surgery, take pills with just a small sip of water  Call your prescribing doctor for specific infroamtion on what to do if you take insulin    What should I bring to the hospital?    Bring:  Karrie Maringger or a walker, if you have them, for foot or knee surgery   A list of the daily medicines, vitamins, minerals, herbals and nutritional supplements you take   Include the dosages of medicines and the time you take them each day   Glasses, dentures or hearing aids   Minimal clothing; you will be wearing hospital sleepwear   Photo ID; required to verify your identity   If you have a Living Will or Power of , bring a copy of the documents   If you have an ostomy, bring an extra pouch and any supplies you use    Do not bring   Medicines or inhalers   Money, valuables or jewelry    What other information should I know about the day of surgery?  Notify your surgeons if you develop a cold, sore throat, cough, fever, rash or any other illness   Report to the Ambulatory Surgical/Same Day Surgery Unit   You will be instructed to stop at Registration only if you have not been pre-registered   Inform your  fi they do not stay that they will be asked by the staff to leave a phone number where they can be reached   Be available to be reached before surgery  In the event the operating room schedule changes, you may be asked to come in earlier or later than expected    *It is important to tell your doctor and others involved in your health care if you are taking or have been taking any non-prescription drugs, vitamins, minerals, herbals or other nutritional supplements  Any of these may interact with some food or medicines and cause a reaction      Pre-Surgery Instructions:   Medication Instructions    atorvastatin (LIPITOR) 80 mg tablet Instructed patient per Anesthesia Guidelines   clopidogrel (PLAVIX) 75 mg tablet Instructed patient per Anesthesia Guidelines   doxazosin (CARDURA) 2 mg tablet Instructed patient per Anesthesia Guidelines   folic acid (FOLVITE) 1 mg tablet Instructed patient per Anesthesia Guidelines   thiamine (VITAMIN B1) 100 mg tablet Instructed patient per Anesthesia Guidelines  Pt's wife to check with the surgeon as when to hold plavix pre-op

## 2021-07-09 ENCOUNTER — OFFICE VISIT (OUTPATIENT)
Dept: NEPHROLOGY | Facility: CLINIC | Age: 56
End: 2021-07-09
Payer: COMMERCIAL

## 2021-07-09 ENCOUNTER — TELEPHONE (OUTPATIENT)
Dept: NEUROLOGY | Facility: CLINIC | Age: 56
End: 2021-07-09

## 2021-07-09 VITALS
HEIGHT: 72 IN | BODY MASS INDEX: 27.77 KG/M2 | DIASTOLIC BLOOD PRESSURE: 84 MMHG | WEIGHT: 205 LBS | SYSTOLIC BLOOD PRESSURE: 146 MMHG

## 2021-07-09 DIAGNOSIS — N17.9 AKI (ACUTE KIDNEY INJURY) (HCC): Primary | ICD-10-CM

## 2021-07-09 PROCEDURE — U0005 INFEC AGEN DETEC AMPLI PROBE: HCPCS | Performed by: SPECIALIST

## 2021-07-09 PROCEDURE — 99214 OFFICE O/P EST MOD 30 MIN: CPT | Performed by: INTERNAL MEDICINE

## 2021-07-09 PROCEDURE — U0003 INFECTIOUS AGENT DETECTION BY NUCLEIC ACID (DNA OR RNA); SEVERE ACUTE RESPIRATORY SYNDROME CORONAVIRUS 2 (SARS-COV-2) (CORONAVIRUS DISEASE [COVID-19]), AMPLIFIED PROBE TECHNIQUE, MAKING USE OF HIGH THROUGHPUT TECHNOLOGIES AS DESCRIBED BY CMS-2020-01-R: HCPCS | Performed by: SPECIALIST

## 2021-07-09 PROCEDURE — 1036F TOBACCO NON-USER: CPT | Performed by: INTERNAL MEDICINE

## 2021-07-09 PROCEDURE — 3008F BODY MASS INDEX DOCD: CPT | Performed by: INTERNAL MEDICINE

## 2021-07-09 NOTE — TELEPHONE ENCOUNTER
Patient's wife stated that the patient is scheduled for a CYSTOSCOPY, TRANSURETHRAL RESECTION OF PROSTATE (TURP) next week on 7/15 and was advised to have the patient hold his plavix for one week, starting today  Wife spoke to PCP since they're prescribing the plavix and PCP is deferring to neuro  Wife is questioning if holding the plavix for one week is okay, and questioning when to resume it? Please advise  Okay to leave vm

## 2021-07-09 NOTE — PROGRESS NOTES
NEPHROLOGY OFFICE VISIT   Michaela Stuart 64 y o  male MRN: [id number]  7/9/2021    Reason for Visit:  Acute kidney injury    History of Present Illness (HPI):    I had the pleasure of seeing Magalie Carter today in the renal clinic for the continued management of hospital follow-up for acute kidney injury  Since our last visit, there has been no ER visits or hospitilizations  He currently has no complaints at this time and is feeling well  Patient denies any chest pain, shortness of breath and swelling  The last blood work was done on July 6, which we have reviewed together  Patient was recently discharged at [facility] where I had seen him during that hospitalization where he had acute kidney injury secondary to obstructive uropathy  There was a difficult time placing a Dale catheter and had to be placed in the OR  His renal function improved and plateaued  He is planned for cystoscopy with transurethral resection of prostate next week at [facility]   His creatinine continues to slowly trend down  I discussed with him that he will probably be at a higher baseline creatinine due to this chronic obstruction  His Toprol was recently discontinued due to labile blood pressure and symptomatic      ASSESSMENT and PLAN:    Acute kidney injury  --severe Stage III  --baseline creatinine 1-1 2 mg/dL  --continue to hold losartan  --renal ultrasound from May 17th:  Distended bladder with diffuse bladder wall thickening with small bladder diverticula   Marked postvoid residual urine volume of approximately 600 mL, persistent and severe hydronephrosis bilaterally  --CT scan again shows severe bladder distention and moderate diffuse wall thickening as well as moderate bilateral hydroureteronephrosis  --urology on board  --avoid NSAIDs  --improvement of the uremic symptoms  --excellent urine output   --Dale catheter in place  --plan for cystoscopy with transurethral resection of the prostate on July 15th  --renal function continues to slowly improve, creatinine down to 2 4 mg/dL     Hypertension  --blood pressure currently acceptable  --off losartan and metoprolol    Abnormal renal function test  --may have some underlying degree of mild renal dysfunction likely stage I versus stage II secondary to hypertension and possible chronic symptoms of urinary retention along with some age-related nephron loss  --baseline creatinine 1-1 2 mg/dL     Hyperkalemia  --resolved     Urinary retention  --Dale catheter in place, management as per Urology, plan for procedure next week     Low bicarbonate level       PATIENT INSTRUCTIONS:    Patient Instructions   1 )  Low 2 g sodium diet    2 )  Monitor weights at home    3 )  Avoid NSAIDs (ibuprofen, Motrin, Advil, Aleve, naproxen)    4 )  Monitor blood pressure at home, call if blood pressure greater than 150/90 persistently    5 ) I will plan to discuss all results including blood work, and/or imaging at our next visit, unless there is an urgent indication, in which case I will call you earlier  If you have any questions or concerns about your results, please feel free to call our office  6 ) Lab work the week following your procedure and 3 months           Orders Placed This Encounter   Procedures    Basic metabolic panel     This is a patient instruction: Patient fasting for 8 hours or longer recommended  Standing Status:   Future     Number of Occurrences:   1     Standing Expiration Date:   7/9/2022    Basic metabolic panel     This is a patient instruction: Patient fasting for 8 hours or longer recommended  Standing Status:   Future     Number of Occurrences:   1     Standing Expiration Date:   7/9/2022       OBJECTIVE:  Current Weight: Weight - Scale: 93 kg (205 lb)  Vitals:    07/09/21 1519   Weight: 93 kg (205 lb)   Height: 6' (1 829 m)    Body mass index is 27 8 kg/m²        REVIEW OF SYSTEMS:    Review of Systems   Constitutional: Negative for activity change and fever  Respiratory: Negative for cough, chest tightness, shortness of breath and wheezing  Cardiovascular: Negative for chest pain and leg swelling  Gastrointestinal: Negative for abdominal pain, diarrhea, nausea and vomiting  Endocrine: Negative for polyuria  Genitourinary: Negative for difficulty urinating, dysuria, flank pain, frequency and urgency  Skin: Negative for rash  Neurological: Negative for dizziness, syncope, light-headedness and headaches  PHYSICAL EXAM:      Physical Exam  Vitals and nursing note reviewed  Exam conducted with a chaperone present  Constitutional:       General: He is not in acute distress  Appearance: He is well-developed  HENT:      Head: Normocephalic and atraumatic  Eyes:      General: No scleral icterus  Conjunctiva/sclera: Conjunctivae normal       Pupils: Pupils are equal, round, and reactive to light  Cardiovascular:      Rate and Rhythm: Normal rate and regular rhythm  Heart sounds: S1 normal and S2 normal  No murmur heard  No friction rub  No gallop  Pulmonary:      Effort: Pulmonary effort is normal  No respiratory distress  Breath sounds: Normal breath sounds  No wheezing or rales  Abdominal:      General: Bowel sounds are normal       Palpations: Abdomen is soft  Tenderness: There is no abdominal tenderness  There is no rebound  Musculoskeletal:         General: Normal range of motion  Cervical back: Normal range of motion and neck supple  Skin:     Findings: No rash  Neurological:      Mental Status: He is alert and oriented to person, place, and time     Psychiatric:         Behavior: Behavior normal          Medications:    Current Outpatient Medications:     atorvastatin (LIPITOR) 80 mg tablet, Take 1 tablet (80 mg total) by mouth daily with dinner, Disp: 90 tablet, Rfl: 3    clopidogrel (PLAVIX) 75 mg tablet, TAKE ONE TABLET BY MOUTH EVERY DAY (Patient taking differently: Pt's wife to check with surgeon when to hold pre-op), Disp: 90 tablet, Rfl: 2    doxazosin (CARDURA) 2 mg tablet, Take 4 mg by mouth daily at bedtime , Disp: , Rfl:     folic acid (FOLVITE) 1 mg tablet, TAKE ONE TABLET BY MOUTH EVERY DAY (GENERIC FOR Anise Sprinkles), Disp: 90 tablet, Rfl: 2    thiamine (VITAMIN B1) 100 mg tablet, TAKE ONE TABLET BY MOUTH EVERY DAY, Disp: 90 tablet, Rfl: 3    docusate sodium (COLACE) 100 mg capsule, Take 1 capsule (100 mg total) by mouth 2 (two) times a day (Patient not taking: Reported on 7/9/2021), Disp: 10 capsule, Rfl: 0    polyethylene glycol (MIRALAX) 17 g packet, Take 17 g by mouth daily (Patient not taking: Reported on 7/9/2021), Disp: 10 each, Rfl: 0    Laboratory Results:  Results from last 7 days   Lab Units 07/06/21  0736   POTASSIUM mmol/L 4 5   CHLORIDE mmol/L 110*   CO2 mmol/L 19*   BUN mg/dL 23   CREATININE mg/dL 2 40*       Results for orders placed or performed in visit on 37/23/22   Basic metabolic panel   Result Value Ref Range    Glucose, Random 90 65 - 99 mg/dL    BUN 30 (H) 6 - 24 mg/dL    Creatinine 2 60 (H) 0 76 - 1 27 mg/dL    eGFR Non  26 (L) >59 mL/min/1 73    eGFR  31 (L) >59 mL/min/1 73    SL AMB BUN/CREATININE RATIO 12 9 - 20    Sodium 138 134 - 144 mmol/L    Potassium 4 6 3 5 - 5 2 mmol/L    Chloride 106 96 - 106 mmol/L    CO2 19 (L) 20 - 29 mmol/L    CALCIUM 9 3 8 7 - 10 2 mg/dL   [employer] Kidney Stone Panel   Result Value Ref Range    Interpretation Note

## 2021-07-09 NOTE — PATIENT INSTRUCTIONS
1  )  Low 2 g sodium diet    2 )  Monitor weights at home    3 )  Avoid NSAIDs (ibuprofen, Motrin, Advil, Aleve, naproxen)    4 )  Monitor blood pressure at home, call if blood pressure greater than 150/90 persistently    5 ) I will plan to discuss all results including blood work, and/or imaging at our next visit, unless there is an urgent indication, in which case I will call you earlier  If you have any questions or concerns about your results, please feel free to call our office      6 ) Lab work the week following your procedure and 3 months

## 2021-07-09 NOTE — TELEPHONE ENCOUNTER
He is at high risk for stroke/tia without aspirin or plavix for 7 days because he has carotid occlusion and M1 occlusion  We don't have alternative  If he needs to have procedure done, will have to take that risk with least days off of blood thinners   If 3-5 days instead of 7 days ok per surgeon, would be ideal

## 2021-07-14 ENCOUNTER — ANESTHESIA EVENT (OUTPATIENT)
Dept: PERIOP | Facility: HOSPITAL | Age: 56
End: 2021-07-14
Payer: COMMERCIAL

## 2021-07-14 NOTE — H&P
H&P Exam - Urology       Patient: Rebecca Garcia   : 1965 Sex: male   MRN: [id number]     CSN: [id number]      History of Present Illness   HPI:  Rebecca Garcia is a 64 y o  male who presents with urinary retention renal failure not candidate for alpha-blocker now to undergo cysto TURP aware risk of infection bleeding anesthesia recurrent retention as well as postoperative stress incontinence ED and retrograde ejaculation        Review of Systems:   Constitutional:  Negative for activity change, fever, chills and diaphoresis  HENT: Negative for hearing loss and trouble swallowing  Eyes: Negative for itching and visual disturbance  Respiratory: Negative for chest tightness and shortness of breath  Cardiovascular: Negative for chest pain, edema  Gastrointestinal: Negative for abdominal distention, na abdominal pain, constipation, diarrhea, Nausea and vomiting  Genitourinary: Negative for decreased urine volume, difficulty urinating, dysuria, enuresis, frequency, hematuria and urgency  Musculoskeletal: Negative for gait problem and myalgias  Neurological: Negative for dizziness and headaches  Hematological: Does not bruise/bleed easily         Historical Information   Past Medical History:   Diagnosis Date    Chronic kidney disease     Hyperlipidemia     Hypertension     Stroke ([facility] [address] ) 02/10/2019    right side sl weakness     Past Surgical History:   Procedure Laterality Date    FL RETROGRADE URETHROCYSTOGRAM  6/10/2021    NO PAST SURGERIES      ID CYSTOURETHROSCOPY,URETER CATHETER N/A 6/10/2021    Procedure: CYSTOSCOPY WITH RETROGRADE PYELOGRAM/internal urethrotomy, DVIU, URETHROGRAM, CYSTOGRAM ;  Surgeon: Chacorta Lawton MD;  Location: [facility];  Service: Urology     Social History   Social History     Substance and Sexual Activity   Alcohol Use Yes    Alcohol/week: 6 0 standard drinks    Types: 6 Cans of beer per week     Social History     Substance and Sexual Activity   Drug Use Never     Social History     Tobacco Use   Smoking Status Former Smoker    Packs/day: 20 00    Types: Cigarettes    Quit date: 2012    Years since quittin 0   Smokeless Tobacco Never Used     Family History:   Family History   Problem Relation Age of Onset    Diabetes type II Mother     Hypertension Father     Diabetes type II Father     Diabetes type II Sister     Hypertension Brother        Meds/Allergies   No medications prior to admission  No Known Allergies    Objective   Vitals: There were no vitals taken for this visit  Physical Exam:  General Alert awake   Normocephalic atraumatic PERRLA  Lungs clear bilaterally  Cardiac normal S1 normal S2  Abdomen soft, flank pain  Rectal smooth prostate  Extremities no edema    No intake/output data recorded      Invasive Devices     Drain            Urethral Catheter Latex 20 Fr  34 days                    Lab Results: CBC:   Lab Results   Component Value Date    WBC 6 35 2021    HGB 9 7 (L) 2021    HCT 29 8 (L) 2021    MCV 87 2021     2021    MCH 28 2 2021    MCHC 32 6 2021    RDW 12 5 2021    MPV 9 0 2021    NRBC 0 2021     CMP:   Lab Results   Component Value Date     (H) 2021    CO2 19 (L) 2021    BUN 23 2021    CREATININE 2 40 (H) 2021    CREATININE 2 77 2021    CREATININE 3 02 (H) 2021    CALCIUM 9 7 2021    CALCIUM 8 7 2021    AST 18 2021    AST 16 2021    ALT 26 2021    ALT 17 2021    ALKPHOS 105 2021    EGFR 25 2021    EGFR 22 2021     Urinalysis:   Lab Results   Component Value Date    COLORU YELLOW 2021    COLORU Light Yellow 2021    CLARITYU CLEAR 2021    CLARITYU Clear 2021    SPECGRAV 1 010 2021    PHUR 6 0 2021    PHUR 6 0 2021    LEUKOCYTESUR 500 2021    LEUKOCYTESUR Trace (A) 2021    NITRITE NEG 2021    NITRITE Negative 06/09/2021    GLUCOSEU NORM 06/18/2021    GLUCOSEU Negative 06/09/2021    KETONESU NEG 06/18/2021    KETONESU Negative 06/09/2021    BILIRUBINUR NEG 06/18/2021    BILIRUBINUR Negative 06/09/2021    BLOODU 250 06/18/2021    BLOODU Negative 06/09/2021     Urine Culture: No results found for: URINECX  PSA:   Lab Results   Component Value Date    PSA 0 8 04/17/2021           Assessment/ Plan:  Cysto TURP    Prakash Abebe MD

## 2021-07-15 ENCOUNTER — ANESTHESIA (OUTPATIENT)
Dept: PERIOP | Facility: HOSPITAL | Age: 56
End: 2021-07-15
Payer: COMMERCIAL

## 2021-07-15 ENCOUNTER — HOSPITAL ENCOUNTER (OUTPATIENT)
Facility: HOSPITAL | Age: 56
Setting detail: OUTPATIENT SURGERY
Discharge: HOME | End: 2021-07-15
Attending: SPECIALIST | Admitting: SPECIALIST
Payer: COMMERCIAL

## 2021-07-15 VITALS
WEIGHT: 204.56 LBS | TEMPERATURE: 97.8 F | HEART RATE: 64 BPM | SYSTOLIC BLOOD PRESSURE: 183 MMHG | BODY MASS INDEX: 27.74 KG/M2 | OXYGEN SATURATION: 98 % | RESPIRATION RATE: 20 BRPM | DIASTOLIC BLOOD PRESSURE: 101 MMHG

## 2021-07-15 DIAGNOSIS — N40.2 NODULAR PROSTATE WITHOUT LOWER URINARY TRACT SYMPTOMS: ICD-10-CM

## 2021-07-15 DIAGNOSIS — Z20.822 COVID-19 RULED OUT BY LABORATORY TESTING: Primary | ICD-10-CM

## 2021-07-15 PROCEDURE — 88305 TISSUE EXAM BY PATHOLOGIST: CPT | Performed by: PATHOLOGY

## 2021-07-15 RX ORDER — FENTANYL CITRATE/PF 50 MCG/ML
25 SYRINGE (ML) INJECTION
Status: DISCONTINUED | OUTPATIENT
Start: 2021-07-15 | End: 2021-07-15 | Stop reason: HOSPADM

## 2021-07-15 RX ORDER — MIDAZOLAM HYDROCHLORIDE 2 MG/2ML
INJECTION, SOLUTION INTRAMUSCULAR; INTRAVENOUS AS NEEDED
Status: DISCONTINUED | OUTPATIENT
Start: 2021-07-15 | End: 2021-07-15

## 2021-07-15 RX ORDER — CEFAZOLIN SODIUM 1 G/3ML
INJECTION, POWDER, FOR SOLUTION INTRAMUSCULAR; INTRAVENOUS AS NEEDED
Status: DISCONTINUED | OUTPATIENT
Start: 2021-07-15 | End: 2021-07-15

## 2021-07-15 RX ORDER — SODIUM CHLORIDE, SODIUM LACTATE, POTASSIUM CHLORIDE, CALCIUM CHLORIDE 600; 310; 30; 20 MG/100ML; MG/100ML; MG/100ML; MG/100ML
125 INJECTION, SOLUTION INTRAVENOUS CONTINUOUS
Status: DISCONTINUED | OUTPATIENT
Start: 2021-07-15 | End: 2021-07-15

## 2021-07-15 RX ORDER — MAGNESIUM HYDROXIDE 1200 MG/15ML
LIQUID ORAL AS NEEDED
Status: DISCONTINUED | OUTPATIENT
Start: 2021-07-15 | End: 2021-07-15 | Stop reason: HOSPADM

## 2021-07-15 RX ORDER — FENTANYL CITRATE 50 UG/ML
INJECTION, SOLUTION INTRAMUSCULAR; INTRAVENOUS AS NEEDED
Status: DISCONTINUED | OUTPATIENT
Start: 2021-07-15 | End: 2021-07-15

## 2021-07-15 RX ORDER — LIDOCAINE HYDROCHLORIDE 10 MG/ML
INJECTION, SOLUTION EPIDURAL; INFILTRATION; INTRACAUDAL; PERINEURAL AS NEEDED
Status: DISCONTINUED | OUTPATIENT
Start: 2021-07-15 | End: 2021-07-15

## 2021-07-15 RX ORDER — ONDANSETRON 2 MG/ML
INJECTION INTRAMUSCULAR; INTRAVENOUS AS NEEDED
Status: DISCONTINUED | OUTPATIENT
Start: 2021-07-15 | End: 2021-07-15

## 2021-07-15 RX ORDER — HYDROMORPHONE HCL/PF 1 MG/ML
0.2 SYRINGE (ML) INJECTION
Status: DISCONTINUED | OUTPATIENT
Start: 2021-07-15 | End: 2021-07-15 | Stop reason: HOSPADM

## 2021-07-15 RX ORDER — DEXAMETHASONE SODIUM PHOSPHATE 10 MG/ML
INJECTION, SOLUTION INTRAMUSCULAR; INTRAVENOUS AS NEEDED
Status: DISCONTINUED | OUTPATIENT
Start: 2021-07-15 | End: 2021-07-15

## 2021-07-15 RX ORDER — PROPOFOL 10 MG/ML
INJECTION, EMULSION INTRAVENOUS AS NEEDED
Status: DISCONTINUED | OUTPATIENT
Start: 2021-07-15 | End: 2021-07-15

## 2021-07-15 RX ORDER — SODIUM CHLORIDE, SODIUM LACTATE, POTASSIUM CHLORIDE, CALCIUM CHLORIDE 600; 310; 30; 20 MG/100ML; MG/100ML; MG/100ML; MG/100ML
20 INJECTION, SOLUTION INTRAVENOUS CONTINUOUS
Status: DISCONTINUED | OUTPATIENT
Start: 2021-07-15 | End: 2021-07-15 | Stop reason: HOSPADM

## 2021-07-15 RX ORDER — CEFAZOLIN SODIUM 2 G/50ML
2000 SOLUTION INTRAVENOUS ONCE
Status: DISCONTINUED | OUTPATIENT
Start: 2021-07-15 | End: 2021-07-15 | Stop reason: HOSPADM

## 2021-07-15 RX ORDER — ONDANSETRON 2 MG/ML
4 INJECTION INTRAMUSCULAR; INTRAVENOUS ONCE AS NEEDED
Status: DISCONTINUED | OUTPATIENT
Start: 2021-07-15 | End: 2021-07-15 | Stop reason: HOSPADM

## 2021-07-15 RX ORDER — GLYCINE 1.5 G/100ML
SOLUTION IRRIGATION AS NEEDED
Status: DISCONTINUED | OUTPATIENT
Start: 2021-07-15 | End: 2021-07-15 | Stop reason: HOSPADM

## 2021-07-15 RX ADMIN — FENTANYL CITRATE 25 MCG: 50 INJECTION, SOLUTION INTRAMUSCULAR; INTRAVENOUS at 10:18

## 2021-07-15 RX ADMIN — CEFAZOLIN 2000 MG: 1 INJECTION, POWDER, FOR SOLUTION INTRAVENOUS at 10:04

## 2021-07-15 RX ADMIN — PROPOFOL 30 MG: 10 INJECTION, EMULSION INTRAVENOUS at 10:20

## 2021-07-15 RX ADMIN — PROPOFOL 180 MG: 10 INJECTION, EMULSION INTRAVENOUS at 10:01

## 2021-07-15 RX ADMIN — FENTANYL CITRATE 25 MCG: 50 INJECTION, SOLUTION INTRAMUSCULAR; INTRAVENOUS at 10:20

## 2021-07-15 RX ADMIN — FENTANYL CITRATE 25 MCG: 50 INJECTION, SOLUTION INTRAMUSCULAR; INTRAVENOUS at 10:06

## 2021-07-15 RX ADMIN — LIDOCAINE HYDROCHLORIDE 50 MG: 10 INJECTION, SOLUTION EPIDURAL; INFILTRATION; INTRACAUDAL; PERINEURAL at 10:01

## 2021-07-15 RX ADMIN — ONDANSETRON 4 MG: 2 INJECTION INTRAMUSCULAR; INTRAVENOUS at 10:10

## 2021-07-15 RX ADMIN — FENTANYL CITRATE 25 MCG: 50 INJECTION, SOLUTION INTRAMUSCULAR; INTRAVENOUS at 10:15

## 2021-07-15 RX ADMIN — MIDAZOLAM 2 MG: 1 INJECTION INTRAMUSCULAR; INTRAVENOUS at 09:54

## 2021-07-15 RX ADMIN — SODIUM CHLORIDE, SODIUM LACTATE, POTASSIUM CHLORIDE, AND CALCIUM CHLORIDE 125 ML/HR: .6; .31; .03; .02 INJECTION, SOLUTION INTRAVENOUS at 08:58

## 2021-07-15 RX ADMIN — DEXAMETHASONE SODIUM PHOSPHATE 4 MG: 10 INJECTION, SOLUTION INTRAMUSCULAR; INTRAVENOUS at 10:10

## 2021-07-15 NOTE — PERIOPERATIVE NURSING NOTE
Received patient from PACU via stretcher awake and alert  Denies c/o pain or discomfort  [address] Dale catheter intact and draining pink tinged urine with no clots noted  Taking oral fluids well  IV infusing well

## 2021-07-15 NOTE — TELEPHONE ENCOUNTER
He needs to restart plavix as soon as he can per urology  If there is excessive bleeding can hold it but my rec will be to restart asap

## 2021-07-15 NOTE — TELEPHONE ENCOUNTER
Pt's wife called in to state that pt had his TURP procedure today  He has been off the Plavix since last Thursday (7/8)  The urologist ideally wanted pt to resume Plavix in another week but is agreeable to pt restarting on Monday (pt will be seeing Urology on Monday for an evaluation/follow up) as long as pt does not have any signs of bleeding  Pt's wife is worried about bleeding as well as increase risk of stroke  Please advise your thoughts on pt restarting plavix this coming Monday       Chris Martinez [phone]

## 2021-07-15 NOTE — OP NOTE
OPERATIVE REPORT  PATIENT NAME: Merary De La Torre    :  1965  MRN: [id number]  Pt Location: [facility] OR ROOM 04    SURGERY DATE: 7/15/2021    Surgeon(s) and Role:     * Adrianna Munoz MD - Primary    Preop Diagnosis:  Nodular prostate without lower urinary tract symptoms [N40 2]  Urinary tension  Urethral stricture disease    Post-Op Diagnosis Codes:     * Nodular prostate without lower urinary tract symptoms [N40 2]    Procedure(s) (LRB):  CYSTOSCOPY, TRANSURETHRAL RESECTION OF PROSTATE (TURP) (N/A)    Specimen(s):  ID Type Source Tests Collected by Time Destination   1 : prostate chips Tissue Prostate TISSUE EXAM Adrianna Munoz MD 7/15/2021 1024        Estimated Blood Loss:   Minimal    Drains:  Urethral Catheter Three way;Latex 22 Fr  (Active)   Number of days: 0       Anesthesia Type:   General    Operative Indications:  Nodular prostate without lower urinary tract symptoms [N40 2]  This 51-year-old male was met on consultation at [address] [place] [address] a few weeks ago in urinary tension bilateral hydronephrosis and difficult catheterization unable to place a Dale  Patient was found to have on emergency cystoscopy a false passage and bulbar urethral stricture undergoing cysto DVIU and also found to have bilobar obstructing prostate  Patient has longstanding obstructive symptoms at this time patient to undergo cysto TURP aware risk of anesthesia infection bleeding postop ED retrograde ejaculation stress incontinence    Operative Findings:  Bilobar 2 0 cm tight bladder neck TURP performed      Complications:   None    Procedure and Technique:  After the patient identified in the holding area with indwelling catheter consented signed in the office he was placed in the op suite  After anesthesia was induced he was placed thigh position draped prep standard fashion  Time-out performed  A 22 Italian cystoscope passed through through the bladder    Remaining remnants of his false passage and urethral stricture were noted the scope was easily passed through into the posterior confirming a by lateral tight bladder neck obstructing prostatic urethra trigone was away from the bladder neck scope removed 24 Tajik resectoscope placed resection of the prostate was started with the right lobe between 1 and 5:00 a m  Followed by left lobe between 11 7:00 a m  Followed by the roof between 11 and 1:00 a m  Finally the floor between 5 and 7:00 a m  From bladder neck to verumontanum  On view for the verumontanum prostatic urethra completely open prostate chips removed with the Tish  Scope removed 22 Tajik 3 Dale catheter inserted with 40 cc in the balloon left to continuous bladder irrigation at time of dictation CBI is clear    Postop procedure was awakened taken recovery room stable condition left Dale removed few days   I was present for the entire procedure    Patient Disposition:  PACU     SIGNATURE: Iris Hoffman MD  DATE: July 15, 2021  TIME: 11:04 AM

## 2021-07-15 NOTE — ANESTHESIA PREPROCEDURE EVALUATION
Procedure:  CYSTOSCOPY, TRANSURETHRAL RESECTION OF PROSTATE (TURP) (N/A Abdomen)    Relevant Problems   CARDIO   (+) Benign essential hypertension   (+) Hypercholesterolemia   (+) Hypertension      /RENAL   (+) ARMANDO (acute kidney injury) (HCC)   (+) Benign prostatic hyperplasia with lower urinary tract symptoms      HEMATOLOGY   (+) Anemia      NEURO/PSYCH   (+) Cerebrovascular accident (CVA) due to occlusion of left middle cerebral artery (HCC)        Physical Exam    Airway    Mallampati score: II  TM Distance: >3 FB  Neck ROM: full     Dental   No notable dental hx     Cardiovascular  Rhythm: regular, Rate: normal,     Pulmonary  Breath sounds clear to auscultation, Decreased breath sounds,     Other Findings        Anesthesia Plan  ASA Score- 3     Anesthesia Type- general with ASA Monitors  Additional Monitors:   Airway Plan: LMA  Plan Factors-Exercise tolerance (METS): >4 METS  Chart reviewed  EKG reviewed  Existing labs reviewed  Patient summary reviewed  Patient is not a current smoker  Induction- intravenous  Postoperative Plan- Plan for postoperative opioid use  Informed Consent- Anesthetic plan and risks discussed with patient and spouse  I personally reviewed this patient with the CRNA  Discussed and agreed on the Anesthesia Plan with the CRNA  Luci Barcenas

## 2021-07-15 NOTE — DISCHARGE INSTRUCTIONS
#1 no heavy straining or lifting above 10 pounds for 2 weeks    #2 call office fevers, chills, or worsening blood in the urine  #3 Patient to be discharged home with Dale  No straining lifting no aspirin no Plavix 1 week can start 7/26  Office follow-up Monday July 19th [address] Friday  Sandrita WEBER  [address] [place] [address] office  [address] [address]  [phone]  8:30 AM to 4:30 PM  Monday through Friday    [place] office  [phone]  [place] [address]  [place], [address] [place] [address]  [phone]  1:00 to 5:00 PM  Wednesday

## 2021-07-15 NOTE — PROGRESS NOTES
Progress Note - Urology      Patient: Aric Martinez   : 1965 Sex: male   MRN: [id number]     CSN: [id number]  Unit/Bed#: OR POOL     SUBJECTIVE:   Patient in surgical preop unit  To undergo cysto TURP  Were risk of anesthesia infection bleeding additional urologic procedures      Objective   Vitals: There were no vitals taken for this visit  No intake/output data recorded        Physical Exam:   General Alert awake   Normocephalic atraumatic PERRLA  Lungs clear bilaterally  Cardiac normal S1 normal S2  Abdomen soft, flank pain  Dale  Extremities no edema      Lab Results: CBC:   Lab Results   Component Value Date    WBC 6 35 2021    HGB 9 7 (L) 2021    HCT 29 8 (L) 2021    MCV 87 2021     2021    MCH 28 2 2021    MCHC 32 6 2021    RDW 12 5 2021    MPV 9 0 2021    NRBC 0 2021     CMP:   Lab Results   Component Value Date     (H) 2021    CO2 19 (L) 2021    BUN 23 2021    CREATININE 2 40 (H) 2021    CREATININE 2 77 2021    CREATININE 3 02 (H) 2021    CALCIUM 9 7 2021    CALCIUM 8 7 2021    AST 18 2021    AST 16 2021    ALT 26 2021    ALT 17 2021    ALKPHOS 105 2021    EGFR 25 2021    EGFR 22 2021     Urinalysis:   Lab Results   Component Value Date    COLORU YELLOW 2021    COLORU Light Yellow 2021    CLARITYU CLEAR 2021    CLARITYU Clear 2021    SPECGRAV 1 010 2021    PHUR 6 0 2021    PHUR 6 0 2021    LEUKOCYTESUR 500 2021    LEUKOCYTESUR Trace (A) 2021    NITRITE NEG 2021    NITRITE Negative 2021    GLUCOSEU NORM 2021    GLUCOSEU Negative 2021    KETONESU NEG 2021    KETONESU Negative 2021    BILIRUBINUR NEG 2021    BILIRUBINUR Negative 2021    BLOODU 250 2021    BLOODU Negative 2021     Urine Culture: No results found for: URINECX  PSA:   Lab Results   Component Value Date    PSA 0 8 04/17/2021         Assessment/ Plan:  Cysto TUR pain          Michael Griffin MD

## 2021-07-15 NOTE — ANESTHESIA POSTPROCEDURE EVALUATION
Post-Op Assessment Note    CV Status:  Stable  Pain Score: 0    Pain management: adequate     Mental Status:  Alert and awake   Hydration Status:  Euvolemic   PONV Controlled:  Controlled   Airway Patency:  Patent      Post Op Vitals Reviewed: Yes      Staff: CRNA         No complications documented      BP  140/68   Temp  97 9   Pulse  70   Resp   18   SpO2   99

## 2021-07-22 LAB
BUN SERPL-MCNC: 23 MG/DL (ref 6–24)
BUN/CREAT SERPL: 10 (ref 9–20)
CALCIUM SERPL-MCNC: 9.4 MG/DL (ref 8.7–10.2)
CHLORIDE SERPL-SCNC: 102 MMOL/L (ref 96–106)
CO2 SERPL-SCNC: 20 MMOL/L (ref 20–29)
CREAT SERPL-MCNC: 2.31 MG/DL (ref 0.76–1.27)
GLUCOSE SERPL-MCNC: 98 MG/DL (ref 65–99)
POTASSIUM SERPL-SCNC: 4.8 MMOL/L (ref 3.5–5.2)
SL AMB EGFR AFRICAN AMERICAN: 35 ML/MIN/1.73
SL AMB EGFR NON AFRICAN AMERICAN: 30 ML/MIN/1.73
SODIUM SERPL-SCNC: 136 MMOL/L (ref 134–144)

## 2021-08-26 ENCOUNTER — OFFICE VISIT (OUTPATIENT)
Dept: FAMILY MEDICINE CLINIC | Facility: CLINIC | Age: 56
End: 2021-08-26
Payer: COMMERCIAL

## 2021-08-26 VITALS
RESPIRATION RATE: 20 BRPM | HEART RATE: 70 BPM | WEIGHT: 204.4 LBS | HEIGHT: 72 IN | DIASTOLIC BLOOD PRESSURE: 80 MMHG | OXYGEN SATURATION: 98 % | BODY MASS INDEX: 27.68 KG/M2 | SYSTOLIC BLOOD PRESSURE: 132 MMHG | TEMPERATURE: 97.7 F

## 2021-08-26 DIAGNOSIS — R79.89 ABNORMAL SERUM CREATININE LEVEL: ICD-10-CM

## 2021-08-26 DIAGNOSIS — R53.82 CHRONIC FATIGUE: ICD-10-CM

## 2021-08-26 DIAGNOSIS — N32.0 BLADDER OUTLET OBSTRUCTION: ICD-10-CM

## 2021-08-26 DIAGNOSIS — F10.10 ALCOHOL ABUSE: ICD-10-CM

## 2021-08-26 DIAGNOSIS — I65.22 LEFT CAROTID ARTERY OCCLUSION: ICD-10-CM

## 2021-08-26 DIAGNOSIS — I10 ESSENTIAL HYPERTENSION: Primary | ICD-10-CM

## 2021-08-26 PROCEDURE — 3075F SYST BP GE 130 - 139MM HG: CPT | Performed by: FAMILY MEDICINE

## 2021-08-26 PROCEDURE — 3079F DIAST BP 80-89 MM HG: CPT | Performed by: FAMILY MEDICINE

## 2021-08-26 PROCEDURE — 3008F BODY MASS INDEX DOCD: CPT | Performed by: FAMILY MEDICINE

## 2021-08-26 PROCEDURE — 1036F TOBACCO NON-USER: CPT | Performed by: FAMILY MEDICINE

## 2021-08-26 PROCEDURE — 99214 OFFICE O/P EST MOD 30 MIN: CPT | Performed by: FAMILY MEDICINE

## 2021-08-26 RX ORDER — FOLIC ACID 1 MG/1
TABLET ORAL
Qty: 90 TABLET | Refills: 2 | Status: SHIPPED | OUTPATIENT
Start: 2021-08-26 | End: 2022-05-23

## 2021-08-26 NOTE — PROGRESS NOTES
Jorge L Mayes 1965 male MRN: [id number]    FAMILY PRACTICE OFFICE VISIT  [facility] - [address]      ASSESSMENT/PLAN  Jorge L Mayes is a 64 y o  male presents to the office for    Diagnoses and all orders for this visit:    Essential hypertension  -     Basic metabolic panel; Future  -     Basic metabolic panel    Chronic fatigue  -     CBC and differential; Future  -     Iron, TIBC and Ferritin Panel; Future  -     CBC and differential    Left carotid artery occlusion    Abnormal serum creatinine level  -     CBC and differential; Future  -     Iron, TIBC and Ferritin Panel; Future  -     Basic metabolic panel; Future  -     CBC and differential  -     Basic metabolic panel    Bladder outlet obstruction    - repeat blood work for the patient  Given his  fatigue will rule out any iron deficiency given his history of chronic kidney disease     - hypertension currently doing well  If the patient's blood pressure seems to be on the lower side of normal would recommend that we start him on 12 5 mg rather than 25 mg in the a m  / 25 mg in the p m  especially given his history of left carotid artery stenosis we do not want any low blood pressures  Repeat Dopplers in October    if blood pressures/ blood work is all within normal limits likely patient would benefit from returning to work  -   Bladder outlet syndrome recommend following up with urology and nephrology as scheduled         Future Appointments   Date Time Provider Benedicto Stephanie   10/12/2021  2:45 PM Kendy Sales MD [facility]          SUBJECTIVE  CC: Blood Pressure Check (patient here for a BP check)      HPI:  Jorge L Mayes is a 64 y o  male who presents for  A follow-up appointment  Patient was just recently taken off of Cardura by his urologist   Patient states that his blood pressure has been doing okay    But he did have 2 days specifically when he was on vacation and exerting himself that he felt like his pressure was a little bit on the lower side  Patient states he notices when it is low  Has been having a significant fatigue since this whole start of Kidney acute disease  Review of Systems   Constitutional: Positive for fatigue  Negative for activity change, appetite change, chills and fever  HENT: Negative for congestion  Respiratory: Negative for cough, chest tightness and shortness of breath  Cardiovascular: Negative for chest pain and leg swelling  Gastrointestinal: Negative for abdominal distention, abdominal pain, constipation, diarrhea, nausea and vomiting  All other systems reviewed and are negative  Historical Information   The patient history was reviewed as follows:  Past Medical History:   Diagnosis Date    Chronic kidney disease     Hyperlipidemia     Hypertension     Stroke ([facility] [address] ) 02/10/2019    right side sl weakness         Medications:     Current Outpatient Medications:     atorvastatin (LIPITOR) 80 mg tablet, Take 1 tablet (80 mg total) by mouth daily with dinner, Disp: 90 tablet, Rfl: 3    folic acid (FOLVITE) 1 mg tablet, TAKE ONE TABLET BY MOUTH EVERY DAY ( GENERIC FOR FOLVITE), Disp: 90 tablet, Rfl: 2    metoprolol tartrate (LOPRESSOR) 25 mg tablet, Take 1 tablet (25 mg total) by mouth every 12 (twelve) hours, Disp: 180 tablet, Rfl: 0    thiamine (VITAMIN B1) 100 mg tablet, Take 1 tablet (100 mg total) by mouth daily, Disp: 90 tablet, Rfl: 3    No Known Allergies    OBJECTIVE  Vitals:   Vitals:    08/26/21 0944   BP: 132/80   BP Location: Left arm   Patient Position: Sitting   Cuff Size: Standard   Pulse: 70   Resp: 20   Temp: 97 7 °F (36 5 °C)   TempSrc: Temporal   SpO2: 98%   Weight: 92 7 kg (204 lb 6 4 oz)   Height: 6' (1 829 m)         Physical Exam  Vitals reviewed  Constitutional:       Appearance: He is well-developed  HENT:      Head: Normocephalic and atraumatic     Eyes:      Conjunctiva/sclera: Conjunctivae normal       Pupils: Pupils are equal, round, and reactive to light  Cardiovascular:      Rate and Rhythm: Normal rate and regular rhythm  Heart sounds: Normal heart sounds  Pulmonary:      Effort: Pulmonary effort is normal  No respiratory distress  Breath sounds: Normal breath sounds  Musculoskeletal:         General: Normal range of motion  Cervical back: Normal range of motion and neck supple  Skin:     General: Skin is warm  Capillary Refill: Capillary refill takes less than 2 seconds  Neurological:      Mental Status: He is alert and oriented to person, place, and time                      Belkys Piper MD,   [facility]  8/26/2021

## 2021-08-27 ENCOUNTER — TELEPHONE (OUTPATIENT)
Dept: FAMILY MEDICINE CLINIC | Facility: CLINIC | Age: 56
End: 2021-08-27

## 2021-08-27 LAB
BASOPHILS # BLD AUTO: 0.1 X10E3/UL (ref 0–0.2)
BASOPHILS NFR BLD AUTO: 1 %
BUN SERPL-MCNC: 22 MG/DL (ref 6–24)
BUN/CREAT SERPL: 11 (ref 9–20)
CALCIUM SERPL-MCNC: 10 MG/DL (ref 8.7–10.2)
CHLORIDE SERPL-SCNC: 106 MMOL/L (ref 96–106)
CO2 SERPL-SCNC: 20 MMOL/L (ref 20–29)
CREAT SERPL-MCNC: 2 MG/DL (ref 0.76–1.27)
EOSINOPHIL # BLD AUTO: 0.2 X10E3/UL (ref 0–0.4)
EOSINOPHIL NFR BLD AUTO: 2 %
ERYTHROCYTE [DISTWIDTH] IN BLOOD BY AUTOMATED COUNT: 13.7 % (ref 11.6–15.4)
GLUCOSE SERPL-MCNC: 94 MG/DL (ref 65–99)
HCT VFR BLD AUTO: 37.7 % (ref 37.5–51)
HGB BLD-MCNC: 11.9 G/DL (ref 13–17.7)
IMM GRANULOCYTES # BLD: 0 X10E3/UL (ref 0–0.1)
IMM GRANULOCYTES NFR BLD: 0 %
LYMPHOCYTES # BLD AUTO: 1.7 X10E3/UL (ref 0.7–3.1)
LYMPHOCYTES NFR BLD AUTO: 20 %
MCH RBC QN AUTO: 26.6 PG (ref 26.6–33)
MCHC RBC AUTO-ENTMCNC: 31.6 G/DL (ref 31.5–35.7)
MCV RBC AUTO: 84 FL (ref 79–97)
MICRODELETION SYND BLD/T FISH: NORMAL
MONOCYTES # BLD AUTO: 0.6 X10E3/UL (ref 0.1–0.9)
MONOCYTES NFR BLD AUTO: 8 %
NEUTROPHILS # BLD AUTO: 5.7 X10E3/UL (ref 1.4–7)
NEUTROPHILS NFR BLD AUTO: 69 %
PLATELET # BLD AUTO: 248 X10E3/UL (ref 150–450)
POTASSIUM SERPL-SCNC: 4.9 MMOL/L (ref 3.5–5.2)
RBC # BLD AUTO: 4.47 X10E6/UL (ref 4.14–5.8)
SL AMB EGFR AFRICAN AMERICAN: 42 ML/MIN/1.73
SL AMB EGFR NON AFRICAN AMERICAN: 36 ML/MIN/1.73
SODIUM SERPL-SCNC: 141 MMOL/L (ref 134–144)
WBC # BLD AUTO: 8.3 X10E3/UL (ref 3.4–10.8)

## 2021-08-27 NOTE — TELEPHONE ENCOUNTER
----- Message from Dominic Cintron MD sent at 8/27/2021  9:45 AM EDT -----  Still pending iron panel unsure why this wasn't received  ( can we look into this)  However can we advise the patient that his levels are returning back to baseline  At this time I feel comfortable with him going back to work at least 3 days a week to see how he feels energy wise  Also IMPORTANT to continue monitoring BP for hypoten  faith remember his goal is Around 130/80 ; I rather not see it lower given his history of 100% blockage on the carotid  Any wuestion I can contact them on Monday

## 2021-09-01 DIAGNOSIS — I63.512 CEREBROVASCULAR ACCIDENT (CVA) DUE TO OCCLUSION OF LEFT MIDDLE CEREBRAL ARTERY (HCC): ICD-10-CM

## 2021-09-01 RX ORDER — CLOPIDOGREL BISULFATE 75 MG/1
TABLET ORAL
Qty: 90 TABLET | Refills: 2 | Status: SHIPPED | OUTPATIENT
Start: 2021-09-01 | End: 2022-05-14

## 2021-09-19 DIAGNOSIS — I10 ESSENTIAL HYPERTENSION: ICD-10-CM

## 2021-09-20 RX ORDER — LOSARTAN POTASSIUM 100 MG/1
TABLET ORAL
Qty: 90 TABLET | Refills: 2 | Status: SHIPPED | OUTPATIENT
Start: 2021-09-20 | End: 2022-06-15

## 2021-09-24 ENCOUNTER — RA CDI HCC (OUTPATIENT)
Dept: OTHER | Facility: HOSPITAL | Age: 56
End: 2021-09-24

## 2021-09-24 NOTE — PROGRESS NOTES
[facility]  coding opportunities       Chart reviewed, no opportunity found: CHART REVIEWED, NO OPPORTUNITY FOUND                        Patients insurance company: [employer] (Medicare and Commercial for [employer] and [employer])

## 2021-10-12 ENCOUNTER — OFFICE VISIT (OUTPATIENT)
Dept: FAMILY MEDICINE CLINIC | Facility: CLINIC | Age: 56
End: 2021-10-12
Payer: COMMERCIAL

## 2021-10-12 VITALS
BODY MASS INDEX: 28.31 KG/M2 | HEIGHT: 72 IN | TEMPERATURE: 98.3 F | RESPIRATION RATE: 20 BRPM | SYSTOLIC BLOOD PRESSURE: 162 MMHG | HEART RATE: 70 BPM | DIASTOLIC BLOOD PRESSURE: 100 MMHG | OXYGEN SATURATION: 99 % | WEIGHT: 209 LBS

## 2021-10-12 DIAGNOSIS — I65.21 STENOSIS OF RIGHT CAROTID ARTERY: ICD-10-CM

## 2021-10-12 DIAGNOSIS — E87.5 HYPERKALEMIA: Primary | ICD-10-CM

## 2021-10-12 DIAGNOSIS — I65.22 LEFT CAROTID ARTERY OCCLUSION: ICD-10-CM

## 2021-10-12 DIAGNOSIS — I10 PRIMARY HYPERTENSION: ICD-10-CM

## 2021-10-12 PROCEDURE — 99214 OFFICE O/P EST MOD 30 MIN: CPT | Performed by: FAMILY MEDICINE

## 2021-10-13 LAB
BUN SERPL-MCNC: 23 MG/DL (ref 6–24)
BUN/CREAT SERPL: 13 (ref 9–20)
CALCIUM SERPL-MCNC: 9.8 MG/DL (ref 8.7–10.2)
CHLORIDE SERPL-SCNC: 107 MMOL/L (ref 96–106)
CO2 SERPL-SCNC: 19 MMOL/L (ref 20–29)
CREAT SERPL-MCNC: 1.8 MG/DL (ref 0.76–1.27)
GLUCOSE SERPL-MCNC: 92 MG/DL (ref 65–99)
MICRODELETION SYND BLD/T FISH: NORMAL
POTASSIUM SERPL-SCNC: 5.1 MMOL/L (ref 3.5–5.2)
SL AMB EGFR AFRICAN AMERICAN: 48 ML/MIN/1.73
SL AMB EGFR NON AFRICAN AMERICAN: 41 ML/MIN/1.73
SODIUM SERPL-SCNC: 140 MMOL/L (ref 134–144)

## 2021-10-15 ENCOUNTER — OFFICE VISIT (OUTPATIENT)
Dept: FAMILY MEDICINE CLINIC | Facility: CLINIC | Age: 56
End: 2021-10-15
Payer: COMMERCIAL

## 2021-10-15 VITALS
HEIGHT: 72 IN | DIASTOLIC BLOOD PRESSURE: 80 MMHG | SYSTOLIC BLOOD PRESSURE: 122 MMHG | HEART RATE: 63 BPM | TEMPERATURE: 97.8 F | RESPIRATION RATE: 20 BRPM | OXYGEN SATURATION: 98 % | WEIGHT: 209.2 LBS | BODY MASS INDEX: 28.33 KG/M2

## 2021-10-15 DIAGNOSIS — I10 PRIMARY HYPERTENSION: Primary | ICD-10-CM

## 2021-10-15 DIAGNOSIS — I65.21 STENOSIS OF RIGHT CAROTID ARTERY: ICD-10-CM

## 2021-10-15 DIAGNOSIS — E78.00 HYPERCHOLESTEROLEMIA: ICD-10-CM

## 2021-10-15 PROCEDURE — 1036F TOBACCO NON-USER: CPT | Performed by: FAMILY MEDICINE

## 2021-10-15 PROCEDURE — 99214 OFFICE O/P EST MOD 30 MIN: CPT | Performed by: FAMILY MEDICINE

## 2021-10-15 PROCEDURE — 3074F SYST BP LT 130 MM HG: CPT | Performed by: FAMILY MEDICINE

## 2021-10-15 PROCEDURE — 3079F DIAST BP 80-89 MM HG: CPT | Performed by: FAMILY MEDICINE

## 2021-10-15 PROCEDURE — 3008F BODY MASS INDEX DOCD: CPT | Performed by: FAMILY MEDICINE

## 2021-10-15 PROCEDURE — 3725F SCREEN DEPRESSION PERFORMED: CPT | Performed by: FAMILY MEDICINE

## 2021-10-16 LAB
BUN SERPL-MCNC: 22 MG/DL (ref 6–24)
BUN/CREAT SERPL: 11 (ref 9–20)
CALCIUM SERPL-MCNC: 9.6 MG/DL (ref 8.7–10.2)
CHLORIDE SERPL-SCNC: 106 MMOL/L (ref 96–106)
CO2 SERPL-SCNC: 17 MMOL/L (ref 20–29)
CREAT SERPL-MCNC: 1.97 MG/DL (ref 0.76–1.27)
GLUCOSE SERPL-MCNC: 81 MG/DL (ref 65–99)
MICRODELETION SYND BLD/T FISH: NORMAL
POTASSIUM SERPL-SCNC: 5.1 MMOL/L (ref 3.5–5.2)
SL AMB EGFR AFRICAN AMERICAN: 43 ML/MIN/1.73
SL AMB EGFR NON AFRICAN AMERICAN: 37 ML/MIN/1.73
SODIUM SERPL-SCNC: 142 MMOL/L (ref 134–144)

## 2021-11-07 LAB
BUN SERPL-MCNC: 18 MG/DL (ref 6–24)
BUN/CREAT SERPL: 9 (ref 9–20)
CALCIUM SERPL-MCNC: 9.7 MG/DL (ref 8.7–10.2)
CHLORIDE SERPL-SCNC: 104 MMOL/L (ref 96–106)
CO2 SERPL-SCNC: 21 MMOL/L (ref 20–29)
CREAT SERPL-MCNC: 1.95 MG/DL (ref 0.76–1.27)
GLUCOSE SERPL-MCNC: 123 MG/DL (ref 65–99)
POTASSIUM SERPL-SCNC: 4.7 MMOL/L (ref 3.5–5.2)
SL AMB EGFR AFRICAN AMERICAN: 43 ML/MIN/1.73
SL AMB EGFR NON AFRICAN AMERICAN: 37 ML/MIN/1.73
SODIUM SERPL-SCNC: 140 MMOL/L (ref 134–144)

## 2021-11-09 ENCOUNTER — OFFICE VISIT (OUTPATIENT)
Dept: NEPHROLOGY | Facility: CLINIC | Age: 56
End: 2021-11-09
Payer: COMMERCIAL

## 2021-11-09 VITALS
HEIGHT: 72 IN | WEIGHT: 212 LBS | DIASTOLIC BLOOD PRESSURE: 78 MMHG | SYSTOLIC BLOOD PRESSURE: 140 MMHG | BODY MASS INDEX: 28.71 KG/M2

## 2021-11-09 DIAGNOSIS — I10 PRIMARY HYPERTENSION: ICD-10-CM

## 2021-11-09 DIAGNOSIS — N18.30 BENIGN HYPERTENSION WITH CKD (CHRONIC KIDNEY DISEASE) STAGE III (HCC): ICD-10-CM

## 2021-11-09 DIAGNOSIS — N32.0 BLADDER OUTLET OBSTRUCTION: ICD-10-CM

## 2021-11-09 DIAGNOSIS — N40.1 BENIGN PROSTATIC HYPERPLASIA WITH LOWER URINARY TRACT SYMPTOMS, SYMPTOM DETAILS UNSPECIFIED: Primary | ICD-10-CM

## 2021-11-09 DIAGNOSIS — I12.9 BENIGN HYPERTENSION WITH CKD (CHRONIC KIDNEY DISEASE) STAGE III (HCC): ICD-10-CM

## 2021-11-09 PROBLEM — N18.32 STAGE 3B CHRONIC KIDNEY DISEASE (HCC): Status: ACTIVE | Noted: 2021-11-09

## 2021-11-09 PROBLEM — N17.9 AKI (ACUTE KIDNEY INJURY) (HCC): Status: RESOLVED | Noted: 2021-04-19 | Resolved: 2021-11-09

## 2021-11-09 PROCEDURE — 99213 OFFICE O/P EST LOW 20 MIN: CPT | Performed by: INTERNAL MEDICINE

## 2021-11-09 RX ORDER — MELATONIN
2000 DAILY
COMMUNITY

## 2021-11-22 ENCOUNTER — OFFICE VISIT (OUTPATIENT)
Dept: FAMILY MEDICINE CLINIC | Facility: CLINIC | Age: 56
End: 2021-11-22
Payer: COMMERCIAL

## 2021-11-22 VITALS
BODY MASS INDEX: 28.82 KG/M2 | TEMPERATURE: 97.1 F | SYSTOLIC BLOOD PRESSURE: 138 MMHG | OXYGEN SATURATION: 99 % | RESPIRATION RATE: 20 BRPM | HEART RATE: 64 BPM | WEIGHT: 212.8 LBS | HEIGHT: 72 IN | DIASTOLIC BLOOD PRESSURE: 90 MMHG

## 2021-11-22 DIAGNOSIS — E78.00 HYPERCHOLESTEROLEMIA: ICD-10-CM

## 2021-11-22 DIAGNOSIS — I63.512 CEREBROVASCULAR ACCIDENT (CVA) DUE TO OCCLUSION OF LEFT MIDDLE CEREBRAL ARTERY (HCC): ICD-10-CM

## 2021-11-22 DIAGNOSIS — I65.22 LEFT CAROTID ARTERY OCCLUSION: ICD-10-CM

## 2021-11-22 DIAGNOSIS — R53.82 CHRONIC FATIGUE: ICD-10-CM

## 2021-11-22 DIAGNOSIS — I10 PRIMARY HYPERTENSION: ICD-10-CM

## 2021-11-22 DIAGNOSIS — R55 VASO VAGAL EPISODE: ICD-10-CM

## 2021-11-22 DIAGNOSIS — I65.21 STENOSIS OF RIGHT CAROTID ARTERY: Primary | ICD-10-CM

## 2021-11-22 DIAGNOSIS — E87.5 HYPERKALEMIA: ICD-10-CM

## 2021-11-22 PROCEDURE — 99214 OFFICE O/P EST MOD 30 MIN: CPT | Performed by: FAMILY MEDICINE

## 2021-11-23 LAB
25(OH)D3+25(OH)D2 SERPL-MCNC: 37.5 NG/ML (ref 30–100)
ALBUMIN SERPL-MCNC: 4.4 G/DL (ref 3.8–4.9)
ALBUMIN/GLOB SERPL: 1.6 {RATIO} (ref 1.2–2.2)
ALP SERPL-CCNC: 124 IU/L (ref 44–121)
ALT SERPL-CCNC: 27 IU/L (ref 0–44)
AST SERPL-CCNC: 20 IU/L (ref 0–40)
BASOPHILS # BLD AUTO: 0 X10E3/UL (ref 0–0.2)
BASOPHILS NFR BLD AUTO: 0 %
BILIRUB SERPL-MCNC: 0.3 MG/DL (ref 0–1.2)
BUN SERPL-MCNC: 24 MG/DL (ref 6–24)
BUN/CREAT SERPL: 14 (ref 9–20)
CALCIUM SERPL-MCNC: 10.2 MG/DL (ref 8.7–10.2)
CHLORIDE SERPL-SCNC: 104 MMOL/L (ref 96–106)
CO2 SERPL-SCNC: 22 MMOL/L (ref 20–29)
CREAT SERPL-MCNC: 1.7 MG/DL (ref 0.76–1.27)
EOSINOPHIL # BLD AUTO: 0 X10E3/UL (ref 0–0.4)
EOSINOPHIL NFR BLD AUTO: 0 %
ERYTHROCYTE [DISTWIDTH] IN BLOOD BY AUTOMATED COUNT: 13.3 % (ref 11.6–15.4)
FERRITIN SERPL-MCNC: 332 NG/ML (ref 30–400)
GLOBULIN SER-MCNC: 2.7 G/DL (ref 1.5–4.5)
GLUCOSE SERPL-MCNC: 102 MG/DL (ref 65–99)
HCT VFR BLD AUTO: 41 % (ref 37.5–51)
HGB BLD-MCNC: 14.1 G/DL (ref 13–17.7)
IMM GRANULOCYTES # BLD: 0.1 X10E3/UL (ref 0–0.1)
IMM GRANULOCYTES NFR BLD: 1 %
IRON SATN MFR SERPL: 18 % (ref 15–55)
IRON SERPL-MCNC: 57 UG/DL (ref 38–169)
LYMPHOCYTES # BLD AUTO: 1.2 X10E3/UL (ref 0.7–3.1)
LYMPHOCYTES NFR BLD AUTO: 12 %
MCH RBC QN AUTO: 28.4 PG (ref 26.6–33)
MCHC RBC AUTO-ENTMCNC: 34.4 G/DL (ref 31.5–35.7)
MCV RBC AUTO: 83 FL (ref 79–97)
MICRODELETION SYND BLD/T FISH: NORMAL
MONOCYTES # BLD AUTO: 0.4 X10E3/UL (ref 0.1–0.9)
MONOCYTES NFR BLD AUTO: 4 %
NEUTROPHILS # BLD AUTO: 8.7 X10E3/UL (ref 1.4–7)
NEUTROPHILS NFR BLD AUTO: 83 %
PLATELET # BLD AUTO: 242 X10E3/UL (ref 150–450)
POTASSIUM SERPL-SCNC: 5.2 MMOL/L (ref 3.5–5.2)
PROT SERPL-MCNC: 7.1 G/DL (ref 6–8.5)
RBC # BLD AUTO: 4.97 X10E6/UL (ref 4.14–5.8)
SL AMB EGFR AFRICAN AMERICAN: 51 ML/MIN/1.73
SL AMB EGFR NON AFRICAN AMERICAN: 44 ML/MIN/1.73
SODIUM SERPL-SCNC: 139 MMOL/L (ref 134–144)
TIBC SERPL-MCNC: 312 UG/DL (ref 250–450)
UIBC SERPL-MCNC: 255 UG/DL (ref 111–343)
VIT B12 SERPL-MCNC: 506 PG/ML (ref 232–1245)
WBC # BLD AUTO: 10.4 X10E3/UL (ref 3.4–10.8)

## 2021-11-24 ENCOUNTER — HOSPITAL ENCOUNTER (OUTPATIENT)
Dept: RADIOLOGY | Facility: HOSPITAL | Age: 56
Discharge: HOME | End: 2021-11-24
Attending: FAMILY MEDICINE
Payer: COMMERCIAL

## 2021-11-24 DIAGNOSIS — I65.21 STENOSIS OF RIGHT CAROTID ARTERY: ICD-10-CM

## 2021-11-24 PROCEDURE — 93880 EXTRACRANIAL BILAT STUDY: CPT

## 2021-11-24 PROCEDURE — 93880 EXTRACRANIAL BILAT STUDY: CPT | Performed by: SURGERY

## 2021-11-29 ENCOUNTER — CONSULT (OUTPATIENT)
Dept: CARDIOLOGY CLINIC | Facility: CLINIC | Age: 56
End: 2021-11-29
Payer: COMMERCIAL

## 2021-11-29 VITALS
DIASTOLIC BLOOD PRESSURE: 92 MMHG | SYSTOLIC BLOOD PRESSURE: 140 MMHG | TEMPERATURE: 98.6 F | BODY MASS INDEX: 29.26 KG/M2 | HEIGHT: 72 IN | WEIGHT: 216 LBS | OXYGEN SATURATION: 99 % | HEART RATE: 70 BPM

## 2021-11-29 DIAGNOSIS — I65.21 STENOSIS OF RIGHT CAROTID ARTERY: ICD-10-CM

## 2021-11-29 DIAGNOSIS — I65.22 LEFT CAROTID ARTERY OCCLUSION: ICD-10-CM

## 2021-11-29 DIAGNOSIS — I10 PRIMARY HYPERTENSION: ICD-10-CM

## 2021-11-29 DIAGNOSIS — E78.00 HYPERCHOLESTEROLEMIA: ICD-10-CM

## 2021-11-29 DIAGNOSIS — I63.512 CEREBROVASCULAR ACCIDENT (CVA) DUE TO OCCLUSION OF LEFT MIDDLE CEREBRAL ARTERY (HCC): ICD-10-CM

## 2021-11-29 DIAGNOSIS — R42 ORTHOSTATIC DIZZINESS: Primary | ICD-10-CM

## 2021-11-29 DIAGNOSIS — R53.82 CHRONIC FATIGUE: ICD-10-CM

## 2021-11-29 PROCEDURE — 3008F BODY MASS INDEX DOCD: CPT | Performed by: INTERNAL MEDICINE

## 2021-11-29 PROCEDURE — 3077F SYST BP >= 140 MM HG: CPT | Performed by: INTERNAL MEDICINE

## 2021-11-29 PROCEDURE — 99204 OFFICE O/P NEW MOD 45 MIN: CPT | Performed by: INTERNAL MEDICINE

## 2021-11-29 PROCEDURE — 1036F TOBACCO NON-USER: CPT | Performed by: INTERNAL MEDICINE

## 2021-11-29 PROCEDURE — 93000 ELECTROCARDIOGRAM COMPLETE: CPT | Performed by: INTERNAL MEDICINE

## 2021-11-29 PROCEDURE — 3080F DIAST BP >= 90 MM HG: CPT | Performed by: INTERNAL MEDICINE

## 2021-12-05 LAB
CHOLEST SERPL-MCNC: 179 MG/DL (ref 100–199)
CHOLEST/HDLC SERPL: 6.2 RATIO (ref 0–5)
HDLC SERPL-MCNC: 29 MG/DL
LDLC SERPL CALC-MCNC: 102 MG/DL (ref 0–99)
LDLC SERPL DIRECT ASSAY-MCNC: 92 MG/DL (ref 0–99)
SL AMB VLDL CHOLESTEROL CALC: 48 MG/DL (ref 5–40)
TRIGL SERPL-MCNC: 280 MG/DL (ref 0–149)

## 2021-12-06 ENCOUNTER — TELEPHONE (OUTPATIENT)
Dept: CARDIOLOGY CLINIC | Facility: CLINIC | Age: 56
End: 2021-12-06

## 2021-12-07 ENCOUNTER — TELEPHONE (OUTPATIENT)
Dept: FAMILY MEDICINE CLINIC | Facility: CLINIC | Age: 56
End: 2021-12-07

## 2021-12-07 ENCOUNTER — HOSPITAL ENCOUNTER (OUTPATIENT)
Dept: NON INVASIVE DIAGNOSTICS | Facility: HOSPITAL | Age: 56
Discharge: HOME | End: 2021-12-07
Attending: FAMILY MEDICINE
Payer: COMMERCIAL

## 2021-12-07 DIAGNOSIS — I65.22 LEFT CAROTID ARTERY OCCLUSION: ICD-10-CM

## 2021-12-07 DIAGNOSIS — R53.82 CHRONIC FATIGUE: ICD-10-CM

## 2021-12-07 LAB
BASELINE ST DEPRESSION: 0 MM
CHEST PAIN STATEMENT: NORMAL
MAX DIASTOLIC BP: 102 MMHG
MAX HEART RATE: 142 BPM
MAX HR PERCENT: 86 %
MAX HR: 164 BPM
MAX PREDICTED HEART RATE: 164 BPM
MAX. SYSTOLIC BP: 220 MMHG
PROTOCOL NAME: NORMAL
RATE PRESSURE PRODUCT: NORMAL
SL CV STRESS RECOVERY BP: NORMAL MMHG
SL CV STRESS RECOVERY HR: 93 BPM
SL CV STRESS RECOVERY O2 SAT: 98 %
SL CV STRESS STAGE REACHED: 3
STRESS ANGINA INDEX: 0
STRESS BASELINE BP: NORMAL MMHG
STRESS BASELINE HR: 78 BPM
STRESS DUKE TREADMILL SCORE: 9
STRESS O2 SAT REST: 98 %
STRESS PEAK HR: 142 BPM
STRESS PERCENT HR: 86 %
STRESS POST ESTIMATED WORKLOAD: 10.4 METS
STRESS POST EXERCISE DUR MIN: 9 MIN
STRESS POST EXERCISE DUR SEC: 8 SEC
STRESS POST O2 SAT PEAK: 100 %
STRESS POST PEAK BP: 220 MMHG
STRESS ST DEPRESSION: 0 MM
STRESS TARGET HR: 142 BPM
TARGET HR FORMULA: NORMAL
TEST INDICATION: NORMAL
TIME IN EXERCISE PHASE: NORMAL

## 2021-12-07 PROCEDURE — 93017 CV STRESS TEST TRACING ONLY: CPT

## 2021-12-07 PROCEDURE — 93018 CV STRESS TEST I&R ONLY: CPT | Performed by: INTERNAL MEDICINE

## 2021-12-07 PROCEDURE — 93016 CV STRESS TEST SUPVJ ONLY: CPT | Performed by: INTERNAL MEDICINE

## 2022-01-14 ENCOUNTER — OFFICE VISIT (OUTPATIENT)
Dept: FAMILY MEDICINE CLINIC | Facility: CLINIC | Age: 57
End: 2022-01-14
Payer: COMMERCIAL

## 2022-01-14 VITALS
TEMPERATURE: 96.4 F | HEIGHT: 72 IN | RESPIRATION RATE: 16 BRPM | BODY MASS INDEX: 29.69 KG/M2 | OXYGEN SATURATION: 99 % | SYSTOLIC BLOOD PRESSURE: 150 MMHG | DIASTOLIC BLOOD PRESSURE: 100 MMHG | HEART RATE: 64 BPM | WEIGHT: 219.2 LBS

## 2022-01-14 DIAGNOSIS — N32.0 BLADDER OUTLET OBSTRUCTION: ICD-10-CM

## 2022-01-14 DIAGNOSIS — I10 BENIGN ESSENTIAL HYPERTENSION: ICD-10-CM

## 2022-01-14 DIAGNOSIS — I65.22 LEFT CAROTID ARTERY OCCLUSION: Primary | ICD-10-CM

## 2022-01-14 PROCEDURE — 99214 OFFICE O/P EST MOD 30 MIN: CPT | Performed by: FAMILY MEDICINE

## 2022-01-14 NOTE — PROGRESS NOTES
Jo Cazares 1965 male MRN: [id number]    FAMILY PRACTICE OFFICE VISIT  [facility] - [address]      ASSESSMENT/PLAN  Jo Cazares is a 64 y o  male presents to the office for    1  Left carotid artery occlusion  Recommend that the patient see the vascular surgeon given that there has been a change in the Dopplers  The Doppler originally showed a full occlusion  However now showing some flow  2  Benign essential hypertension  Recently had episodes of fatigue and recurrent falls  Had a workup from the cardiologist   Blood pressure today in normal range  Recommend monitoring at home  Avoid hypotension    3  Bladder outlet obstruction  Slight reduction and urine flow  Recommend following up with Urology  If it worsens to please notify us immediately       BMI Counseling: Body mass index is 29 73 kg/m²  The BMI is above normal  Nutrition recommendations include consuming healthier snacks  Exercise recommendations include exercising 3-5 times per week  Rationale for BMI follow-up plan is due to patient being overweight or obese  Repeat in 6 months  Pending neurology evaluation    Future Appointments   Date Time Provider Benedicto Brown   1/21/2022  9:45 AM Frances Hall MD [facility]   1/31/2022  3:00 PM [facility]  [address]   3/1/2022 11:30 AM Bebe Fregoso MD NEURO [facility]   8/4/2022  8:00 AM Debbie De Paz MD [facility]          SUBJECTIVE  CC: Blood Pressure Check      HPI:  Jo Cazares is a 64 y o  male who presents for a follow-up appointment  Patient states that he has been taking his blood pressure medications without any difficulties  Just recently had a cardiac workup which was normal   Patient is set to have an MRI of the brain as well seeing Neurology given he has had frequent falls as well as significant fatigue    States he has not had any falls recently and was concerned that it might have been secondary to his blood pressure  Patient states that he has not been leaking but has had a decrease in his urine flow  Has been being monitored by his urologist   Review of Systems   Constitutional: Negative for activity change, appetite change, chills, fatigue and fever  HENT: Negative for congestion  Respiratory: Negative for cough, chest tightness and shortness of breath  Cardiovascular: Negative for chest pain and leg swelling  Gastrointestinal: Negative for abdominal distention, abdominal pain, constipation, diarrhea, nausea and vomiting  Genitourinary: Positive for decreased urine volume  All other systems reviewed and are negative        Historical Information   The patient history was reviewed as follows:  Past Medical History:   Diagnosis Date    Chronic kidney disease     Hyperlipidemia     Hypertension     Stroke ([facility] [address] ) 02/10/2019    right side sl weakness         Medications:     Current Outpatient Medications:     atorvastatin (LIPITOR) 80 mg tablet, Take 1 tablet (80 mg total) by mouth daily with dinner, Disp: 90 tablet, Rfl: 3    cholecalciferol (VITAMIN D3) 1,000 units tablet, Take 2,000 Units by mouth daily, Disp: , Rfl:     clopidogrel (PLAVIX) 75 mg tablet, TAKE ONE TABLET BY MOUTH EVERY DAY, Disp: 90 tablet, Rfl: 2    folic acid (FOLVITE) 1 mg tablet, TAKE ONE TABLET BY MOUTH EVERY DAY ( GENERIC FOR FOLVITE), Disp: 90 tablet, Rfl: 2    metoprolol tartrate (LOPRESSOR) 25 mg tablet, TAKE ONE TABLET BY MOUTH EVERY DAY (Patient taking differently: Pt taking 1 in am I in pm ), Disp: 90 tablet, Rfl: 3    thiamine (VITAMIN B1) 100 mg tablet, Take 1 tablet (100 mg total) by mouth daily, Disp: 90 tablet, Rfl: 3    losartan (COZAAR) 100 MG tablet, TAKE ONE TABLET BY MOUTH EVERY DAY (Patient not taking: Reported on 11/9/2021), Disp: 90 tablet, Rfl: 2    No Known Allergies    OBJECTIVE  Vitals:   Vitals:    01/14/22 0911   BP: 150/100   BP Location: Left arm   Patient Position: Sitting   Cuff Size: Standard   Pulse: 64   Resp: 16   Temp: (!) 96 4 °F (35 8 °C)   TempSrc: Temporal   SpO2: 99%   Weight: 99 4 kg (219 lb 3 2 oz)   Height: 6' (1 829 m)         Physical Exam  Vitals reviewed  Constitutional:       Appearance: He is well-developed  HENT:      Head: Normocephalic and atraumatic  Eyes:      Conjunctiva/sclera: Conjunctivae normal       Pupils: Pupils are equal, round, and reactive to light  Cardiovascular:      Rate and Rhythm: Normal rate and regular rhythm  Heart sounds: Normal heart sounds, S1 normal and S2 normal  No murmur heard  Pulmonary:      Effort: Pulmonary effort is normal  No respiratory distress  Breath sounds: Normal breath sounds  No wheezing  Musculoskeletal:         General: Normal range of motion  Cervical back: Normal range of motion and neck supple  Skin:     General: Skin is warm  Neurological:      Mental Status: He is alert and oriented to person, place, and time  Psychiatric:         Speech: Speech normal          Behavior: Behavior normal          Thought Content:  Thought content normal          Judgment: Judgment normal                     Jimmy Ozuna MD,   [facility]  1/14/2022

## 2022-01-21 ENCOUNTER — OFFICE VISIT (OUTPATIENT)
Dept: VASCULAR SURGERY | Facility: CLINIC | Age: 57
End: 2022-01-21
Payer: COMMERCIAL

## 2022-01-21 VITALS
WEIGHT: 220 LBS | DIASTOLIC BLOOD PRESSURE: 100 MMHG | SYSTOLIC BLOOD PRESSURE: 140 MMHG | HEART RATE: 79 BPM | BODY MASS INDEX: 29.8 KG/M2 | HEIGHT: 72 IN | TEMPERATURE: 99.2 F

## 2022-01-21 DIAGNOSIS — N18.32 STAGE 3B CHRONIC KIDNEY DISEASE (HCC): ICD-10-CM

## 2022-01-21 DIAGNOSIS — I65.29 ASYMPTOMATIC CAROTID ARTERY STENOSIS, UNSPECIFIED LATERALITY: Primary | ICD-10-CM

## 2022-01-21 DIAGNOSIS — Z82.49 FAMILY HISTORY OF ABDOMINAL AORTIC ANEURYSM (AAA): ICD-10-CM

## 2022-01-21 DIAGNOSIS — I65.21 STENOSIS OF RIGHT CAROTID ARTERY: ICD-10-CM

## 2022-01-21 DIAGNOSIS — I65.22 LEFT CAROTID ARTERY OCCLUSION: ICD-10-CM

## 2022-01-21 PROCEDURE — 1036F TOBACCO NON-USER: CPT | Performed by: SURGERY

## 2022-01-21 PROCEDURE — 3008F BODY MASS INDEX DOCD: CPT | Performed by: SURGERY

## 2022-01-21 PROCEDURE — 3077F SYST BP >= 140 MM HG: CPT | Performed by: SURGERY

## 2022-01-21 PROCEDURE — 99214 OFFICE O/P EST MOD 30 MIN: CPT | Performed by: SURGERY

## 2022-01-21 PROCEDURE — 3080F DIAST BP >= 90 MM HG: CPT | Performed by: SURGERY

## 2022-01-21 NOTE — ASSESSMENT & PLAN NOTE
On recent carotid duplex some flow noted (13-42cm/s with high resistance waveform) in the left internal carotid artery which was noted to be occluded by CTA in 2019  This would essentially be a functional occlusion  I do not believe proceeding with CTA is necessary, as again this is a functional occlusion  Any intervention in this asymptomatic patient would not be very unlikely to provide benefit and present with risk of surgery  Additionally, patient has CKD and CTA would put his renal function at risk  Believe continuing with clopidogrel is appropriate  Patient on statin  Patient continues to abstain from smoking

## 2022-01-21 NOTE — LETTER
January 21, 2022     Randi Anderson MD  [address] [facility]    Patient: Bryan Aleman   YOB: 1965   Date of Visit: 1/21/2022       Dear Dr Joan Taylor: Thank you for referring Bryan Aleman to me for evaluation  Below are the relevant portions of my assessment and plan of care  Pt is here to review CD done 11/24/21  The the study now shows some flow in the left internal carotid artery which was noted to be occluded by CT in 2019  However, the flow velocities are quite low indicative of trickle flow  This would essentially be a functional occlusion  I do not believe proceeding with CTA is necessary as again this is a functional occlusion  Any intervention in this asymptomatic patient would not provide benefit  Additionally, patient has CKD and would put his renal function at risk  Patient's vertigo is not related to his carotid occlusive disease  Patient has vertebral arteries are patent and there is less than 50% stenosis in the right ICA  Pt denies stroke/TIA symptoms  Patient has history of stroke in 2/2019, as result of left ICA occlusion per CTA  This was discussed at length with the patient and his accompanying wife  Patient will continue to undergo yearly surveillance studies of the right internal carotid artery  Patient with a family history of abdominal aortic aneurysm in both his father and brother  Patient is quite concerned about genetic predisposition to developing aneurysmal disease  He had abdominal CT in June of 2021 for other reasons which showed no evidence of abdominal aortic aneurysm  Patient denies any new TIA stroke symptoms  Patient is taking atorvastatin and plavix  If you have questions, please do not hesitate to call me  I look forward to following Lenore Doctor along with you           Sincerely,        Leighton Carlos MD        CC: No Recipients

## 2022-01-21 NOTE — PROGRESS NOTES
Assessment/Plan:    Left carotid artery occlusion  On recent carotid duplex some flow noted (13-42cm/s with high resistance waveform) in the left internal carotid artery which was noted to be occluded by CTA in 2019  This would essentially be a functional occlusion  I do not believe proceeding with CTA is necessary, as again this is a functional occlusion  Any intervention in this asymptomatic patient would not be very unlikely to provide benefit and present with risk of surgery  Additionally, patient has CKD and CTA would put his renal function at risk  Believe continuing with clopidogrel is appropriate  Patient on statin  Patient continues to abstain from smoking  Stenosis of right carotid artery  Velocities consistent with less than 50% stenosis  Will continue to monitor on an annual basis  Family history of abdominal aortic aneurysm (AAA)  Patient had CT in June of 2021 for unrelated reasons  No evidence of AAA was noted  Patient is concerned about his genetic predisposition  Will repeat screening ultrasound in 5 years  Diagnoses and all orders for this visit:    Asymptomatic carotid artery stenosis, unspecified laterality  -     VAS abdominal aorta/iliacs; complete study; Future  -     VAS carotid complete study; Future    Family history of abdominal aortic aneurysm (AAA)  -     VAS abdominal aorta/iliacs; complete study; Future  -     VAS carotid complete study; Future    Left carotid artery occlusion    Stenosis of right carotid artery    Stage 3b chronic kidney disease ([facility] [address] )          Subjective:       Patient ID: Rom Manohar is a 64 y o  male  Pt is here to review CD done 11/24/21  The the study now shows some flow in the left internal carotid artery which was noted to be occluded by CT in 2019  However, the flow velocities are quite low indicative of trickle flow  This would essentially be a functional occlusion    I do not believe proceeding with CTA is necessary as again this is a functional occlusion  Any intervention in this asymptomatic patient would not provide benefit  Additionally, patient has CKD and would put his renal function at risk  Patient's vertigo is not related to his carotid occlusive disease  Patient has vertebral arteries are patent and there is less than 50% stenosis in the right ICA  Pt denies stroke/TIA symptoms  Patient has history of stroke in 2/2019, as result of left ICA occlusion per CTA  This was discussed at length with the patient and his accompanying wife  Patient will continue to undergo yearly surveillance studies of the right internal carotid artery  Patient with a family history of abdominal aortic aneurysm in both his father and brother  Patient is quite concerned about genetic predisposition to developing aneurysmal disease  He had abdominal CT in June of 2021 for other reasons which showed no evidence of abdominal aortic aneurysm  Patient denies any new TIA stroke symptoms  Patient is taking atorvastatin and plavix  The following portions of the patient's history were reviewed and updated as appropriate: allergies, current medications, past family history, past medical history, past social history, past surgical history and problem list     Review of Systems   Constitutional: Negative  HENT: Negative  Eyes: Negative  Respiratory: Negative  Cardiovascular: Negative  Gastrointestinal: Negative  Musculoskeletal: Negative  Skin: Negative  Allergic/Immunologic: Negative  Neurological: Negative  Hematological: Negative  Psychiatric/Behavioral: Negative  Objective:      /100 (BP Location: Left arm, Patient Position: Sitting, Cuff Size: Standard)   Pulse 79   Temp 99 2 °F (37 3 °C) (Tympanic)   Ht 6' (1 829 m)   Wt 99 8 kg (220 lb)   BMI 29 84 kg/m²          Physical Exam  Vitals and nursing note reviewed  Constitutional:       Appearance: He is well-developed  He is obese     HENT: Head: Normocephalic and atraumatic  Eyes:      Conjunctiva/sclera: Conjunctivae normal       Pupils: Pupils are equal, round, and reactive to light  Neck:      Vascular: No JVD  Pulmonary:      Effort: No respiratory distress  Breath sounds: No stridor  No wheezing or rales  Chest:      Chest wall: No tenderness  Abdominal:      General: There is no distension  Palpations: Abdomen is soft  There is no mass  Tenderness: There is no abdominal tenderness  There is no guarding or rebound  Musculoskeletal:         General: No tenderness or deformity  Normal range of motion  Cervical back: Normal range of motion and neck supple  Skin:     General: Skin is warm and dry  Findings: No erythema or rash  Neurological:      Mental Status: He is alert and oriented to person, place, and time  Psychiatric:         Behavior: Behavior normal          Thought Content:  Thought content normal

## 2022-01-21 NOTE — ASSESSMENT & PLAN NOTE
Patient had CT in June of 2021 for unrelated reasons  No evidence of AAA was noted  Patient is concerned about his genetic predisposition  Will repeat screening ultrasound in 5 years

## 2022-01-31 ENCOUNTER — HOSPITAL ENCOUNTER (OUTPATIENT)
Dept: RADIOLOGY | Facility: HOSPITAL | Age: 57
Discharge: HOME | End: 2022-01-31
Attending: FAMILY MEDICINE
Payer: COMMERCIAL

## 2022-01-31 DIAGNOSIS — I63.512 CEREBROVASCULAR ACCIDENT (CVA) DUE TO OCCLUSION OF LEFT MIDDLE CEREBRAL ARTERY (HCC): ICD-10-CM

## 2022-01-31 PROCEDURE — G1004 CDSM NDSC: HCPCS

## 2022-01-31 PROCEDURE — 70551 MRI BRAIN STEM W/O DYE: CPT

## 2022-02-07 ENCOUNTER — TELEPHONE (OUTPATIENT)
Dept: FAMILY MEDICINE CLINIC | Facility: CLINIC | Age: 57
End: 2022-02-07

## 2022-02-07 NOTE — TELEPHONE ENCOUNTER
----- Message from Jimmy Ozuna MD sent at 2/6/2022  9:09 PM EST -----  Please advise family patient doesn't have any protection from Frandyian, showing no signs of pass infection

## 2022-02-25 ENCOUNTER — TELEPHONE (OUTPATIENT)
Dept: NEUROLOGY | Facility: CLINIC | Age: 57
End: 2022-02-25

## 2022-03-01 ENCOUNTER — OFFICE VISIT (OUTPATIENT)
Dept: NEUROLOGY | Facility: CLINIC | Age: 57
End: 2022-03-01
Payer: COMMERCIAL

## 2022-03-01 VITALS
HEART RATE: 66 BPM | SYSTOLIC BLOOD PRESSURE: 163 MMHG | TEMPERATURE: 97.7 F | DIASTOLIC BLOOD PRESSURE: 83 MMHG | WEIGHT: 222 LBS | BODY MASS INDEX: 30.07 KG/M2 | HEIGHT: 72 IN

## 2022-03-01 DIAGNOSIS — I69.30 CHRONIC ISCHEMIC LEFT MCA STROKE: Primary | ICD-10-CM

## 2022-03-01 DIAGNOSIS — I65.22 ICAO (INTERNAL CAROTID ARTERY OCCLUSION), LEFT: ICD-10-CM

## 2022-03-01 DIAGNOSIS — E78.5 HLD (HYPERLIPIDEMIA): ICD-10-CM

## 2022-03-01 PROCEDURE — 3077F SYST BP >= 140 MM HG: CPT | Performed by: PSYCHIATRY & NEUROLOGY

## 2022-03-01 PROCEDURE — 99214 OFFICE O/P EST MOD 30 MIN: CPT | Performed by: PSYCHIATRY & NEUROLOGY

## 2022-03-01 PROCEDURE — 3079F DIAST BP 80-89 MM HG: CPT | Performed by: PSYCHIATRY & NEUROLOGY

## 2022-03-01 NOTE — PROGRESS NOTES
Return NeuroOutpatient Note        Christy Ugalde  [id number]  64 y o   1965       Chronic ischemic left MCA stroke        History obtained from:  Patient and wife     HPI/Subjective: Christy Ugalde is a 65 yo M with PMH of stroke presents as f/u  He was last seen by us in sept of 2020  Per my previous history, he had [facility] to [facility] with dysarthria and right sided weakness  They were considering tPA but his speech had improved so it was deferred  His CTA had revealed left M1 occlusion  His MRI brain had revealed large acute left MCA territory infarction involving dorsal lateral aspect of left frontal lobe, frontal operculum and left basal ganglia with mild petechial hemorrhage  His repeat CT brain 2 days later had revealed evolving left mca infarction with no significant change  Clinically he had improved within minutes and then had continued to improve throughout his course so tPA or thrombectomy were not done    Patient has remained on Lipitor 80mg and plavix 75mg daily  He has known left ica complete occlusion that is being followed by vascular surgery  He was last seen in Jan of 2020 by Dr Devaughn Lewis to address left carotid occlusion and right asymptomatic <50% carotid stenosis  he had repeat carotid doppler recently that revealed some flow in left ica compared to it being occluded previously  No intervention is necessary per vascular surgery  since stroke, his only limitation is fatigue  He doesn't smoke anymore  He is compliant with his medications  He's a serna  He is able to perform all duties but hasn't started  He was asked to have sleep study done a year ago but didn't have it done  Wife denies any episodes of apnea  Denies snoring as well       Past Medical History:   Diagnosis Date    Chronic kidney disease     Hyperlipidemia     Hypertension     Stroke ([facility] [address] ) 02/10/2019    right side sl weakness     Social History     Socioeconomic History    Marital status: /Civil [employer]     Spouse name: Not on file    Number of children: Not on file    Years of education: Not on file    Highest education level: Not on file   Occupational History    Not on file   Tobacco Use    Smoking status: Former Smoker     Packs/day: 20 00     Types: Cigarettes     Quit date: 2012     Years since quittin 6    Smokeless tobacco: Never Used   Vaping Use    Vaping Use: Never used   Substance and Sexual Activity    Alcohol use:  Yes     Alcohol/week: 6 0 standard drinks     Types: 6 Cans of beer per week    Drug use: Never    Sexual activity: Not Currently   Other Topics Concern    Not on file   Social History Narrative    Not on file     Social Determinants of Health     Financial Resource Strain: Not on file   Food Insecurity: Not on file   Transportation Needs: Not on file   Physical Activity: Not on file   Stress: Not on file   Social Connections: Not on file   Intimate Partner Violence: Not on file   Housing Stability: Not on file     Family History   Problem Relation Age of Onset    Diabetes type II Mother     Hypertension Father     Diabetes type II Father     Diabetes type II Sister     Hypertension Brother      No Known Allergies  Current Outpatient Medications on File Prior to Visit   Medication Sig Dispense Refill    atorvastatin (LIPITOR) 80 mg tablet Take 1 tablet (80 mg total) by mouth daily with dinner 90 tablet 3    cholecalciferol (VITAMIN D3) 1,000 units tablet Take 2,000 Units by mouth daily      clopidogrel (PLAVIX) 75 mg tablet TAKE ONE TABLET BY MOUTH EVERY DAY 90 tablet 2    folic acid (FOLVITE) 1 mg tablet TAKE ONE TABLET BY MOUTH EVERY DAY ( GENERIC FOR FOLVITE) 90 tablet 2    losartan (COZAAR) 100 MG tablet TAKE ONE TABLET BY MOUTH EVERY DAY 90 tablet 2    metoprolol tartrate (LOPRESSOR) 25 mg tablet TAKE ONE TABLET BY MOUTH EVERY DAY (Patient taking differently: Pt taking 1 in am I in pm ) 90 tablet 3    thiamine (VITAMIN B1) 100 mg tablet Take 1 tablet (100 mg total) by mouth daily 90 tablet 3    [DISCONTINUED] thiamine (VITAMIN B1) 100 mg tablet TAKE ONE TABLET BY MOUTH EVERY DAY 90 tablet 3     No current facility-administered medications on file prior to visit  Review of Systems   Refer to positive review of systems in HPI     Review of Systems    Constitutional- No fever  Eyes- No visual change  ENT- Hearing normal  CV- No chest pain  Resp- No Shortness of breath  GI- No diarrhea  - Bladder normal  MS- No Arthritis   Skin- No rash  Psych- No depression  Endo- No DM  Heme- No nodes    Vitals:    03/01/22 1134   BP: 163/83   BP Location: Left arm   Patient Position: Sitting   Cuff Size: Standard   Pulse: 66   Temp: 97 7 °F (36 5 °C)   TempSrc: Tympanic   Weight: 101 kg (222 lb)   Height: 6' (1 829 m)       PHYSICAL EXAM:  Appearance: No Acute Distress  Ophthalmoscopic: Disc Flat, Normal fundus  Mental status:  Orientation: Awake, Alert, and Orientedx3  Memory: Registation 3/3 Recall 3/3  Attention: normal  Knowledge: good  Language: No aphasia  Speech: No dysarthria  Cranial Nerves:  2 No Visual Defect on Confrontation, Pupils round, equal, reactive to light  3,4,6 Extraocular Movements Intact, no nystagmus  5 Facial Sensation Intact  7 No facial asymmetry  8 Intact hearing  9,10 Palate symmetric, normal gag  11 Good shoulder shrug  12 Tongue Midline  Gait: Stable  Coordination: No ataxia with finger to nose testing, and heel to shin  Sensory: Intact, Symmetric to pinprick, light touch, vibration, and joint position  Muscle Tone: Normal              Muscle exam:  Arm Right Left Leg Right Left   Deltoid 5/5 5/5 Iliopsoas 5/5 5/5   Biceps 5/5 5/5 Quads 5/5 5/5   Triceps 5/5 5/5 Hamstrings 5/5 5/5   Wrist Extension 5/5 5/5 Ankle Dorsi Flexion 5/5 5/5   Wrist Flexion 5/5 5/5 Ankle Plantar Flexion 5/5 5/5   Interossei 5/5 5/5 Ankle Eversion 5/5 5/5   APB 5/5 5/5 Ankle Inversion 5/5 5/5       Reflexes   RJ BJ TJ KJ MARIBELL Plantars Jacy's Right 2+ 2+ 2+ 2+ 2+ Downgoing Not present   Left 2+ 2+ 2+ 2+ 2+ Downgoing Not present     Personal review of  Labs:                  Diagnoses and all orders for this visit:      1  Chronic ischemic left MCA stroke     2  HLD (hyperlipidemia)     3  ICAO (internal carotid artery occlusion), left         Patient has remained stable  He is to resume plavix 75mg and lipitor 80mg daily  Encouraged physical and cognitive exercises  He's getting biannual carotid doppler as surveillance                   Total time of encounter:  30 min  More than 50% of the time was used in counseling and/or coordination of care  Extent of counseling and/or coordination of care        Jj Bernstein MD  [facility]  [address]  [address]  [phone]

## 2022-03-12 LAB
ALBUMIN SERPL-MCNC: 4.4 G/DL (ref 3.8–4.9)
ALBUMIN/GLOB SERPL: 1.7 {RATIO} (ref 1.2–2.2)
ALP SERPL-CCNC: 118 IU/L (ref 44–121)
ALT SERPL-CCNC: 27 IU/L (ref 0–44)
AST SERPL-CCNC: 16 IU/L (ref 0–40)
BILIRUB SERPL-MCNC: 0.3 MG/DL (ref 0–1.2)
BUN SERPL-MCNC: 17 MG/DL (ref 6–24)
BUN/CREAT SERPL: 9 (ref 9–20)
CALCIUM SERPL-MCNC: 9.7 MG/DL (ref 8.7–10.2)
CHLORIDE SERPL-SCNC: 101 MMOL/L (ref 96–106)
CO2 SERPL-SCNC: 21 MMOL/L (ref 20–29)
CREAT SERPL-MCNC: 1.81 MG/DL (ref 0.76–1.27)
EGFR: 43 ML/MIN/1.73
ERYTHROCYTE [DISTWIDTH] IN BLOOD BY AUTOMATED COUNT: 13 % (ref 11.6–15.4)
GLOBULIN SER-MCNC: 2.6 G/DL (ref 1.5–4.5)
GLUCOSE SERPL-MCNC: 83 MG/DL (ref 65–99)
HCT VFR BLD AUTO: 42.1 % (ref 37.5–51)
HGB BLD-MCNC: 14.4 G/DL (ref 13–17.7)
MCH RBC QN AUTO: 28.2 PG (ref 26.6–33)
MCHC RBC AUTO-ENTMCNC: 34.2 G/DL (ref 31.5–35.7)
MCV RBC AUTO: 82 FL (ref 79–97)
PHOSPHATE SERPL-MCNC: 2.4 MG/DL (ref 2.8–4.1)
PLATELET # BLD AUTO: 240 X10E3/UL (ref 150–450)
POTASSIUM SERPL-SCNC: 4.7 MMOL/L (ref 3.5–5.2)
PROT SERPL-MCNC: 7 G/DL (ref 6–8.5)
PTH-INTACT SERPL-MCNC: 34 PG/ML (ref 15–65)
RBC # BLD AUTO: 5.11 X10E6/UL (ref 4.14–5.8)
SODIUM SERPL-SCNC: 138 MMOL/L (ref 134–144)
WBC # BLD AUTO: 8.3 X10E3/UL (ref 3.4–10.8)

## 2022-03-14 ENCOUNTER — OFFICE VISIT (OUTPATIENT)
Dept: NEPHROLOGY | Facility: CLINIC | Age: 57
End: 2022-03-14
Payer: COMMERCIAL

## 2022-03-14 VITALS — BODY MASS INDEX: 29.93 KG/M2 | WEIGHT: 221 LBS | HEIGHT: 72 IN

## 2022-03-14 DIAGNOSIS — I10 PRIMARY HYPERTENSION: ICD-10-CM

## 2022-03-14 DIAGNOSIS — N18.32 STAGE 3B CHRONIC KIDNEY DISEASE (HCC): Primary | ICD-10-CM

## 2022-03-14 PROCEDURE — 99214 OFFICE O/P EST MOD 30 MIN: CPT | Performed by: INTERNAL MEDICINE

## 2022-03-14 PROCEDURE — 3008F BODY MASS INDEX DOCD: CPT | Performed by: INTERNAL MEDICINE

## 2022-03-14 PROCEDURE — 1036F TOBACCO NON-USER: CPT | Performed by: INTERNAL MEDICINE

## 2022-03-14 NOTE — PATIENT INSTRUCTIONS
1  )  Low 2 g sodium diet    2 )  Monitor weights at home    3 )  Avoid NSAIDs (ibuprofen, Motrin, Advil, Aleve, naproxen)    4 )  Monitor blood pressure at home, call if blood pressure greater than 150/90 persistently    5 ) I will plan to discuss all results including blood work, and/or imaging at our next visit, unless there is an urgent indication, in which case I will call you earlier  If you have any questions or concerns about your results, please feel free to call our office      6 )  Continue follow-up with Urology

## 2022-03-14 NOTE — PROGRESS NOTES
NEPHROLOGY OFFICE VISIT   Avis Negrete 64 y o  male MRN: [id number]  3/14/2022    Reason for Visit: CKD    History of Present Illness (HPI):    I had the pleasure of seeing Altagracia Rubi today in the renal clinic for the continued management of chronic kidney disease  Since our last visit, there has been no ER visits or hospitilizations  He currently has no complaints at this time and is feeling well  Patient denies any chest pain, shortness of breath and swelling  The last blood work was done on March 11, which we have reviewed together  Other than feeling tired he has no new complaints  He is now voiding well without any issues  Recently followed up with Urology and his ultrasound was normal with good voiding  Blood pressure ranges around 410 systolic and given his history of carotid stenosis is the target that there aiming for  He is following with vascular surgery      ASSESSMENT and PLAN:    Chronic kidney disease stage IIIB  --secondary to history of longstanding hypertension with episode of acute kidney injury secondary to chronic urinary retention  --most recent renal ultrasound shows improvement of the obstruction  --creatinine has plateaued at around high 1's to low 2's (1 8-2 mg/dL)  --continued antihypertensive control  --status post cystoscopy with transurethral resection prostate on July 15th, 2021  --avoid NSAIDs     Hypertension  --losartan 100 mg daily  --continue metoprolol 25 mg twice a day  --aiming for blood pressure in the 140s given his history of carotid stenosis     Hyperkalemia--resolved     Urinary retention/BPH  --improved after cystoscopy with transurethral resection of prostate on 7/15/2021      PATIENT INSTRUCTIONS:    Patient Instructions   1 )  Low 2 g sodium diet    2 )  Monitor weights at home    3 )  Avoid NSAIDs (ibuprofen, Motrin, Advil, Aleve, naproxen)    4 )  Monitor blood pressure at home, call if blood pressure greater than 150/90 persistently    5 ) I will plan to discuss all results including blood work, and/or imaging at our next visit, unless there is an urgent indication, in which case I will call you earlier  If you have any questions or concerns about your results, please feel free to call our office  6 )  Continue follow-up with Urology          Orders Placed This Encounter   Procedures    Comprehensive metabolic panel     This is a patient instruction: Patient fasting for 8 hours or longer recommended  Standing Status:   Future     Number of Occurrences:   1     Standing Expiration Date:   3/14/2023    Cystatin C With eGFR     Standing Status:   Future     Standing Expiration Date:   3/14/2023    CBC     Standing Status:   Future     Number of Occurrences:   1     Standing Expiration Date:   3/14/2023    PTH, intact     Standing Status:   Future     Number of Occurrences:   1     Standing Expiration Date:   3/14/2023    Phosphorus     Standing Status:   Future     Number of Occurrences:   1     Standing Expiration Date:   3/14/2023    Urinalysis with microscopic     Standing Status:   Future     Number of Occurrences:   1     Standing Expiration Date:   3/14/2023    Vitamin D 25 hydroxy     Standing Status:   Future     Number of Occurrences:   1     Standing Expiration Date:   3/14/2023       OBJECTIVE:  Current Weight: Weight - Scale: 100 kg (221 lb)  Vitals:    03/14/22 1018   Weight: 100 kg (221 lb)   Height: 6' (1 829 m)    Body mass index is 29 97 kg/m²  REVIEW OF SYSTEMS:    Review of Systems   Constitutional: Positive for fatigue  Negative for activity change and fever  Respiratory: Negative for cough, chest tightness, shortness of breath and wheezing  Cardiovascular: Negative for chest pain and leg swelling  Gastrointestinal: Negative for abdominal pain, diarrhea, nausea and vomiting  Endocrine: Negative for polyuria  Genitourinary: Negative for difficulty urinating, dysuria, flank pain, frequency and urgency  Skin: Negative for rash  Neurological: Negative for dizziness, syncope, light-headedness and headaches  PHYSICAL EXAM:      Physical Exam  Vitals and nursing note reviewed  Exam conducted with a chaperone present (Wife was present)  Constitutional:       General: He is not in acute distress  Appearance: He is well-developed  He is not diaphoretic  HENT:      Head: Normocephalic and atraumatic  Eyes:      General: No scleral icterus  Pupils: Pupils are equal, round, and reactive to light  Cardiovascular:      Rate and Rhythm: Normal rate and regular rhythm  Heart sounds: Normal heart sounds  No murmur heard  No friction rub  No gallop  Pulmonary:      Effort: Pulmonary effort is normal  No respiratory distress  Breath sounds: Normal breath sounds  No wheezing or rales  Chest:      Chest wall: No tenderness  Abdominal:      General: Bowel sounds are normal  There is no distension  Palpations: Abdomen is soft  Tenderness: There is no abdominal tenderness  There is no rebound  Musculoskeletal:         General: Normal range of motion  Cervical back: Normal range of motion and neck supple  Skin:     Findings: No rash  Neurological:      Mental Status: He is alert and oriented to person, place, and time           Medications:    Current Outpatient Medications:     atorvastatin (LIPITOR) 80 mg tablet, Take 1 tablet (80 mg total) by mouth daily with dinner, Disp: 90 tablet, Rfl: 3    cholecalciferol (VITAMIN D3) 1,000 units tablet, Take 2,000 Units by mouth daily, Disp: , Rfl:     clopidogrel (PLAVIX) 75 mg tablet, TAKE ONE TABLET BY MOUTH EVERY DAY, Disp: 90 tablet, Rfl: 2    folic acid (FOLVITE) 1 mg tablet, TAKE ONE TABLET BY MOUTH EVERY DAY ( GENERIC FOR FOLVITE), Disp: 90 tablet, Rfl: 2    losartan (COZAAR) 100 MG tablet, TAKE ONE TABLET BY MOUTH EVERY DAY, Disp: 90 tablet, Rfl: 2    metoprolol tartrate (LOPRESSOR) 25 mg tablet, TAKE ONE TABLET BY MOUTH EVERY DAY (Patient taking differently: Pt taking 1 in am I in pm ), Disp: 90 tablet, Rfl: 3    thiamine (VITAMIN B1) 100 mg tablet, Take 1 tablet (100 mg total) by mouth daily, Disp: 90 tablet, Rfl: 3    Laboratory Results:  Results from last 7 days   Lab Units 03/11/22  1227   WHITE BLOOD CELL COUNT  x10E3/uL 8 3   HEMOGLOBIN  g/dL 14 4   HEMATOCRIT  % 42 1   PLATELETS  [id number]/   POTASSIUM mmol/L 4 7   CHLORIDE mmol/L 101   CO2 mmol/L 21   BUN mg/dL 17   CREATININE mg/dL 1 81*       Results for orders placed or performed in visit on 02/01/22   SARS CoV-2 ANTIBODY,IgG- Lab Collect   Result Value Ref Range    SARS-CoV-2 Semi-Quant IgG Ab <13 0 Neg <13 0 AU/mL    SARS-CoV-2 Jason Ab Interp containing attached abbrev COVDS Negative

## 2022-06-13 ENCOUNTER — TELEPHONE (OUTPATIENT)
Dept: NEUROLOGY | Facility: CLINIC | Age: 57
End: 2022-06-13

## 2022-08-04 ENCOUNTER — OFFICE VISIT (OUTPATIENT)
Dept: FAMILY MEDICINE CLINIC | Facility: CLINIC | Age: 57
End: 2022-08-04
Payer: COMMERCIAL

## 2022-08-04 VITALS
HEART RATE: 78 BPM | WEIGHT: 219 LBS | HEIGHT: 72 IN | BODY MASS INDEX: 29.66 KG/M2 | RESPIRATION RATE: 14 BRPM | SYSTOLIC BLOOD PRESSURE: 120 MMHG | TEMPERATURE: 97.8 F | DIASTOLIC BLOOD PRESSURE: 82 MMHG

## 2022-08-04 DIAGNOSIS — Z00.00 ANNUAL PHYSICAL EXAM: Primary | ICD-10-CM

## 2022-08-04 DIAGNOSIS — I63.512 CEREBROVASCULAR ACCIDENT (CVA) DUE TO OCCLUSION OF LEFT MIDDLE CEREBRAL ARTERY (HCC): ICD-10-CM

## 2022-08-04 DIAGNOSIS — Z23 NEED FOR VACCINATION: ICD-10-CM

## 2022-08-04 DIAGNOSIS — G47.10 EXCESSIVE SLEEPINESS: ICD-10-CM

## 2022-08-04 DIAGNOSIS — I10 ESSENTIAL HYPERTENSION: ICD-10-CM

## 2022-08-04 DIAGNOSIS — E78.00 HYPERCHOLESTEROLEMIA: ICD-10-CM

## 2022-08-04 PROCEDURE — 90715 TDAP VACCINE 7 YRS/> IM: CPT | Performed by: FAMILY MEDICINE

## 2022-08-04 PROCEDURE — 3725F SCREEN DEPRESSION PERFORMED: CPT | Performed by: FAMILY MEDICINE

## 2022-08-04 PROCEDURE — 90471 IMMUNIZATION ADMIN: CPT | Performed by: FAMILY MEDICINE

## 2022-08-04 PROCEDURE — 3079F DIAST BP 80-89 MM HG: CPT | Performed by: FAMILY MEDICINE

## 2022-08-04 PROCEDURE — 99396 PREV VISIT EST AGE 40-64: CPT | Performed by: FAMILY MEDICINE

## 2022-08-04 PROCEDURE — 3074F SYST BP LT 130 MM HG: CPT | Performed by: FAMILY MEDICINE

## 2022-08-04 NOTE — PATIENT INSTRUCTIONS

## 2022-08-04 NOTE — DISCHARGE INSTR - LAB
Please call to schedule scopes [phone]    Please follow up with primary care doctor regarding chronic anemia

## 2022-08-04 NOTE — PROGRESS NOTES
[address] [facility] PRACTICE    NAME: Viet Fraser  AGE: 62 y o  SEX: male  : 1965     DATE: 2022     Assessment and Plan:     Problem List Items Addressed This Visit        Cardiovascular and Mediastinum    Cerebrovascular accident (CVA) due to occlusion of left middle cerebral artery ([address] )       Other    Hypercholesterolemia    Relevant Orders    Lipid panel      Other Visit Diagnoses     Annual physical exam    -  Primary    Relevant Orders    Testosterone    CBC and differential    Comprehensive metabolic panel    Lipid panel    TSH, 3rd generation with Free T4 reflex    Essential hypertension        Relevant Orders    CBC and differential    Comprehensive metabolic panel    Excessive sleepiness        Relevant Orders    Home Study    Testosterone    TSH, 3rd generation with Free T4 reflex    Need for vaccination        Relevant Orders    TDAP VACCINE GREATER THAN OR EQUAL TO 6YO IM (Completed)        TDAP:  Unknown when the last was given  Patient did have a yg nail injury that showed no signs of infection according to the patient  Extreme fatigue: highly recommend sleep study to be performed  Patient has a history of cancelling the appointment in the past given that he does not want to do it in the hospital setting   Neurology: Recommend repeat evaluation, stable MRI in 2022  BP monitor to be sure staying at goal of 130/80  Repeat all other labs    Immunizations and preventive care screenings were discussed with patient today  Appropriate education was printed on patient's after visit summary  Depression Screening and Follow-up Plan: Patient was screened for depression during today's encounter  They screened negative with a PHQ-2 score of 1  No follow-ups on file       Chief Complaint:     Chief Complaint   Patient presents with    Physical Exam      History of Present Illness:     Adult Annual Physical   Patient here for a comprehensive physical exam      Diet and Physical Activity  Diet/Nutrition: well balanced diet ; does continue to have beers every now and then  Sodium free beer  Exercise: no formal exercise  Depression Screening  PHQ-2/9 Depression Screening    Little interest or pleasure in doing things: 0 - not at all  Feeling down, depressed, or hopeless: 1 - several days  PHQ-2 Score: 1  PHQ-2 Interpretation: Negative depression screen       General Health  Sleep: sleeps well  Hearing: normal - bilateral   Vision: no vision problems  Dental: no dental visits for >1 year   Health  Symptoms include: weak urinary stream     Review of Systems:     Review of Systems   Constitutional: Positive for fatigue  Negative for activity change, appetite change, chills and fever  HENT: Negative for congestion  Respiratory: Negative for cough, chest tightness and shortness of breath  Cardiovascular: Negative for chest pain and leg swelling  Gastrointestinal: Negative for abdominal distention, abdominal pain, constipation, diarrhea, nausea and vomiting  Psychiatric/Behavioral: Positive for sleep disturbance  All other systems reviewed and are negative  Past Medical History:     Past Medical History:   Diagnosis Date    Chronic kidney disease     Hyperlipidemia     Hypertension     Stroke ([facility] ) 02/10/2019    right side sl weakness      Past Surgical History:     Past Surgical History:   Procedure Laterality Date    FL RETROGRADE URETHROCYSTOGRAM  6/10/2021    NO PAST SURGERIES      ND CYSTOURETHROSCOPY,URETER CATHETER N/A 6/10/2021    Procedure: CYSTOSCOPY WITH RETROGRADE PYELOGRAM/internal urethrotomy, DVIU, URETHROGRAM, CYSTOGRAM ;  Surgeon: Jose Musa MD;  Location: [address] [place] [address];  Service: Urology    ND TRANSURETHRAL ELEC-SURG PROSTATECTOM N/A 7/15/2021    Procedure: CYSTOSCOPY, TRANSURETHRAL RESECTION OF PROSTATE (TURP);   Surgeon: Jose Musa MD;  Location: [address] [place] [address];  Service: Urology      Family History:     Family History   Problem Relation Age of Onset    Diabetes type II Mother     Hypertension Father     Diabetes type II Father     Diabetes type II Sister     Hypertension Brother       Social History:     Social History     Socioeconomic History    Marital status: /Civil Union     Spouse name: None    Number of children: None    Years of education: None    Highest education level: None   Occupational History    None   Tobacco Use    Smoking status: Former Smoker     Packs/day: 20 00     Types: Cigarettes     Quit date: 7/8/2012     Years since quitting: 10 0    Smokeless tobacco: Never Used   Vaping Use    Vaping Use: Never used   Substance and Sexual Activity    Alcohol use:  Yes     Alcohol/week: 6 0 standard drinks     Types: 6 Cans of beer per week    Drug use: Never    Sexual activity: Not Currently   Other Topics Concern    None   Social History Narrative    None     Social Determinants of Health     Financial Resource Strain: Not on file   Food Insecurity: Not on file   Transportation Needs: Not on file   Physical Activity: Not on file   Stress: Not on file   Social Connections: Not on file   Intimate Partner Violence: Not on file   Housing Stability: Not on file      Current Medications:     Current Outpatient Medications   Medication Sig Dispense Refill    atorvastatin (LIPITOR) 80 mg tablet TAKE ONE TABLET BY MOUTH EVERY DAY WITH DINNER 90 tablet 0    cholecalciferol (VITAMIN D3) 1,000 units tablet Take 2,000 Units by mouth daily      clopidogrel (PLAVIX) 75 mg tablet TAKE ONE TABLET BY MOUTH EVERY DAY 90 tablet 0    folic acid (FOLVITE) 1 mg tablet TAKE ONE TABLET BY MOUTH EVERY DAY (GENERIC FOR FOLVITE) 90 tablet 0    losartan (COZAAR) 100 MG tablet TAKE ONE TABLET BY MOUTH EVERY DAY 90 tablet 0    metoprolol tartrate (LOPRESSOR) 25 mg tablet TAKE ONE TABLET BY MOUTH EVERY DAY (Patient taking differently: Pt taking 1 in am I in pm) 90 tablet 3    Thiamine Mononitrate (VITAMIN B1) 100 mg tablet TAKE ONE TABLET BY MOUTH EVERY DAY(VITAMIN B-1) 90 tablet 0     No current facility-administered medications for this visit  Allergies:     No Known Allergies   Physical Exam:     /82 (BP Location: Left arm, Patient Position: Sitting, Cuff Size: Adult)   Pulse 78   Temp 97 8 °F (36 6 °C) (Temporal)   Resp 14   Ht 6' (1 829 m)   Wt 99 3 kg (219 lb)   BMI 29 70 kg/m²     Physical Exam  Vitals reviewed  Constitutional:       Appearance: He is well-developed  HENT:      Head: Normocephalic and atraumatic  Right Ear: External ear normal       Left Ear: External ear normal       Nose: Nose normal    Eyes:      Conjunctiva/sclera: Conjunctivae normal       Pupils: Pupils are equal, round, and reactive to light  Cardiovascular:      Rate and Rhythm: Normal rate and regular rhythm  Heart sounds: Normal heart sounds  Pulmonary:      Effort: Pulmonary effort is normal       Breath sounds: Normal breath sounds  No wheezing  Abdominal:      General: Bowel sounds are normal  There is no distension  Palpations: Abdomen is soft  There is no mass  Tenderness: There is no abdominal tenderness  Genitourinary:     Penis: Normal     Musculoskeletal:         General: No tenderness  Normal range of motion  Cervical back: Normal range of motion and neck supple  Skin:     General: Skin is warm  Capillary Refill: Capillary refill takes less than 2 seconds  Neurological:      Mental Status: He is alert and oriented to person, place, and time  Cranial Nerves: No cranial nerve deficit  Sensory: No sensory deficit  Motor: No abnormal muscle tone  Coordination: Coordination normal       Deep Tendon Reflexes: Reflexes normal    Psychiatric:         Behavior: Behavior normal          Thought Content:  Thought content normal          Judgment: Judgment normal           [facility]

## 2022-08-09 ENCOUNTER — TELEPHONE (OUTPATIENT)
Dept: FAMILY MEDICINE CLINIC | Facility: CLINIC | Age: 57
End: 2022-08-09

## 2022-08-09 LAB
ALBUMIN SERPL-MCNC: 4.4 G/DL (ref 3.8–4.9)
ALBUMIN/GLOB SERPL: 1.8 {RATIO} (ref 1.2–2.2)
ALP SERPL-CCNC: 102 IU/L (ref 44–121)
ALT SERPL-CCNC: 26 IU/L (ref 0–44)
AST SERPL-CCNC: 22 IU/L (ref 0–40)
BASOPHILS # BLD AUTO: 0 X10E3/UL (ref 0–0.2)
BASOPHILS NFR BLD AUTO: 1 %
BILIRUB SERPL-MCNC: 0.4 MG/DL (ref 0–1.2)
BUN SERPL-MCNC: 20 MG/DL (ref 6–24)
BUN/CREAT SERPL: 10 (ref 9–20)
CALCIUM SERPL-MCNC: 9.3 MG/DL (ref 8.7–10.2)
CHLORIDE SERPL-SCNC: 105 MMOL/L (ref 96–106)
CHOLEST SERPL-MCNC: 169 MG/DL (ref 100–199)
CHOLEST/HDLC SERPL: 5.5 RATIO (ref 0–5)
CO2 SERPL-SCNC: 20 MMOL/L (ref 20–29)
CREAT SERPL-MCNC: 2.01 MG/DL (ref 0.76–1.27)
EGFR: 38 ML/MIN/1.73
EOSINOPHIL # BLD AUTO: 0.2 X10E3/UL (ref 0–0.4)
EOSINOPHIL NFR BLD AUTO: 3 %
ERYTHROCYTE [DISTWIDTH] IN BLOOD BY AUTOMATED COUNT: 13.5 % (ref 11.6–15.4)
GLOBULIN SER-MCNC: 2.4 G/DL (ref 1.5–4.5)
GLUCOSE SERPL-MCNC: 91 MG/DL (ref 65–99)
HCT VFR BLD AUTO: 43.4 % (ref 37.5–51)
HDLC SERPL-MCNC: 31 MG/DL
HGB BLD-MCNC: 14.6 G/DL (ref 13–17.7)
IMM GRANULOCYTES # BLD: 0 X10E3/UL (ref 0–0.1)
IMM GRANULOCYTES NFR BLD: 0 %
LDLC SERPL CALC-MCNC: 106 MG/DL (ref 0–99)
LYMPHOCYTES # BLD AUTO: 1.9 X10E3/UL (ref 0.7–3.1)
LYMPHOCYTES NFR BLD AUTO: 30 %
MCH RBC QN AUTO: 27.8 PG (ref 26.6–33)
MCHC RBC AUTO-ENTMCNC: 33.6 G/DL (ref 31.5–35.7)
MCV RBC AUTO: 83 FL (ref 79–97)
MICRODELETION SYND BLD/T FISH: NORMAL
MICRODELETION SYND BLD/T FISH: NORMAL
MONOCYTES # BLD AUTO: 0.6 X10E3/UL (ref 0.1–0.9)
MONOCYTES NFR BLD AUTO: 10 %
NEUTROPHILS # BLD AUTO: 3.7 X10E3/UL (ref 1.4–7)
NEUTROPHILS NFR BLD AUTO: 56 %
PLATELET # BLD AUTO: 201 X10E3/UL (ref 150–450)
POTASSIUM SERPL-SCNC: 4.5 MMOL/L (ref 3.5–5.2)
PROT SERPL-MCNC: 6.8 G/DL (ref 6–8.5)
RBC # BLD AUTO: 5.25 X10E6/UL (ref 4.14–5.8)
SL AMB T4, FREE (DIRECT): 1.02 NG/DL (ref 0.82–1.77)
SL AMB VLDL CHOLESTEROL CALC: 32 MG/DL (ref 5–40)
SODIUM SERPL-SCNC: 141 MMOL/L (ref 134–144)
TESTOST SERPL-MCNC: 586 NG/DL (ref 264–916)
TRIGL SERPL-MCNC: 179 MG/DL (ref 0–149)
TSH SERPL DL<=0.005 MIU/L-ACNC: 4.77 UIU/ML (ref 0.45–4.5)
WBC # BLD AUTO: 6.4 X10E3/UL (ref 3.4–10.8)

## 2022-08-11 ENCOUNTER — TELEPHONE (OUTPATIENT)
Dept: SLEEP CENTER | Facility: CLINIC | Age: 57
End: 2022-08-11

## 2022-08-11 ENCOUNTER — TELEPHONE (OUTPATIENT)
Dept: FAMILY MEDICINE CLINIC | Facility: CLINIC | Age: 57
End: 2022-08-11

## 2022-08-11 DIAGNOSIS — R79.89 ABNORMAL TSH: Primary | ICD-10-CM

## 2022-08-11 NOTE — TELEPHONE ENCOUNTER
----- Message from Boris Gutierrez DO sent at 8/10/2022  9:19 PM EDT -----  approved  ----- Message -----  From: Demetria Mason  Sent: 8/9/2022   8:17 AM EDT  To: Sleep Medicine [facility] Provider    This Home sleep study needs approval      If approved please sign and return to clerical pool  If denied please include reasons why  Also provide alternative testing if warranted  Please sign and return to clerical pool

## 2022-09-06 ENCOUNTER — OFFICE VISIT (OUTPATIENT)
Dept: FAMILY MEDICINE CLINIC | Facility: CLINIC | Age: 57
End: 2022-09-06
Payer: COMMERCIAL

## 2022-09-06 VITALS
HEART RATE: 88 BPM | HEIGHT: 72 IN | TEMPERATURE: 97.9 F | RESPIRATION RATE: 16 BRPM | SYSTOLIC BLOOD PRESSURE: 150 MMHG | BODY MASS INDEX: 29.53 KG/M2 | DIASTOLIC BLOOD PRESSURE: 90 MMHG | OXYGEN SATURATION: 99 % | WEIGHT: 218 LBS

## 2022-09-06 DIAGNOSIS — I10 ESSENTIAL HYPERTENSION: Primary | ICD-10-CM

## 2022-09-06 DIAGNOSIS — I63.512 CEREBROVASCULAR ACCIDENT (CVA) DUE TO OCCLUSION OF LEFT MIDDLE CEREBRAL ARTERY (HCC): ICD-10-CM

## 2022-09-06 DIAGNOSIS — R79.89 ABNORMAL TSH: ICD-10-CM

## 2022-09-06 PROCEDURE — 99214 OFFICE O/P EST MOD 30 MIN: CPT | Performed by: FAMILY MEDICINE

## 2022-09-06 RX ORDER — CLOPIDOGREL BISULFATE 75 MG/1
75 TABLET ORAL DAILY
Qty: 90 TABLET | Refills: 2 | Status: SHIPPED | OUTPATIENT
Start: 2022-09-06

## 2022-09-06 NOTE — PROGRESS NOTES
Sofya Luna 1965 male MRN: [id number]    [facility] PRACTICE OFFICE VISIT  [facility]      ASSESSMENT/PLAN  Sofya Luna is a 62 y o  male presents to the office for    Diagnoses and all orders for this visit:    Essential hypertension    Cerebrovascular accident (CVA) due to occlusion of left middle cerebral artery (HCC)  -     clopidogrel (PLAVIX) 75 mg tablet; Take 1 tablet (75 mg total) by mouth daily    Abnormal TSH     Reprinted TSH today patient is to perform 2 week  Recommend sleep apnea study which is already scheduled for December  Recommend monitoring his blood pressure at home  Printed out prescriptions for the patient so that he is able to take medications appropriately sore able to assess more on how he should be taking his metoprolol if it should be in the morning at night  However at this time I would like to take both of his blood pressure pills in the morning  Future Appointments   Date Time Provider Benedicto Brown   9/12/2022  4:00 PM Curt Arriola MD [facility]   11/25/2022  9:00 AM [place] [facility] [id number]   12/28/2022  7:30  [facility] Sleep lab [id number]  CC: Hypertension      HPI:  Sofya Luna is a 62 y o  male who presents for a follow-up appointment  Patient states he has not been monitoring his blood pressure  Does admit to not eating as well when he was on vacation  A prior to that was trying to eat well  Continues to have profound fatigue which is stopping him from doing his daily activities as well as 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  Patient states that he does drink beer here and there but is not in every night thing  Does take all his supplements  Would like to know how he should be taking his medications to be sure he is taking it correctly    Review of Systems   Constitutional: Positive for fatigue   Negative for activity change, appetite change, chills and fever    HENT: Negative for congestion  Respiratory: Negative for cough, chest tightness and shortness of breath  Cardiovascular: Negative for chest pain and leg swelling  Gastrointestinal: Negative for abdominal distention, abdominal pain, constipation, diarrhea, nausea and vomiting  All other systems reviewed and are negative  Historical Information   The patient history was reviewed as follows:  Past Medical History:   Diagnosis Date    Chronic kidney disease     Hyperlipidemia     Hypertension     [facility] ) 02/10/2019    right side sl weakness         Medications:     Current Outpatient Medications:     atorvastatin (LIPITOR) 80 mg tablet, TAKE ONE TABLET BY MOUTH EVERY DAY WITH DINNER, Disp: 90 tablet, Rfl: 0    cholecalciferol (VITAMIN D3) 1,000 units tablet, Take 2,000 Units by mouth daily, Disp: , Rfl:     clopidogrel (PLAVIX) 75 mg tablet, Take 1 tablet (75 mg total) by mouth daily, Disp: 90 tablet, Rfl: 2    folic acid (FOLVITE) 1 mg tablet, TAKE ONE TABLET BY MOUTH EVERY DAY (GENERIC FOR FOLVITE), Disp: 90 tablet, Rfl: 0    losartan (COZAAR) 100 MG tablet, TAKE ONE TABLET BY MOUTH EVERY DAY, Disp: 90 tablet, Rfl: 0    Thiamine Mononitrate (VITAMIN B1) 100 mg tablet, TAKE ONE TABLET BY MOUTH EVERY DAY(VITAMIN B-1), Disp: 90 tablet, Rfl: 0    metoprolol tartrate (LOPRESSOR) 25 mg tablet, TAKE ONE TABLET BY MOUTH EVERY DAY (Patient taking differently: Pt taking 1 in am I in pm), Disp: 90 tablet, Rfl: 3    No Known Allergies    OBJECTIVE  Vitals:   Vitals:    09/06/22 1417   BP: 150/90   BP Location: Left arm   Patient Position: Sitting   Cuff Size: Large   Pulse: 88   Resp: 16   Temp: 97 9 °F (36 6 °C)   SpO2: 99%   Weight: 98 9 kg (218 lb)   Height: 6' (1 829 m)         Physical Exam  Vitals reviewed  Constitutional:       Appearance: He is well-developed  HENT:      Head: Normocephalic and atraumatic     Eyes:      Conjunctiva/sclera: Conjunctivae normal       Pupils: Pupils are equal, round, and reactive to light  Cardiovascular:      Rate and Rhythm: Normal rate and regular rhythm  Heart sounds: Normal heart sounds  Pulmonary:      Effort: Pulmonary effort is normal  No respiratory distress  Breath sounds: Normal breath sounds  Musculoskeletal:         General: Normal range of motion  Cervical back: Normal range of motion and neck supple  Skin:     General: Skin is warm  Capillary Refill: Capillary refill takes less than 2 seconds  Neurological:      Mental Status: He is alert and oriented to person, place, and time                      Francy Sousa MD,   [facility]  9/6/2022

## 2022-09-12 ENCOUNTER — OFFICE VISIT (OUTPATIENT)
Dept: NEPHROLOGY | Facility: CLINIC | Age: 57
End: 2022-09-12
Payer: COMMERCIAL

## 2022-09-12 VITALS
HEIGHT: 72 IN | BODY MASS INDEX: 29.8 KG/M2 | DIASTOLIC BLOOD PRESSURE: 82 MMHG | WEIGHT: 220 LBS | SYSTOLIC BLOOD PRESSURE: 152 MMHG

## 2022-09-12 DIAGNOSIS — I10 BENIGN ESSENTIAL HYPERTENSION: ICD-10-CM

## 2022-09-12 DIAGNOSIS — N18.32 STAGE 3B CHRONIC KIDNEY DISEASE (HCC): Primary | ICD-10-CM

## 2022-09-12 DIAGNOSIS — N32.0 BLADDER OUTLET OBSTRUCTION: ICD-10-CM

## 2022-09-12 PROBLEM — E87.5 HYPERKALEMIA: Status: RESOLVED | Noted: 2021-04-19 | Resolved: 2022-09-12

## 2022-09-12 PROCEDURE — 3079F DIAST BP 80-89 MM HG: CPT | Performed by: INTERNAL MEDICINE

## 2022-09-12 PROCEDURE — 99214 OFFICE O/P EST MOD 30 MIN: CPT | Performed by: INTERNAL MEDICINE

## 2022-09-12 PROCEDURE — 3077F SYST BP >= 140 MM HG: CPT | Performed by: INTERNAL MEDICINE

## 2022-09-12 NOTE — PROGRESS NOTES
NEPHROLOGY OFFICE VISIT   Carlos Child 62 y o  male MRN: [id number]  9/12/2022    Reason for Visit:  Chronic kidney disease and hypertension    History of Present Illness (HPI):    I had the pleasure of seeing Kaley Clancy today in the renal clinic for the continued management of chronic kidney disease and hyper 10  Since our last visit, there has been no ER visits or hospitilizations  He currently has no complaints at this time and is feeling well  Patient denies any chest pain, shortness of breath and swelling  The last blood work was done on early August, which we have reviewed together  His blood pressure at home systolic has been ranging [id number] systolic  And that his is target blood pressure as any lower he gets dizzy  He does report some white coat hypertension  Blood pressure slightly above goal I have asked him to continue monitor at home as long as it is at target at home is acceptable to continue the current regiment of losartan and metoprolol  His last blood work which was a fasting specimen was otherwise quite stable with a creatinine of 2 01 mg/dL which is at the higher end of his baseline but overall quite stable  He reports no urinary symptoms      ASSESSMENT and PLAN:      Chronic kidney disease stage IIIB  --secondary to history of longstanding hypertension with episode of acute kidney injury secondary to chronic urinary retention  --most recent renal ultrasound shows improvement of the obstruction  --creatinine has plateaued at around high 1's to low 2's (1 8-2 mg/dL)  --continued antihypertensive control  --status post cystoscopy with transurethral resection prostate on July 15th, 2021  --avoid NSAIDs  --renal function stable     Hypertension  --losartan 100 mg daily  --continue metoprolol 25 mg twice a day  --aiming for blood pressure in the 140s given his history of carotid stenosis  --continue current managed     Hyperkalemia--resolved     Urinary retention/BPH  --improved after cystoscopy with transurethral resection of prostate on 7/15/2021      PATIENT INSTRUCTIONS:    Patient Instructions   1 )  Low 2 g sodium diet    2 )  Monitor weights at home    3 )  Avoid NSAIDs (ibuprofen, Motrin, Advil, Aleve, naproxen)    4 )  Monitor blood pressure at home, call if blood pressure greater than 150/90 persistently    5 ) I will plan to discuss all results including blood work, and/or imaging at our next visit, unless there is an urgent indication, in which case I will call you earlier  If you have any questions or concerns about your results, please feel free to call our office  Orders Placed This Encounter   Procedures    Cystatin C With eGFR     Standing Status:   Future     Standing Expiration Date:   9/12/2023    Comprehensive metabolic panel     This is a patient instruction: Patient fasting for 8 hours or longer recommended  Standing Status:   Future     Number of Occurrences:   1     Standing Expiration Date:   9/12/2023    CBC     Standing Status:   Future     Number of Occurrences:   1     Standing Expiration Date:   9/12/2023       OBJECTIVE:  Current Weight: Weight - Scale: 99 8 kg (220 lb)  Vitals:    09/12/22 1635 09/12/22 1645   BP:  152/82   Weight: 99 8 kg (220 lb)    Height: 6' (1 829 m)     Body mass index is 29 84 kg/m²  REVIEW OF SYSTEMS:    Review of Systems   Constitutional: Negative for activity change and fever  Respiratory: Negative for cough, chest tightness, shortness of breath and wheezing  Cardiovascular: Negative for chest pain and leg swelling  Gastrointestinal: Negative for abdominal pain, diarrhea, nausea and vomiting  Endocrine: Negative for polyuria  Genitourinary: Negative for difficulty urinating, dysuria, flank pain, frequency and urgency  Skin: Negative for rash  Neurological: Negative for dizziness, syncope, light-headedness and headaches  PHYSICAL EXAM:      Physical Exam  Vitals and nursing note reviewed   Exam conducted with a chaperone present  Constitutional:       General: He is not in acute distress  Appearance: He is well-developed  HENT:      Head: Normocephalic and atraumatic  Eyes:      General: No scleral icterus  Conjunctiva/sclera: Conjunctivae normal       Pupils: Pupils are equal, round, and reactive to light  Cardiovascular:      Rate and Rhythm: Normal rate and regular rhythm  Heart sounds: S1 normal and S2 normal  No murmur heard  No friction rub  No gallop  Pulmonary:      Effort: Pulmonary effort is normal  No respiratory distress  Breath sounds: Normal breath sounds  No wheezing or rales  Abdominal:      General: Bowel sounds are normal       Palpations: Abdomen is soft  Tenderness: There is no abdominal tenderness  There is no rebound  Musculoskeletal:         General: Normal range of motion  Cervical back: Normal range of motion and neck supple  Skin:     Findings: No rash  Neurological:      Mental Status: He is alert and oriented to person, place, and time     Psychiatric:         Behavior: Behavior normal          Medications:    Current Outpatient Medications:     atorvastatin (LIPITOR) 80 mg tablet, TAKE ONE TABLET BY MOUTH EVERY DAY WITH DINNER, Disp: 90 tablet, Rfl: 0    cholecalciferol (VITAMIN D3) 1,000 units tablet, Take 2,000 Units by mouth daily, Disp: , Rfl:     clopidogrel (PLAVIX) 75 mg tablet, Take 1 tablet (75 mg total) by mouth daily, Disp: 90 tablet, Rfl: 2    folic acid (FOLVITE) 1 mg tablet, TAKE ONE TABLET BY MOUTH EVERY DAY (GENERIC FOR FOLVITE), Disp: 90 tablet, Rfl: 0    losartan (COZAAR) 100 MG tablet, TAKE ONE TABLET BY MOUTH EVERY DAY, Disp: 90 tablet, Rfl: 0    metoprolol tartrate (LOPRESSOR) 25 mg tablet, TAKE ONE TABLET BY MOUTH EVERY DAY (Patient taking differently: 25 mg in the morning), Disp: 90 tablet, Rfl: 3    Thiamine Mononitrate (VITAMIN B1) 100 mg tablet, TAKE ONE TABLET BY MOUTH EVERY DAY(VITAMIN B-1), Disp: 90 tablet, Rfl: 0    Laboratory Results:        Invalid input(s): ALBUMIN    Results for orders placed or performed in visit on 08/04/22   Testosterone   Result Value Ref Range    TESTOSTERONE TOTAL 586 264 - 916 ng/dL   CBC and differential   Result Value Ref Range    White Blood Cell Count 6 4 3 4 - 10 8 x10E3/uL    Red Blood Cell Count 5 25 4 14 - 5 80 x10E6/uL    Hemoglobin 14 6 13 0 - 17 7 g/dL    HCT 43 4 37 5 - 51 0 %    MCV 83 79 - 97 fL    MCH 27 8 26 6 - 33 0 pg    MCHC 33 6 31 5 - 35 7 g/dL    RDW 13 5 11 6 - 15 4 %    Platelet Count 854 964 - 450 x10E3/uL    Neutrophils 56 Not Estab  %    Lymphocytes 30 Not Estab  %    Monocytes 10 Not Estab  %    Eosinophils 3 Not Estab  %    Basophils PCT 1 Not Estab  %    Neutrophils (Absolute) 3 7 1 4 - 7 0 x10E3/uL    Lymphocytes (Absolute) 1 9 0 7 - 3 1 x10E3/uL    Monocytes (Absolute) 0 6 0 1 - 0 9 x10E3/uL    Eosinophils (Absolute) 0 2 0 0 - 0 4 x10E3/uL    Basophils ABS 0 0 0 0 - 0 2 x10E3/uL    Immature Granulocytes 0 Not Estab  %    Immature Granulocytes (Absolute) 0 0 0 0 - 0 1 x10E3/uL   Comprehensive metabolic panel   Result Value Ref Range    Glucose, Random 91 65 - 99 mg/dL    BUN 20 6 - 24 mg/dL    Creatinine 2 01 (H) 0 76 - 1 27 mg/dL    eGFR 38 (L) >59 mL/min/1 73    SL AMB BUN/CREATININE RATIO 10 9 - 20    Sodium 141 134 - 144 mmol/L    Potassium 4 5 3 5 - 5 2 mmol/L    Chloride 105 96 - 106 mmol/L    CO2 20 20 - 29 mmol/L    CALCIUM 9 3 8 7 - 10 2 mg/dL    Protein, Total 6 8 6 0 - 8 5 g/dL    Albumin 4 4 3 8 - 4 9 g/dL    Globulin, Total 2 4 1 5 - 4 5 g/dL    Albumin/Globulin Ratio 1 8 1 2 - 2 2    TOTAL BILIRUBIN 0 4 0 0 - 1 2 mg/dL    Alk Phos Isoenzymes 102 44 - 121 IU/L    AST 22 0 - 40 IU/L    ALT 26 0 - 44 IU/L   Lipid panel   Result Value Ref Range    Cholesterol, Total 169 100 - 199 mg/dL    Triglycerides 179 (H) 0 - 149 mg/dL    HDL 31 (L) >39 mg/dL    VLDL Cholesterol Calculated 32 5 - 40 mg/dL    LDL Calculated 106 (H) 0 - 99 mg/dL T  Chol/HDL Ratio 5 5 (H) 0 0 - 5 0 ratio   TSH, 3rd generation with Free T4 reflex   Result Value Ref Range    TSH 4 770 (H) 0 450 - 4 500 uIU/mL   Cardiovascular Report   Result Value Ref Range    Interpretation Note    [employer] Kidney Stone Panel   Result Value Ref Range    Interpretation Note    T4, free   Result Value Ref Range    T4,Free (Direct) 1 02 0 82 - 1 77 ng/dL

## 2022-09-13 LAB
25(OH)D3+25(OH)D2 SERPL-MCNC: 45.6 NG/ML (ref 30–100)
ALBUMIN SERPL-MCNC: 4.4 G/DL (ref 3.8–4.9)
ALBUMIN/GLOB SERPL: 1.6 {RATIO} (ref 1.2–2.2)
ALP SERPL-CCNC: 122 IU/L (ref 44–121)
ALT SERPL-CCNC: 26 IU/L (ref 0–44)
APPEARANCE UR: CLEAR
AST SERPL-CCNC: 18 IU/L (ref 0–40)
BACTERIA URNS QL MICRO: ABNORMAL
BILIRUB SERPL-MCNC: 0.5 MG/DL (ref 0–1.2)
BILIRUB UR QL STRIP: NEGATIVE
BUN SERPL-MCNC: 26 MG/DL (ref 6–24)
BUN/CREAT SERPL: 13 (ref 9–20)
CALCIUM SERPL-MCNC: 9.5 MG/DL (ref 8.7–10.2)
CASTS URNS QL MICRO: ABNORMAL /LPF
CHLORIDE SERPL-SCNC: 99 MMOL/L (ref 96–106)
CO2 SERPL-SCNC: 24 MMOL/L (ref 20–29)
COLOR UR: YELLOW
CREAT SERPL-MCNC: 1.94 MG/DL (ref 0.76–1.27)
CYSTATIN C SERPL-MCNC: 1.65 MG/L (ref 0.67–1.14)
EGFR: 40 ML/MIN/1.73
EPI CELLS #/AREA URNS HPF: ABNORMAL /HPF (ref 0–10)
ERYTHROCYTE [DISTWIDTH] IN BLOOD BY AUTOMATED COUNT: 13.4 % (ref 11.6–15.4)
GFR/BSA.PRED SERPLBLD CYS-BASED-ARV: 40 ML/MIN/1.73
GLOBULIN SER-MCNC: 2.7 G/DL (ref 1.5–4.5)
GLUCOSE SERPL-MCNC: 87 MG/DL (ref 65–99)
GLUCOSE UR QL: NEGATIVE
HCT VFR BLD AUTO: 46.9 % (ref 37.5–51)
HGB BLD-MCNC: 15.5 G/DL (ref 13–17.7)
HGB UR QL STRIP: ABNORMAL
KETONES UR QL STRIP: NEGATIVE
LEUKOCYTE ESTERASE UR QL STRIP: ABNORMAL
MCH RBC QN AUTO: 27.7 PG (ref 26.6–33)
MCHC RBC AUTO-ENTMCNC: 33 G/DL (ref 31.5–35.7)
MCV RBC AUTO: 84 FL (ref 79–97)
MICRO URNS: ABNORMAL
NITRITE UR QL STRIP: POSITIVE
PH UR STRIP: 6 [PH] (ref 5–7.5)
PHOSPHATE SERPL-MCNC: 3.2 MG/DL (ref 2.8–4.1)
PLATELET # BLD AUTO: 221 X10E3/UL (ref 150–450)
POTASSIUM SERPL-SCNC: 4.6 MMOL/L (ref 3.5–5.2)
PROT SERPL-MCNC: 7.1 G/DL (ref 6–8.5)
PROT UR QL STRIP: ABNORMAL
PTH-INTACT SERPL-MCNC: 22 PG/ML (ref 15–65)
RBC # BLD AUTO: 5.59 X10E6/UL (ref 4.14–5.8)
RBC #/AREA URNS HPF: ABNORMAL /HPF (ref 0–2)
SODIUM SERPL-SCNC: 138 MMOL/L (ref 134–144)
SP GR UR: 1.01 (ref 1–1.03)
UROBILINOGEN UR STRIP-ACNC: 0.2 MG/DL (ref 0.2–1)
WBC # BLD AUTO: 7.2 X10E3/UL (ref 3.4–10.8)
WBC #/AREA URNS HPF: >30 /HPF (ref 0–5)

## 2022-10-10 ENCOUNTER — TELEPHONE (OUTPATIENT)
Dept: FAMILY MEDICINE CLINIC | Facility: CLINIC | Age: 57
End: 2022-10-10

## 2022-10-10 NOTE — TELEPHONE ENCOUNTER
Can you do me a favor and call his insurance and be sure that his sleep study has been approved?  Sleep center told patient to call us

## 2022-10-20 ENCOUNTER — HOSPITAL ENCOUNTER (OUTPATIENT)
Dept: SLEEP CENTER | Facility: CLINIC | Age: 57
Discharge: HOME | End: 2022-10-20
Payer: COMMERCIAL

## 2022-10-20 DIAGNOSIS — G47.10 EXCESSIVE SLEEPINESS: ICD-10-CM

## 2022-10-20 PROCEDURE — 95806 SLEEP STUDY UNATT&RESP EFFT: CPT

## 2022-10-20 PROCEDURE — G0399 HOME SLEEP TEST/TYPE 3 PORTA: HCPCS

## 2022-10-26 DIAGNOSIS — G47.30 MILD SLEEP APNEA: Primary | ICD-10-CM

## 2022-10-28 ENCOUNTER — TELEPHONE (OUTPATIENT)
Dept: PULMONOLOGY | Facility: MEDICAL CENTER | Age: 57
End: 2022-10-28

## 2022-10-31 ENCOUNTER — DOCUMENTATION (OUTPATIENT)
Dept: FAMILY MEDICINE CLINIC | Facility: CLINIC | Age: 57
End: 2022-10-31

## 2022-10-31 DIAGNOSIS — I63.512 CEREBROVASCULAR ACCIDENT (CVA) DUE TO OCCLUSION OF LEFT MIDDLE CEREBRAL ARTERY (HCC): Primary | ICD-10-CM

## 2022-10-31 RX ORDER — ATORVASTATIN CALCIUM 80 MG/1
80 TABLET, FILM COATED ORAL
Qty: 30 TABLET | Refills: 2 | Status: SHIPPED | OUTPATIENT
Start: 2022-10-31 | End: 2023-01-29

## 2022-11-22 ENCOUNTER — CONSULT (OUTPATIENT)
Dept: PULMONOLOGY | Facility: MEDICAL CENTER | Age: 57
End: 2022-11-22

## 2022-11-22 VITALS
WEIGHT: 216.6 LBS | HEIGHT: 72 IN | BODY MASS INDEX: 29.34 KG/M2 | SYSTOLIC BLOOD PRESSURE: 122 MMHG | DIASTOLIC BLOOD PRESSURE: 86 MMHG | OXYGEN SATURATION: 97 % | HEART RATE: 74 BPM | TEMPERATURE: 97.8 F | RESPIRATION RATE: 12 BRPM

## 2022-11-22 DIAGNOSIS — G47.19 EXCESSIVE DAYTIME SLEEPINESS: ICD-10-CM

## 2022-11-22 DIAGNOSIS — G47.33 OSA (OBSTRUCTIVE SLEEP APNEA): Primary | ICD-10-CM

## 2022-11-22 DIAGNOSIS — G47.30 MILD SLEEP APNEA: ICD-10-CM

## 2022-11-22 LAB

## 2022-11-22 NOTE — PROGRESS NOTES
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  He has snoring, gasping/choking in sleep  He has stroke about 4 years back  Plan:     -start autoCPAP: Tips to tolerate CPAP discussed  CPAP compliance discussed  Subjective:     Patient ID: Rebecca Lynn is a 62 y o  male  Chief Complaint:  HPI   A 62 M with hypertension, hyperlipidemia, CVA about 4 years ago had developed progressive fatigue and excessive daytime sleepiness to the point that he is 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  He also has snoring, gasping and choking in sleep  He is a restless sleeper  The following portions of the patient's history were reviewed in this encounter and updated as appropriate:   Review of Systems   Constitutional: Positive for fatigue  Negative for activity change, appetite change, diaphoresis and fever  HENT: Negative for congestion, dental problem, drooling and ear discharge  Eyes: Negative for discharge and redness  Respiratory: Positive for apnea  Negative for cough, choking, chest tightness, shortness of breath and stridor  Cardiovascular: Negative for chest pain and leg swelling  Gastrointestinal: Negative for abdominal distention, abdominal pain, anal bleeding and blood in stool  Genitourinary: Negative for difficulty urinating, dysuria and enuresis  Musculoskeletal: Negative for arthralgias and back pain  Neurological: Negative for dizziness, facial asymmetry and light-headedness  Psychiatric/Behavioral: Negative for agitation, behavioral problems and confusion           Objective:    Physical Exam  Constitutional: Appearance: Normal appearance  HENT:      Head: Normocephalic and atraumatic  Nose: Nose normal       Mouth/Throat:      Mouth: Mucous membranes are moist    Eyes:      Pupils: Pupils are equal, round, and reactive to light  Cardiovascular:      Rate and Rhythm: Normal rate and regular rhythm  Pulses: Normal pulses  Heart sounds: Normal heart sounds  Pulmonary:      Effort: Pulmonary effort is normal       Breath sounds: Normal breath sounds  Abdominal:      General: Bowel sounds are normal  There is no distension  Palpations: Abdomen is soft  Musculoskeletal:      Cervical back: Neck supple  Skin:     General: Skin is warm  Capillary Refill: Capillary refill takes less than 2 seconds  Neurological:      General: No focal deficit present  Mental Status: He is alert and oriented to person, place, and time  Mental status is at baseline  Psychiatric:         Mood and Affect: Mood normal          Thought Content: Thought content normal          Lab Review:   No visits with results within 2 Month(s) from this visit     Latest known visit with results is:   Office Visit on 08/04/2022   Component Date Value   • TESTOSTERONE TOTAL 08/08/2022 586    • White Blood Cell Count 08/08/2022 6 4    • Red Blood Cell Count 08/08/2022 5 25    • Hemoglobin 08/08/2022 14 6    • HCT 08/08/2022 43 4    • MCV 08/08/2022 83    • MCH 08/08/2022 27 8    • MCHC 08/08/2022 33 6    • RDW 08/08/2022 13 5    • Platelet Count 50/55/2383 201    • Neutrophils 08/08/2022 56    • Lymphocytes 08/08/2022 30    • Monocytes 08/08/2022 10    • Eosinophils 08/08/2022 3    • Basophils PCT 08/08/2022 1    • Neutrophils (Absolute) 08/08/2022 3 7    • Lymphocytes (Absolute) 08/08/2022 1 9    • Monocytes (Absolute) 08/08/2022 0 6    • Eosinophils (Absolute) 08/08/2022 0 2    • Basophils ABS 08/08/2022 0 0    • Immature Granulocytes 08/08/2022 0    • Immature Granulocytes (A* 08/08/2022 0 0    • Glucose, Random 08/08/2022 91    • BUN 08/08/2022 20    • Creatinine 08/08/2022 2 01 (H)    • eGFR 08/08/2022 38 (L)    • SL AMB BUN/CREATININE RA* 08/08/2022 10    • Sodium 08/08/2022 141    • Potassium 08/08/2022 4 5    • Chloride 08/08/2022 105    • CO2 08/08/2022 20    • CALCIUM 08/08/2022 9 3    • Protein, Total 08/08/2022 6 8    • Albumin 08/08/2022 4 4    • Globulin, Total 08/08/2022 2 4    • Albumin/Globulin Ratio 08/08/2022 1 8    • TOTAL BILIRUBIN 08/08/2022 0 4    • Alk Phos Isoenzymes 08/08/2022 102    • AST 08/08/2022 22    • ALT 08/08/2022 26    • Cholesterol, Total 08/08/2022 169    • Triglycerides 08/08/2022 179 (H)    • HDL 08/08/2022 31 (L)    • VLDL Cholesterol Calcula* 08/08/2022 32    • LDL Calculated 08/08/2022 106 (H)    • T   Chol/HDL Ratio 08/08/2022 5 5 (H)    • TSH 08/08/2022 4 770 (H)    • Interpretation 08/08/2022 Note    • Interpretation 08/08/2022 Note    • T4,Free (Direct) 08/08/2022 1 02

## 2022-11-22 NOTE — PROGRESS NOTES
[facility]  Pt will have  at [facility] on 12/6/22 @ 3pm with Ivan

## 2022-12-06 ENCOUNTER — HOSPITAL ENCOUNTER (OUTPATIENT)
Dept: RADIOLOGY | Facility: HOSPITAL | Age: 57
Discharge: HOME | End: 2022-12-06
Attending: SURGERY

## 2022-12-06 DIAGNOSIS — Z82.49 FAMILY HISTORY OF ABDOMINAL AORTIC ANEURYSM (AAA): ICD-10-CM

## 2022-12-06 DIAGNOSIS — I65.29 ASYMPTOMATIC CAROTID ARTERY STENOSIS, UNSPECIFIED LATERALITY: ICD-10-CM

## 2022-12-19 DIAGNOSIS — I10 ESSENTIAL HYPERTENSION: ICD-10-CM

## 2022-12-19 DIAGNOSIS — I63.512 CEREBROVASCULAR ACCIDENT (CVA) DUE TO OCCLUSION OF LEFT MIDDLE CEREBRAL ARTERY (HCC): ICD-10-CM

## 2022-12-19 DIAGNOSIS — F10.10 ALCOHOL ABUSE: ICD-10-CM

## 2022-12-19 RX ORDER — FOLIC ACID 1 MG/1
1 TABLET ORAL DAILY
Qty: 90 TABLET | Refills: 2 | Status: SHIPPED | OUTPATIENT
Start: 2022-12-19 | End: 2023-09-13

## 2022-12-19 RX ORDER — CLOPIDOGREL BISULFATE 75 MG/1
75 TABLET ORAL DAILY
Qty: 90 TABLET | Refills: 2 | Status: SHIPPED | OUTPATIENT
Start: 2022-12-19 | End: 2023-09-22

## 2022-12-19 RX ORDER — GAUZE BANDAGE 2" X 2"
100 BANDAGE TOPICAL DAILY
Qty: 90 TABLET | Refills: 2 | Status: SHIPPED | OUTPATIENT
Start: 2022-12-19

## 2022-12-19 RX ORDER — ATORVASTATIN CALCIUM 80 MG/1
80 TABLET, FILM COATED ORAL
Qty: 90 TABLET | Refills: 2 | Status: SHIPPED | OUTPATIENT
Start: 2022-12-19 | End: 2023-09-22

## 2022-12-19 RX ORDER — LOSARTAN POTASSIUM 100 MG/1
100 TABLET ORAL DAILY
Qty: 90 TABLET | Refills: 0 | Status: SHIPPED | OUTPATIENT
Start: 2022-12-19 | End: 2023-06-21 | Stop reason: SDUPTHER

## 2022-12-20 LAB

## 2022-12-29 DIAGNOSIS — I10 ESSENTIAL HYPERTENSION: ICD-10-CM

## 2023-02-14 LAB

## 2023-02-27 ENCOUNTER — TELEPHONE (OUTPATIENT)
Dept: FAMILY MEDICINE CLINIC | Facility: CLINIC | Age: 58
End: 2023-02-27

## 2023-02-27 DIAGNOSIS — R41.0 CONFUSION: Primary | ICD-10-CM

## 2023-02-27 NOTE — TELEPHONE ENCOUNTER
Patient"s wife says patient has been confused and fatigued since last Wednesday  She wants to know if you want to order labs to check his kidney levels and appointment some time this week? Please advise

## 2023-02-27 NOTE — TELEPHONE ENCOUNTER
Labs are placed  But confusion and fatigue can be multiple things   Would prefer to see him as a Same day tomorrow, or hospital which we will get answers faster

## 2023-02-28 ENCOUNTER — OFFICE VISIT (OUTPATIENT)
Dept: FAMILY MEDICINE CLINIC | Facility: CLINIC | Age: 58
End: 2023-02-28

## 2023-02-28 VITALS
OXYGEN SATURATION: 97 % | BODY MASS INDEX: 28.99 KG/M2 | DIASTOLIC BLOOD PRESSURE: 80 MMHG | HEIGHT: 72 IN | WEIGHT: 214 LBS | SYSTOLIC BLOOD PRESSURE: 124 MMHG | TEMPERATURE: 96.5 F | RESPIRATION RATE: 16 BRPM | HEART RATE: 72 BPM

## 2023-02-28 DIAGNOSIS — R79.89 ABNORMAL SERUM CREATININE LEVEL: ICD-10-CM

## 2023-02-28 DIAGNOSIS — R35.0 FREQUENT URINATION: Primary | ICD-10-CM

## 2023-02-28 DIAGNOSIS — R05.9 COUGH IN ADULT: ICD-10-CM

## 2023-02-28 DIAGNOSIS — I10 ESSENTIAL HYPERTENSION: ICD-10-CM

## 2023-02-28 DIAGNOSIS — R41.0 CONFUSION: ICD-10-CM

## 2023-02-28 LAB
SL AMB  POCT GLUCOSE, UA: ABNORMAL
SL AMB LEUKOCYTE ESTERASE,UA: 500
SL AMB POCT BILIRUBIN,UA: ABNORMAL
SL AMB POCT BLOOD,UA: ABNORMAL
SL AMB POCT CLARITY,UA: CLEAR
SL AMB POCT COLOR,UA: YELLOW
SL AMB POCT KETONES,UA: ABNORMAL
SL AMB POCT NITRITE,UA: ABNORMAL
SL AMB POCT PH,UA: 6
SL AMB POCT SPECIFIC GRAVITY,UA: 1.01
SL AMB POCT URINE PROTEIN: ABNORMAL
SL AMB POCT UROBILINOGEN: ABNORMAL

## 2023-02-28 NOTE — PROGRESS NOTES
Lindsey lBanco 1965 male MRN: [id number]    FAMILY PRACTICE OFFICE VISIT  [facility] - [address]      ASSESSMENT/PLAN  Lindsey Blanco is a 62 y o  male presents to the office for    Diagnoses and all orders for this visit:    Frequent urination  -     POCT urine dip auto non-scope  -     Urine culture; Future  -     Urine culture    Cough in adult  -     COVID Only- Office Collect    Confusion  -     TSH, 3rd generation; Future  -     TSH, 3rd generation    Essential hypertension  -     CBC and differential; Future  -     Comprehensive metabolic panel; Future  -     TSH, 3rd generation; Future  -     CBC and differential  -     Comprehensive metabolic panel  -     TSH, 3rd generation    Abnormal serum creatinine level  -     Comprehensive metabolic panel; Future  -     Comprehensive metabolic panel     We will send for all blood work  We will also send for COVID testing  If there is no improvement highly recommend the emergency room visit  Recommend hydration at least 64 ounces of fluid a day  BP currently stable today  BMI Counseling: Body mass index is 29 02 kg/m²  The BMI is above normal  Exercise recommendations include exercising 3-5 times per week  Rationale for BMI follow-up plan is due to patient being overweight or obese  Depression Screening and Follow-up Plan: Patient was screened for depression during today's encounter  They screened negative with a PHQ-2 score of 0  Future Appointments   Date Time Provider Benedicto Wynnei   3/7/2023  4:00 PM Celestine Pacheco MD [facility]   3/8/2023  9:00 AM YASIR Bryson [facility]          SUBJECTIVE  CC: Fatigue (Feeling cold )      HPI:  Lindsey Blanco is a 62 y o  male who presents for an acute appointment accompanied by his wife   " Feeling off" Family has been seeing confusion  Exhausted tired  Saturday-> he felt cold, and was shaking  Naomia Coil down with covers  Had chills  Slept downstairs  Sunday lay around  Does have a history of having ARMANDO therefore concerning  Review of Systems   Constitutional: Positive for activity change, appetite change and fatigue  Negative for chills and fever  HENT: Negative for congestion  Respiratory: Negative for cough, chest tightness and shortness of breath  Cardiovascular: Negative for chest pain and leg swelling  Gastrointestinal: Negative for abdominal distention, abdominal pain, constipation, diarrhea, nausea and vomiting  Psychiatric/Behavioral: Positive for confusion  All other systems reviewed and are negative        Historical Information   The patient history was reviewed as follows:  Past Medical History:   Diagnosis Date   • Chronic kidney disease    • Hyperlipidemia    • Hypertension    • Stroke ([facility] [address] ) 02/10/2019    right side sl weakness         Medications:     Current Outpatient Medications:   •  atorvastatin (LIPITOR) 80 mg tablet, Take 1 tablet (80 mg total) by mouth daily with dinner, Disp: 90 tablet, Rfl: 2  •  cholecalciferol (VITAMIN D3) 1,000 units tablet, Take 2,000 Units by mouth daily, Disp: , Rfl:   •  clopidogrel (PLAVIX) 75 mg tablet, Take 1 tablet (75 mg total) by mouth daily, Disp: 90 tablet, Rfl: 2  •  folic acid (FOLVITE) 1 mg tablet, Take 1 tablet (1 mg total) by mouth daily, Disp: 90 tablet, Rfl: 2  •  losartan (COZAAR) 100 MG tablet, Take 1 tablet (100 mg total) by mouth daily, Disp: 90 tablet, Rfl: 0  •  metoprolol tartrate (LOPRESSOR) 25 mg tablet, TAKE ONE TABLET BY MOUTH EVERY DAY, Disp: 90 tablet, Rfl: 3  •  Thiamine Mononitrate (VITAMIN B1) 100 mg tablet, Take 1 tablet (100 mg total) by mouth daily, Disp: 90 tablet, Rfl: 2    No Known Allergies    OBJECTIVE  Vitals:   Vitals:    02/28/23 1322   BP: 124/80   BP Location: Left arm   Patient Position: Sitting   Cuff Size: Large   Pulse: 72   Resp: 16   Temp: (!) 96 5 °F (35 8 °C)   SpO2: 97%   Weight: 97 1 kg (214 lb)   Height: 6' (1 829 m)         Physical Exam  Vitals reviewed  Constitutional:       Appearance: He is well-developed  HENT:      Head: Normocephalic and atraumatic  Eyes:      Conjunctiva/sclera: Conjunctivae normal       Pupils: Pupils are equal, round, and reactive to light  Cardiovascular:      Rate and Rhythm: Normal rate and regular rhythm  Heart sounds: Normal heart sounds  Pulmonary:      Effort: Pulmonary effort is normal  No respiratory distress  Breath sounds: Normal breath sounds  Musculoskeletal:         General: Normal range of motion  Cervical back: Normal range of motion and neck supple  Skin:     General: Skin is warm  Capillary Refill: Capillary refill takes less than 2 seconds  Neurological:      Mental Status: He is alert and oriented to person, place, and time                      Mary Sanders MD,   [facility]  2/28/2023

## 2023-03-01 LAB — SARS-COV-2 RNA RESP QL NAA+PROBE: NEGATIVE

## 2023-03-03 DIAGNOSIS — R35.0 FREQUENT URINATION: Primary | ICD-10-CM

## 2023-03-03 LAB
ALBUMIN SERPL-MCNC: 4.5 G/DL (ref 3.8–4.9)
ALBUMIN/GLOB SERPL: 2 {RATIO} (ref 1.2–2.2)
ALP SERPL-CCNC: 109 IU/L (ref 44–121)
ALT SERPL-CCNC: 47 IU/L (ref 0–44)
AST SERPL-CCNC: 35 IU/L (ref 0–40)
BACTERIA UR CULT: ABNORMAL
BASOPHILS # BLD AUTO: 0 X10E3/UL (ref 0–0.2)
BASOPHILS NFR BLD AUTO: 0 %
BILIRUB SERPL-MCNC: 0.3 MG/DL (ref 0–1.2)
BUN SERPL-MCNC: 27 MG/DL (ref 6–24)
BUN/CREAT SERPL: 15 (ref 9–20)
CALCIUM SERPL-MCNC: 9.8 MG/DL (ref 8.7–10.2)
CHLORIDE SERPL-SCNC: 102 MMOL/L (ref 96–106)
CO2 SERPL-SCNC: 24 MMOL/L (ref 20–29)
CREAT SERPL-MCNC: 1.78 MG/DL (ref 0.76–1.27)
EGFR: 44 ML/MIN/1.73
EOSINOPHIL # BLD AUTO: 0.1 X10E3/UL (ref 0–0.4)
EOSINOPHIL NFR BLD AUTO: 2 %
ERYTHROCYTE [DISTWIDTH] IN BLOOD BY AUTOMATED COUNT: 12.6 % (ref 11.6–15.4)
GLOBULIN SER-MCNC: 2.3 G/DL (ref 1.5–4.5)
GLUCOSE SERPL-MCNC: 84 MG/DL (ref 70–99)
HCT VFR BLD AUTO: 42.2 % (ref 37.5–51)
HGB BLD-MCNC: 14.5 G/DL (ref 13–17.7)
IMM GRANULOCYTES # BLD: 0 X10E3/UL (ref 0–0.1)
IMM GRANULOCYTES NFR BLD: 0 %
LYMPHOCYTES # BLD AUTO: 1.4 X10E3/UL (ref 0.7–3.1)
LYMPHOCYTES NFR BLD AUTO: 31 %
Lab: ABNORMAL
MCH RBC QN AUTO: 28 PG (ref 26.6–33)
MCHC RBC AUTO-ENTMCNC: 34.4 G/DL (ref 31.5–35.7)
MCV RBC AUTO: 82 FL (ref 79–97)
MICRODELETION SYND BLD/T FISH: NORMAL
MONOCYTES # BLD AUTO: 0.6 X10E3/UL (ref 0.1–0.9)
MONOCYTES NFR BLD AUTO: 13 %
NEUTROPHILS # BLD AUTO: 2.4 X10E3/UL (ref 1.4–7)
NEUTROPHILS NFR BLD AUTO: 54 %
PLATELET # BLD AUTO: 194 X10E3/UL (ref 150–450)
POTASSIUM SERPL-SCNC: 5.2 MMOL/L (ref 3.5–5.2)
PROT SERPL-MCNC: 6.8 G/DL (ref 6–8.5)
RBC # BLD AUTO: 5.18 X10E6/UL (ref 4.14–5.8)
SL AMB ANTIMICROBIAL SUSCEPTIBILITY: ABNORMAL
SODIUM SERPL-SCNC: 138 MMOL/L (ref 134–144)
TSH SERPL DL<=0.005 MIU/L-ACNC: 2.65 UIU/ML (ref 0.45–4.5)
WBC # BLD AUTO: 4.6 X10E3/UL (ref 3.4–10.8)

## 2023-03-03 RX ORDER — CIPROFLOXACIN 500 MG/1
500 TABLET, FILM COATED ORAL EVERY 12 HOURS SCHEDULED
Qty: 14 TABLET | Refills: 0 | Status: SHIPPED | OUTPATIENT
Start: 2023-03-03 | End: 2023-03-10

## 2023-03-07 ENCOUNTER — OFFICE VISIT (OUTPATIENT)
Dept: NEPHROLOGY | Facility: CLINIC | Age: 58
End: 2023-03-07

## 2023-03-07 VITALS
HEART RATE: 54 BPM | WEIGHT: 212 LBS | BODY MASS INDEX: 28.71 KG/M2 | SYSTOLIC BLOOD PRESSURE: 126 MMHG | HEIGHT: 72 IN | DIASTOLIC BLOOD PRESSURE: 84 MMHG

## 2023-03-07 DIAGNOSIS — N18.32 STAGE 3B CHRONIC KIDNEY DISEASE (HCC): ICD-10-CM

## 2023-03-07 DIAGNOSIS — I10 PRIMARY HYPERTENSION: Primary | ICD-10-CM

## 2023-03-07 PROBLEM — R33.8 ACUTE URINARY RETENTION: Status: RESOLVED | Noted: 2021-06-10 | Resolved: 2023-03-07

## 2023-03-07 NOTE — PROGRESS NOTES
NEPHROLOGY OFFICE VISIT   Chen Cai 62 y o  male MRN: [id number]  3/7/2023    Reason for Visit: Chronic kidney disease    History of Present Illness (HPI):    I had the pleasure of seeing Ashley Batista today in the renal clinic for the continued management of chronic kidney disease  Since our last visit, there has been no ER visits or hospitilizations  He currently has no complaints at this time and is feeling well  Patient denies any chest pain, shortness of breath and swelling  The last blood work was done on February 28, which we have reviewed together  No new complaints  Currently being treated for urinary tract infection with ciprofloxacin  Currently reporting no urinary symptoms  Reports no urinary retention  Reports he is voiding well  He reports he continues to follow-up with urology  Had a recent cough which is getting better but negative COVID testing      ASSESSMENT and PLAN:    Chronic kidney disease stage IIIB  --secondary to history of longstanding hypertension with episode of acute kidney injury secondary to chronic urinary retention  --most recent renal ultrasound shows improvement of the obstruction  --creatinine has plateaued at around high 1's to low 2's (1 8-2 mg/dL)  --continued antihypertensive control  --status post cystoscopy with transurethral resection prostate on July 15th, 2021  --avoid NSAIDs  --renal function stable, creatinine at 1 78 mg/dL with a GFR greater than 40 mL/min  --Continue angiotensin receptor blocker     Hypertension  --losartan 100 mg daily  --continue metoprolol 25 mg twice a day  --Has carotid stenosis  -- Blood pressure at target  --Continue current management     Hyperkalemia--resolved  --Potassium 5 2  --Urinary symptoms have resolved  --Continue losartan     Urinary retention/BPH  --improved after cystoscopy with transurethral resection of prostate on 7/15/2021  -- No active urinary symptoms    Chronic ischemic left MCA stroke  -- Blood pressure control  -- Following with neurology  -- Internal carotid artery occlusion  -- Currently on Plavix and Lipitor    Obstructive sleep apnea  -- Following with sleep medicine/pulmonary  -- Continue CPAP at night    Urinary tract infection  -- Urine culture growing out Citrobacter  -- Asymptomatic at this time  -- Completing a course of ciprofloxacin    Obesity  -- BMI 28 75  --Recommend decreasing portion sizes, encouraging healthy choices of fruits and vegetables, consuming healthier snacks and moderation in carbohydrate intake  Exercise recommendations; 3-5 times a week, the American Heart Association recommends 150 minutes a week moderate exercise  --Strongly recommend evaluation by nutritionist  --Overweight and obesity have been associated with increased mortality and morbidity  Associated with a reduction in life expectancy, associated with increased all cause and cardiovascular mortality  --Metabolic risks, including increased risk of diabetes type 2, insulin resistance with hyperinsulinemia (weight loss of 5 to 7% associated with a decreased risk of type 2 diabetes among individuals with prediabetes and weight loss of 15% can lead to dramatic improvement of diabetes in nearly half of people)  Dyslipidemia, hypertension and heart disease are all increased in patients with obesity  Along with increased risk of stroke increased risk of multiple cancer types  Can also be associated with increased renal dysfunction, strongest risk factor for new onset chronic kidney disease  There is also a degree of compensatory hyperfiltration to meet high in the metabolic demands of increased body weight  This can also result in increased increased risk for nephrolithiasis          PATIENT INSTRUCTIONS:    Patient Instructions   1 )  Low 2 g sodium diet    2 )  Monitor weights at home    3 )  Avoid NSAIDs (ibuprofen, Motrin, Advil, Aleve, naproxen)    4 )  Monitor blood pressure at home, call if blood pressure greater than 150/90 persistently    5 ) I will plan to discuss all results including blood work, and/or imaging at our next visit, unless there is an urgent indication, in which case I will call you earlier  If you have any questions or concerns about your results, please feel free to call our office  Orders Placed This Encounter   Procedures   • Comprehensive metabolic panel     This is a patient instruction: Patient fasting for 8 hours or longer recommended  Standing Status:   Future     Number of Occurrences:   1     Standing Expiration Date:   3/7/2024   • PTH, intact     Standing Status:   Future     Number of Occurrences:   1     Standing Expiration Date:   3/7/2024   • CBC     Standing Status:   Future     Number of Occurrences:   1     Standing Expiration Date:   3/7/2024   • Microalbumin / creatinine urine ratio     Standing Status:   Future     Number of Occurrences:   1     Standing Expiration Date:   3/7/2024       OBJECTIVE:  Current Weight: Weight - Scale: 96 2 kg (212 lb)  Vitals:    03/07/23 1557   BP: 126/84   BP Location: Left arm   Patient Position: Sitting   Cuff Size: Standard   Pulse: (!) 54   Weight: 96 2 kg (212 lb)   Height: 6' (1 829 m)    Body mass index is 28 75 kg/m²  REVIEW OF SYSTEMS:    Review of Systems   Constitutional: Negative for activity change and fever  Respiratory: Positive for cough  Negative for chest tightness, shortness of breath and wheezing  Cardiovascular: Negative for chest pain and leg swelling  Gastrointestinal: Negative for abdominal pain, diarrhea, nausea and vomiting  Endocrine: Negative for polyuria  Genitourinary: Negative for difficulty urinating, dysuria, flank pain, frequency and urgency  Skin: Negative for rash  Neurological: Negative for dizziness, syncope, light-headedness and headaches  PHYSICAL EXAM:      Physical Exam  Vitals and nursing note reviewed  Constitutional:       General: He is not in acute distress  Appearance: He is well-developed  HENT:      Head: Normocephalic and atraumatic  Eyes:      General: No scleral icterus  Conjunctiva/sclera: Conjunctivae normal       Pupils: Pupils are equal, round, and reactive to light  Cardiovascular:      Rate and Rhythm: Normal rate and regular rhythm  Heart sounds: S1 normal and S2 normal  No murmur heard  No friction rub  No gallop  Pulmonary:      Effort: Pulmonary effort is normal  No respiratory distress  Breath sounds: Normal breath sounds  No wheezing or rales  Abdominal:      General: Bowel sounds are normal       Palpations: Abdomen is soft  Tenderness: There is no abdominal tenderness  There is no rebound  Musculoskeletal:         General: Normal range of motion  Cervical back: Normal range of motion and neck supple  Skin:     Findings: No rash  Neurological:      Mental Status: He is alert and oriented to person, place, and time     Psychiatric:         Behavior: Behavior normal          Medications:    Current Outpatient Medications:   •  atorvastatin (LIPITOR) 80 mg tablet, Take 1 tablet (80 mg total) by mouth daily with dinner, Disp: 90 tablet, Rfl: 2  •  cholecalciferol (VITAMIN D3) 1,000 units tablet, Take 2,000 Units by mouth daily, Disp: , Rfl:   •  ciprofloxacin (CIPRO) 500 mg tablet, Take 1 tablet (500 mg total) by mouth every 12 (twelve) hours for 7 days, Disp: 14 tablet, Rfl: 0  •  clopidogrel (PLAVIX) 75 mg tablet, Take 1 tablet (75 mg total) by mouth daily, Disp: 90 tablet, Rfl: 2  •  folic acid (FOLVITE) 1 mg tablet, Take 1 tablet (1 mg total) by mouth daily, Disp: 90 tablet, Rfl: 2  •  losartan (COZAAR) 100 MG tablet, Take 1 tablet (100 mg total) by mouth daily, Disp: 90 tablet, Rfl: 0  •  metoprolol tartrate (LOPRESSOR) 25 mg tablet, TAKE ONE TABLET BY MOUTH EVERY DAY, Disp: 90 tablet, Rfl: 3  •  Thiamine Mononitrate (VITAMIN B1) 100 mg tablet, Take 1 tablet (100 mg total) by mouth daily, Disp: 90 tablet, Rfl: 2    Laboratory Results:        Invalid input(s): ALBUMIN    Results for orders placed or performed in visit on 02/28/23   COVID Only- Office Collect    Specimen: Nose; Nares   Result Value Ref Range    SARS-CoV-2 Negative Negative   Urine culture    Blood  This is a patie   Result Value Ref Range    Urine Culture Result Final report (A)    CBC and differential   Result Value Ref Range    White Blood Cell Count 4 6 3 4 - 10 8 x10E3/uL    Red Blood Cell Count 5 18 4 14 - 5 80 x10E6/uL    Hemoglobin 14 5 13 0 - 17 7 g/dL    HCT 42 2 37 5 - 51 0 %    MCV 82 79 - 97 fL    MCH 28 0 26 6 - 33 0 pg    MCHC 34 4 31 5 - 35 7 g/dL    RDW 12 6 11 6 - 15 4 %    Platelet Count 889 294 - 450 x10E3/uL    Neutrophils 54 Not Estab  %    Lymphocytes 31 Not Estab  %    Monocytes 13 Not Estab  %    Eosinophils 2 Not Estab  %    Basophils PCT 0 Not Estab  %    Neutrophils (Absolute) 2 4 1 4 - 7 0 x10E3/uL    Lymphocytes (Absolute) 1 4 0 7 - 3 1 x10E3/uL    Monocytes (Absolute) 0 6 0 1 - 0 9 x10E3/uL    Eosinophils (Absolute) 0 1 0 0 - 0 4 x10E3/uL    Basophils ABS 0 0 0 0 - 0 2 x10E3/uL    Immature Granulocytes 0 Not Estab  %    Immature Granulocytes (Absolute) 0 0 0 0 - 0 1 x10E3/uL   Comprehensive metabolic panel   Result Value Ref Range    Glucose, Random 84 70 - 99 mg/dL    BUN 27 (H) 6 - 24 mg/dL    Creatinine 1 78 (H) 0 76 - 1 27 mg/dL    eGFR 44 (L) >59 mL/min/1 73    SL AMB BUN/CREATININE RATIO 15 9 - 20    Sodium 138 134 - 144 mmol/L    Potassium 5 2 3 5 - 5 2 mmol/L    Chloride 102 96 - 106 mmol/L    CO2 24 20 - 29 mmol/L    CALCIUM 9 8 8 7 - 10 2 mg/dL    Protein, Total 6 8 6 0 - 8 5 g/dL    Albumin 4 5 3 8 - 4 9 g/dL    Globulin, Total 2 3 1 5 - 4 5 g/dL    Albumin/Globulin Ratio 2 0 1 2 - 2 2    TOTAL BILIRUBIN 0 3 0 0 - 1 2 mg/dL    Alk Phos Isoenzymes 109 44 - 121 IU/L    AST 35 0 - 40 IU/L    ALT 47 (H) 0 - 44 IU/L   TSH, 3rd generation   Result Value Ref Range    TSH 2 650 0 450 - 4 500 uIU/mL   [facility] Kidney Stone Panel   Result Value Ref Range    Interpretation Note    Result   Result Value Ref Range    Result 1 Citrobacter freundii (A)     SL AMB ANTIMICROBIAL SUSCEPTIBILITY Comment    POCT urine dip auto non-scope   Result Value Ref Range     COLOR,UA yellow     CLARITY,UA clear     SPECIFIC GRAVITY,UA 1 010      PH,UA 6     LEUKOCYTE ESTERASE,     NITRITE,UA neg     GLUCOSE, UA norm     KETONES,UA neg     BILIRUBIN,UA neg     BLOOD,UA neg     POCT URINE PROTEIN neg     SL AMB POCT UROBILINOGEN norm

## 2023-06-21 DIAGNOSIS — I10 ESSENTIAL HYPERTENSION: ICD-10-CM

## 2023-06-21 RX ORDER — LOSARTAN POTASSIUM 100 MG/1
100 TABLET ORAL DAILY
Qty: 90 TABLET | Refills: 4 | Status: SHIPPED | OUTPATIENT
Start: 2023-06-21

## 2023-07-18 DIAGNOSIS — R79.89 ABNORMAL TSH: ICD-10-CM

## 2023-07-18 DIAGNOSIS — I10 ESSENTIAL HYPERTENSION: Primary | ICD-10-CM

## 2023-07-18 DIAGNOSIS — R35.0 FREQUENT URINATION: ICD-10-CM

## 2023-07-18 DIAGNOSIS — E78.00 HYPERCHOLESTEROLEMIA: ICD-10-CM

## 2023-08-08 LAB
ALBUMIN SERPL-MCNC: 4.5 G/DL (ref 3.8–4.9)
ALBUMIN/GLOB SERPL: 1.9 {RATIO} (ref 1.2–2.2)
ALP SERPL-CCNC: 89 IU/L (ref 44–121)
ALT SERPL-CCNC: 25 IU/L (ref 0–44)
AST SERPL-CCNC: 25 IU/L (ref 0–40)
BASOPHILS # BLD AUTO: 0 X10E3/UL (ref 0–0.2)
BASOPHILS NFR BLD AUTO: 1 %
BILIRUB SERPL-MCNC: 0.6 MG/DL (ref 0–1.2)
BUN SERPL-MCNC: 19 MG/DL (ref 6–24)
BUN/CREAT SERPL: 12 (ref 9–20)
CALCIUM SERPL-MCNC: 9.4 MG/DL (ref 8.7–10.2)
CHLORIDE SERPL-SCNC: 105 MMOL/L (ref 96–106)
CHOLEST SERPL-MCNC: 163 MG/DL (ref 100–199)
CHOLEST/HDLC SERPL: 4.2 RATIO (ref 0–5)
CO2 SERPL-SCNC: 20 MMOL/L (ref 20–29)
CREAT SERPL-MCNC: 1.56 MG/DL (ref 0.76–1.27)
EGFR: 51 ML/MIN/1.73
EOSINOPHIL # BLD AUTO: 0.1 X10E3/UL (ref 0–0.4)
EOSINOPHIL NFR BLD AUTO: 2 %
ERYTHROCYTE [DISTWIDTH] IN BLOOD BY AUTOMATED COUNT: 12.5 % (ref 11.6–15.4)
GLOBULIN SER-MCNC: 2.4 G/DL (ref 1.5–4.5)
GLUCOSE SERPL-MCNC: 94 MG/DL (ref 70–99)
HCT VFR BLD AUTO: 43 % (ref 37.5–51)
HDLC SERPL-MCNC: 39 MG/DL
HGB BLD-MCNC: 14.3 G/DL (ref 13–17.7)
IMM GRANULOCYTES # BLD: 0 X10E3/UL (ref 0–0.1)
IMM GRANULOCYTES NFR BLD: 0 %
LDLC SERPL CALC-MCNC: 104 MG/DL (ref 0–99)
LYMPHOCYTES # BLD AUTO: 1.9 X10E3/UL (ref 0.7–3.1)
LYMPHOCYTES NFR BLD AUTO: 29 %
MCH RBC QN AUTO: 27.7 PG (ref 26.6–33)
MCHC RBC AUTO-ENTMCNC: 33.3 G/DL (ref 31.5–35.7)
MCV RBC AUTO: 83 FL (ref 79–97)
MICRODELETION SYND BLD/T FISH: NORMAL
MICRODELETION SYND BLD/T FISH: NORMAL
MONOCYTES # BLD AUTO: 0.5 X10E3/UL (ref 0.1–0.9)
MONOCYTES NFR BLD AUTO: 7 %
NEUTROPHILS # BLD AUTO: 4 X10E3/UL (ref 1.4–7)
NEUTROPHILS NFR BLD AUTO: 61 %
PLATELET # BLD AUTO: 243 X10E3/UL (ref 150–450)
POTASSIUM SERPL-SCNC: 5.1 MMOL/L (ref 3.5–5.2)
PROT SERPL-MCNC: 6.9 G/DL (ref 6–8.5)
RBC # BLD AUTO: 5.17 X10E6/UL (ref 4.14–5.8)
SL AMB VLDL CHOLESTEROL CALC: 20 MG/DL (ref 5–40)
SODIUM SERPL-SCNC: 139 MMOL/L (ref 134–144)
TRIGL SERPL-MCNC: 109 MG/DL (ref 0–149)
TSH SERPL DL<=0.005 MIU/L-ACNC: 3.06 UIU/ML (ref 0.45–4.5)
WBC # BLD AUTO: 6.5 X10E3/UL (ref 3.4–10.8)

## 2023-08-14 ENCOUNTER — OFFICE VISIT (OUTPATIENT)
Dept: FAMILY MEDICINE CLINIC | Facility: CLINIC | Age: 58
End: 2023-08-14
Payer: COMMERCIAL

## 2023-08-14 VITALS
DIASTOLIC BLOOD PRESSURE: 100 MMHG | RESPIRATION RATE: 18 BRPM | HEIGHT: 72 IN | OXYGEN SATURATION: 98 % | WEIGHT: 208.4 LBS | TEMPERATURE: 98 F | SYSTOLIC BLOOD PRESSURE: 150 MMHG | HEART RATE: 70 BPM | BODY MASS INDEX: 28.23 KG/M2

## 2023-08-14 DIAGNOSIS — Z00.00 PHYSICAL EXAM: Primary | ICD-10-CM

## 2023-08-14 DIAGNOSIS — I65.22 LEFT CAROTID ARTERY OCCLUSION: ICD-10-CM

## 2023-08-14 DIAGNOSIS — E78.00 HYPERCHOLESTEROLEMIA: ICD-10-CM

## 2023-08-14 DIAGNOSIS — I10 ESSENTIAL HYPERTENSION: ICD-10-CM

## 2023-08-14 PROCEDURE — 99396 PREV VISIT EST AGE 40-64: CPT | Performed by: FAMILY MEDICINE

## 2023-08-14 NOTE — PROGRESS NOTES
[facility]    NAME: Jeffrey Deluna  AGE: 62 y.o. SEX: male  : 1965     DATE: 2023     Assessment and Plan:     Problem List Items Addressed This Visit        Cardiovascular and Mediastinum    Left carotid artery occlusion       Other    Hypercholesterolemia   Other Visit Diagnoses     Physical exam    -  Primary    Essential hypertension        Relevant Orders    Ambulatory Referral to Cardiology      Even in the setting of kidney function improving the fact that he is having urinary hesitancy recommend that the patient be seen by his urologist for evaluation  Has not seen cardiology since  recommend getting checked  At this time he is only been taking metoprolol once a day rather than twice a day as documented in nephrology's note. Recommend monitoring at home if his pulse or his blood pressure is elevated let us know  Currently his BP is elevated only in the reason why is because he just took his medication  History of left carotid artery not due for his carotid Dopplers at this time  Reviewed all blood work in detail with the patient    Immunizations and preventive care screenings were discussed with patient today. Appropriate education was printed on patient's after visit summary. Depression Screening and Follow-up Plan: Patient was screened for depression during today's encounter. They screened negative with a PHQ-2 score of 0. Return in about 35 weeks (around 4/15/2024). Chief Complaint:     Chief Complaint   Patient presents with   • Annual Exam      History of Present Illness:     Adult Annual Physical   Patient here for a comprehensive physical exam.   Patient doing very well  Patient just recently saw nephrology; states he is taking his medications as prescribed.   Has been walking the dog more which has made him change exercises  Continues to be very sleepy throughout the day wife believes it might be screen time  Has not been seen by cardiology  Scheduled to see urology states that his urine stream is slowing down and he is concerned about it given his history. Diet and Physical Activity  Diet/Nutrition: well balanced diet. Exercise: no formal exercise. Walking the doggy      Depression Screening  PHQ-2/9 Depression Screening    Little interest or pleasure in doing things: 0 - not at all  Feeling down, depressed, or hopeless: 0 - not at all  PHQ-2 Score: 0  PHQ-2 Interpretation: Negative depression screen       General Health  Sleep: sleeps well. Hearing: normal - bilateral.  Vision: no vision problems. Dental: no dental visits for >1 year.  Health  Symptoms include: urinary hesitancy and post-void dribbling     Review of Systems:     Review of Systems   Constitutional: Positive for fatigue. Negative for activity change, appetite change, chills and fever. HENT: Negative for congestion. Respiratory: Negative for cough, chest tightness and shortness of breath. Cardiovascular: Negative for chest pain and leg swelling. Gastrointestinal: Negative for abdominal distention, abdominal pain, constipation, diarrhea, nausea and vomiting. All other systems reviewed and are negative.      Past Medical History:     Past Medical History:   Diagnosis Date   • Chronic kidney disease    • Hyperlipidemia    • Hypertension    • Stroke ([address]) 02/10/2019    right side sl weakness      Past Surgical History:     Past Surgical History:   Procedure Laterality Date   • FL RETROGRADE URETHROCYSTOGRAM  6/10/2021   • NO PAST SURGERIES     • AK CYSTO BLADDER W/URETERAL CATHETERIZATION N/A 6/10/2021    Procedure: CYSTOSCOPY WITH RETROGRADE PYELOGRAM/internal urethrotomy, DVIU, URETHROGRAM, CYSTOGRAM.;  Surgeon: Emanuel Ugalde MD;  Location: [facility];  Service: Urology   • AK TRURL ELECTROSURG RESCJ PROSTATE BLEED COMPLETE N/A 7/15/2021    Procedure: CYSTOSCOPY, TRANSURETHRAL RESECTION OF PROSTATE (TURP); Surgeon: Warden Angel MD;  Location: [facility] OR;  Service: Urology      Family History:     Family History   Problem Relation Age of Onset   • Diabetes type II Mother    • Hypertension Father    • Diabetes type II Father    • Diabetes type II Sister    • Hypertension Brother       Social History:     Social History     Socioeconomic History   • Marital status: /Civil Union     Spouse name: None   • Number of children: None   • Years of education: None   • Highest education level: None   Occupational History   • None   Tobacco Use   • Smoking status: Former     Packs/day: 1.00     Types: Cigarettes     Quit date: 2012     Years since quittin.1   • Smokeless tobacco: Never   Vaping Use   • Vaping Use: Never used   Substance and Sexual Activity   • Alcohol use:  Yes     Alcohol/week: 6.0 standard drinks of alcohol     Types: 6 Cans of beer per week   • Drug use: Never   • Sexual activity: Not Currently   Other Topics Concern   • None   Social History Narrative   • None     Social Determinants of Health     Financial Resource Strain: Not on file   Food Insecurity: Not on file   Transportation Needs: Not on file   Physical Activity: Not on file   Stress: Not on file   Social Connections: Not on file   Intimate Partner Violence: Not on file   Housing Stability: Not on file      Current Medications:     Current Outpatient Medications   Medication Sig Dispense Refill   • cholecalciferol (VITAMIN D3) 1,000 units tablet Take 2,000 Units by mouth daily     • clopidogrel (PLAVIX) 75 mg tablet Take 1 tablet (75 mg total) by mouth daily 90 tablet 2   • folic acid (FOLVITE) 1 mg tablet Take 1 tablet (1 mg total) by mouth daily 90 tablet 2   • losartan (COZAAR) 100 MG tablet Take 1 tablet (100 mg total) by mouth daily 90 tablet 4   • metoprolol tartrate (LOPRESSOR) 25 mg tablet TAKE ONE TABLET BY MOUTH EVERY DAY 90 tablet 3   • Thiamine Mononitrate (VITAMIN B1) 100 mg tablet Take 1 tablet (100 mg total) by mouth daily 90 tablet 2   • atorvastatin (LIPITOR) 80 mg tablet Take 1 tablet (80 mg total) by mouth daily with dinner 90 tablet 2     No current facility-administered medications for this visit. Allergies:     No Known Allergies   Physical Exam:     /100 (BP Location: Left arm, Patient Position: Sitting, Cuff Size: Large)   Pulse 70   Temp 98 °F (36.7 °C)   Resp 18   Ht 6' (1.829 m)   Wt 94.5 kg (208 lb 6.4 oz)   SpO2 98%   BMI 28.26 kg/m²     Physical Exam  Vitals reviewed. Constitutional:       Appearance: He is well-developed. HENT:      Head: Normocephalic and atraumatic. Right Ear: Tympanic membrane, ear canal and external ear normal.      Left Ear: Tympanic membrane, ear canal and external ear normal.      Nose: Nose normal.   Eyes:      Conjunctiva/sclera: Conjunctivae normal.      Pupils: Pupils are equal, round, and reactive to light. Cardiovascular:      Rate and Rhythm: Normal rate and regular rhythm. Heart sounds: Normal heart sounds. Pulmonary:      Effort: Pulmonary effort is normal.      Breath sounds: Normal breath sounds. No wheezing. Abdominal:      General: Bowel sounds are normal. There is no distension. Palpations: Abdomen is soft. There is no mass. Tenderness: There is no abdominal tenderness. Musculoskeletal:         General: No tenderness. Normal range of motion. Cervical back: Normal range of motion and neck supple. Skin:     General: Skin is warm. Capillary Refill: Capillary refill takes less than 2 seconds. Neurological:      Mental Status: He is alert and oriented to person, place, and time. Cranial Nerves: No cranial nerve deficit. Sensory: No sensory deficit. Motor: No abnormal muscle tone. Coordination: Coordination normal.      Deep Tendon Reflexes: Reflexes normal.   Psychiatric:         Behavior: Behavior normal.         Thought Content:  Thought content normal.         Judgment: Judgment normal. Demarco Martin MD  [address] [facility]

## 2023-09-13 DIAGNOSIS — F10.10 ALCOHOL ABUSE: ICD-10-CM

## 2023-09-13 RX ORDER — FOLIC ACID 1 MG/1
TABLET ORAL
Qty: 90 TABLET | Refills: 2 | Status: SHIPPED | OUTPATIENT
Start: 2023-09-13

## 2023-09-14 DIAGNOSIS — F10.10 ALCOHOL ABUSE: ICD-10-CM

## 2023-09-14 RX ORDER — FOLIC ACID 1 MG/1
TABLET ORAL
Qty: 90 TABLET | Refills: 2 | OUTPATIENT
Start: 2023-09-14

## 2023-09-15 DIAGNOSIS — F10.10 ALCOHOL ABUSE: ICD-10-CM

## 2023-09-15 RX ORDER — FOLIC ACID 1 MG/1
TABLET ORAL
Qty: 90 TABLET | Refills: 2 | OUTPATIENT
Start: 2023-09-15

## 2023-09-23 DIAGNOSIS — F10.10 ALCOHOL ABUSE: ICD-10-CM

## 2023-09-25 RX ORDER — GAUZE BANDAGE 2" X 2"
100 BANDAGE TOPICAL DAILY
Qty: 90 TABLET | Refills: 2 | Status: SHIPPED | OUTPATIENT
Start: 2023-09-25

## 2023-10-12 NOTE — PROGRESS NOTES
Progress Note - Cardiology Office  [facility]    Igor Zavala 62 y.o. male MRN: [id number]  : 1965  Encounter: [id number]      ASSESSMENT:   Essential hypertension  BP today is 160/108 with heart rate of 82/min  Patient has an element of whitecoat hypertension  According to him his blood pressure at home is usually 120-130 millimeter mercury systolic. He also has some permissive hypertension due to his history of CVA  On losartan and metoprolol    History of CVA due to occlusion of left MCA  PVD, bilateral <50% ICA stenosis  On atorvastatin and Plavix    Hyperlipidemia  2023: , , HDL 39, Normal AST and ALT  On atorvastatin 80 mg    CKD, stage IIIb    BPH, s/p cystoscopy and TURP on 7/15/2021    GUY  Overweight, BMI 28.26    Stress test, 2021:  Negative for ischemia    TTE, 2019:  EF 60%,    RECOMMENDATIONS:  Zetia 10 mg  Continue other medications including atorvastatin, Plavix, losartan and metoprolol  Monitor blood pressure at home and call us if it is consistently more than the upper limit recommended by your neurologist  Regular cardiovascular exercise as tolerated    Please call [phone] if any questions. HPI :     Igor Zavala is a 62y.o. year old male who came for follow up. He denies any specific cardiac symptoms. He takes his medications regularly and does mild to moderate exercise. He has a new puppy that he walks and also intermittently takes care of 2 small grand children with his wife. His blood pressure is elevated however according to him he has an element of whitecoat hypertension and at home his blood pressure is usually 120 to 905 mmHg systolic. Moreover he states that neurology wants his blood pressure to be on the higher side. His LDL is above the desired level of 70. Therefore will add Zetia 10 mg to his current atorvastatin 80 mg    REVIEW OF SYSTEMS:  Denies any new or acute cardiac symptoms.   Denies chest pain, unusual dyspnea, palpitations or syncope      Historical Information   Past Medical History:   Diagnosis Date    Chronic kidney disease     Hyperlipidemia     Hypertension     Stroke ([address]) 02/10/2019    right side sl weakness     Past Surgical History:   Procedure Laterality Date    FL RETROGRADE URETHROCYSTOGRAM  6/10/2021    NO PAST SURGERIES      UT CYSTO BLADDER W/URETERAL CATHETERIZATION N/A 6/10/2021    Procedure: CYSTOSCOPY WITH RETROGRADE PYELOGRAM/internal urethrotomy, DVIU, URETHROGRAM, CYSTOGRAM.;  Surgeon: Marissa Gomez MD;  Location: [facility];  Service: Urology    UT TRURL ELECTROSURG [address] BLEED COMPLETE N/A 7/15/2021    Procedure: CYSTOSCOPY, TRANSURETHRAL RESECTION OF PROSTATE (TURP);   Surgeon: Marissa oGmez MD;  Location: [facility];  Service: Urology     Social History     Substance and Sexual Activity   Alcohol Use Yes    Alcohol/week: 6.0 standard drinks of alcohol    Types: 6 Cans of beer per week     Social History     Substance and Sexual Activity   Drug Use Never     Social History     Tobacco Use   Smoking Status Former    Packs/day: 1.00    Types: Cigarettes    Quit date: 2012    Years since quittin.2   Smokeless Tobacco Never     Family History:   Family History   Problem Relation Age of Onset    Diabetes type II Mother     Hypertension Father     Diabetes type II Father     Diabetes type II Sister     Hypertension Brother        Meds/Allergies     No Known Allergies    Current Outpatient Medications:     atorvastatin (LIPITOR) 80 mg tablet, TAKE ONE TABLET BY MOUTH EVERY DAY WITH DINNER, Disp: 90 tablet, Rfl: 1    cholecalciferol (VITAMIN D3) 1,000 units tablet, Take 2,000 Units by mouth daily, Disp: , Rfl:     clopidogrel (PLAVIX) 75 mg tablet, TAKE ONE TABLET BY MOUTH EVERY DAY, Disp: 90 tablet, Rfl: 1    ezetimibe (ZETIA) 10 mg tablet, Take 1 tablet (10 mg total) by mouth daily, Disp: 90 tablet, Rfl: 2    folic acid (FOLVITE) 1 mg tablet, TAKE ONE TABLET BY MOUTH EVERY DAY (Brandi Argueta), Disp: 90 tablet, Rfl: 2    losartan (COZAAR) 100 MG tablet, Take 1 tablet (100 mg total) by mouth daily, Disp: 90 tablet, Rfl: 4    metoprolol tartrate (LOPRESSOR) 25 mg tablet, TAKE ONE TABLET BY MOUTH EVERY DAY, Disp: 90 tablet, Rfl: 3    Thiamine Mononitrate (VITAMIN B1) 100 mg tablet, TAKE ONE TABLET BY MOUTH EVERY DAY, Disp: 90 tablet, Rfl: 2    Vitals: Blood pressure (!) 164/108, pulse 82, height 6' (1.829 m), weight 95.3 kg (210 lb), SpO2 97 %. Body mass index is 28.48 kg/m². Vitals:    10/13/23 1445   Weight: 95.3 kg (210 lb)     BP Readings from Last 3 Encounters:   10/13/23 (!) 164/108   23 150/100   23 126/84       Physical Exam:  Physical Exam    Neurologic:  Alert & oriented x 3, no new focal deficits, Not in any acute distress,  Constitutional:  Well developed, well nourished, non-toxic appearance   Eyes:  Pupil equal and reacting to light, conjunctiva normal,   HENT:  Atraumatic, oropharynx moist, Neck- normal range of motion, no tenderness,  Neck supple, No JVP, No LNP   Respiratory:  Bilateral air entry, mostly clear to auscultation  Cardiovascular: S1-S2 regular rhythm murmur   GI:  Soft, nondistended, normal bowel sounds, nontender, no hepatosplenomegaly appreciated. Musculoskeletal:  no tenderness, no deformities.    Skin:  Well hydrated, no rash   Lymphatic:  No lymphadenopathy noted   Extremities:  No edema and distal pulses are present        Diagnostic Studies Review Cardio:      EKG: Normal sinus rhythm, heart rate 82/min    Cardiac testing:   Results for orders placed during the hospital encounter of 02/10/19    Echo complete with contrast if indicated    Narrative  [address]  [address]  [facility], [address] [place] [address]  [phone]    Transthoracic Echocardiogram  2D, M-mode, Doppler, and Color Doppler    Study date:  2019    Patient: Heidi Torres  MR number: [id number]  Account number: [de-identified]  : 1965  Age: 48 years  Gender: Male  Status: Inpatient  Location: Bedside  Height: 73 in  Weight: 225.9 lb  BP: 186/ 96 mmHg    Indications: Evaluate for suspected cardiac source of embolism. Diagnoses: G45.9 - Transient cerebral ischemic attack, unspecified    Sonographer:  Kayli Harrison [facility]  Primary Physician:  Mynor Stoddard MD  Referring Physician:  Soumya Gamez MD  Group:  [facility]  Interpreting Physician:  Anita Esquivel MD    SUMMARY    LEFT VENTRICLE:  Systolic function was normal. Ejection fraction was estimated to be 60 %. There were no regional wall motion abnormalities. RIGHT VENTRICLE:  The size was normal.  Systolic function was normal.    TRICUSPID VALVE:  There was mild regurgitation. Estimated peak PA pressure was 35 mmHg. HISTORY: PRIOR HISTORY: CVA, TIA    PROCEDURE: The procedure was performed at the bedside. This was a routine study. The transthoracic approach was used. The study included complete 2D imaging, M-mode, complete spectral Doppler, and color Doppler. The heart rate was 53 bpm,  at the start of the study. Images were obtained from the parasternal, apical, subcostal, and suprasternal notch acoustic windows. Image quality was adequate. LEFT VENTRICLE: Size was normal. Systolic function was normal. Ejection fraction was estimated to be 60 %. There were no regional wall motion abnormalities. Wall thickness was normal. DOPPLER: Left ventricular diastolic function parameters  were normal.    RIGHT VENTRICLE: The size was normal. Systolic function was normal. Wall thickness was normal.    LEFT ATRIUM: Size was normal.    RIGHT ATRIUM: Size was normal.    MITRAL VALVE: Valve structure was normal. There was normal leaflet separation. DOPPLER: The transmitral velocity was within the normal range. There was no evidence for stenosis. There was no significant regurgitation. AORTIC VALVE: The valve was trileaflet.  Leaflets exhibited mild calcification of the non coronary cusp, normal cuspal separation, and sclerosis. DOPPLER: Transaortic velocity was within the normal range. There was no evidence for stenosis. There was no significant regurgitation. TRICUSPID VALVE: The valve structure was normal. There was normal leaflet separation. DOPPLER: The transtricuspid velocity was within the normal range. There was no evidence for stenosis. There was mild regurgitation. Estimated peak PA  pressure was 35 mmHg. PULMONIC VALVE: Leaflets exhibited normal thickness, no calcification, and normal cuspal separation. DOPPLER: The transpulmonic velocity was within the normal range. There was no significant regurgitation. PERICARDIUM: There was no pericardial effusion. AORTA: The root exhibited normal size. SYSTEMIC VEINS: IVC: The inferior vena cava was normal in size. SYSTEM MEASUREMENT TABLES    2D  %FS: 31.18 %  Ao Diam: 3.4 cm  EDV(Teich): 132.32 ml  EF(Teich): 58.53 %  ESV(Teich): 54.88 ml  IVSd: 0.85 cm  LA Area: 19.47 cm2  LA Diam: 3.68 cm  LVEDV MOD A4C: 143.8 ml  LVEF MOD A4C: 59.51 %  LVESV MOD A4C: 58.22 ml  LVIDd: 5.25 cm  LVIDs: 3.61 cm  LVLd A4C: 9.65 cm  LVLs A4C: 7.72 cm  LVPWd: 0.88 cm  RA Area: 17.34 cm2  RVIDd: 2.96 cm  SV MOD A4C: 85.58 ml  SV(Teich): 77.45 ml    CW  TR Vmax: 2.79 m/s  TR maxP.12 mmHg    MM  TAPSE: 2.73 cm    PW  AVC: 348.09 ms  E': 0.11 m/s  E/E': 6.61  MV A Kyree: 0.66 m/s  MV Dec [place]: 4.16 m/s2  MV DecT: 177.68 ms  MV E Kyree: 0.74 m/s  MV E/A Ratio: 1.12  MV PHT: 51.53 ms  MVA By PHT: 4.27 cm2    [employer] Accredited Echocardiography Laboratory    Prepared and electronically signed by    Es Hoffman MD  Signed 2019 08:21:31        Imaging:  Chest X-Ray:   No Chest XR results available for this patient. CT-scan of the chest:     No CTA results available for this patient.   Lab Review   Lab Results   Component Value Date    WBC 6.5 2023    HGB 14.3 2023    HCT 43.0 08/07/2023    MCV 83 08/07/2023    RDW 12.5 08/07/2023     08/07/2023     BMP:  Lab Results   Component Value Date    SODIUM 139 08/07/2023    K 5.1 08/07/2023     08/07/2023    CO2 20 08/07/2023    BUN 19 08/07/2023    CREATININE 1.56 (H) 08/07/2023    GLUC 94 08/07/2023    CALCIUM 9.7 06/17/2021    EGFR 51 (L) 08/07/2023    MG 1.9 06/10/2021     LFT:  Lab Results   Component Value Date    AST 25 08/07/2023    ALT 25 08/07/2023    ALKPHOS 105 06/09/2021    TP 6.9 08/07/2023    ALB 4.5 08/07/2023      No components found for: "TSH3"  Lab Results   Component Value Date    KFL1JWRJTKXE 3.770 (H) 02/11/2019     Lab Results   Component Value Date    HGBA1C 5.7 (H) 04/17/2021     Lipid Profile:   Lab Results   Component Value Date    CHOLESTEROL 163 08/07/2023    HDL 39 (L) 08/07/2023    LDLCALC 104 (H) 08/07/2023    TRIG 109 08/07/2023     Lab Results   Component Value Date    CHOLESTEROL 163 08/07/2023    CHOLESTEROL 169 08/08/2022     No results found for: "CKTOTAL", "CKMB", "CKMBINDEX", "TROPONINI"  No results found for: "NTBNP"   No results found for this or any previous visit (from the past 672 hour(s)). Dr. Flavio Hurst MD, [facility]      "This note has been constructed using a voice recognition system. Therefore there may be syntax, spelling, and/or grammatical errors.  Please call if you have any questions. "

## 2023-10-13 ENCOUNTER — CONSULT (OUTPATIENT)
Dept: CARDIOLOGY CLINIC | Facility: CLINIC | Age: 58
End: 2023-10-13
Payer: COMMERCIAL

## 2023-10-13 VITALS
SYSTOLIC BLOOD PRESSURE: 164 MMHG | OXYGEN SATURATION: 97 % | BODY MASS INDEX: 28.44 KG/M2 | HEIGHT: 72 IN | WEIGHT: 210 LBS | DIASTOLIC BLOOD PRESSURE: 108 MMHG | HEART RATE: 82 BPM

## 2023-10-13 DIAGNOSIS — I10 ESSENTIAL HYPERTENSION: ICD-10-CM

## 2023-10-13 DIAGNOSIS — E78.1 PURE HYPERTRIGLYCERIDEMIA: Primary | ICD-10-CM

## 2023-10-13 PROCEDURE — 99214 OFFICE O/P EST MOD 30 MIN: CPT | Performed by: INTERNAL MEDICINE

## 2023-10-13 PROCEDURE — 93000 ELECTROCARDIOGRAM COMPLETE: CPT | Performed by: INTERNAL MEDICINE

## 2023-10-13 RX ORDER — EZETIMIBE 10 MG/1
10 TABLET ORAL DAILY
Qty: 90 TABLET | Refills: 2 | Status: SHIPPED | OUTPATIENT
Start: 2023-10-13

## 2023-10-17 ENCOUNTER — DOCUMENTATION (OUTPATIENT)
Dept: FAMILY MEDICINE CLINIC | Facility: CLINIC | Age: 58
End: 2023-10-17

## 2023-10-17 DIAGNOSIS — I10 ESSENTIAL HYPERTENSION: ICD-10-CM

## 2023-10-17 DIAGNOSIS — I10 ESSENTIAL HYPERTENSION: Primary | ICD-10-CM

## 2023-11-06 ENCOUNTER — OFFICE VISIT (OUTPATIENT)
Dept: NEPHROLOGY | Facility: CLINIC | Age: 58
End: 2023-11-06
Payer: COMMERCIAL

## 2023-11-06 VITALS
SYSTOLIC BLOOD PRESSURE: 122 MMHG | HEIGHT: 72 IN | OXYGEN SATURATION: 98 % | WEIGHT: 214 LBS | HEART RATE: 78 BPM | DIASTOLIC BLOOD PRESSURE: 86 MMHG | BODY MASS INDEX: 28.99 KG/M2

## 2023-11-06 DIAGNOSIS — I10 PRIMARY HYPERTENSION: ICD-10-CM

## 2023-11-06 DIAGNOSIS — I63.512 CEREBROVASCULAR ACCIDENT (CVA) DUE TO OCCLUSION OF LEFT MIDDLE CEREBRAL ARTERY (HCC): ICD-10-CM

## 2023-11-06 DIAGNOSIS — N18.32 STAGE 3B CHRONIC KIDNEY DISEASE (HCC): Primary | ICD-10-CM

## 2023-11-06 PROCEDURE — 99213 OFFICE O/P EST LOW 20 MIN: CPT | Performed by: INTERNAL MEDICINE

## 2023-11-06 NOTE — PROGRESS NOTES
NEPHROLOGY OFFICE VISIT   Torrie Brooks 62 y.o. male MRN: [id number]  11/6/2023    Reason for Visit: Chronic kidney disease    History of Present Illness (HPI):    I had the pleasure of seeing Andrew Hagen today in the renal clinic for the continued management of chronic kidney disease. Since our last visit, there has been no ER visits or hospitilizations. He currently has no complaints at this time and is feeling well. Patient denies any chest pain, shortness of breath and swelling. The last blood work was done on August which included a CMP, which we have reviewed together. Planes of chest pain shortness of breath or lower extremity swelling. He feels like his urination is starting to slow a little bit he was recently seen by urology who asked him to just closely monitor it. He still voiding well and has no complaints of urinary retention or incontinence and no dysuria. Blood work from August showed his creatinine actually better than his usual baseline down to 1.5 mg/dL. Electrolytes are stable. CBC was normal.    ASSESSMENT and PLAN:    Chronic kidney disease stage IIIB  --secondary to history of longstanding hypertension with episode of acute kidney injury secondary to chronic urinary retention  --most recent renal ultrasound shows improvement of the obstruction  --creatinine has plateaued at around high 1's to low 2's (1.8-2 mg/dL)  --Continue blood pressure control  --status post cystoscopy with transurethral resection prostate on July 15th, 2021  --avoid NSAIDs  --Renal function stable and actually better than his usual baseline currently at 1.5 mg/dL with a GFR in the 50s we will continue to track it  --Continue angiotensin receptor blocker  --Continue current management     Hypertension  --losartan 100 mg daily  --continue metoprolol 25 mg twice a day  --Has carotid stenosis  --Systolic blood pressure at target but diastolic slightly elevated closely monitor.   --Continue current management Hyperkalemia--resolved     Urinary retention/BPH  --improved after cystoscopy with transurethral resection of prostate on 7/15/2021  -- Slowly slowing but being closely monitored by urology     Chronic ischemic left MCA stroke  -- Blood pressure control  -- Following with neurology  -- Internal carotid artery occlusion  -- Currently on Plavix and Lipitor     Obstructive sleep apnea  -- Following with sleep medicine/pulmonary  -- Continue CPAP at night     Overweight  -- BMI 29  --Recommend decreasing portion sizes, encouraging healthy choices of fruits and vegetables, consuming healthier snacks and moderation in carbohydrate intake. Exercise recommendations; 3-5 times a week, the American Heart Association recommends 150 minutes a week moderate exercise  --Strongly recommend evaluation by nutritionist  --Overweight and obesity have been associated with increased mortality and morbidity. Associated with a reduction in life expectancy, associated with increased all cause and cardiovascular mortality  --Metabolic risks, including increased risk of diabetes type 2, insulin resistance with hyperinsulinemia (weight loss of 5 to 7% associated with a decreased risk of type 2 diabetes among individuals with prediabetes and weight loss of 15% can lead to dramatic improvement of diabetes in nearly half of people). Dyslipidemia, hypertension and heart disease are all increased in patients with obesity. Along with increased risk of stroke increased risk of multiple cancer types. Can also be associated with increased renal dysfunction, strongest risk factor for new onset chronic kidney disease. There is also a degree of compensatory hyperfiltration to meet high in the metabolic demands of increased body weight. This can also result in increased increased risk for nephrolithiasis.          PATIENT INSTRUCTIONS:    Patient Instructions   1.)  Low 2 g sodium diet    2.)  Monitor weights at home    3.)  Avoid NSAIDs (ibuprofen, Motrin, Advil, Aleve, naproxen)    4.)  Monitor blood pressure at home, call if blood pressure greater than 150/90 persistently    5.) I will plan to discuss all results including blood work, and/or imaging at our next visit, unless there is an urgent indication, in which case I will call you earlier. If you have any questions or concerns about your results, please feel free to call our office. Orders Placed This Encounter   Procedures    Albumin / creatinine urine ratio     Standing Status:   Future     Number of Occurrences:   1     Standing Expiration Date:   11/6/2024    Comprehensive metabolic panel     This is a patient instruction: Patient fasting for 8 hours or longer recommended. Standing Status:   Future     Number of Occurrences:   1     Standing Expiration Date:   11/6/2024    Cystatin C With eGFR     Standing Status:   Future     Number of Occurrences:   1     Standing Expiration Date:   11/6/2024    CBC     Standing Status:   Future     Number of Occurrences:   1     Standing Expiration Date:   11/6/2024    PTH, intact     Standing Status:   Future     Number of Occurrences:   1     Standing Expiration Date:   11/6/2024       OBJECTIVE:  Current Weight: Weight - Scale: 97.1 kg (214 lb)  Vitals:    11/06/23 1053   BP: 122/86   BP Location: Left arm   Patient Position: Sitting   Cuff Size: Standard   Pulse: 78   SpO2: 98%   Weight: 97.1 kg (214 lb)   Height: 6' (1.829 m)    Body mass index is 29.02 kg/m². REVIEW OF SYSTEMS:    Review of Systems   Constitutional:  Negative for activity change and fever. Respiratory:  Negative for cough, chest tightness, shortness of breath and wheezing. Cardiovascular:  Negative for chest pain and leg swelling. Gastrointestinal:  Negative for abdominal pain, diarrhea, nausea and vomiting. Endocrine: Negative for polyuria. Genitourinary:  Negative for difficulty urinating, dysuria, flank pain, frequency and urgency.    Skin: Negative for rash. Neurological:  Negative for dizziness, syncope, light-headedness and headaches. PHYSICAL EXAM:      Physical Exam  Vitals and nursing note reviewed. Exam conducted with a chaperone present. Constitutional:       General: He is not in acute distress. Appearance: He is well-developed. HENT:      Head: Normocephalic and atraumatic. Eyes:      General: No scleral icterus. Conjunctiva/sclera: Conjunctivae normal.      Pupils: Pupils are equal, round, and reactive to light. Cardiovascular:      Rate and Rhythm: Normal rate and regular rhythm. Heart sounds: S1 normal and S2 normal. No murmur heard. No friction rub. No gallop. Pulmonary:      Effort: Pulmonary effort is normal. No respiratory distress. Breath sounds: Normal breath sounds. No wheezing or rales. Abdominal:      General: Bowel sounds are normal.      Palpations: Abdomen is soft. Tenderness: There is no abdominal tenderness. There is no rebound. Musculoskeletal:         General: Normal range of motion. Cervical back: Normal range of motion and neck supple. Skin:     Findings: No rash. Neurological:      Mental Status: He is alert and oriented to person, place, and time.    Psychiatric:         Behavior: Behavior normal.         Medications:    Current Outpatient Medications:     atorvastatin (LIPITOR) 80 mg tablet, TAKE ONE TABLET BY MOUTH EVERY DAY WITH DINNER, Disp: 90 tablet, Rfl: 1    cholecalciferol (VITAMIN D3) 1,000 units tablet, Take 2,000 Units by mouth daily, Disp: , Rfl:     clopidogrel (PLAVIX) 75 mg tablet, TAKE ONE TABLET BY MOUTH EVERY DAY, Disp: 90 tablet, Rfl: 1    ezetimibe (ZETIA) 10 mg tablet, Take 1 tablet (10 mg total) by mouth daily, Disp: 90 tablet, Rfl: 2    folic acid (FOLVITE) 1 mg tablet, TAKE ONE TABLET BY MOUTH EVERY DAY (GENERIC FOLVITE), Disp: 90 tablet, Rfl: 2    losartan (COZAAR) 100 MG tablet, Take 1 tablet (100 mg total) by mouth daily, Disp: 90 tablet, Rfl: 4    metoprolol tartrate (LOPRESSOR) 25 mg tablet, Take 1 tablet (25 mg total) by mouth daily, Disp: 90 tablet, Rfl: 3    Thiamine Mononitrate (VITAMIN B1) 100 mg tablet, TAKE ONE TABLET BY MOUTH EVERY DAY, Disp: 90 tablet, Rfl: 2    Laboratory Results:        Invalid input(s): "ALBUMIN"    Results for orders placed or performed in visit on 07/18/23   Lipid panel   Result Value Ref Range    Cholesterol, Total 163 100 - 199 mg/dL    Triglycerides 109 0 - 149 mg/dL    HDL 39 (L) >39 mg/dL    VLDL Cholesterol Calculated 20 5 - 40 mg/dL    LDL Calculated 104 (H) 0 - 99 mg/dL    T.  Chol/HDL Ratio 4.2 0.0 - 5.0 ratio   Comprehensive metabolic panel   Result Value Ref Range    Glucose, Random 94 70 - 99 mg/dL    BUN 19 6 - 24 mg/dL    Creatinine 1.56 (H) 0.76 - 1.27 mg/dL    eGFR 51 (L) >59 mL/min/1.73    SL AMB BUN/CREATININE RATIO 12 9 - 20    Sodium 139 134 - 144 mmol/L    Potassium 5.1 3.5 - 5.2 mmol/L    Chloride 105 96 - 106 mmol/L    CO2 20 20 - 29 mmol/L    CALCIUM 9.4 8.7 - 10.2 mg/dL    Protein, Total 6.9 6.0 - 8.5 g/dL    Albumin 4.5 3.8 - 4.9 g/dL    Globulin, Total 2.4 1.5 - 4.5 g/dL    Albumin/Globulin Ratio 1.9 1.2 - 2.2    TOTAL BILIRUBIN 0.6 0.0 - 1.2 mg/dL    Alk Phos Isoenzymes 89 44 - 121 IU/L    AST 25 0 - 40 IU/L    ALT 25 0 - 44 IU/L   CBC and differential   Result Value Ref Range    White Blood Cell Count 6.5 3.4 - 10.8 x10E3/uL    Red Blood Cell Count 5.17 4.14 - 5.80 x10E6/uL    Hemoglobin 14.3 13.0 - 17.7 g/dL    HCT 43.0 37.5 - 51.0 %    MCV 83 79 - 97 fL    MCH 27.7 26.6 - 33.0 pg    MCHC 33.3 31.5 - 35.7 g/dL    RDW 12.5 11.6 - 15.4 %    Platelet Count 985 830 - 450 x10E3/uL    Neutrophils 61 Not Estab. %    Lymphocytes 29 Not Estab. %    Monocytes 7 Not Estab. %    Eosinophils 2 Not Estab. %    Basophils PCT 1 Not Estab. %    Neutrophils (Absolute) 4.0 1.4 - 7.0 x10E3/uL    Lymphocytes (Absolute) 1.9 0.7 - 3.1 x10E3/uL    Monocytes (Absolute) 0.5 0.1 - 0.9 x10E3/uL    Eosinophils (Absolute) 0.1 0.0 - 0.4 x10E3/uL    Basophils ABS 0.0 0.0 - 0.2 x10E3/uL    Immature Granulocytes 0 Not Estab. %    Immature Granulocytes (Absolute) 0.0 0.0 - 0.1 x10E3/uL   TSH, 3rd generation with Free T4 reflex   Result Value Ref Range    TSH 3.060 0.450 - 4.500 uIU/mL   Cardiovascular Report   Result Value Ref Range    Interpretation Note    [facility] Kidney Stone Panel   Result Value Ref Range    Interpretation Note

## 2024-04-25 ENCOUNTER — TELEPHONE (OUTPATIENT)
Dept: FAMILY MEDICINE CLINIC | Facility: CLINIC | Age: 59
End: 2024-04-25

## 2024-04-25 DIAGNOSIS — E78.00 HYPERCHOLESTEROLEMIA: ICD-10-CM

## 2024-04-25 DIAGNOSIS — I10 PRIMARY HYPERTENSION: Primary | ICD-10-CM

## 2024-04-25 NOTE — TELEPHONE ENCOUNTER
Patient missed 4/22 appointment as wife forgot what date he was scheduled. Rescheduled appointment for 6 month follow up 5/2. Please place order for 6 month labs.

## 2024-04-30 LAB
ALBUMIN SERPL-MCNC: 4.6 G/DL (ref 3.8–4.9)
ALBUMIN/CREAT UR: <3 MG/G CREAT (ref 0–29)
ALBUMIN/GLOB SERPL: 1.7 {RATIO} (ref 1.2–2.2)
ALP SERPL-CCNC: 90 IU/L (ref 44–121)
ALT SERPL-CCNC: 28 IU/L (ref 0–44)
AST SERPL-CCNC: 22 IU/L (ref 0–40)
BILIRUB SERPL-MCNC: 0.5 MG/DL (ref 0–1.2)
BUN SERPL-MCNC: 26 MG/DL (ref 6–24)
BUN/CREAT SERPL: 16 (ref 9–20)
CALCIUM SERPL-MCNC: 9.9 MG/DL (ref 8.7–10.2)
CHLORIDE SERPL-SCNC: 103 MMOL/L (ref 96–106)
CO2 SERPL-SCNC: 22 MMOL/L (ref 20–29)
CREAT SERPL-MCNC: 1.63 MG/DL (ref 0.76–1.27)
CREAT UR-MCNC: 104.5 MG/DL
CYSTATIN C SERPL-MCNC: 1.53 MG/L (ref 0.67–1.14)
EGFR: 49 ML/MIN/1.73
ERYTHROCYTE [DISTWIDTH] IN BLOOD BY AUTOMATED COUNT: 13.3 % (ref 11.6–15.4)
GFR/BSA.PRED SERPLBLD CYS-BASED-ARV: 45 ML/MIN/1.73
GLOBULIN SER-MCNC: 2.7 G/DL (ref 1.5–4.5)
GLUCOSE SERPL-MCNC: 106 MG/DL (ref 70–99)
HCT VFR BLD AUTO: 48.6 % (ref 37.5–51)
HGB BLD-MCNC: 16.2 G/DL (ref 13–17.7)
MCH RBC QN AUTO: 28.2 PG (ref 26.6–33)
MCHC RBC AUTO-ENTMCNC: 33.3 G/DL (ref 31.5–35.7)
MCV RBC AUTO: 85 FL (ref 79–97)
MICROALBUMIN UR-MCNC: <3 UG/ML
PLATELET # BLD AUTO: 244 X10E3/UL (ref 150–450)
POTASSIUM SERPL-SCNC: 5.1 MMOL/L (ref 3.5–5.2)
PROT SERPL-MCNC: 7.3 G/DL (ref 6–8.5)
PTH-INTACT SERPL-MCNC: 30 PG/ML (ref 15–65)
RBC # BLD AUTO: 5.75 X10E6/UL (ref 4.14–5.8)
SODIUM SERPL-SCNC: 139 MMOL/L (ref 134–144)
WBC # BLD AUTO: 6.5 X10E3/UL (ref 3.4–10.8)

## 2024-05-02 ENCOUNTER — OFFICE VISIT (OUTPATIENT)
Dept: FAMILY MEDICINE CLINIC | Facility: CLINIC | Age: 59
End: 2024-05-02
Payer: COMMERCIAL

## 2024-05-02 VITALS
HEIGHT: 72 IN | DIASTOLIC BLOOD PRESSURE: 84 MMHG | HEART RATE: 69 BPM | TEMPERATURE: 98.2 F | BODY MASS INDEX: 30.34 KG/M2 | OXYGEN SATURATION: 96 % | WEIGHT: 224 LBS | SYSTOLIC BLOOD PRESSURE: 128 MMHG | RESPIRATION RATE: 18 BRPM

## 2024-05-02 DIAGNOSIS — I10 ESSENTIAL HYPERTENSION: ICD-10-CM

## 2024-05-02 DIAGNOSIS — E78.00 HYPERCHOLESTEROLEMIA: ICD-10-CM

## 2024-05-02 DIAGNOSIS — R42 DIZZY: ICD-10-CM

## 2024-05-02 DIAGNOSIS — I65.22 LEFT CAROTID ARTERY OCCLUSION: ICD-10-CM

## 2024-05-02 DIAGNOSIS — R53.83 OTHER FATIGUE: Primary | ICD-10-CM

## 2024-05-02 DIAGNOSIS — R26.9 GAIT DISTURBANCE: ICD-10-CM

## 2024-05-02 DIAGNOSIS — I65.21 STENOSIS OF RIGHT CAROTID ARTERY: ICD-10-CM

## 2024-05-02 PROCEDURE — 99214 OFFICE O/P EST MOD 30 MIN: CPT | Performed by: FAMILY MEDICINE

## 2024-05-02 NOTE — PROGRESS NOTES
Elie Cooper 1965 male MRN: [id number]    [facility] PRACTICE OFFICE VISIT  [facility] - [facility]      ASSESSMENT/PLAN  Elie Cooper is a 58 y.o. male presents to the office for    Diagnoses and all orders for this visit:    Other fatigue  -     MRI brain w wo contrast; Future    Gait disturbance  -     MRI brain w wo contrast; Future    Dizzy  -     MRI brain w wo contrast; Future    Left carotid artery occlusion  -     VAS carotid complete study; Future    Stenosis of right carotid artery  -     VAS carotid complete study; Future    Essential hypertension    Hypercholesterolemia    Recommend repeat carotid Doppler  Recommend an MRI given the patient has felt dizzy fatigue and gait disturbance 1 to be sure that the patient's stroke history continues to be improving and no new infarcts  Hypertension currently controlled continue on medications as prescribed  Hyperlipidemia continue medications as prescribed             Future Appointments   Date Time Provider Department Center   5/28/2024  2:30 PM YASIR [facility]   6/11/2024  8:30 AM [place] MRI 1 [place] MRI [facility]   6/11/2024  9:15 AM [place] VASCULAR 2 [place] Vasc [facility]   6/18/2024  9:20 AM Julissa Jennings MD [facility]   11/14/2024  3:00 PM Salome Gonsalves MD [facility]          SUBJECTIVE  CC: Follow-up (6 month )      HPI:  Elie Cooper is a 58 y.o. male who presents for an chronic conditions.  Patient states that he continues to have fatigue and difficulty to complete task at times.  Does get dizzy at times.  Patient states that he knows that he is due for his carotid Dopplers.  Has been seeing the specialist as appointed  Review of Systems   Constitutional:  Positive for activity change and fatigue. Negative for appetite change, chills and fever.   HENT:  Negative for congestion.    Respiratory:  Negative for cough, chest tightness and shortness of breath.    Cardiovascular:   Negative for chest pain and leg swelling.   Gastrointestinal:  Negative for abdominal distention, abdominal pain, constipation, diarrhea, nausea and vomiting.   Musculoskeletal:  Positive for gait problem.   Neurological:  Positive for dizziness and headaches.   All other systems reviewed and are negative.      Historical Information   The patient history was reviewed as follows:  Past Medical History:   Diagnosis Date   • Chronic kidney disease    • Hyperlipidemia    • Hypertension    • Stroke (HCC) 02/10/2019    right side sl weakness         Medications:     Current Outpatient Medications:   •  atorvastatin (LIPITOR) 80 mg tablet, TAKE ONE TABLET BY MOUTH EVERY DAY WITH DINNER, Disp: 90 tablet, Rfl: 1  •  ezetimibe (ZETIA) 10 mg tablet, Take 1 tablet (10 mg total) by mouth daily, Disp: 90 tablet, Rfl: 2  •  losartan (COZAAR) 100 MG tablet, Take 1 tablet (100 mg total) by mouth daily, Disp: 90 tablet, Rfl: 4  •  metoprolol tartrate (LOPRESSOR) 25 mg tablet, Take 1 tablet (25 mg total) by mouth daily, Disp: 90 tablet, Rfl: 3  •  Thiamine Mononitrate (VITAMIN B1) 100 mg tablet, TAKE ONE TABLET BY MOUTH EVERY DAY, Disp: 90 tablet, Rfl: 2  •  cholecalciferol (VITAMIN D3) 1,000 units tablet, Take 2,000 Units by mouth daily (Patient not taking: Reported on 5/2/2024), Disp: , Rfl:   •  clopidogrel (PLAVIX) 75 mg tablet, TAKE ONE TABLET BY MOUTH EVERY DAY (Patient not taking: Reported on 5/2/2024), Disp: 90 tablet, Rfl: 1  •  folic acid (FOLVITE) 1 mg tablet, TAKE ONE TABLET BY MOUTH EVERY DAY (GENERIC FOLVITE) (Patient not taking: Reported on 5/2/2024), Disp: 90 tablet, Rfl: 2    No Known Allergies    OBJECTIVE  Vitals:   Vitals:    05/02/24 1345   BP: 128/84   Pulse: 69   Resp: 18   Temp: 98.2 °F (36.8 °C)   TempSrc: Temporal   SpO2: 96%   Weight: 102 kg (224 lb)   Height: 6' (1.829 m)         Physical Exam  Vitals reviewed.   Constitutional:       Appearance: He is well-developed.   HENT:      Head: Normocephalic and  atraumatic.   Eyes:      Conjunctiva/sclera: Conjunctivae normal.      Pupils: Pupils are equal, round, and reactive to light.   Cardiovascular:      Rate and Rhythm: Normal rate and regular rhythm.      Heart sounds: Normal heart sounds, S1 normal and S2 normal. No murmur heard.  Pulmonary:      Effort: Pulmonary effort is normal. No respiratory distress.      Breath sounds: Normal breath sounds. No wheezing.   Musculoskeletal:         General: Normal range of motion.      Cervical back: Normal range of motion and neck supple.   Skin:     General: Skin is warm.   Neurological:      Mental Status: He is alert and oriented to person, place, and time.   Psychiatric:         Speech: Speech normal.         Behavior: Behavior normal.         Thought Content: Thought content normal.         Judgment: Judgment normal.                    Salome Magaña MD,   [facility]  5/5/2024

## 2024-06-15 DIAGNOSIS — F10.10 ALCOHOL ABUSE: ICD-10-CM

## 2024-06-15 NOTE — TELEPHONE ENCOUNTER
Refill must be reviewed and completed by the office or provider. The refill is unable to be approved or denied by the medication management team.       Medication not assigned to a protocol, review manually.

## 2024-06-17 RX ORDER — GAUZE BANDAGE 2" X 2"
100 BANDAGE TOPICAL DAILY
Qty: 90 TABLET | Refills: 2 | Status: SHIPPED | OUTPATIENT
Start: 2024-06-17

## 2024-06-19 NOTE — PROGRESS NOTES
Progress Note - Cardiology Office  [facility]    Elie Cooper 58 y.o. male MRN: [id number]  : 1965  Encounter: [id number]      ASSESSMENT:   Essential hypertension  BP today is 158/100 with heart rate of 74/min  Patient has some issues with his insurance renewal and therefore has been out of metoprolol.  The problem is getting fixed and he should be able to resume probably today  On 2024 it was 128/84 mmHg  Patient has an element of whitecoat hypertension  According to him his blood pressure at home is usually within normal range  He also has some permissive hypertension due to his history of CVA  On losartan and metoprolol     History of CVA due to occlusion of left MCA  PVD, bilateral <50% ICA stenosis  On atorvastatin and Plavix     Hyperlipidemia  2023: , , HDL 39, Normal AST and ALT  On atorvastatin 80 mg  Zetia 10 mg added on 10/13/2023     CKD, stage IIIb     BPH, s/p cystoscopy and TURP on 7/15/2021     GUY  Overweight, BMI 28.26     Stress test, 2021:  Negative for ischemia     TTE, 2019:  EF 60%,     RECOMMENDATIONS:  Continue current cardiac medications including atorvastatin 80 mg, Zetia 10 mg, losartan 100 mg, metoprolol 25 mg  Patient was also previously prescribed Plavix by neurology.  He is currently not taking it.  He should check with neurology if it can be safely discontinued  Low-salt and low-cholesterol diet  Regular cardiovascular exercise as tolerated  Weight loss        Please call [phone] if any questions.    HPI :     Elie Cooper is a 58 y.o. year old male who came for follow up.  He denies any symptoms.  He had problem with renewing his insurance and therefore has recently been out of some medications including metoprolol.  He thinks his medications should be arriving today and he should be able to resume all his medications.  As such his blood pressure is elevated, he however also has an element of whitecoat  hypertension    REVIEW OF SYSTEMS:  Denies any new or acute cardiac symptoms.  Denies chest pain, dyspnea, palpitations or syncope      Historical Information   Past Medical History:   Diagnosis Date    Chronic kidney disease     Hyperlipidemia     Hypertension     Stroke (HCC) 02/10/2019    right side sl weakness     Past Surgical History:   Procedure Laterality Date    FL RETROGRADE URETHROCYSTOGRAM  6/10/2021    NO PAST SURGERIES      KY CYSTO BLADDER W/URETERAL CATHETERIZATION N/A 6/10/2021    Procedure: CYSTOSCOPY WITH RETROGRADE PYELOGRAM/internal urethrotomy, DVIU, URETHROGRAM, CYSTOGRAM.;  Surgeon: Fabian Remy MD;  Location: [facility];  Service: Urology    [place] TRURL ELECTROSURG RESCJ PROSTATE BLEED COMPLETE N/A 7/15/2021    Procedure: CYSTOSCOPY, TRANSURETHRAL RESECTION OF PROSTATE (TURP);  Surgeon: Fabian Remy MD;  Location: [facility];  Service: Urology     Social History     Substance and Sexual Activity   Alcohol Use Yes    Alcohol/week: 6.0 standard drinks of alcohol    Types: 6 Cans of beer per week     Social History     Substance and Sexual Activity   Drug Use Never     Social History     Tobacco Use   Smoking Status Former    Current packs/day: 0.00    Types: Cigarettes    Quit date: 2012    Years since quittin.9   Smokeless Tobacco Never     Family History:   Family History   Problem Relation Age of Onset    Diabetes type II Mother     Hypertension Father     Diabetes type II Father     Diabetes type II Sister     Hypertension Brother        Meds/Allergies     No Known Allergies    Current Outpatient Medications:     atorvastatin (LIPITOR) 80 mg tablet, TAKE ONE TABLET BY MOUTH EVERY DAY WITH DINNER, Disp: 90 tablet, Rfl: 1    ezetimibe (ZETIA) 10 mg tablet, Take 1 tablet (10 mg total) by mouth daily, Disp: 90 tablet, Rfl: 2    losartan (COZAAR) 100 MG tablet, Take 1 tablet (100 mg total) by mouth daily, Disp: 90 tablet, Rfl: 4    metoprolol tartrate (LOPRESSOR) 25 mg tablet, Take  1 tablet (25 mg total) by mouth daily, Disp: 90 tablet, Rfl: 3    Thiamine Mononitrate (VITAMIN B1) 100 mg tablet, TAKE ONE TABLET BY MOUTH EVERY DAY, Disp: 90 tablet, Rfl: 2    cholecalciferol (VITAMIN D3) 1,000 units tablet, Take 2,000 Units by mouth daily (Patient not taking: Reported on 5/2/2024), Disp: , Rfl:     clopidogrel (PLAVIX) 75 mg tablet, TAKE ONE TABLET BY MOUTH EVERY DAY (Patient not taking: Reported on 5/2/2024), Disp: 90 tablet, Rfl: 1    folic acid (FOLVITE) 1 mg tablet, TAKE ONE TABLET BY MOUTH EVERY DAY (GENERIC FOLVITE) (Patient not taking: Reported on 5/2/2024), Disp: 90 tablet, Rfl: 2    Vitals: Blood pressure 158/100, pulse 74, height 6' (1.829 m), weight 103 kg (226 lb), SpO2 95%.    Body mass index is 30.65 kg/m².  Vitals:    06/20/24 0941   Weight: 103 kg (226 lb)     BP Readings from Last 3 Encounters:   06/20/24 158/100   05/02/24 128/84   11/06/23 122/86       Physical Exam:  Physical Exam    Neurologic:  Alert & oriented x 3, no new focal deficits, Not in any acute distress,  Constitutional: Obese  Eyes:  Pupil equal and reacting to light, conjunctiva normal,   HENT:  Atraumatic, oropharynx moist, Neck- normal range of motion, no tenderness,  Neck supple, No JVP, No LNP   Respiratory:  Bilateral air entry, mostly clear to auscultation  Cardiovascular: S1-S2 regular with a I/VI systolic murmur   GI:  Soft, nondistended, normal bowel sounds, nontender, no hepatosplenomegaly appreciated.  Musculoskeletal:  No tenderness, no deformities.   Skin:  Well hydrated, no rash   Lymphatic:  No lymphadenopathy noted   Extremities:  No edema and distal pulses are present        Diagnostic Studies Review Cardio:      EKG: Normal sinus rhythm, heart rate 74/min    Cardiac testing:   Results for orders placed during the hospital encounter of 02/10/19    Echo complete with contrast if indicated    [address]  [address]  [phone]    Transthoracic Echocardiogram  2D, M-mode, Doppler, and Color Doppler    Study date:  2019    Patient: GRACIE SHINE  MR number: [id number]  Account number: [id number]  : 1965  Age: 53 years  Gender: Male  Status: Inpatient  Location: Bedside  Height: 73 in  Weight: 225.9 lb  BP: 186/ 96 mmHg    Indications: Evaluate for suspected cardiac source of embolism.    Diagnoses: G45.9 - Transient cerebral ischemic attack, unspecified    Sonographer:  Nikki Mata RDCS  Primary Physician:  Salome Magaña MD  Referring Physician:  Fabian Lane MD  Group:  [facility]  Interpreting Physician:  Donato Fitzgerald MD    SUMMARY    LEFT VENTRICLE:  Systolic function was normal. Ejection fraction was estimated to be 60 %.  There were no regional wall motion abnormalities.    RIGHT VENTRICLE:  The size was normal.  Systolic function was normal.    TRICUSPID VALVE:  There was mild regurgitation.  Estimated peak PA pressure was 35 mmHg.    HISTORY: PRIOR HISTORY: CVA, TIA    PROCEDURE: The procedure was performed at the bedside. This was a routine study. The transthoracic approach was used. The study included complete 2D imaging, M-mode, complete spectral Doppler, and color Doppler. The heart rate was 53 bpm,  at the start of the study. Images were obtained from the parasternal, apical, subcostal, and suprasternal notch acoustic windows. Image quality was adequate.    LEFT VENTRICLE: Size was normal. Systolic function was normal. Ejection fraction was estimated to be 60 %. There were no regional wall motion abnormalities. Wall thickness was normal. DOPPLER: Left ventricular diastolic function parameters  were normal.    RIGHT VENTRICLE: The size was normal. Systolic function was normal. Wall thickness was normal.    LEFT ATRIUM: Size was normal.    RIGHT ATRIUM: Size was normal.    MITRAL VALVE: Valve structure was normal. There was normal leaflet separation.  DOPPLER: The transmitral velocity was within the normal range. There was no evidence for stenosis. There was no significant regurgitation.    AORTIC VALVE: The valve was trileaflet. Leaflets exhibited mild calcification of the non coronary cusp, normal cuspal separation, and sclerosis. DOPPLER: Transaortic velocity was within the normal range. There was no evidence for stenosis.  There was no significant regurgitation.    TRICUSPID VALVE: The valve structure was normal. There was normal leaflet separation. DOPPLER: The transtricuspid velocity was within the normal range. There was no evidence for stenosis. There was mild regurgitation. Estimated peak PA  pressure was 35 mmHg.    PULMONIC VALVE: Leaflets exhibited normal thickness, no calcification, and normal cuspal separation. DOPPLER: The transpulmonic velocity was within the normal range. There was no significant regurgitation.    PERICARDIUM: There was no pericardial effusion.    AORTA: The root exhibited normal size.    SYSTEMIC VEINS: IVC: The inferior vena cava was normal in size.    SYSTEM MEASUREMENT TABLES    2D  %FS: 31.18 %  Ao Diam: 3.4 cm  EDV(Teich): 132.32 ml  EF(Teich): 58.53 %  ESV(Teich): 54.88 ml  IVSd: 0.85 cm  LA Area: 19.47 cm2  LA Diam: 3.68 cm  LVEDV MOD A4C: 143.8 ml  LVEF MOD A4C: 59.51 %  LVESV MOD A4C: 58.22 ml  LVIDd: 5.25 cm  LVIDs: 3.61 cm  LVLd A4C: 9.65 cm  LVLs A4C: 7.72 cm  LVPWd: 0.88 cm  RA Area: 17.34 cm2  RVIDd: 2.96 cm  SV MOD A4C: 85.58 ml  SV(Teich): 77.45 ml    CW  TR Vmax: 2.79 m/s  TR maxP.12 mmHg    MM  TAPSE: 2.73 cm    PW  AVC: 348.09 ms  E': 0.11 m/s  E/E': 6.61  MV A [place]: 0.66 m/s  MV Dec [place]: 4.16 m/s2  MV DecT: 177.68 ms  MV E [place]: 0.74 m/s  MV E/A Ratio: 1.12  MV PHT: 51.53 ms  MVA By PHT: 4.27 cm2    [employer] Accredited Echocardiography Laboratory    Prepared and electronically signed by    Donato Fitzgerald MD  Signed 2019 08:21:31        Imaging:  Chest X-Ray:   No Chest XR results  "available for this patient.    CT-scan of the chest:     No CTA results available for this patient.  Lab Review   Lab Results   Component Value Date    WBC 6.5 04/27/2024    HGB 16.2 04/27/2024    HCT 48.6 04/27/2024    MCV 85 04/27/2024    RDW 13.3 04/27/2024     04/27/2024     BMP:  Lab Results   Component Value Date    SODIUM 139 04/27/2024    K 5.1 04/27/2024     04/27/2024    CO2 22 04/27/2024    BUN 26 (H) 04/27/2024    CREATININE 1.63 (H) 04/27/2024    GLUC 106 (H) 04/27/2024    CALCIUM 9.7 06/17/2021    EGFR 49 (L) 04/27/2024    EGFR 45 (L) 04/27/2024    MG 1.9 06/10/2021     LFT:  Lab Results   Component Value Date    AST 22 04/27/2024    ALT 28 04/27/2024    ALKPHOS 105 06/09/2021    TP 7.3 04/27/2024    ALB 4.6 04/27/2024      No components found for: \"TSH3\"  Lab Results   Component Value Date    GPI7XKDBAHTE 3.770 (H) 02/11/2019     Lab Results   Component Value Date    HGBA1C 5.7 (H) 04/17/2021     Lipid Profile:   Lab Results   Component Value Date    CHOLESTEROL 163 08/07/2023    HDL 39 (L) 08/07/2023    LDLCALC 104 (H) 08/07/2023    TRIG 109 08/07/2023     Lab Results   Component Value Date    CHOLESTEROL 163 08/07/2023    CHOLESTEROL 169 08/08/2022     No results found for: \"CKTOTAL\", \"CKMB\", \"CKMBINDEX\", \"TROPONINI\"  No results found for: \"NTBNP\"   No results found for this or any previous visit (from the past 672 hour(s)).          Dr. Julissa Jennings MD, FACC      \"This note has been constructed using a voice recognition system.Therefore there may be syntax, spelling, and/or grammatical errors. Please call if you have any questions. \"  "

## 2024-06-20 ENCOUNTER — OFFICE VISIT (OUTPATIENT)
Dept: CARDIOLOGY CLINIC | Facility: CLINIC | Age: 59
End: 2024-06-20
Payer: COMMERCIAL

## 2024-06-20 VITALS
SYSTOLIC BLOOD PRESSURE: 158 MMHG | HEIGHT: 72 IN | WEIGHT: 226 LBS | DIASTOLIC BLOOD PRESSURE: 100 MMHG | BODY MASS INDEX: 30.61 KG/M2 | HEART RATE: 74 BPM | OXYGEN SATURATION: 95 %

## 2024-06-20 DIAGNOSIS — I10 BENIGN ESSENTIAL HYPERTENSION: ICD-10-CM

## 2024-06-20 DIAGNOSIS — Z82.49 FAMILY HISTORY OF ABDOMINAL AORTIC ANEURYSM (AAA): ICD-10-CM

## 2024-06-20 DIAGNOSIS — I63.512 CEREBROVASCULAR ACCIDENT (CVA) DUE TO OCCLUSION OF LEFT MIDDLE CEREBRAL ARTERY (HCC): Primary | ICD-10-CM

## 2024-06-20 PROCEDURE — 99214 OFFICE O/P EST MOD 30 MIN: CPT | Performed by: INTERNAL MEDICINE

## 2024-06-20 PROCEDURE — 93000 ELECTROCARDIOGRAM COMPLETE: CPT | Performed by: INTERNAL MEDICINE

## 2024-07-01 ENCOUNTER — APPOINTMENT (OUTPATIENT)
Dept: RADIOLOGY | Facility: HOSPITAL | Age: 59
End: 2024-07-01
Attending: FAMILY MEDICINE
Payer: COMMERCIAL

## 2024-07-01 ENCOUNTER — HOSPITAL ENCOUNTER (OUTPATIENT)
Dept: RADIOLOGY | Facility: HOSPITAL | Age: 59
Discharge: HOME | End: 2024-07-01
Attending: FAMILY MEDICINE
Payer: COMMERCIAL

## 2024-07-01 DIAGNOSIS — I65.22 LEFT CAROTID ARTERY OCCLUSION: ICD-10-CM

## 2024-07-01 DIAGNOSIS — I65.21 STENOSIS OF RIGHT CAROTID ARTERY: ICD-10-CM

## 2024-07-01 PROCEDURE — 93880 EXTRACRANIAL BILAT STUDY: CPT | Performed by: SURGERY

## 2024-07-01 PROCEDURE — 93880 EXTRACRANIAL BILAT STUDY: CPT

## 2024-09-16 DIAGNOSIS — I10 ESSENTIAL HYPERTENSION: ICD-10-CM

## 2024-09-17 RX ORDER — LOSARTAN POTASSIUM 100 MG/1
100 TABLET ORAL DAILY
Qty: 90 TABLET | Refills: 1 | Status: SHIPPED | OUTPATIENT
Start: 2024-09-17

## 2024-09-19 DIAGNOSIS — I63.512 CEREBROVASCULAR ACCIDENT (CVA) DUE TO OCCLUSION OF LEFT MIDDLE CEREBRAL ARTERY (HCC): ICD-10-CM

## 2024-09-19 RX ORDER — ATORVASTATIN CALCIUM 80 MG/1
80 TABLET, FILM COATED ORAL
Qty: 30 TABLET | Refills: 0 | Status: SHIPPED | OUTPATIENT
Start: 2024-09-19

## 2024-09-19 RX ORDER — CLOPIDOGREL BISULFATE 75 MG/1
75 TABLET ORAL DAILY
Qty: 90 TABLET | Refills: 1 | Status: SHIPPED | OUTPATIENT
Start: 2024-09-19

## 2024-09-24 NOTE — PROGRESS NOTES
E TRANSITION OF CARE OFFICE VISIT  [facility] - [facility]    NAME: Maria Teresa Mendoza  AGE: 48 y o  SEX: male  : 1965     DATE: 2019     Assessment and Plan:     1  Cerebrovascular accident (CVA) due to occlusion of left middle cerebral artery ([address] )  At this time I believe that the patient should have OT/speech therapy outpatient till he has returns to his baseline  Physical therapy not needed at this time given that the patient's strength is 5/5 and no gait abnormalities  Would highly recommend to continue aspirin/Plavix for total of 12 months  Continue on atorvastatin till seen by Neurology  Might need a lower dose in the future given his cholesterol has never been elevated  Explained to him the mechanism of action for carotid stenosis  - Ambulatory referral to Occupational Therapy; Future  - Ambulatory referral to Neurology; Future  - Ambulatory referral to Speech Therapy; Future    2  Essential hypertension  Currently will stop the patient on midodrine  Given that the patient has had multiple SBP is greater than 160 per documentation from ICU he was to discontinue this medication if the SBP is were elevated  Will like him to continue to keep a log at home  Have the patient come back in 2 weeks for blood pressure check    3  Alcohol abuse  Educated the importance of maintaining abstinence  I did agree the patient did have 1 beer once in a while  Given that he is not classified as an alcoholic  I personally have seen this patient multiple times throughout the week given that his daughters 1 my patients he has never been intoxicated to the point where he is able to not perform his daily activities  If this ever becomes a problem I have advised him that we have support groups and counseling available for him  Continue on thiamine and folic acid    4  Hypercholesterolemia  Continue medications described above    5   Stenosis of right carotid artery  Will obtain a carotid Doppler June 2019  Carotid stenosis of 20-30% less likely the cause of his stroke    6  Pre DM  A1c 5 7%;education given  Return to the office in 2 weeks for blood pressure check     Counseling:     · Medication Side Effects: Adverse side effects of medications were reviewed with the patient/guardian today  · I have spent 35 minutes with Patient and family today in which greater than 50% of this time was spent in counseling/coordination of care regarding Diagnostic results, Prognosis, Risks and benefits of tx options, Intructions for management, Patient and family education, Importance of tx compliance, Risk factor reductions and Impressions  · Barriers to treatment include: No identified barriers     Transitional Care Management Review:     Dakota Giordano is a 48 y o  male here for TCM follow-up    During the TCM phone call patient stated:    TCM Call (since 1/18/2019)     Date and time call was made  2/15/2019  8:55 AM    Patient was hospitialized at  [facility]    Date of Admission  02/10/19    Date of discharge  02/14/19    Diagnosis  Stroke    Disposition  Home    Were the patients medications reviewed and updated  Yes    Current Symptoms  Weakness    Weakness severity  Moderate      TCM Call (since 1/18/2019)     Post hospital issues  Reduced activity    Should patient be enrolled in anticoag monitoring? No    Scheduled for follow up? Yes    Did you obtain your prescribed medications  Yes    Do you need help managing your prescriptions or medications  No    Is transportation to your appointment needed  Yes    Specify why  Right side weakness    Living Arrangements  Spouse or Significiant other    Support System  Family    The type of support provided  Emotional; Physical    Do you have social support  Yes, as much as I need    Are you recieving any outpatient services  No    Are you recieving home care services  Yes    Types of home care services  Home PT;  Home RT    Are you using any community resources  No    Current waiver services  No    Have you fallen in the last 12 months  No    Interperter language line needed  No    Counseling  Family           HPI:     59-year-old male presenting to the office as the [facility] visit after being seen in the hospital for a left MCA skin nick stroke/left external ICA occlusion  Patient is currently accompanied by his wife  Patient has a significant history of hypertension off medications for greater than 5 years/alcohol usage  Patient does not classify himself as an alcohol abuser  Wife states that he has never become violent at any times  He does have 4-6 drinks a day but has never had any withdrawal symptoms  Patient does not need a drink every morning when he wakes up  On February 9, 2019 patient was admitted to [facility] after being transferred from [address] [place] [address] for right-sided weakness and slurred speech  CT was performed at that time showing a left ICA occlusion and a left MCA ischemic stroke  Patient expressed today that 2 weeks prior to this event he started having some weakness of the right arm but did not think anything of it  He cut back on alcohol thinking it was secondary to alcohol use  Patient was not a candidate for tPA given that his symptoms resolved spontaneously  Patient was given Plavix and aspirin  And placed on an high dose atorvastatin  Patient was hypotensive and was started on midodrine 5 mg twice a day  Patient was not aware of when to stop the medication therefore has been continuing to take it  He has had SBP is at home from 150 to 160s  Asymptomatic at that time  Patient has not established with edge neurologist   Incidentally on exam there was seen to have a carotid stenosis of 20-30%  Was told that he would need an ultrasound of the carotids outpatient  For his alcohol use the patient was placed on folate 1 mg/thiamine  Patient admits to drinking again since discharge    Currently the patient states that his strength is 5/5 however he continues to have little slip ups with his words  Denies any family history of strokes  The following portions of the patient's history were reviewed and updated as appropriate: allergies, current medications, past family history, past medical history, past social history, past surgical history and problem list      Review of Systems:     Review of Systems     Problem List:     Patient Active Problem List   Diagnosis    Cerebrovascular accident (CVA) due to occlusion of left middle cerebral artery ([address] )    Alcohol abuse    Hypertension    Hypercholesterolemia    Benign essential hypertension        Objective: There were no vitals taken for this visit  Physical Exam    Laboratory Results: I have personally reviewed the pertinent laboratory results/reports  and all from [facility] AND [facility]    Radiology/Other Diagnostic Testing Results: I have personally reviewed pertinent reports  Mri Brain Wo Contrast    Result Date: 2/11/2019  MRI BRAIN WITHOUT CONTRAST INDICATION: Acute left MCA infarct  COMPARISON:   2/10/2019  TECHNIQUE:  Sagittal T1, axial T2, axial FLAIR, axial T1, axial Jackson and axial diffusion imaging  IMAGE QUALITY:  Diagnostic  FINDINGS: BRAIN PARENCHYMA: There is restricted diffusion and T2/flair hyperintensity in the dorsal lateral aspect of the left frontal lobe, frontal operculum and anterior insula  Signal abnormality is also present in the anterior caudate body, head and lentiform  nuclei  Few punctate foci of restricted diffusion and T2 hyperintensity in the left corona radiata  There is dural swelling in the affected areas  Foci of petechial hemorrhage within the areas of acute infarct  No midline shift  VENTRICLES:  No hydrocephalus or extra-axial collection  SELLA AND PITUITARY GLAND:  Normal  ORBITS:  No retro-orbital inflammation  PARANASAL SINUSES:  No significant paranasal sinus disease   VASCULATURE:  Evaluation of the major intracranial vasculature demonstrates appropriate flow voids  CALVARIUM AND SKULL BASE:  No evidence of osseous lesion  EXTRACRANIAL SOFT TISSUES:  No soft tissue mass  Late acute left MCA territory infarct, primarily involving the dorsal lateral aspect of the left frontal lobe, frontal operculum and left basal ganglia  Mild petechial hemorrhage  No significant mass effect  The study was marked in [facility] for immediate notification  Workstation performed: [id number]     Ct Stroke Alert Brain    Result Date: 2/10/2019  CT BRAIN - STROKE ALERT PROTOCOL INDICATION:   Expressive aphasia  COMPARISON:  Concurrently performed CT angiogram of the head and neck  TECHNIQUE:  CT examination of the brain was performed  In addition to axial images, coronal reformatted images were created and submitted for interpretation  Radiation dose length product (DLP) for this visit:  2065 74 mGy-cm   This examination, like all CT scans performed in the [facility], was performed utilizing techniques to minimize radiation dose exposure, including the use of iterative reconstruction and automated exposure control  IMAGE QUALITY:  Diagnostic  FINDINGS:  PARENCHYMA:  There is subtle loss of the left insular ribbon in keeping with early CT changes of acute left middle cerebral artery distribution stroke  Also noted is a small area of chronic cortical encephalomalacia involving the posterior superior left  insula and low lower left frontal lobe perhaps best seen on coronal image 58 of series 700 consistent with the sequela of subacute or perhaps even chronic left posterior MCA distribution infarction  There is focal high density over the distal left M1 on image 14 of series 5 consistent with distal M1 occlusion better visualized on CT angiography  No acute parenchymal hemorrhage  No intracranial mass, mass effect or midline shift   VENTRICLES AND EXTRA-AXIAL SPACES:  Minor prominence of lateral and 3rd ventricle suggesting mild central atrophy  No hydrocephalus  No extra-axial collection  VISUALIZED ORBITS AND PARANASAL SINUSES:  Unremarkable  CALVARIUM AND EXTRACRANIAL SOFT TISSUES:   Normal      Noncontrast head CT demonstrates loss of the left insular ribbon consistent with acute left middle cerebral artery distribution stroke  Also noted is a small focus of decreased density in the cortical left posterior insula and low left posterior frontal lobe consistent with small late subacute or perhaps chronic posterior MCA distribution infarction  No acute intracranial hemorrhage  Findings were directly discussed with Linward Files on 2/10/2019 10:03 AM  Workstation performed: [id number]     Cta Stroke Alert (head/neck)    Result Date: 2/10/2019  CTA NECK AND BRAIN WITH CONTRAST INDICATION:  Expressive aphasia  Stroke COMPARISON:   Concurrently performed noncontrast head CT  TECHNIQUE:   Post contrast imaging was performed after administration of iodinated contrast through the neck and brain  Post contrast axial 0 625 mm images timed to opacify the arterial system  3D rendering was performed on an independent workstation  MIP reconstructions performed  Coronal reconstructions were performed of the noncontrast portion of the brain  Radiation dose length product (DLP) for this visit:  419 72 mGy-cm   This examination, like all CT scans performed in the [facility], was performed utilizing techniques to minimize radiation dose exposure, including the use of iterative  reconstruction and automated exposure control  IV Contrast:  85 mL of iohexol (OMNIPAQUE)  IMAGE QUALITY:   Diagnostic FINDINGS: CERVICAL VASCULATURE AORTIC ARCH AND GREAT VESSELS:  Normal aortic arch and great vessel origins  Normal visualized subclavian vessels  RIGHT VERTEBRAL ARTERY CERVICAL SEGMENT:  Normal origin  The vessel is normal in caliber throughout the neck  LEFT VERTEBRAL ARTERY CERVICAL SEGMENT:  Normal origin   The vessel is normal in caliber throughout the neck  RIGHT EXTRACRANIAL CAROTID SEGMENT:  There is fibrocalcific plaque at the carotid bifurcation resulting in mild approximate 20-30% atherosclerotic stenosis at right internal carotid artery origin  Distal cervical right internal carotid artery is uniform  and normal in caliber to the carotid terminus  LEFT EXTRACRANIAL CAROTID SEGMENT:  There is calcific and fibrocalcific atherosclerotic plaque at the carotid bifurcation and there is complete occlusion of the left internal carotid artery at its origin  There is no opacification of the cervical left internal carotid artery in the neck  Left external carotid artery is patent  NASCET criteria was used to determine the degree of internal carotid artery diameter stenosis  INTRACRANIAL VASCULATURE INTERNAL CAROTID ARTERIES:  Minor fibrocalcific plaque associated with the right cavernous and supraclinoid internal carotid artery  Normal right-sided carotid terminus  There is minimal retrograde collateral flow into the supraclinoid left internal carotid artery cavernous with flow coming across the [place] via patent but somewhat small caliber left A1 anterior cerebral artery segment and patent but very small  caliber left posterior communicating artery  ANTERIOR CIRCULATION:  Patent bilateral A1 segments, right larger than left, patent distal anterior cerebral arteries with normal enhancement  Normal anterior communicating artery  MIDDLE CEREBRAL ARTERY CIRCULATION:  Right M1 segment and middle cerebral artery branches demonstrate normal enhancement bilaterally  Opacification of the proximal and mid left M1 middle cerebral artery occurs via collateral circulation across the [place] via small caliber left A1 and tiny caliber left posterior communicating artery    There is occlusion of the mid to distal left middle cerebral artery with reconstitution of opacification of small anterior and middle left M2 branches via collateral circulation  DISTAL VERTEBRAL ARTERIES:  Normal distal vertebral arteries  Posterior inferior cerebellar artery origins are normal  Normal vertebral basilar junction  BASILAR ARTERY:  Basilar artery is normal in caliber  Normal superior cerebellar arteries  POSTERIOR CEREBRAL ARTERIES: Both posterior cerebral arteries arises from the basilar tip  Both arteries demonstrate normal enhancement  Patent small caliber bilateral posterior communicating arteries  DURAL VENOUS SINUSES:  Normal  NON VASCULAR ANATOMY BONY STRUCTURES:  No acute osseous abnormality  SOFT TISSUES OF THE NECK:  Unremarkable  THORACIC INLET:  Unremarkable  Occlusion of left internal carotid artery beginning at its origin  Enhancement of proximal left M1 via collateral circulation across [place] via small caliber left A1 and tiny caliber left posterior communicating arteries  Occlusion of left mid to distal left M1 middle cerebral artery segment  There is some enhancement relatively diminutive of anterior and middle M2 and M3 middle cerebral artery branches presumably via collateral circulation  Mild atherosclerotic narrowing at the origin of the right internal carotid artery  Noncontrast head CT demonstrates loss of the left insular ribbon consistent with acute left middle cerebral artery distribution stroke  Also noted is a small focus of encephalomalacia in the left posterior insula and low left posterior frontal lobe consistent with small chronic cortical infarction in that location  No acute intracranial hemorrhage   Findings were directly discussed with Burl Leyden on 2/10/2019 10:03 AM  Workstation performed: [id number]        Current Medications:     Outpatient Medications Prior to Visit   Medication Sig Dispense Refill    aspirin 81 mg chewable tablet Chew 1 tablet (81 mg total) daily for 14 days 14 tablet 0    atorvastatin (LIPITOR) 80 mg tablet Take 1 tablet (80 mg total) by mouth daily with dinner 14 tablet 0    clopidogrel (PLAVIX) 75 mg tablet Take 1 tablet (75 mg total) by mouth daily for 14 days 14 tablet 0    folic acid (FOLVITE) 1 mg tablet Take 1 tablet (1 mg total) by mouth daily for 14 days 14 tablet 0    midodrine (PROAMATINE) 5 mg tablet Take 1 tablet (5 mg total) by mouth 2 (two) times a day for 14 days 28 tablet 0    thiamine 100 MG tablet Take 1 tablet (100 mg total) by mouth daily for 14 days 14 tablet 0     No facility-administered medications prior to visit          Vivi Bains MD  [facility] Gerald Ceballos(Attending)

## 2025-03-06 ENCOUNTER — OFFICE VISIT (OUTPATIENT)
Dept: FAMILY MEDICINE CLINIC | Facility: CLINIC | Age: 60
End: 2025-03-06
Payer: COMMERCIAL

## 2025-03-06 VITALS
BODY MASS INDEX: 30.96 KG/M2 | RESPIRATION RATE: 18 BRPM | HEIGHT: 72 IN | OXYGEN SATURATION: 98 % | TEMPERATURE: 97 F | HEART RATE: 85 BPM | DIASTOLIC BLOOD PRESSURE: 90 MMHG | WEIGHT: 228.6 LBS | SYSTOLIC BLOOD PRESSURE: 150 MMHG

## 2025-03-06 DIAGNOSIS — E55.9 VITAMIN D DEFICIENCY: ICD-10-CM

## 2025-03-06 DIAGNOSIS — Z13.29 SCREENING FOR THYROID DISORDER: ICD-10-CM

## 2025-03-06 DIAGNOSIS — R26.9 GAIT DISTURBANCE: ICD-10-CM

## 2025-03-06 DIAGNOSIS — Z12.5 SCREENING FOR PROSTATE CANCER: ICD-10-CM

## 2025-03-06 DIAGNOSIS — I10 ESSENTIAL HYPERTENSION: ICD-10-CM

## 2025-03-06 DIAGNOSIS — Z00.00 ANNUAL PHYSICAL EXAM: Primary | ICD-10-CM

## 2025-03-06 DIAGNOSIS — I65.22 LEFT CAROTID ARTERY OCCLUSION: ICD-10-CM

## 2025-03-06 DIAGNOSIS — R42 DIZZY: ICD-10-CM

## 2025-03-06 DIAGNOSIS — R53.82 CHRONIC FATIGUE: ICD-10-CM

## 2025-03-06 DIAGNOSIS — F10.10 ALCOHOL ABUSE: ICD-10-CM

## 2025-03-06 DIAGNOSIS — E78.1 PURE HYPERTRIGLYCERIDEMIA: ICD-10-CM

## 2025-03-06 PROCEDURE — 99396 PREV VISIT EST AGE 40-64: CPT | Performed by: FAMILY MEDICINE

## 2025-03-06 RX ORDER — FOLIC ACID 1 MG/1
1 TABLET ORAL DAILY
Qty: 90 TABLET | Refills: 2 | Status: SHIPPED | OUTPATIENT
Start: 2025-03-06

## 2025-03-06 RX ORDER — METOPROLOL TARTRATE 25 MG/1
25 TABLET, FILM COATED ORAL DAILY
Qty: 90 TABLET | Refills: 3 | Status: SHIPPED | OUTPATIENT
Start: 2025-03-06

## 2025-03-06 RX ORDER — LOSARTAN POTASSIUM 100 MG/1
100 TABLET ORAL DAILY
Qty: 90 TABLET | Refills: 1 | Status: SHIPPED | OUTPATIENT
Start: 2025-03-06

## 2025-03-06 RX ORDER — EZETIMIBE 10 MG/1
10 TABLET ORAL DAILY
Qty: 90 TABLET | Refills: 2 | Status: SHIPPED | OUTPATIENT
Start: 2025-03-06

## 2025-03-06 RX ORDER — GAUZE BANDAGE 2" X 2"
100 BANDAGE TOPICAL DAILY
Qty: 90 TABLET | Refills: 2 | Status: SHIPPED | OUTPATIENT
Start: 2025-03-06

## 2025-03-06 NOTE — PROGRESS NOTES
Elie Cooper 1965 male MRN: [id number]    [facility] PRACTICE OFFICE VISIT  [facility] - [facility]      ASSESSMENT/PLAN  Elie Cooper is a 59 y.o. male presents to the office for    Diagnoses and all orders for this visit:    Annual physical exam    Essential hypertension  -     losartan (COZAAR) 100 MG tablet; Take 1 tablet (100 mg total) by mouth daily  -     metoprolol tartrate (LOPRESSOR) 25 mg tablet; Take 1 tablet (25 mg total) by mouth daily  -     CBC and differential; Future  -     Comprehensive metabolic panel; Future  -     CBC and differential  -     Comprehensive metabolic panel    Pure hypertriglyceridemia  -     ezetimibe (ZETIA) 10 mg tablet; Take 1 tablet (10 mg total) by mouth daily  -     Lipid panel; Future  -     Lipid panel    Chronic fatigue  -     MRI brain w wo contrast; Future  -     Testosterone; Future  -     Testosterone    Dizzy  -     MRI brain w wo contrast; Future    Gait disturbance  -     MRI brain w wo contrast; Future    Screening for thyroid disorder  -     TSH, 3rd generation with Free T4 reflex; Future  -     TSH, 3rd generation with Free T4 reflex    Vitamin D deficiency  -     Vitamin D 25 hydroxy; Future  -     Vitamin D 25 hydroxy    Alcohol abuse  -     folic acid (FOLVITE) 1 mg tablet; Take 1 tablet (1 mg total) by mouth daily  -     Thiamine Mononitrate (VITAMIN B1) 100 mg tablet; Take 1 tablet (100 mg total) by mouth daily    Left carotid artery occlusion  -     VAS carotid complete study; Future    Screening for prostate cancer  -     PSA, total and free; Future  -     PSA, total and free    Strongly recommend repeating all blood work.  Due for an ultrasound in June for his carotids.  Recommend MRI of the brain given symptoms.  Recommend all blood work and medications to be restarted           Future Appointments   Date Time Provider Department Center   9/8/2025  2:30 PM Salome Gonsalves MD [facility]           SUBJECTIVE  CC: Physical Exam      HPI:  Elie Cooper is a 59 y.o. male who presents for an Physical exam.   Patient's biggest concern is that he continues to have chronic fatigue does have some dizziness some have gait disturbances no longer working because of this.  Patient unfortunately lost his insurance therefore has been without any medications for the last 3 months.  States he has not been monitoring his BP.  Besides the symptoms shown above he has not had any other symptoms at this time.  States that he does drink only 3 beers a day    Review of Systems   Constitutional:  Positive for fatigue. Negative for activity change, appetite change, chills and fever.   HENT:  Negative for congestion.    Respiratory:  Negative for cough, chest tightness and shortness of breath.    Cardiovascular:  Negative for chest pain and leg swelling.   Gastrointestinal:  Negative for abdominal distention, abdominal pain, constipation, diarrhea, nausea and vomiting.   Musculoskeletal:  Positive for gait problem.   Neurological:  Positive for dizziness, weakness and light-headedness.   All other systems reviewed and are negative.      Historical Information   The patient history was reviewed as follows:  Past Medical History:   Diagnosis Date   • Chronic kidney disease    • Hyperlipidemia    • Hypertension    • Stroke (HCC) 02/10/2019    right side sl weakness         Medications:     Current Outpatient Medications:   •  ezetimibe (ZETIA) 10 mg tablet, Take 1 tablet (10 mg total) by mouth daily, Disp: 90 tablet, Rfl: 2  •  folic acid (FOLVITE) 1 mg tablet, Take 1 tablet (1 mg total) by mouth daily, Disp: 90 tablet, Rfl: 2  •  losartan (COZAAR) 100 MG tablet, Take 1 tablet (100 mg total) by mouth daily, Disp: 90 tablet, Rfl: 1  •  metoprolol tartrate (LOPRESSOR) 25 mg tablet, Take 1 tablet (25 mg total) by mouth daily, Disp: 90 tablet, Rfl: 3  •  Thiamine Mononitrate (VITAMIN B1) 100 mg tablet, Take 1 tablet (100 mg total) by  mouth daily, Disp: 90 tablet, Rfl: 2  •  atorvastatin (LIPITOR) 80 mg tablet, TAKE ONE TABLET BY MOUTH EVERY DAY WITH DINNER (Patient not taking: Reported on 3/6/2025), Disp: 30 tablet, Rfl: 0  •  cholecalciferol (VITAMIN D3) 1,000 units tablet, Take 2,000 Units by mouth daily (Patient not taking: Reported on 5/2/2024), Disp: , Rfl:   •  clopidogrel (PLAVIX) 75 mg tablet, TAKE ONE TABLET BY MOUTH EVERY DAY (Patient not taking: Reported on 3/6/2025), Disp: 90 tablet, Rfl: 1    No Known Allergies    OBJECTIVE  Vitals:   Vitals:    03/06/25 1352   BP: 150/90   BP Location: Left arm   Patient Position: Sitting   Cuff Size: Large   Pulse: 85   Resp: 18   Temp: (!) 97 °F (36.1 °C)   TempSrc: Temporal   SpO2: 98%   Weight: 104 kg (228 lb 9.6 oz)   Height: 6' (1.829 m)         Physical Exam  Vitals reviewed.   Constitutional:       Appearance: He is well-developed.   HENT:      Head: Normocephalic and atraumatic.      Right Ear: Tympanic membrane, ear canal and external ear normal.      Left Ear: Tympanic membrane, ear canal and external ear normal.      Nose: Nose normal.      Mouth/Throat:      Mouth: Mucous membranes are moist.   Eyes:      Conjunctiva/sclera: Conjunctivae normal.      Pupils: Pupils are equal, round, and reactive to light.   Cardiovascular:      Rate and Rhythm: Normal rate and regular rhythm.      Heart sounds: Normal heart sounds, S1 normal and S2 normal. No murmur heard.  Pulmonary:      Effort: Pulmonary effort is normal. No respiratory distress.      Breath sounds: Normal breath sounds. No wheezing.   Abdominal:      General: Abdomen is flat. There is no distension.      Tenderness: There is no abdominal tenderness. There is no right CVA tenderness or guarding.   Musculoskeletal:         General: Normal range of motion.      Cervical back: Normal range of motion and neck supple.   Skin:     General: Skin is warm.   Neurological:      Mental Status: He is alert and oriented to person, place, and  time.   Psychiatric:         Speech: Speech normal.         Behavior: Behavior normal.         Thought Content: Thought content normal.         Judgment: Judgment normal.                    Salome Gosnalves MD,   [facility]  3/6/2025

## 2025-03-16 LAB
25(OH)D3+25(OH)D2 SERPL-MCNC: 22 NG/ML (ref 30–100)
ALBUMIN SERPL-MCNC: 4.2 G/DL (ref 3.8–4.9)
ALP SERPL-CCNC: 92 IU/L (ref 44–121)
ALT SERPL-CCNC: 22 IU/L (ref 0–44)
AST SERPL-CCNC: 23 IU/L (ref 0–40)
BASOPHILS # BLD AUTO: 0 X10E3/UL (ref 0–0.2)
BASOPHILS NFR BLD AUTO: 1 %
BILIRUB SERPL-MCNC: 0.4 MG/DL (ref 0–1.2)
BUN SERPL-MCNC: 21 MG/DL (ref 6–24)
BUN/CREAT SERPL: 12 (ref 9–20)
CALCIUM SERPL-MCNC: 9.7 MG/DL (ref 8.7–10.2)
CHLORIDE SERPL-SCNC: 104 MMOL/L (ref 96–106)
CHOLEST SERPL-MCNC: 228 MG/DL (ref 100–199)
CHOLEST/HDLC SERPL: 7.1 RATIO (ref 0–5)
CO2 SERPL-SCNC: 19 MMOL/L (ref 20–29)
CREAT SERPL-MCNC: 1.74 MG/DL (ref 0.76–1.27)
EGFR: 45 ML/MIN/1.73
EOSINOPHIL # BLD AUTO: 0.2 X10E3/UL (ref 0–0.4)
EOSINOPHIL NFR BLD AUTO: 3 %
ERYTHROCYTE [DISTWIDTH] IN BLOOD BY AUTOMATED COUNT: 13.5 % (ref 11.6–15.4)
GLOBULIN SER-MCNC: 2.7 G/DL (ref 1.5–4.5)
GLUCOSE SERPL-MCNC: 102 MG/DL (ref 70–99)
HCT VFR BLD AUTO: 51.4 % (ref 37.5–51)
HDLC SERPL-MCNC: 32 MG/DL
HGB BLD-MCNC: 16.8 G/DL (ref 13–17.7)
IMM GRANULOCYTES # BLD: 0 X10E3/UL (ref 0–0.1)
IMM GRANULOCYTES NFR BLD: 0 %
LDLC SERPL CALC-MCNC: 153 MG/DL (ref 0–99)
LYMPHOCYTES # BLD AUTO: 1.9 X10E3/UL (ref 0.7–3.1)
LYMPHOCYTES NFR BLD AUTO: 32 %
MCH RBC QN AUTO: 26.8 PG (ref 26.6–33)
MCHC RBC AUTO-ENTMCNC: 32.7 G/DL (ref 31.5–35.7)
MCV RBC AUTO: 82 FL (ref 79–97)
MICRODELETION SYND BLD/T FISH: NORMAL
MICRODELETION SYND BLD/T FISH: NORMAL
MONOCYTES # BLD AUTO: 0.5 X10E3/UL (ref 0.1–0.9)
MONOCYTES NFR BLD AUTO: 9 %
NEUTROPHILS # BLD AUTO: 3.2 X10E3/UL (ref 1.4–7)
NEUTROPHILS NFR BLD AUTO: 55 %
PLATELET # BLD AUTO: 222 X10E3/UL (ref 150–450)
POTASSIUM SERPL-SCNC: 5.2 MMOL/L (ref 3.5–5.2)
PROT SERPL-MCNC: 6.9 G/DL (ref 6–8.5)
PSA FREE MFR SERPL: 48 %
PSA FREE SERPL-MCNC: 0.24 NG/ML
PSA SERPL-MCNC: 0.5 NG/ML (ref 0–4)
RBC # BLD AUTO: 6.26 X10E6/UL (ref 4.14–5.8)
SL AMB VLDL CHOLESTEROL CALC: 43 MG/DL (ref 5–40)
SODIUM SERPL-SCNC: 139 MMOL/L (ref 134–144)
TESTOST SERPL-MCNC: 441 NG/DL (ref 264–916)
TRIGL SERPL-MCNC: 232 MG/DL (ref 0–149)
TSH SERPL DL<=0.005 MIU/L-ACNC: 3.69 UIU/ML (ref 0.45–4.5)
WBC # BLD AUTO: 5.8 X10E3/UL (ref 3.4–10.8)

## 2025-03-18 ENCOUNTER — RESULTS FOLLOW-UP (OUTPATIENT)
Dept: FAMILY MEDICINE CLINIC | Facility: CLINIC | Age: 60
End: 2025-03-18

## 2025-03-18 DIAGNOSIS — E78.1 PURE HYPERTRIGLYCERIDEMIA: Primary | ICD-10-CM

## 2025-04-23 ENCOUNTER — HOSPITAL ENCOUNTER (OUTPATIENT)
Dept: RADIOLOGY | Facility: HOSPITAL | Age: 60
Discharge: HOME | End: 2025-04-23
Attending: FAMILY MEDICINE
Payer: COMMERCIAL

## 2025-04-23 DIAGNOSIS — R53.82 CHRONIC FATIGUE: ICD-10-CM

## 2025-04-23 DIAGNOSIS — R42 DIZZY: ICD-10-CM

## 2025-04-23 DIAGNOSIS — R26.9 GAIT DISTURBANCE: ICD-10-CM

## 2025-04-23 PROCEDURE — 70553 MRI BRAIN STEM W/O & W/DYE: CPT

## 2025-04-23 PROCEDURE — A9585 GADOBUTROL INJECTION: HCPCS | Performed by: FAMILY MEDICINE

## 2025-04-23 RX ORDER — GADOBUTROL 604.72 MG/ML
10 INJECTION INTRAVENOUS
Status: COMPLETED | OUTPATIENT
Start: 2025-04-23 | End: 2025-04-23

## 2025-04-23 RX ADMIN — GADOBUTROL 10 ML: 604.72 INJECTION INTRAVENOUS at 18:32

## 2025-07-04 ENCOUNTER — APPOINTMENT (EMERGENCY)
Dept: RADIOLOGY | Facility: HOSPITAL | Age: 60
DRG: 093 | End: 2025-07-04
Payer: COMMERCIAL

## 2025-07-04 ENCOUNTER — HOSPITAL ENCOUNTER (INPATIENT)
Facility: HOSPITAL | Age: 60
LOS: 2 days | Discharge: HOME | DRG: 093 | End: 2025-07-06
Attending: EMERGENCY MEDICINE | Admitting: INTERNAL MEDICINE
Payer: COMMERCIAL

## 2025-07-04 DIAGNOSIS — I63.512 CEREBROVASCULAR ACCIDENT (CVA) DUE TO OCCLUSION OF LEFT MIDDLE CEREBRAL ARTERY (HCC): ICD-10-CM

## 2025-07-04 DIAGNOSIS — R47.81 SLURRED SPEECH: Primary | ICD-10-CM

## 2025-07-04 DIAGNOSIS — R29.90 STROKE-LIKE SYMPTOMS: ICD-10-CM

## 2025-07-04 DIAGNOSIS — G45.9 TIA (TRANSIENT ISCHEMIC ATTACK): ICD-10-CM

## 2025-07-04 LAB
ANION GAP SERPL CALCULATED.3IONS-SCNC: 6 MMOL/L (ref 4–13)
APTT PPP: 110 SECONDS (ref 23–34)
APTT PPP: 27 SECONDS (ref 23–34)
ATRIAL RATE: 58 BPM
BASOPHILS # BLD AUTO: 0.03 THOUSANDS/ÂΜL (ref 0–0.1)
BASOPHILS NFR BLD AUTO: 1 % (ref 0–1)
BUN SERPL-MCNC: 22 MG/DL (ref 5–25)
CALCIUM SERPL-MCNC: 9.5 MG/DL (ref 8.4–10.2)
CHLORIDE SERPL-SCNC: 105 MMOL/L (ref 96–108)
CO2 SERPL-SCNC: 26 MMOL/L (ref 21–32)
CREAT SERPL-MCNC: 1.63 MG/DL (ref 0.6–1.3)
EOSINOPHIL # BLD AUTO: 0.17 THOUSAND/ÂΜL (ref 0–0.61)
EOSINOPHIL NFR BLD AUTO: 3 % (ref 0–6)
ERYTHROCYTE [DISTWIDTH] IN BLOOD BY AUTOMATED COUNT: 13.6 % (ref 11.6–15.1)
GFR SERPL CREATININE-BSD FRML MDRD: 45 ML/MIN/1.73SQ M
GLUCOSE SERPL-MCNC: 117 MG/DL (ref 65–140)
GLUCOSE SERPL-MCNC: 124 MG/DL (ref 65–140)
HCT VFR BLD AUTO: 47.4 % (ref 36.5–49.3)
HGB BLD-MCNC: 15.6 G/DL (ref 12–17)
IMM GRANULOCYTES # BLD AUTO: 0.01 THOUSAND/UL (ref 0–0.2)
IMM GRANULOCYTES NFR BLD AUTO: 0 % (ref 0–2)
INR PPP: 0.94 (ref 0.85–1.19)
LYMPHOCYTES # BLD AUTO: 1.39 THOUSANDS/ÂΜL (ref 0.6–4.47)
LYMPHOCYTES NFR BLD AUTO: 23 % (ref 14–44)
MCH RBC QN AUTO: 27.8 PG (ref 26.8–34.3)
MCHC RBC AUTO-ENTMCNC: 32.9 G/DL (ref 31.4–37.4)
MCV RBC AUTO: 85 FL (ref 82–98)
MONOCYTES # BLD AUTO: 0.49 THOUSAND/ÂΜL (ref 0.17–1.22)
MONOCYTES NFR BLD AUTO: 8 % (ref 4–12)
NEUTROPHILS # BLD AUTO: 4.03 THOUSANDS/ÂΜL (ref 1.85–7.62)
NEUTS SEG NFR BLD AUTO: 65 % (ref 43–75)
NRBC BLD AUTO-RTO: 0 /100 WBCS
P AXIS: 76 DEGREES
PLATELET # BLD AUTO: 187 THOUSANDS/UL (ref 149–390)
PMV BLD AUTO: 8.9 FL (ref 8.9–12.7)
POTASSIUM SERPL-SCNC: 4.8 MMOL/L (ref 3.5–5.3)
PR INTERVAL: 166 MS
PROTHROMBIN TIME: 13.1 SECONDS (ref 12.3–15)
QRS AXIS: 77 DEGREES
QRSD INTERVAL: 94 MS
QT INTERVAL: 404 MS
QTC INTERVAL: 396 MS
RBC # BLD AUTO: 5.61 MILLION/UL (ref 3.88–5.62)
SODIUM SERPL-SCNC: 137 MMOL/L (ref 135–147)
T WAVE AXIS: 65 DEGREES
VENTRICULAR RATE: 58 BPM
WBC # BLD AUTO: 6.12 THOUSAND/UL (ref 4.31–10.16)

## 2025-07-04 PROCEDURE — 85730 THROMBOPLASTIN TIME PARTIAL: CPT | Performed by: INTERNAL MEDICINE

## 2025-07-04 PROCEDURE — 96374 THER/PROPH/DIAG INJ IV PUSH: CPT

## 2025-07-04 PROCEDURE — 99285 EMERGENCY DEPT VISIT HI MDM: CPT

## 2025-07-04 PROCEDURE — 85730 THROMBOPLASTIN TIME PARTIAL: CPT | Performed by: EMERGENCY MEDICINE

## 2025-07-04 PROCEDURE — 36415 COLL VENOUS BLD VENIPUNCTURE: CPT | Performed by: EMERGENCY MEDICINE

## 2025-07-04 PROCEDURE — 93010 ELECTROCARDIOGRAM REPORT: CPT | Performed by: INTERNAL MEDICINE

## 2025-07-04 PROCEDURE — 82948 REAGENT STRIP/BLOOD GLUCOSE: CPT

## 2025-07-04 PROCEDURE — 99255 IP/OBS CONSLTJ NEW/EST HI 80: CPT | Performed by: STUDENT IN AN ORGANIZED HEALTH CARE EDUCATION/TRAINING PROGRAM

## 2025-07-04 PROCEDURE — 99285 EMERGENCY DEPT VISIT HI MDM: CPT | Performed by: EMERGENCY MEDICINE

## 2025-07-04 PROCEDURE — 93005 ELECTROCARDIOGRAM TRACING: CPT

## 2025-07-04 PROCEDURE — 70498 CT ANGIOGRAPHY NECK: CPT

## 2025-07-04 PROCEDURE — 99223 1ST HOSP IP/OBS HIGH 75: CPT | Performed by: INTERNAL MEDICINE

## 2025-07-04 PROCEDURE — 70496 CT ANGIOGRAPHY HEAD: CPT

## 2025-07-04 PROCEDURE — 80048 BASIC METABOLIC PNL TOTAL CA: CPT | Performed by: EMERGENCY MEDICINE

## 2025-07-04 PROCEDURE — 85025 COMPLETE CBC W/AUTO DIFF WBC: CPT | Performed by: EMERGENCY MEDICINE

## 2025-07-04 PROCEDURE — 85610 PROTHROMBIN TIME: CPT | Performed by: EMERGENCY MEDICINE

## 2025-07-04 RX ORDER — ATORVASTATIN CALCIUM 40 MG/1
40 TABLET, FILM COATED ORAL EVERY EVENING
Status: DISCONTINUED | OUTPATIENT
Start: 2025-07-04 | End: 2025-07-04

## 2025-07-04 RX ORDER — METOPROLOL TARTRATE 25 MG/1
25 TABLET, FILM COATED ORAL DAILY
Status: DISCONTINUED | OUTPATIENT
Start: 2025-07-05 | End: 2025-07-06 | Stop reason: HOSPADM

## 2025-07-04 RX ORDER — HEPARIN SODIUM 10000 [USP'U]/100ML
3-24 INJECTION, SOLUTION INTRAVENOUS
Status: DISCONTINUED | OUTPATIENT
Start: 2025-07-04 | End: 2025-07-06

## 2025-07-04 RX ORDER — ATORVASTATIN CALCIUM 80 MG/1
80 TABLET, FILM COATED ORAL EVERY EVENING
Status: DISCONTINUED | OUTPATIENT
Start: 2025-07-04 | End: 2025-07-06 | Stop reason: HOSPADM

## 2025-07-04 RX ORDER — ACETAMINOPHEN 325 MG/1
650 TABLET ORAL EVERY 4 HOURS PRN
Status: DISCONTINUED | OUTPATIENT
Start: 2025-07-04 | End: 2025-07-06 | Stop reason: HOSPADM

## 2025-07-04 RX ORDER — LANOLIN ALCOHOL/MO/W.PET/CERES
100 CREAM (GRAM) TOPICAL DAILY
Status: DISCONTINUED | OUTPATIENT
Start: 2025-07-05 | End: 2025-07-06 | Stop reason: HOSPADM

## 2025-07-04 RX ORDER — EZETIMIBE 10 MG/1
10 TABLET ORAL DAILY
Status: DISCONTINUED | OUTPATIENT
Start: 2025-07-05 | End: 2025-07-06 | Stop reason: HOSPADM

## 2025-07-04 RX ORDER — CLOPIDOGREL BISULFATE 75 MG/1
75 TABLET ORAL DAILY
Status: DISCONTINUED | OUTPATIENT
Start: 2025-07-05 | End: 2025-07-06 | Stop reason: HOSPADM

## 2025-07-04 RX ORDER — ASPIRIN 81 MG/1
81 TABLET, CHEWABLE ORAL DAILY
Status: DISCONTINUED | OUTPATIENT
Start: 2025-07-04 | End: 2025-07-04

## 2025-07-04 RX ORDER — LOSARTAN POTASSIUM 50 MG/1
100 TABLET ORAL DAILY
Status: DISCONTINUED | OUTPATIENT
Start: 2025-07-05 | End: 2025-07-06 | Stop reason: HOSPADM

## 2025-07-04 RX ORDER — FOLIC ACID 1 MG/1
1 TABLET ORAL DAILY
Status: DISCONTINUED | OUTPATIENT
Start: 2025-07-05 | End: 2025-07-06 | Stop reason: HOSPADM

## 2025-07-04 RX ORDER — ASPIRIN 81 MG/1
81 TABLET, CHEWABLE ORAL ONCE
Status: COMPLETED | OUTPATIENT
Start: 2025-07-04 | End: 2025-07-04

## 2025-07-04 RX ORDER — ONDANSETRON 2 MG/ML
4 INJECTION INTRAMUSCULAR; INTRAVENOUS EVERY 4 HOURS PRN
Status: DISCONTINUED | OUTPATIENT
Start: 2025-07-04 | End: 2025-07-06 | Stop reason: HOSPADM

## 2025-07-04 RX ADMIN — IOHEXOL 85 ML: 350 INJECTION, SOLUTION INTRAVENOUS at 09:20

## 2025-07-04 RX ADMIN — ASPIRIN 81 MG: 81 TABLET, CHEWABLE ORAL at 15:30

## 2025-07-04 RX ADMIN — HEPARIN SODIUM 15 UNITS/KG/HR: 10000 INJECTION, SOLUTION INTRAVENOUS at 11:31

## 2025-07-04 RX ADMIN — ATORVASTATIN CALCIUM 80 MG: 80 TABLET, FILM COATED ORAL at 17:15

## 2025-07-04 NOTE — ASSESSMENT & PLAN NOTE
Elie Cooper is a 60 y.o. right handed male with prior stroke (LICA and LMCA occlusion in 2019) , HTN who presents with transient episode of slurred speech. CTH with chronic LMCA infarct.  CTA with occluded of left MCA branches involving frontoinsular regions. Nonocclusive thrombus in left MCA distal M1 segment extending into proximal M2 segments superior divisional branch with slight extension into left MCA proximal M2 inferior divisional branch. Severe stenosis in proximal M2 segment superior divisional branch vessel which is occluded more distally. Appear improved as compared to CTA in 2019. Imaging review with endovascular attending, Dr. Carlin, given no new LVO and resolved symptoms, no need to transfer to B.     - Start stroke protocol high risk heparin gtt  - Continue plavix. Given a dose of ASA, no need to continue triple therapy while in heparin gtt.   - Admit to stroke pathway, MRI brain, Echo, A1c, lipid panel, telemetry. Repeat CTH if unable to tolerate MRI  - If MRI negative for acute ischemic stroke, resume home plavix, this is likely TME  - If MRI positive for acute ischemic stroke,  can discuss with neurology to see if anticoagulation is more appropriate or ASA+plavix for 90 days upon DC .   - Vascular risk factor management, A1c goal <6.5, LDL goal <70  - PT/OT/ST

## 2025-07-04 NOTE — OCCUPATIONAL THERAPY NOTE
"  OCCUPATIONAL THERAPY       07/04/25 1535   OT Last Visit   OT Visit Date 07/04/25   Note Type   Note type Screen   Additional Comments Pt is at baseline with ADLS and functional mobility. Pt reports all prior symptoms have resolved and he \"feels totally back to normal\". No skilled OT needs or adaptive equipment needs at this time.  Discharge recommendations home.   Licensure   [place] License Number  Swati Pina MS, OTR/L, [place] Lic# [id number]       "

## 2025-07-04 NOTE — PLAN OF CARE
Problem: PAIN - ADULT  Goal: Verbalizes/displays adequate comfort level or baseline comfort level  Description: Interventions:  - Encourage patient to monitor pain and request assistance  - Assess pain using appropriate pain scale  - Administer analgesics as ordered based on type and severity of pain and evaluate response  - Implement non-pharmacological measures as appropriate and evaluate response  - Consider cultural and social influences on pain and pain management  - Notify physician/advanced practitioner if interventions unsuccessful or patient reports new pain  - Educate patient/family on pain management process including their role and importance of  reporting pain   - Provide non-pharmacologic/complimentary pain relief interventions  Outcome: Progressing     Problem: INFECTION - ADULT  Goal: Absence or prevention of progression during hospitalization  Description: INTERVENTIONS:  - Assess and monitor for signs and symptoms of infection  - Monitor lab/diagnostic results  - Monitor all insertion sites, i.e. indwelling lines, tubes, and drains  - Monitor endotracheal if appropriate and nasal secretions for changes in amount and color  - [place] appropriate cooling/warming therapies per order  - Administer medications as ordered  - Instruct and encourage patient and family to use good hand hygiene technique  - Identify and instruct in appropriate isolation precautions for identified infection/condition  Outcome: Progressing  Goal: Absence of fever/infection during neutropenic period  Description: INTERVENTIONS:  - Monitor WBC  - Perform strict hand hygiene  Outcome: Progressing     Problem: SAFETY ADULT  Goal: Patient will remain free of falls  Description: INTERVENTIONS:  - Educate patient/family on patient safety including physical limitations  - Instruct patient to call for assistance with activity   - Consider consulting OT/PT to assist with strengthening/mobility based on AM PAC & JH-HLM score  -  Consult OT/PT to assist with strengthening/mobility   - Keep Call bell within reach  - Keep bed low and locked with side rails adjusted as appropriate  - Keep care items and personal belongings within reach  - Initiate and maintain comfort rounds  - Make Fall Risk Sign visible to staff  - Offer Toileting every 2 Hours, in advance of need  - Initiate/Maintain bed/chair alarm  - Apply yellow socks and bracelet for high fall risk patients  - Consider moving patient to room near nurses station  Outcome: Progressing  Goal: Maintain or return to baseline ADL function  Description: INTERVENTIONS:  -  Assess patient's ability to carry out ADLs; assess patient's baseline for ADL function and identify physical deficits which impact ability to perform ADLs (bathing, care of mouth/teeth, toileting, grooming, dressing, etc.)  - Assess/evaluate cause of self-care deficits   - Assess range of motion  - Assess patient's mobility; develop plan if impaired  - Assess patient's need for assistive devices and provide as appropriate  - Encourage maximum independence but intervene and supervise when necessary  - Involve family in performance of ADLs  - Assess for home care needs following discharge   - Consider OT consult to assist with ADL evaluation and planning for discharge  - Provide patient education as appropriate  - Monitor functional capacity and physical performance, use of AM PAC & JH-HLM   - Monitor gait, balance and fatigue with ambulation    Outcome: Progressing  Goal: Maintains/Returns to pre admission functional level  Description: INTERVENTIONS:  - Perform AM-PAC 6 Click Basic Mobility/ Daily Activity assessment daily.  - Set and communicate daily mobility goal to care team and patient/family/caregiver.   - Collaborate with rehabilitation services on mobility goals if consulted  - Ambulate patient as tolerated  - Out of bed to chair 3 times a day   - Out of bed for meals 3 times a day  - Out of bed for toileting  - Record  patient progress and toleration of activity level   Outcome: Progressing     Problem: DISCHARGE PLANNING  Goal: Discharge to home or other facility with appropriate resources  Description: INTERVENTIONS:  - Identify barriers to discharge w/patient and caregiver  - Arrange for needed discharge resources and transportation as appropriate  - Identify discharge learning needs (meds, wound care, etc.)  - Arrange for interpretive services to assist at discharge as needed  - Refer to Case Management Department for coordinating discharge planning if the patient needs post-hospital services based on physician/advanced practitioner order or complex needs related to functional status, cognitive ability, or social support system  Outcome: Progressing     Problem: Knowledge Deficit  Goal: Patient/family/caregiver demonstrates understanding of disease process, treatment plan, medications, and discharge instructions  Description: Complete learning assessment and assess knowledge base.  Interventions:  - Provide teaching at level of understanding  - Provide teaching via preferred learning methods  Outcome: Progressing     Problem: Neurological Deficit  Goal: Neurological status is stable or improving  Description: Interventions:  - Monitor and assess patient's level of consciousness, motor function, sensory function, and level of assistance needed for ADLs.   - Monitor and report changes from baseline. Collaborate with interdisciplinary team to initiate plan and implement interventions as ordered.   - Provide and maintain a safe environment.  - Consider seizure precautions.  - Consider fall precautions.  - Consider aspiration precautions.  - Consider bleeding precautions.  Outcome: Progressing     Problem: Activity Intolerance/Impaired Mobility  Goal: Mobility/activity is maintained at optimum level for patient  Description: Interventions:  - Assess and monitor patient  barriers to mobility and need for assistive/adaptive  devices.  - Assess patient's emotional response to limitations.  - Collaborate with interdisciplinary team and initiate plans and interventions as ordered.  - Encourage independent activity per ability.  - Maintain proper body alignment.  - Perform active/passive rom as tolerated/ordered.  - Plan activities to conserve energy.  Outcome: Progressing     Problem: Communication Impairment  Goal: Ability to express needs and understand communication  Description: Assess patient's communication skills and ability to understand information.  Patient will demonstrate use of effective communication techniques, alternative methods of communication and understanding even if not able to speak.     - Encourage communication and provide alternate methods of communication as needed.  - Collaborate with case management/ for discharge needs.  - Include patient/family/caregiver in decisions related to communication.  Outcome: Progressing     Problem: Potential for Aspiration  Goal: Non-ventilated patient's risk of aspiration is minimized  Description: Assess and monitor vital signs, respiratory status, and labs (WBC).  Monitor for signs of aspiration (tachypnea, cough, rales, wheezing, cyanosis, fever).    - Assess and monitor patient's ability to swallow.  - Place patient up in chair to eat if possible.  - HOB up at 90 degrees to eat if unable to get patient up into chair.  - Instruct patient/ family to take small bites.  - Instruct patient/ family to take small single sips when taking liquids.  - Follow patient-specific strategies generated by speech pathologist.  Outcome: Progressing     Problem: Nutrition  Goal: Nutrition/Hydration status is improving  Description: Monitor and assess patient's nutrition/hydration status for malnutrition (ex- brittle hair, bruises, dry skin, pale skin and conjunctiva, muscle wasting, smooth red tongue, and disorientation). Collaborate with interdisciplinary team and initiate plan  and interventions as ordered.  Monitor patient's weight and dietary intake as ordered or per policy. Utilize nutrition screening tool and intervene per policy. Determine patient's food preferences and provide high-protein, high-caloric foods as appropriate.     - Assist patient with eating.  - Allow adequate time for meals.  - Encourage patient to take dietary supplement as ordered.  - Collaborate with clinical nutritionist.  - Include patient/family/caregiver in decisions related to nutrition.  Outcome: Progressing

## 2025-07-04 NOTE — ED PROVIDER NOTES
Time reflects when diagnosis was documented in both MDM as applicable and the Disposition within this note       Time User Action Codes Description Comment    7/4/2025 10:07 AM Kevin Beach Add [R47.81] Slurred speech     7/4/2025 11:22 AM Kevin Beach Add [G45.9] TIA (transient ischemic attack)           ED Disposition       ED Disposition   Admit    Condition   Stable    Date/Time   Fri Jul 4, 2025 11:22 AM    Comment   Case was discussed with Dr. Soto and the patient's admission status was agreed to be Admission Status: inpatient status to the service of Dr. Soto .               Assessment & Plan       Medical Decision Making  60-year-old male, history of CVA, presents for evaluation of slurred speech reported by wife, noted at 8 AM today.  Differential diagnosis includes CVA, TIA, dehydration among other diagnoses.  Speech currently improved according to wife.  Patient has right facial droop which she reports is chronic from previous CVA, no other focal neurologic deficits noted on exam.  Patient takes Plavix daily, took medications this morning.  CT imaging, EKG, labs ordered.  Will continue to monitor in ED and reevaluate.    Amount and/or Complexity of Data Reviewed  Independent Historian: spouse  External Data Reviewed: radiology and ECG.     Details: Previous EKG and imaging reviewed and compared to current.  Labs: ordered. Decision-making details documented in ED Course.  Radiology: ordered and independent interpretation performed. Decision-making details documented in ED Course.  ECG/medicine tests: independent interpretation performed. Decision-making details documented in ED Course.    Risk  Prescription drug management.  Decision regarding hospitalization.        ED Course as of 07/04/25 1123   Fri Jul 04, 2025   0940 Head CT dependently reviewed by myself, no intracranial hemorrhage noted, pending radiology read.   0957 Discussed CT imaging with radiology.  Neurology consulted.   1007 Patient  comfortable with no acute complaints, normal speech on current evaluation.   1040 Discussed with Dr. Barr, on-call neurology.  Recommends starting patient on IV heparin, admit for further monitoring and evaluation.   1122 Discussed with hospitalist, will admit patient.       Medications   heparin (porcine) 25,000 units in 0.45% NaCl 250 mL infusion (premix) (has no administration in time range)   iohexol (OMNIPAQUE) 350 MG/ML injection (MULTI-DOSE) 85 mL (85 mL Intravenous Given 7/4/25 0920)       ED Risk Strat Scores                    No data recorded                            History of Present Illness       Chief Complaint   Patient presents with    Slurred Speech     Pt states his wife noticed slurred speech and expressive aphasia this morning while making coffee approx 0800.  Hx of right facial droop from previous stroke, maybe be slightly worse per patient        Past Medical History[1]   Past Surgical History[2]   Family History[3]   Social History[4]   E-Cigarette/Vaping    E-Cigarette Use Never User       E-Cigarette/Vaping Substances    Nicotine No     THC No     CBD No     Flavoring No     Other No     Unknown No       I have reviewed and agree with the history as documented.     60-year-old male, history of CVA with residual right facial droop, on Plavix, presents for evaluation of reported slurred speech.  Wife reported that she noted patient with slurred speech at around 8 AM today.  Patient denies any known symptoms of slurred speech.  Patient's speech has improved.  Patient denies any headache or acute visual changes.  Patient denies any recent illness.       History provided by:  Patient and spouse   used: No        Review of Systems   Constitutional: Negative.  Negative for fever.   Eyes: Negative.    Respiratory: Negative.     Cardiovascular: Negative.    Gastrointestinal: Negative.    Neurological:  Negative for headaches.           Objective       ED Triage Vitals    Temperature Pulse Blood Pressure Respirations SpO2 Patient Position - Orthostatic VS   07/04/25 0902 07/04/25 0852 07/04/25 0852 07/04/25 0852 07/04/25 0852 07/04/25 0852   97.6 °F (36.4 °C) 70 (!) 186/96 18 96 % Lying      Temp Source Heart Rate Source BP Location FiO2 (%) Pain Score    07/04/25 0902 07/04/25 0852 07/04/25 0852 -- --    Tympanic Monitor Left arm        Vitals      Date and Time Temp Pulse SpO2 Resp BP Pain Score FACES Pain Rating User   07/04/25 1100 -- 58 97 % 16 163/100 -- -- OE   07/04/25 1040 -- -- -- -- 160/88 -- -- JG   07/04/25 1030 -- 58 95 % 16 160/88 -- -- OE   07/04/25 1000 -- 56 96 % 20 185/98 -- -- OE   07/04/25 0945 -- 66 98 % 22 182/101 -- -- OE   07/04/25 0930 -- 62 98 % 19 -- -- -- OE   07/04/25 0902 97.6 °F (36.4 °C) -- -- -- -- -- -- OE   07/04/25 0900 -- 59 98 % 18 175/105 -- -- OE   07/04/25 0852 -- 70 96 % 18 186/96 -- -- MK            Physical Exam  Vitals and nursing note reviewed.   Constitutional:       General: He is not in acute distress.  HENT:      Head: Normocephalic and atraumatic.      Mouth/Throat:      Mouth: Mucous membranes are moist.      Pharynx: Oropharynx is clear.     Eyes:      Extraocular Movements: Extraocular movements intact.      Conjunctiva/sclera: Conjunctivae normal.      Pupils: Pupils are equal, round, and reactive to light.       Cardiovascular:      Rate and Rhythm: Normal rate and regular rhythm.   Pulmonary:      Effort: Pulmonary effort is normal.      Breath sounds: Normal breath sounds.     Musculoskeletal:         General: Normal range of motion.      Cervical back: Normal range of motion and neck supple. No rigidity.     Skin:     General: Skin is warm and dry.     Neurological:      Mental Status: He is alert and oriented to person, place, and time.      Comments: Normal speech, right mouth droop (patient reports chronic)  5 out of 5 strength all 4 extremities, no drift  Normal sensation to light touch in all 4 extremities  Normal  gait       Results Reviewed       Procedure Component Value Units Date/Time    APTT [[id number]]  (Normal) Collected: 07/04/25 1059    Lab Status: Final result Specimen: Blood from Arm, Left Updated: 07/04/25 1116     PTT 27 seconds     Protime-INR [[id number]]  (Normal) Collected: 07/04/25 1059    Lab Status: Final result Specimen: Blood from Arm, Left Updated: 07/04/25 1116     Protime 13.1 seconds      INR 0.94    Narrative:      INR Therapeutic Range    Indication                                             INR Range      Atrial Fibrillation                                               2.0-3.0  Hypercoagulable State                                    2.0.2.3  Left Ventricular Asist Device                            2.0-3.0  Mechanical Heart Valve                                  -    Aortic(with afib, MI, embolism, HF, LA enlargement,    and/or coagulopathy)                                     2.0-3.0 (2.5-3.5)     Mitral                                                             2.5-3.5  Prosthetic/Bioprosthetic Heart Valve               2.0-3.0  Venous thromboembolism (VTE: VT, PE        2.0-3.0    Basic metabolic panel [[id number]]  (Abnormal) Collected: 07/04/25 0901    Lab Status: Final result Specimen: Blood from Arm, Left Updated: 07/04/25 0921     Sodium 137 mmol/L      Potassium 4.8 mmol/L      Chloride 105 mmol/L      CO2 26 mmol/L      ANION GAP 6 mmol/L      BUN 22 mg/dL      Creatinine 1.63 mg/dL      Glucose 117 mg/dL      Calcium 9.5 mg/dL      eGFR 45 ml/min/1.73sq m     Narrative:      National Kidney Disease Foundation guidelines for Chronic Kidney Disease (CKD):     Stage 1 with normal or high GFR (GFR > 90 mL/min/1.73 square meters)    Stage 2 Mild CKD (GFR = 60-89 mL/min/1.73 square meters)    Stage 3A Moderate CKD (GFR = 45-59 mL/min/1.73 square meters)    Stage 3B Moderate CKD (GFR = 30-44 mL/min/1.73 square meters)    Stage 4 Severe CKD (GFR = 15-29 mL/min/1.73 square meters)    Stage 5  End Stage CKD (GFR <15 mL/min/1.73 square meters)  Note: GFR calculation is accurate only with a steady state creatinine    CBC and differential [[id number]] Collected: 07/04/25 0901    Lab Status: Final result Specimen: Blood from Arm, Left Updated: 07/04/25 0905     WBC 6.12 Thousand/uL      RBC 5.61 Million/uL      Hemoglobin 15.6 g/dL      Hematocrit 47.4 %      MCV 85 fL      MCH 27.8 pg      MCHC 32.9 g/dL      RDW 13.6 %      MPV 8.9 fL      Platelets 187 Thousands/uL      nRBC 0 /100 WBCs      Segmented % 65 %      Immature Grans % 0 %      Lymphocytes % 23 %      Monocytes % 8 %      Eosinophils Relative 3 %      Basophils Relative 1 %      Absolute Neutrophils 4.03 Thousands/µL      Absolute Immature Grans 0.01 Thousand/uL      Absolute Lymphocytes 1.39 Thousands/µL      Absolute Monocytes 0.49 Thousand/µL      Eosinophils Absolute 0.17 Thousand/µL      Basophils Absolute 0.03 Thousands/µL     Fingerstick Glucose (POCT) [[id number]]  (Normal) Collected: 07/04/25 0853    Lab Status: Final result Specimen: Blood Updated: 07/04/25 0854     POC Glucose 124 mg/dl             CTA head and neck with and without contrast   Final Interpretation by John Heard MD (07/04 0959)      CT Brain:   - No acute intracranial abnormality.   - Unchanged chronic left MCA territory infarct involving left-sided basal ganglia, frontal, insular, and superior temporal lobes with mild chronic microangiopathy.      CT Angiography:   - Occluded left MCA branches involving frontoinsular regions, likely chronic given chronic left MCA territory infarct.   - Nonocclusive thrombus in left MCA distal M1 segment extending into proximal M2 segments superior divisional branch with slight extension into left MCA proximal M2 inferior divisional branch, which may be chronic given appearance since CTA stroke alert    head and neck 2/10/2019. Severe stenosis in left MCA proximal M2 segment superior divisional branch vessel which is  occluded more distally.   - Severe stenosis in origin and proximal cervical segment of left ICA which is asymmetrically diminutive throughout cervical and intracranial segments. Severe stenosis in left ICA distal cavernous segment.   - Additional atherosclerosis of the head and neck, as detailed above.      Additional chronic/incidental findings as detailed above.         I personally discussed this study with MAX ESTRADA on 7/4/2025 9:53 AM. The study was marked in EPIC for immediate notification.                           Workstation performed: [id number]             ECG 12 Lead Documentation Only    Date/Time: 7/4/2025 9:05 AM    Performed by: Max Estrada MD  Authorized by: Max Estrada MD    ECG reviewed by me, the ED Provider: yes    Patient location:  ED  Previous ECG:     Previous ECG:  Compared to current    Similarity:  No change  Rate:     ECG rate assessment: bradycardic    Rhythm:     Rhythm: sinus bradycardia    Ectopy:     Ectopy: aberrant    QRS:     QRS axis:  Normal    QRS intervals:  Normal  Conduction:     Conduction: normal    ST segments:     ST segments:  Normal  Comments:      Sinus bradycardia at 58, no acute changes      ED Medication and Procedure Management   Prior to Admission Medications   Prescriptions Last Dose Informant Patient Reported? Taking?   Thiamine Mononitrate (VITAMIN B1) 100 mg tablet 7/4/2025 Morning  No Yes   Sig: Take 1 tablet (100 mg total) by mouth daily   atorvastatin (LIPITOR) 80 mg tablet   No No   Sig: TAKE ONE TABLET BY MOUTH EVERY DAY WITH DINNER   Patient not taking: Reported on 3/6/2025   cholecalciferol (VITAMIN D3) 1,000 units tablet  Self Yes No   Sig: Take 2,000 Units by mouth daily   Patient not taking: Reported on 5/2/2024   clopidogrel (PLAVIX) 75 mg tablet   No No   Sig: TAKE ONE TABLET BY MOUTH EVERY DAY   Patient not taking: Reported on 3/6/2025   ezetimibe (ZETIA) 10 mg tablet 7/4/2025 Morning  No Yes   Sig: Take 1 tablet (10 mg total) by  mouth daily   folic acid (FOLVITE) 1 mg tablet 7/4/2025 Morning  No Yes   Sig: Take 1 tablet (1 mg total) by mouth daily   losartan (COZAAR) 100 MG tablet 7/4/2025 Morning  No Yes   Sig: Take 1 tablet (100 mg total) by mouth daily   metoprolol tartrate (LOPRESSOR) 25 mg tablet 7/4/2025 Morning  No Yes   Sig: Take 1 tablet (25 mg total) by mouth daily      Facility-Administered Medications: None     Patient's Medications   Discharge Prescriptions    No medications on file     No discharge procedures on file.  ED SEPSIS DOCUMENTATION   Time reflects when diagnosis was documented in both MDM as applicable and the Disposition within this note       Time User Action Codes Description Comment    7/4/2025 10:07 AM Kevin Beach [R47.81] Slurred speech     7/4/2025 11:22 AM Kevin Beach [G45.9] TIA (transient ischemic attack)                    [1]   Past Medical History:  Diagnosis Date    Chronic kidney disease     Hyperlipidemia     Hypertension     Stroke (HCC) 02/10/2019    right side sl weakness   [2]   Past Surgical History:  Procedure Laterality Date    FL RETROGRADE URETHROCYSTOGRAM  6/10/2021    NO PAST SURGERIES      DE CYSTOURETHROSCOPY W/URETERAL CATHETERIZATION N/A 6/10/2021    Procedure: CYSTOSCOPY WITH RETROGRADE PYELOGRAM/internal urethrotomy, DVIU, URETHROGRAM, CYSTOGRAM.;  Surgeon: Fabian Remy MD;  Location: [facility] OR;  Service: Urology    DE TRURL ELECTROSURG RESCJ PROSTATE BLEED COMPLETE N/A 7/15/2021    Procedure: CYSTOSCOPY, TRANSURETHRAL RESECTION OF PROSTATE (TURP);  Surgeon: Fabian Remy MD;  Location: [facility] OR;  Service: Urology   [3]   Family History  Problem Relation Name Age of Onset    Diabetes type II Mother      Hypertension Father      Diabetes type II Father      Diabetes type II Sister      Hypertension Brother     [4]   Social History  Tobacco Use    Smoking status: Former     Current packs/day: 0.00     Types: Cigarettes     Quit date: 7/8/2012     Years since  quittin.9    Smokeless tobacco: Never   Vaping Use    Vaping status: Never Used   Substance Use Topics    Alcohol use: Yes     Alcohol/week: 6.0 standard drinks of alcohol     Types: 6 Cans of beer per week    Drug use: Never        Kevin Beach MD  25 [phone]

## 2025-07-04 NOTE — ASSESSMENT & PLAN NOTE
Baseline creatinine closer to 1.8  Currently stable    Results from last 7 days   Lab Units 07/04/25  0901   BUN mg/dL 22   CREATININE mg/dL 1.63*   EGFR ml/min/1.73sq m 45

## 2025-07-04 NOTE — CONSULTS
Consultation - Neurology   Name: Elie Cooper 60 y.o. male I MRN: [id number]  Unit/Bed#: 4 [place] 422-01 I Date of Admission: 7/4/2025   Date of Service: 7/4/2025 I Hospital Day: 0   Consult to neurology  Consult performed by: Moreno Barr MD  Consult ordered by: Kevin Beach MD        Physician Requesting Evaluation: David Soto DO   Reason for Evaluation / Principal Problem: speech issues     Assessment & Plan  Stroke-like symptoms  Elie Cooper is a 60 y.o. right handed male with prior stroke (LICA and LMCA occlusion in 2019) , HTN who presents with transient episode of slurred speech. CTH with chronic LMCA infarct.  CTA with occluded of left MCA branches involving frontoinsular regions. Nonocclusive thrombus in left MCA distal M1 segment extending into proximal M2 segments superior divisional branch with slight extension into left MCA proximal M2 inferior divisional branch. Severe stenosis in proximal M2 segment superior divisional branch vessel which is occluded more distally. Appear improved as compared to CTA in 2019. Imaging review with endovascular attending, Dr. Carlin, given no new LVO and resolved symptoms, no need to transfer to B.     - Start stroke protocol high risk heparin gtt  - Continue plavix. Given a dose of ASA, no need to continue triple therapy while in heparin gtt.   - Admit to stroke pathway, MRI brain, Echo, A1c, lipid panel, telemetry. Repeat CTH if unable to tolerate MRI  - If MRI negative for acute ischemic stroke, resume home plavix, this is likely TME  - If MRI positive for acute ischemic stroke,  can discuss with neurology to see if anticoagulation is more appropriate or ASA+plavix for 90 days upon DC .   - Vascular risk factor management, A1c goal <6.5, LDL goal <70  - PT/OT/ST    Hypercholesterolemia  Continue Statin     Benign essential hypertension  SBP goal 120-180  Stage 3b chronic kidney disease (HCC)  Appreciate treatment of infectious and metabolic  abnormalities per primary team          Recommendations for outpatient neurological follow up have yet to be determined.    History of Present Illness   Elie Cooper is a 60 y.o. right handed male with prior stroke (LICA and LMCA occlusion in 2019) , HTN who presents with transient episode of slurred speech. He was making coffee and his speech is not making sense. No weakness or numbness or falls. Patient denies fever, chill, chest pain or shortness of breath. No nausea, vomiting, cough, diarrhea or recent infections. Denies LOC, shaking/jerking spell, tongue biting or bowel/bladder incontinence. No vision changes(blurry vision or double vision), hearing changes, headaches.     CTA with occluded of left MCA branches involving frontoinsular regions. Nonocclusive thrombus in left MCA distal M1 segment extending into proximal M2 segments superior divisional branch with slight extension into left MCA proximal M2 inferior divisional branch. Severe stenosis in proximal M2 segment superior divisional branch vessel which is occluded more distally. Appear improved as compared to CTA in 2019.       Review of Systems   Constitutional symptoms: no weakness, no fever, no chills, no sweats.   Skin symptoms: no rash.   Eye:  no vision changes/disturbances, Blurry vision  ENMT:   no dizziness  Respiratory symptoms: no shortness of breath, no cough.   Cardiovascular symptoms: no chest pain.   Gastrointestinal symptoms: no nausea, no vomiting, no diarrhea.   Hema/Lymph:    no problems  Endocrine:    no Cold intolerance, Heat intolerance  Immunologic:    no problems  Musculoskeletal symptoms: no back pain.  Integumentary:    no problems   Neurologic symptoms: speech issues, no headache, no dizziness, no weakness, no altered level of consciousness, no numbness, no tingling. No Abnormal balance nor falls  Psych: No SI/HI  All other system reviewed and negative except positives noted above      Medical History Review: I have reviewed  the patient's PMH, PSH, Social History, Family History, Meds, and Allergies   Historical Information   Past Medical History[1]  Past Surgical History[2]  Social History[3]  E-Cigarette/Vaping    E-Cigarette Use Never User      E-Cigarette/Vaping Substances    Nicotine No     THC No     CBD No     Flavoring No     Other No     Unknown No      Family History[4]  Social History[5]    Current Facility-Administered Medications:     heparin (porcine) 25,000 units in 0.45% NaCl 250 mL infusion (premix), Titrated, Last Rate: 15 Units/kg/hr (07/04/25 1356)  Prior to Admission Medications   Prescriptions Last Dose Informant Patient Reported? Taking?   Thiamine Mononitrate (VITAMIN B1) 100 mg tablet 7/4/2025 Morning  No Yes   Sig: Take 1 tablet (100 mg total) by mouth daily   atorvastatin (LIPITOR) 80 mg tablet   No No   Sig: TAKE ONE TABLET BY MOUTH EVERY DAY WITH DINNER   Patient not taking: Reported on 3/6/2025   cholecalciferol (VITAMIN D3) 1,000 units tablet 7/4/2025 at  8:00 AM Self Yes Yes   Sig: Take 2,000 Units by mouth daily   clopidogrel (PLAVIX) 75 mg tablet   No No   Sig: TAKE ONE TABLET BY MOUTH EVERY DAY   Patient not taking: Reported on 3/6/2025   ezetimibe (ZETIA) 10 mg tablet 7/4/2025 Morning  No Yes   Sig: Take 1 tablet (10 mg total) by mouth daily   folic acid (FOLVITE) 1 mg tablet 7/4/2025 Morning  No Yes   Sig: Take 1 tablet (1 mg total) by mouth daily   losartan (COZAAR) 100 MG tablet 7/4/2025 Morning  No Yes   Sig: Take 1 tablet (100 mg total) by mouth daily   metoprolol tartrate (LOPRESSOR) 25 mg tablet 7/4/2025 Morning  No Yes   Sig: Take 1 tablet (25 mg total) by mouth daily      Facility-Administered Medications: None     Patient has no known allergies.    Objective :  Temp:  [97.2 °F (36.2 °C)-98.1 °F (36.7 °C)] 98.1 °F (36.7 °C)  HR:  [55-71] 68  BP: (142-186)/() 142/89  Resp:  [15-22] 18  SpO2:  [95 %-98 %] 95 %  O2 Device: None (Room air)    Physical Exam  Modified PE as this is a video  "consultation:  Gen:   NAD.  HEENT:  No Septal deviation EOMI NCAT.  Resp:  Symmetric chest rise and patient in no obvious respiratory distress  MSK: ROM normal  Skin: No rash noted in visualized portion of this exam    Neurological Exam  Awake, alert, oriented x 4 name, age, month ,year. No aphasia or dysarthria confusion noted.  Patient is able to follow 2 and 3 step commands with ease  CN 2-12 grossly intact, EOMI, right facial droop, normal to LT, hearing intact to conversation,  no tongue deviation, symmetric shoulder shrug and side bending and neck rotation  Motor:  Intact antigravity x 4 extremities. No Pronator drift noted.  Sensation: Intact to light touch, instructed patient on how to do this in all extremities proximal and distal.  Cerebellar: No gross ataxia noted  Gait:  Deferred        Lab Results: I have reviewed the following results:I have personally reviewed pertinent reports.  , CBC:   Results from last 7 days   Lab Units 07/04/25  0901   WBC Thousand/uL 6.12   RBC Million/uL 5.61   HEMOGLOBIN g/dL 15.6   HEMATOCRIT % 47.4   MCV fL 85   PLATELETS Thousands/uL 187   , BMP/CMP:   Results from last 7 days   Lab Units 07/04/25  0901   SODIUM mmol/L 137   POTASSIUM mmol/L 4.8   CHLORIDE mmol/L 105   CO2 mmol/L 26   BUN mg/dL 22   CREATININE mg/dL 1.63*   CALCIUM mg/dL 9.5   EGFR ml/min/1.73sq m 45   , Lipid Profile:   , Urinalysis:       Invalid input(s): \"URIBILINOGEN\", Drug Screen:     Recent Labs     07/04/25  0901   WBC 6.12   HGB 15.6   HCT 47.4      SODIUM 137   K 4.8      CO2 26   BUN 22   CREATININE 1.63*   GLUC 117     Imaging Results Review: I personally reviewed the following image studies in PACS and associated radiology reports: CTA head and neck. My interpretation of the radiology images/reports is: as noted below.    CTA head and neck with and without contrast   Final Result by John Heard MD (07/04 0959)      CT Brain:   - No acute intracranial abnormality.   - " Unchanged chronic left MCA territory infarct involving left-sided basal ganglia, frontal, insular, and superior temporal lobes with mild chronic microangiopathy.      CT Angiography:   - Occluded left MCA branches involving frontoinsular regions, likely chronic given chronic left MCA territory infarct.   - Nonocclusive thrombus in left MCA distal M1 segment extending into proximal M2 segments superior divisional branch with slight extension into left MCA proximal M2 inferior divisional branch, which may be chronic given appearance since CTA stroke alert    head and neck 2/10/2019. Severe stenosis in left MCA proximal M2 segment superior divisional branch vessel which is occluded more distally.   - Severe stenosis in origin and proximal cervical segment of left ICA which is asymmetrically diminutive throughout cervical and intracranial segments. Severe stenosis in left ICA distal cavernous segment.   - Additional atherosclerosis of the head and neck, as detailed above.      Additional chronic/incidental findings as detailed above.         I personally discussed this study with MAX ESTRADA on 7/4/2025 9:53 AM. The study was marked in EPIC for immediate notification.                           Workstation performed: [id number]                 VTE Prophylaxis: VTE covered by:  heparin, Intravenous, 15 Units/kg/hr at 07/04/25 1356       Administrative Statements   VIRTUAL CARE DOCUMENTATION:     1. This service was provided via Telemedicine using [employer]     2. Parties in the room with patient during teleconsult RN    3. Confidentiality My office door was closed     4. Participants No one else was in the room    5. Patient acknowledged consent and understanding of privacy and security of the  Telemedicine consult. I informed the patient that I have reviewed their record in Epic and presented the opportunity for them to ask any questions regarding the visit today.  The patient agreed to  participate.    6. I have spent a total time of 60 minutes in caring for this patient on the day of the visit/encounter including Diagnostic results, Prognosis, Risks and benefits of tx options, Instructions for management, Patient and family education, Risk factor reductions, Impressions, Counseling / Coordination of care, Obtaining or reviewing history  , and Communicating with other healthcare professionals , not including the time spent for establishing the audio/video connection.             [1]   Past Medical History:  Diagnosis Date    Chronic kidney disease     Hyperlipidemia     Hypertension     Stroke (HCC) 02/10/2019    right side sl weakness   [2]   Past Surgical History:  Procedure Laterality Date    FL RETROGRADE URETHROCYSTOGRAM  6/10/2021    NO PAST SURGERIES      HI CYSTOURETHROSCOPY W/URETERAL CATHETERIZATION N/A 6/10/2021    Procedure: CYSTOSCOPY WITH RETROGRADE PYELOGRAM/internal urethrotomy, DVIU, URETHROGRAM, CYSTOGRAM.;  Surgeon: Fabian Remy MD;  Location: [facility];  Service: Urology    HI TRURL ELECTROSURG RESCJ PROSTATE BLEED COMPLETE N/A 7/15/2021    Procedure: CYSTOSCOPY, TRANSURETHRAL RESECTION OF PROSTATE (TURP);  Surgeon: Fabian Remy MD;  Location: [facility];  Service: Urology   [3]   Social History  Tobacco Use    Smoking status: Former     Current packs/day: 0.00     Types: Cigarettes     Quit date: 2012     Years since quittin.9    Smokeless tobacco: Never   Vaping Use    Vaping status: Never Used   Substance and Sexual Activity    Alcohol use: Yes     Alcohol/week: 6.0 standard drinks of alcohol     Types: 6 Cans of beer per week    Drug use: Never    Sexual activity: Not Currently   [4]   Family History  Problem Relation Name Age of Onset    Diabetes type II Mother      Hypertension Father      Diabetes type II Father      Diabetes type II Sister      Hypertension Brother     [5]   Social History  Tobacco Use    Smoking status: Former     Current packs/day: 0.00      Types: Cigarettes     Quit date: 2012     Years since quittin.9    Smokeless tobacco: Never   Vaping Use    Vaping status: Never Used   Substance and Sexual Activity    Alcohol use: Yes     Alcohol/week: 6.0 standard drinks of alcohol     Types: 6 Cans of beer per week    Drug use: Never    Sexual activity: Not Currently

## 2025-07-04 NOTE — H&P
H&P - Hospitalist   Name: Elie Cooper 60 y.o. male I MRN: [id number]  Unit/Bed#: 4 [place] [address] I Date of Admission: 7/4/2025   Date of Service: 7/4/2025 I Hospital Day: 0     Assessment & Plan  Stroke-like symptoms  History of stroke, carotid disease, CKD 3b, hypertension, hyperlipidemia presented to the hospital with dysarthria  Patient with history of state of health until waking up this morning to make coffee.  Was noted by wife to have slurring of speech which slowly improved.  Patient does report taking clopidogrel but not aspirin.  May be atorvastatin.  CTA with multiple vascular occlusions.  ED discussed with neurology recommending stroke heparin infusion.  Stroke pathway ordered: Continue clopidogrel with statin  Hypercholesterolemia  Continue atorvastatin  Benign essential hypertension  Prior to admission on losartan and metoprolol  Allowing permissive hypertension  Stage 3b chronic kidney disease (HCC)  Baseline creatinine closer to 1.8  Currently stable    Results from last 7 days   Lab Units 07/04/25  0901   BUN mg/dL 22   CREATININE mg/dL 1.63*   EGFR ml/min/1.73sq m 45       VTE Pharmacologic Prophylaxis: VTE Score: 6 High Risk (Score >/= 5) - Pharmacological DVT Prophylaxis Ordered: heparin drip. Sequential Compression Devices Ordered.  Code Status: Level 1 - Full Code   Discussion with family:     Anticipated Length of Stay: Patient will be admitted on an inpatient basis with an anticipated length of stay of greater than 2 midnights secondary to stroke workup.    Chief Complaint:     Slurred Speech (Pt states his wife noticed slurred speech and expressive aphasia this morning while making coffee approx 0800.  Hx of right facial droop from previous stroke, maybe be slightly worse per patient )    History of Present Illness  Elie Cooper is a 60 y.o. male with a PMH of carotid disease with stroke, hypertension, hyperlipidemia, CKD who presents with slurring of speech.  The patient reports he was  his usual state of health.  He has chronic very slight right-sided weakness from previous stroke.  He woke up this morning to make some coffee and was noted by wife and himself to have slurring of speech and word finding difficulties.  He was brought to the hospital where his symptoms eventually improved.  He feels close to baseline.  No chest pain shortness of breath nausea vomiting or diarrhea.  He is being admitted for stroke workup.    Review of Systems   Constitutional:  Negative for chills and fever.   HENT:  Negative for facial swelling and trouble swallowing.    Eyes:  Negative for visual disturbance.   Respiratory:  Negative for shortness of breath.    Cardiovascular:  Negative for chest pain and palpitations.   Gastrointestinal:  Negative for abdominal distention, abdominal pain, diarrhea, nausea and vomiting.   Genitourinary:  Negative for hematuria.   Musculoskeletal:  Negative for back pain and myalgias.   Skin:  Negative for rash.   Neurological:  Positive for speech difficulty. Negative for facial asymmetry.   Psychiatric/Behavioral:  The patient is not nervous/anxious.    All other systems reviewed and are negative.      Past Medical and Surgical History:   Past Medical History[1]  Past Surgical History[2]  Meds/Allergies:  Allergies: Allergies[3]  Prior to Admission Medications   Prescriptions Last Dose Informant Patient Reported? Taking?   Thiamine Mononitrate (VITAMIN B1) 100 mg tablet 7/4/2025 Morning  No Yes   Sig: Take 1 tablet (100 mg total) by mouth daily   atorvastatin (LIPITOR) 80 mg tablet   No No   Sig: TAKE ONE TABLET BY MOUTH EVERY DAY WITH DINNER   Patient not taking: Reported on 3/6/2025   cholecalciferol (VITAMIN D3) 1,000 units tablet 7/4/2025 at  8:00 AM Self Yes Yes   Sig: Take 2,000 Units by mouth daily   clopidogrel (PLAVIX) 75 mg tablet   No No   Sig: TAKE ONE TABLET BY MOUTH EVERY DAY   Patient not taking: Reported on 3/6/2025   ezetimibe (ZETIA) 10 mg tablet 7/4/2025 Morning   No Yes   Sig: Take 1 tablet (10 mg total) by mouth daily   folic acid (FOLVITE) 1 mg tablet 2025 Morning  No Yes   Sig: Take 1 tablet (1 mg total) by mouth daily   losartan (COZAAR) 100 MG tablet 2025 Morning  No Yes   Sig: Take 1 tablet (100 mg total) by mouth daily   metoprolol tartrate (LOPRESSOR) 25 mg tablet 2025 Morning  No Yes   Sig: Take 1 tablet (25 mg total) by mouth daily      Facility-Administered Medications: None     Social History:     Social History     Socioeconomic History    Marital status: /Civil Union     Spouse name: Not on file    Number of children: Not on file    Years of education: Not on file    Highest education level: Not on file   Occupational History    Not on file   Tobacco Use    Smoking status: Former     Current packs/day: 0.00     Types: Cigarettes     Quit date: 2012     Years since quittin.9    Smokeless tobacco: Never   Vaping Use    Vaping status: Never Used   Substance and Sexual Activity    Alcohol use: Yes     Alcohol/week: 6.0 standard drinks of alcohol     Types: 6 Cans of beer per week    Drug use: Never    Sexual activity: Not Currently   Other Topics Concern    Not on file   Social History Narrative    Not on file     Social Drivers of Health     Financial Resource Strain: Not on file   Food Insecurity: No Food Insecurity (2025)    Nursing - Inadequate Food Risk Classification     Worried About Running Out of Food in the Last Year: Not on file     Ran Out of Food in the Last Year: Not on file     Ran Out of Food in the Last Year: Never true   Transportation Needs: No Transportation Needs (2025)    Nursing - Transportation Risk Classification     Lack of Transportation: Not on file     Lack of Transportation: No   Physical Activity: Not on file   Stress: Not on file   Social Connections: Not on file   Intimate Partner Violence: Unknown (2025)    Nursing IPS     Feels Physically and Emotionally Safe: Not on file     Physically Hurt  by Someone: Not on file     Humiliated or Emotionally Abused by Someone: Not on file     Physically Hurt by Someone: No     Hurt or Threatened by Someone: No   Housing Stability: Unknown (7/4/2025)    Nursing: Inadequate Housing Risk Classification     Has Housing: Not on file     Worried About Losing Housing: Not on file     Unable to Get Utilities: Not on file     Unable to Pay for Housing in the Last Year: No     Has Housing: No     Patient Pre-hospital Living Situation: Home  Patient Pre-hospital Level of Mobility: walks  Patient Pre-hospital Diet Restrictions:     Objective   Vitals:   Blood Pressure: 142/89 (07/04/25 1405)  Pulse: 68 (07/04/25 1405)  Temperature: 98.1 °F (36.7 °C) (07/04/25 1405)  Temp Source: Oral (07/04/25 1405)  Respirations: 18 (07/04/25 1405)  Height: 6' (182.9 cm) (07/04/25 1358)  Weight - Scale: 104 kg (229 lb 0.9 oz) (07/04/25 1358)  SpO2: 95 % (07/04/25 1405)    Physical Exam  Vitals reviewed.   Constitutional:       General: He is not in acute distress.  HENT:      Head: Atraumatic.     Eyes:      General: No scleral icterus.     Extraocular Movements: Extraocular movements intact.       Cardiovascular:      Rate and Rhythm: Regular rhythm.      Heart sounds:      No gallop.   Pulmonary:      Effort: Pulmonary effort is normal. No respiratory distress.   Abdominal:      General: Bowel sounds are normal.      Palpations: Abdomen is soft.      Tenderness: There is no abdominal tenderness. There is no guarding.     Musculoskeletal:         General: No tenderness or deformity.     Skin:     General: Skin is warm.      Coloration: Skin is not jaundiced.     Neurological:      General: No focal deficit present.      Mental Status: He is alert.      Motor: No weakness.     Psychiatric:         Mood and Affect: Mood normal.         Additional Data:   Lab Results: I have reviewed the following results:  Results from last 7 days   Lab Units 07/04/25  0901   WBC Thousand/uL 6.12   HEMOGLOBIN  g/dL 15.6   HEMATOCRIT % 47.4   PLATELETS Thousands/uL 187   SEGS PCT % 65   LYMPHO PCT % 23   MONO PCT % 8   EOS PCT % 3     Results from last 7 days   Lab Units 07/04/25  0901   SODIUM mmol/L 137   POTASSIUM mmol/L 4.8   CHLORIDE mmol/L 105   CO2 mmol/L 26   ANION GAP mmol/L 6   BUN mg/dL 22   CREATININE mg/dL 1.63*   CALCIUM mg/dL 9.5   EGFR ml/min/1.73sq m 45   GLUCOSE RANDOM mg/dL 117     Results from last 7 days   Lab Units 07/04/25  1059   INR  0.94                                          Lines/Drains  Invasive Devices       Peripheral Intravenous Line  Duration             Peripheral IV 07/04/25 Left Antecubital <1 day    Peripheral IV 07/04/25 Left;Ventral (anterior) Hand <1 day                    Imaging:   Personally reviewed the following image studies in PACS and associated radiology reports:  CTA head and neck with and without contrast  Result Date: 7/4/2025  Impression: CT Brain: - No acute intracranial abnormality. - Unchanged chronic left MCA territory infarct involving left-sided basal ganglia, frontal, insular, and superior temporal lobes with mild chronic microangiopathy. CT Angiography: - Occluded left MCA branches involving frontoinsular regions, likely chronic given chronic left MCA territory infarct. - Nonocclusive thrombus in left MCA distal M1 segment extending into proximal M2 segments superior divisional branch with slight extension into left MCA proximal M2 inferior divisional branch, which may be chronic given appearance since CTA stroke alert head and neck 2/10/2019. Severe stenosis in left MCA proximal M2 segment superior divisional branch vessel which is occluded more distally. - Severe stenosis in origin and proximal cervical segment of left ICA which is asymmetrically diminutive throughout cervical and intracranial segments. Severe stenosis in left ICA distal cavernous segment. - Additional atherosclerosis of the head and neck, as detailed above. Additional chronic/incidental  findings as detailed above. I personally discussed this study with MAX ESTRADA on 7/4/2025 9:53 AM. The study was marked in EPIC for immediate notification. Workstation performed: [id number]       EKG, Pathology, and Other Studies Reviewed on Admission:   EKG  Result Date: 07/04/25  Personally reviewed strips with impression of: Sinus bradycardia 58 bpm    Administrative Statements   Reviewed outpatient records in detail    ** Please Note: This note has been constructed using a voice recognition system. **         [1]   Past Medical History:  Diagnosis Date    Chronic kidney disease     Hyperlipidemia     Hypertension     Stroke (HCC) 02/10/2019    right side sl weakness   [2]   Past Surgical History:  Procedure Laterality Date    FL RETROGRADE URETHROCYSTOGRAM  6/10/2021    NO PAST SURGERIES      OR CYSTOURETHROSCOPY W/URETERAL CATHETERIZATION N/A 6/10/2021    Procedure: CYSTOSCOPY WITH RETROGRADE PYELOGRAM/internal urethrotomy, DVIU, URETHROGRAM, CYSTOGRAM.;  Surgeon: Fabian Remy MD;  Location: [facility] OR;  Service: Urology    OR TRURL ELECTROSURG RESCJ PROSTATE BLEED COMPLETE N/A 7/15/2021    Procedure: CYSTOSCOPY, TRANSURETHRAL RESECTION OF PROSTATE (TURP);  Surgeon: Fabian Remy MD;  Location: [facility] OR;  Service: Urology   [3] No Known Allergies

## 2025-07-04 NOTE — ASSESSMENT & PLAN NOTE
History of stroke, carotid disease, CKD 3b, hypertension, hyperlipidemia presented to the hospital with dysarthria  Patient with history of state of health until waking up this morning to make coffee.  Was noted by wife to have slurring of speech which slowly improved.  Patient does report taking clopidogrel but not aspirin.  May be atorvastatin.  CTA with multiple vascular occlusions.  ED discussed with neurology recommending stroke heparin infusion.  Stroke pathway ordered: Continue clopidogrel with statin

## 2025-07-05 ENCOUNTER — APPOINTMENT (INPATIENT)
Dept: RADIOLOGY | Facility: HOSPITAL | Age: 60
DRG: 093 | End: 2025-07-05
Payer: COMMERCIAL

## 2025-07-05 ENCOUNTER — APPOINTMENT (INPATIENT)
Dept: NON INVASIVE DIAGNOSTICS | Facility: HOSPITAL | Age: 60
DRG: 093 | End: 2025-07-05
Attending: INTERNAL MEDICINE
Payer: COMMERCIAL

## 2025-07-05 LAB
ALBUMIN SERPL BCG-MCNC: 3.8 G/DL (ref 3.5–5)
ALP SERPL-CCNC: 63 U/L (ref 34–104)
ALT SERPL W P-5'-P-CCNC: 16 U/L (ref 7–52)
ANION GAP SERPL CALCULATED.3IONS-SCNC: 6 MMOL/L (ref 4–13)
AORTIC ROOT: 3.8 CM
AORTIC VALVE MEAN VELOCITY: 10 M/S
APTT PPP: 53 SECONDS (ref 23–34)
APTT PPP: 60 SECONDS (ref 23–34)
APTT PPP: 79 SECONDS (ref 23–34)
AST SERPL W P-5'-P-CCNC: 15 U/L (ref 13–39)
AV AREA BY CONTINUOUS VTI: 3.3 CM2
AV AREA PEAK VELOCITY: 3.2 CM2
AV LVOT MEAN GRADIENT: 2 MMHG
AV LVOT PEAK GRADIENT: 4 MMHG
AV MEAN PRESS GRAD SYS DOP V1V2: 4 MMHG
AV ORIFICE AREA US: 3.32 CM2
AV PEAK GRADIENT: 8 MMHG
AV VELOCITY RATIO: 0.73
AV VMAX SYS DOP: 1.38 M/S
BILIRUB SERPL-MCNC: 0.64 MG/DL (ref 0.2–1)
BSA FOR ECHO PROCEDURE: 2.26 M2
BUN SERPL-MCNC: 20 MG/DL (ref 5–25)
CALCIUM SERPL-MCNC: 8.9 MG/DL (ref 8.4–10.2)
CHLORIDE SERPL-SCNC: 107 MMOL/L (ref 96–108)
CHOLEST SERPL-MCNC: 207 MG/DL (ref ?–200)
CO2 SERPL-SCNC: 23 MMOL/L (ref 21–32)
CREAT SERPL-MCNC: 1.51 MG/DL (ref 0.6–1.3)
CRP SERPL QL: 5.1 MG/L
DOP CALC AO VTI: 27.15 CM
DOP CALC LVOT AREA: 4.52 CM2
DOP CALC LVOT CARDIAC INDEX: 3.05 L/MIN/M2
DOP CALC LVOT CARDIAC OUTPUT: 6.89 L/MIN
DOP CALC LVOT DIAMETER: 2.4 CM
DOP CALC LVOT PEAK VEL VTI: 19.95 CM
DOP CALC LVOT PEAK VEL: 0.97 M/S
DOP CALC LVOT STROKE INDEX: 41.2 ML/M2
DOP CALC LVOT STROKE VOLUME: 90.21
E WAVE DECELERATION TIME: 210 MS
E/A RATIO: 0.71
ERYTHROCYTE [DISTWIDTH] IN BLOOD BY AUTOMATED COUNT: 13.9 % (ref 11.6–15.1)
EST. AVERAGE GLUCOSE BLD GHB EST-MCNC: 117 MG/DL
FRACTIONAL SHORTENING: 34 (ref 28–44)
GFR SERPL CREATININE-BSD FRML MDRD: 49 ML/MIN/1.73SQ M
GLUCOSE SERPL-MCNC: 90 MG/DL (ref 65–140)
HBA1C MFR BLD: 5.7 %
HCT VFR BLD AUTO: 46.1 % (ref 36.5–49.3)
HCYS SERPL-SCNC: 13.3 UMOL/L (ref 5–15)
HDLC SERPL-MCNC: 35 MG/DL
HGB BLD-MCNC: 15.2 G/DL (ref 12–17)
INTERVENTRICULAR SEPTUM IN DIASTOLE (PARASTERNAL SHORT AXIS VIEW): 1.2 CM
INTERVENTRICULAR SEPTUM: 1.2 CM (ref 0.6–1.1)
LAAS-AP2: 24.1 CM2
LAAS-AP4: 20.2 CM2
LDLC SERPL CALC-MCNC: 122 MG/DL (ref 0–100)
LEFT ATRIUM SIZE: 4 CM
LEFT ATRIUM VOLUME (MOD BIPLANE): 74 ML
LEFT ATRIUM VOLUME INDEX (MOD BIPLANE): 32.7 ML/M2
LEFT INTERNAL DIMENSION IN SYSTOLE: 3.1 CM (ref 2.1–4)
LEFT VENTRICULAR INTERNAL DIMENSION IN DIASTOLE: 4.7 CM (ref 3.5–6)
LEFT VENTRICULAR POSTERIOR WALL IN END DIASTOLE: 1.1 CM
LEFT VENTRICULAR STROKE VOLUME: 66 ML
LV EF US.2D.A4C+ESTIMATED: 53 %
LVSV (TEICH): 66 ML
MCH RBC QN AUTO: 28 PG (ref 26.8–34.3)
MCHC RBC AUTO-ENTMCNC: 33 G/DL (ref 31.4–37.4)
MCV RBC AUTO: 85 FL (ref 82–98)
MV E'TISSUE VEL-LAT: 10 CM/S
MV E'TISSUE VEL-SEP: 10 CM/S
MV PEAK A VEL: 0.85 M/S
MV PEAK E VEL: 60 CM/S
MV STENOSIS PRESSURE HALF TIME: 61 MS
MV VALVE AREA P 1/2 METHOD: 3.61
PLATELET # BLD AUTO: 189 THOUSANDS/UL (ref 149–390)
PMV BLD AUTO: 9.4 FL (ref 8.9–12.7)
POTASSIUM SERPL-SCNC: 4.2 MMOL/L (ref 3.5–5.3)
PROT SERPL-MCNC: 6.6 G/DL (ref 6.4–8.4)
RBC # BLD AUTO: 5.43 MILLION/UL (ref 3.88–5.62)
RIGHT ATRIUM AREA SYSTOLE A4C: 17.5 CM2
RIGHT VENTRICLE ID DIMENSION: 3.4 CM
SL CV LEFT ATRIUM LENGTH A2C: 5.3 CM
SL CV LV EF: 60
SL CV PED ECHO LEFT VENTRICLE DIASTOLIC VOLUME (MOD BIPLANE) 2D: 103 ML
SL CV PED ECHO LEFT VENTRICLE SYSTOLIC VOLUME (MOD BIPLANE) 2D: 37 ML
SODIUM SERPL-SCNC: 136 MMOL/L (ref 135–147)
TRICUSPID ANNULAR PLANE SYSTOLIC EXCURSION: 2.6 CM
TRIGL SERPL-MCNC: 250 MG/DL (ref ?–150)
WBC # BLD AUTO: 6.3 THOUSAND/UL (ref 4.31–10.16)

## 2025-07-05 PROCEDURE — 83090 ASSAY OF HOMOCYSTEINE: CPT | Performed by: INTERNAL MEDICINE

## 2025-07-05 PROCEDURE — 85027 COMPLETE CBC AUTOMATED: CPT | Performed by: INTERNAL MEDICINE

## 2025-07-05 PROCEDURE — 70551 MRI BRAIN STEM W/O DYE: CPT

## 2025-07-05 PROCEDURE — 99232 SBSQ HOSP IP/OBS MODERATE 35: CPT | Performed by: INTERNAL MEDICINE

## 2025-07-05 PROCEDURE — 80053 COMPREHEN METABOLIC PANEL: CPT | Performed by: INTERNAL MEDICINE

## 2025-07-05 PROCEDURE — 93306 TTE W/DOPPLER COMPLETE: CPT | Performed by: INTERNAL MEDICINE

## 2025-07-05 PROCEDURE — 93306 TTE W/DOPPLER COMPLETE: CPT

## 2025-07-05 PROCEDURE — 85730 THROMBOPLASTIN TIME PARTIAL: CPT | Performed by: INTERNAL MEDICINE

## 2025-07-05 PROCEDURE — 83036 HEMOGLOBIN GLYCOSYLATED A1C: CPT | Performed by: INTERNAL MEDICINE

## 2025-07-05 PROCEDURE — 86140 C-REACTIVE PROTEIN: CPT | Performed by: INTERNAL MEDICINE

## 2025-07-05 PROCEDURE — 80061 LIPID PANEL: CPT | Performed by: INTERNAL MEDICINE

## 2025-07-05 RX ADMIN — ATORVASTATIN CALCIUM 80 MG: 80 TABLET, FILM COATED ORAL at 18:52

## 2025-07-05 RX ADMIN — CLOPIDOGREL 75 MG: 75 TABLET ORAL at 08:32

## 2025-07-05 RX ADMIN — THIAMINE HCL TAB 100 MG 100 MG: 100 TAB at 08:32

## 2025-07-05 RX ADMIN — FOLIC ACID 1 MG: 1 TABLET ORAL at 08:32

## 2025-07-05 RX ADMIN — EZETIMIBE 10 MG: 10 TABLET ORAL at 08:32

## 2025-07-05 RX ADMIN — HEPARIN SODIUM 10 UNITS/KG/HR: 10000 INJECTION, SOLUTION INTRAVENOUS at 06:44

## 2025-07-05 RX ADMIN — Medication 2000 UNITS: at 08:32

## 2025-07-05 NOTE — CASE MANAGEMENT
Case Management Assessment & Discharge Planning Note    Patient name Elie Cooper  Location [address] [place] [facility]/[address] [place] [facility]-* MRN [id number]  : 1965 Date 2025       Current Admission Date: 2025  Current Admission Diagnosis:Stroke-like symptoms   Patient Active Problem List    Diagnosis Date Noted    Stroke-like symptoms 2025    GUY (obstructive sleep apnea)     Stage 3b chronic kidney disease (HCC) 2021    Bladder outlet obstruction 2021    Anemia 2021    Benign prostatic hyperplasia with lower urinary tract symptoms 2021    Family history of abdominal aortic aneurysm (AAA) 01/10/2020    Left carotid artery occlusion 01/10/2020    Abnormal glucose 2019    Stenosis of right carotid artery 2019    Cerebrovascular accident (CVA) due to occlusion of left middle cerebral artery (HCC) 02/10/2019    Alcohol abuse 02/10/2019    Hypertension 02/10/2019    Hypercholesterolemia 2012    Benign essential hypertension 2012      LOS (days): 1  Geometric Mean LOS (GMLOS) (days):   Days to GMLOS:     OBJECTIVE:    Risk of Unplanned Readmission Score: 10.15         Current admission status: Inpatient  Referral Reason: Stroke    Preferred Pharmacy:   [id number] - [facility] [address] [facility] [address] [facility]  [address] [facility] [address] [address]  Phone: [phone] Fax: [phone]    Primary Care Provider: Salome Gonsalves MD    Primary Insurance:   Secondary Insurance:     ASSESSMENT:  Active Health Care Proxies    There are no active Health Care Proxies on file.        Readmission Root Cause  30 Day Readmission: No    Patient Information  Admitted from:: Home  Mental Status: Alert  During Assessment patient was accompanied by: Not accompanied during assessment  Assessment information provided by:: Patient  Primary Caregiver: Self  Support Systems: Family members  County [place] Residence: [place]specify in [place] box[place]  What  [facility] do you live in?: [place]  Type of Current Residence: 2 [place] home  Upon entering residence, is there a bedroom on the main floor (no further steps)?: No  A bedroom is located on the following floor levels of residence (select all that apply):: 2nd Floor  Number of steps to 2nd floor from main floor: One Flight  Living Arrangements: Lives w/ Spouse/significant other    Activities of Daily Living Prior to Admission  Functional Status: Independent  Completes ADLs independently?: Yes  Ambulates independently?: Yes  Does patient currently own DME?: No  Does patient have a history of Outpatient Therapy (PT/OT)?: Yes (Outpatient ST rehab)  Does the patient have a history of Short-Term Rehab?: No  Does patient have a history of HHC?: No  Does patient currently have HHC?: No     Patient Information Continued  Income Source: Self-employed  Does patient have prescription coverage?: No  Can the patient afford their medications and any related supplies (such as glucometers or test strips)?: Yes  Does patient receive dialysis treatments?: No     Means of Transportation  Means of Transport to [facility]:: Drives Self, Family transport    DISCHARGE DETAILS:    Discharge planning discussed with:: Patient  Freedom of Choice: Yes  Comments - Freedom of Choice: Discharge to home when medically cleared     Contacts  Patient Contacts: Mary Ellen Velozt (spouse)  Relationship to Patient:: Family    Requested Home Health Care         Is the patient interested in HHC at discharge?: No    DME Referral Provided  Referral made for DME?: No    Other Referral/Resources/Interventions Provided:  Referral Comments: RN CM met with patient at bedside to introduce role, complete assessment and screen for anticipated discharge needs requiring CM assistance. Patient reports being independent with adls and iadls and wife provides transportation to [facility]. Patient does not use any assistive device and denied having any concens requiring CM assistance at this  time.     Treatment Team Recommendation: Home  Expected Discharge Disposition: Home or Self Care  Additional Discharge Dispositions: Home or Self Care  Transport at Discharge : Family

## 2025-07-05 NOTE — PLAN OF CARE
Problem: PAIN - ADULT  Goal: Verbalizes/displays adequate comfort level or baseline comfort level  Description: Interventions:  - Encourage patient to monitor pain and request assistance  - Assess pain using appropriate pain scale  - Administer analgesics as ordered based on type and severity of pain and evaluate response  - Implement non-pharmacological measures as appropriate and evaluate response  - Consider cultural and social influences on pain and pain management  - Notify physician/advanced practitioner if interventions unsuccessful or patient reports new pain  - Educate patient/family on pain management process including their role and importance of  reporting pain   - Provide non-pharmacologic/complimentary pain relief interventions  Outcome: Progressing     Problem: INFECTION - ADULT  Goal: Absence or prevention of progression during hospitalization  Description: INTERVENTIONS:  - Assess and monitor for signs and symptoms of infection  - Monitor lab/diagnostic results  - Monitor all insertion sites, i.e. indwelling lines, tubes, and drains  - Monitor endotracheal if appropriate and nasal secretions for changes in amount and color  - [place] appropriate cooling/warming therapies per order  - Administer medications as ordered  - Instruct and encourage patient and family to use good hand hygiene technique  - Identify and instruct in appropriate isolation precautions for identified infection/condition  Outcome: Progressing  Goal: Absence of fever/infection during neutropenic period  Description: INTERVENTIONS:  - Monitor WBC  - Perform strict hand hygiene  - Limit to healthy visitors only  - No plants, dried, fresh or silk flowers with brower in patient room  Outcome: Progressing     Problem: SAFETY ADULT  Goal: Patient will remain free of falls  Description: INTERVENTIONS:  - Educate patient/family on patient safety including physical limitations  - Instruct patient to call for assistance with activity   -  Consider consulting OT/PT to assist with strengthening/mobility based on AM PAC & JH-HLM score  - Consult OT/PT to assist with strengthening/mobility   - Keep Call bell within reach  - Keep bed low and locked with side rails adjusted as appropriate  - Keep care items and personal belongings within reach  - Initiate and maintain comfort rounds  - Make Fall Risk Sign visible to staff  - Offer Toileting every 2 Hours, in advance of need  - Initiate/Maintain bed and chair alarm  - Obtain necessary fall risk management equipment: bed and chair alarm/yellow socks and bracelet   - Apply yellow socks and bracelet for high fall risk patients  - Consider moving patient to room near nurses station  Outcome: Progressing  Goal: Maintain or return to baseline ADL function  Description: INTERVENTIONS:  -  Assess patient's ability to carry out ADLs; assess patient's baseline for ADL function and identify physical deficits which impact ability to perform ADLs (bathing, care of mouth/teeth, toileting, grooming, dressing, etc.)  - Assess/evaluate cause of self-care deficits   - Assess range of motion  - Assess patient's mobility; develop plan if impaired  - Assess patient's need for assistive devices and provide as appropriate  - Encourage maximum independence but intervene and supervise when necessary  - Involve family in performance of ADLs  - Assess for home care needs following discharge   - Consider OT consult to assist with ADL evaluation and planning for discharge  - Provide patient education as appropriate  - Monitor functional capacity and physical performance, use of AM PAC & JH-HLM   - Monitor gait, balance and fatigue with ambulation    Outcome: Progressing  Goal: Maintains/Returns to pre admission functional level  Description: INTERVENTIONS:  - Perform AM-PAC 6 Click Basic Mobility/ Daily Activity assessment daily.  - Set and communicate daily mobility goal to care team and patient/family/caregiver.   - Collaborate with  rehabilitation services on mobility goals if consulted  - Perform Range of Motion 3 times a day.  - Reposition patient every 2 hours.  - Dangle patient 3 times a day  - Stand patient 3 times a day  - Ambulate patient 3 times a day  - Out of bed to chair 3 times a day   - Out of bed for meals 3 times a day  - Out of bed for toileting  - Record patient progress and toleration of activity level   Outcome: Progressing     Problem: DISCHARGE PLANNING  Goal: Discharge to home or other facility with appropriate resources  Description: INTERVENTIONS:  - Identify barriers to discharge w/patient and caregiver  - Arrange for needed discharge resources and transportation as appropriate  - Identify discharge learning needs (meds, wound care, etc.)  - Arrange for interpretive services to assist at discharge as needed  - Refer to Case Management Department for coordinating discharge planning if the patient needs post-hospital services based on physician/advanced practitioner order or complex needs related to functional status, cognitive ability, or social support system  Outcome: Progressing     Problem: Knowledge Deficit  Goal: Patient/family/caregiver demonstrates understanding of disease process, treatment plan, medications, and discharge instructions  Description: Complete learning assessment and assess knowledge base.  Interventions:  - Provide teaching at level of understanding  - Provide teaching via preferred learning methods  Outcome: Progressing     Problem: Neurological Deficit  Goal: Neurological status is stable or improving  Description: Interventions:  - Monitor and assess patient's level of consciousness, motor function, sensory function, and level of assistance needed for ADLs.   - Monitor and report changes from baseline. Collaborate with interdisciplinary team to initiate plan and implement interventions as ordered.   - Provide and maintain a safe environment.  - Consider seizure precautions.  - Consider fall  precautions.  - Consider aspiration precautions.  - Consider bleeding precautions.  Outcome: Progressing     Problem: Activity Intolerance/Impaired Mobility  Goal: Mobility/activity is maintained at optimum level for patient  Description: Interventions:  - Assess and monitor patient  barriers to mobility and need for assistive/adaptive devices.  - Assess patient's emotional response to limitations.  - Collaborate with interdisciplinary team and initiate plans and interventions as ordered.  - Encourage independent activity per ability.  - Maintain proper body alignment.  - Perform active/passive rom as tolerated/ordered.  - Plan activities to conserve energy.  - Turn patient as appropriate  Outcome: Progressing     Problem: Communication Impairment  Goal: Ability to express needs and understand communication  Description: Assess patient's communication skills and ability to understand information.  Patient will demonstrate use of effective communication techniques, alternative methods of communication and understanding even if not able to speak.     - Encourage communication and provide alternate methods of communication as needed.  - Collaborate with case management/ for discharge needs.  - Include patient/family/caregiver in decisions related to communication.  Outcome: Progressing     Problem: Potential for Aspiration  Goal: Non-ventilated patient's risk of aspiration is minimized  Description: Assess and monitor vital signs, respiratory status, and labs (WBC).  Monitor for signs of aspiration (tachypnea, cough, rales, wheezing, cyanosis, fever).    - Assess and monitor patient's ability to swallow.  - Place patient up in chair to eat if possible.  - HOB up at 90 degrees to eat if unable to get patient up into chair.  - Supervise patient during oral intake.   - Instruct patient/ family to take small bites.  - Instruct patient/ family to take small single sips when taking liquids.  - Follow  patient-specific strategies generated by speech pathologist.  Outcome: Progressing     Problem: Nutrition  Goal: Nutrition/Hydration status is improving  Description: Monitor and assess patient's nutrition/hydration status for malnutrition (ex- brittle hair, bruises, dry skin, pale skin and conjunctiva, muscle wasting, smooth red tongue, and disorientation). Collaborate with interdisciplinary team and initiate plan and interventions as ordered.  Monitor patient's weight and dietary intake as ordered or per policy. Utilize nutrition screening tool and intervene per policy. Determine patient's food preferences and provide high-protein, high-caloric foods as appropriate.     - Assist patient with eating.  - Allow adequate time for meals.  - Encourage patient to take dietary supplement as ordered.  - Collaborate with clinical nutritionist.  - Include patient/family/caregiver in decisions related to nutrition.  Outcome: Progressing      Class I (easy) - visualization of the soft palate, fauces, uvula, and both anterior and posterior pillars

## 2025-07-05 NOTE — PROGRESS NOTES
Progress Note - Hospitalist   Name: Elie Cooper 60 y.o. male I MRN: [id number]  Unit/Bed#: 4 [address] I Date of Admission: 7/4/2025   Date of Service: 7/5/2025 I Hospital Day: 1    Assessment & Plan  Stroke-like symptoms  History of stroke, carotid disease, CKD 3b, hypertension, hyperlipidemia presented to the hospital with dysarthria  Patient with history of state of health until waking up to make coffee.  Was noted by wife to have slurring of speech which slowly improved.  Patient does report taking clopidogrel but not aspirin.  May be atorvastatin.  CTA with multiple vascular occlusions.  ED discussed with neurology recommended stroke heparin infusion.  Stroke pathway ordered: Continue clopidogrel with statin  Follow-up on echocardiogram and MRI  Hypercholesterolemia  Continue atorvastatin  Benign essential hypertension  Prior to admission on losartan and metoprolol  Allowing permissive hypertension  Stage 3b chronic kidney disease (HCC)  Baseline creatinine closer to 1.8  Currently stable    Results from last 7 days   Lab Units 07/05/25  0457 07/04/25  0901   BUN mg/dL 20 22   CREATININE mg/dL 1.51* 1.63*   EGFR ml/min/1.73sq m 49 45       VTE Pharmacologic Prophylaxis: VTE Score: 6 High Risk (Score >/= 5) - Pharmacological DVT Prophylaxis Ordered: heparin drip. Sequential Compression Devices Ordered.    Mobility:   Basic Mobility Inpatient Raw Score: 24  JH-HLM Goal: 8: Walk 250 feet or more  JH-HLM Achieved: 7: Walk 25 feet or more  JH-HLM Goal achieved. Continue to encourage appropriate mobility.    Patient Centered Rounds: I have performed bedside rounds with nursing staff today.  Discussions with Specialists or Other Care Team Provider: Case management    Education and Discussions with Family / Patient: Updated  (wife) via phone.    Current Length of Stay: 1 day(s)  Current Patient Status: Inpatient   Certification Statement: The patient will continue to require additional inpatient  hospital stay due to stroke workup  Discharge Plan: Anticipate discharge in 24-48 hrs to home.    Code Status: Level 1 - Full Code    Subjective   Patient seen and examined.  Feels back to normal today.    Objective   Vitals:   Temp (24hrs), Av.1 °F (36.7 °C), Min:97.2 °F (36.2 °C), Max:98.6 °F (37 °C)    Temp:  [97.2 °F (36.2 °C)-98.6 °F (37 °C)] 97.6 °F (36.4 °C)  HR:  [57-75] 60  Resp:  [16-18] 17  BP: (116-166)/() 140/84  SpO2:  [90 %-97 %] 95 %  Body mass index is 31.06 kg/m².     Input and Output Summary (last 24 hours):     Intake/Output Summary (Last 24 hours) at 2025 1303  Last data filed at 2025 0642  Gross per 24 hour   Intake --   Output 1000 ml   Net -1000 ml       Physical Exam  Vitals reviewed.   Constitutional:       General: He is not in acute distress.  HENT:      Head: Atraumatic.     Cardiovascular:      Rate and Rhythm: Regular rhythm.      Heart sounds:      No gallop.   Pulmonary:      Effort: Pulmonary effort is normal. No respiratory distress.   Abdominal:      General: Bowel sounds are normal. There is no distension.      Palpations: Abdomen is soft.      Tenderness: There is no abdominal tenderness.     Musculoskeletal:         General: No tenderness or deformity.     Skin:     General: Skin is warm.      Coloration: Skin is not jaundiced.     Neurological:      General: No focal deficit present.      Mental Status: He is alert.      Motor: No weakness.     Psychiatric:         Mood and Affect: Mood normal.       Lines/Drains:  Invasive Devices       Peripheral Intravenous Line  Duration             Peripheral IV 25 Left Antecubital 1 day    Peripheral IV 25 Left;Ventral (anterior) Hand 1 day                      Telemetry:  Telemetry Orders (From admission, onward)               24 Hour Telemetry Monitoring  Continuous x 24 Hours (Telem)        Expiring   Question:  Reason for 24 Hour Telemetry  Answer:  TIA/Suspected CVA/ Confirmed CVA                      Telemetry Reviewed: Normal Sinus Rhythm  Indication for Continued Telemetry Use: Acute CVA               Lab Results: I have reviewed the following results:   Results from last 7 days   Lab Units 07/05/25  0457 07/04/25  1059 07/04/25  0901   WBC Thousand/uL 6.30  --  6.12   HEMOGLOBIN g/dL 15.2  --  15.6   PLATELETS Thousands/uL 189  --  187   MCV fL 85  --  85   INR   --  0.94  --      Results from last 7 days   Lab Units 07/05/25  0457 07/04/25  0901   SODIUM mmol/L 136 137   POTASSIUM mmol/L 4.2 4.8   CHLORIDE mmol/L 107 105   CO2 mmol/L 23 26   ANION GAP mmol/L 6 6   BUN mg/dL 20 22   CREATININE mg/dL 1.51* 1.63*   CALCIUM mg/dL 8.9 9.5   ALBUMIN g/dL 3.8  --    TOTAL BILIRUBIN mg/dL 0.64  --    ALK PHOS U/L 63  --    ALT U/L 16  --    AST U/L 15  --    EGFR ml/min/1.73sq m 49 45   GLUCOSE RANDOM mg/dL 90 117                          Results from last 7 days   Lab Units 07/04/25  0853   POC GLUCOSE mg/dl 124     Results from last 7 days   Lab Units 07/05/25  0457   HEMOGLOBIN A1C % 5.7*           Recent Cultures (last 7 days):         Imaging:  Reviewed radiology reports from this admission including:  CTA head and neck with and without contrast  Result Date: 7/4/2025  Impression: CT Brain: - No acute intracranial abnormality. - Unchanged chronic left MCA territory infarct involving left-sided basal ganglia, frontal, insular, and superior temporal lobes with mild chronic microangiopathy. CT Angiography: - Occluded left MCA branches involving frontoinsular regions, likely chronic given chronic left MCA territory infarct. - Nonocclusive thrombus in left MCA distal M1 segment extending into proximal M2 segments superior divisional branch with slight extension into left MCA proximal M2 inferior divisional branch, which may be chronic given appearance since CTA stroke alert head and neck 2/10/2019. Severe stenosis in left MCA proximal M2 segment superior divisional branch vessel which is occluded more distally.  - Severe stenosis in origin and proximal cervical segment of left ICA which is asymmetrically diminutive throughout cervical and intracranial segments. Severe stenosis in left ICA distal cavernous segment. - Additional atherosclerosis of the head and neck, as detailed above. Additional chronic/incidental findings as detailed above. I personally discussed this study with MAX ESTRADA on 7/4/2025 9:53 AM. The study was marked in EPIC for immediate notification. Workstation performed: [id number]       Last 24 Hours Medication List:     Current Facility-Administered Medications:     acetaminophen (TYLENOL) tablet 650 mg, Q4H PRN    atorvastatin (LIPITOR) tablet 80 mg, QPM    Cholecalciferol (VITAMIN D3) tablet 2,000 Units, Daily    clopidogrel (PLAVIX) tablet 75 mg, Daily    ezetimibe (ZETIA) tablet 10 mg, Daily    folic acid (FOLVITE) tablet 1 mg, Daily    heparin (porcine) 25,000 units in 0.45% NaCl 250 mL infusion (premix), Titrated, Last Rate: 10 Units/kg/hr (07/05/25 0644)    [Held by provider] losartan (COZAAR) tablet 100 mg, Daily    [Held by provider] metoprolol tartrate (LOPRESSOR) tablet 25 mg, Daily    ondansetron (ZOFRAN) injection 4 mg, Q4H PRN    thiamine tablet 100 mg, Daily    Administrative Statements   Today, Patient Was Seen By: David Soto, DO  I have spent a total time of 35 minutes in caring for this patient on the day of the visit/encounter including Patient and family education, Counseling / Coordination of care, Documenting in the medical record, Reviewing/placing orders in the medical record (including tests, medications, and/or procedures), and Communicating with other healthcare professionals .    **Please Note: This note may have been constructed using a voice recognition system.**

## 2025-07-05 NOTE — ASSESSMENT & PLAN NOTE
History of stroke, carotid disease, CKD 3b, hypertension, hyperlipidemia presented to the hospital with dysarthria  Patient with history of state of health until waking up to make coffee.  Was noted by wife to have slurring of speech which slowly improved.  Patient does report taking clopidogrel but not aspirin.  May be atorvastatin.  CTA with multiple vascular occlusions.  ED discussed with neurology recommended stroke heparin infusion.  Stroke pathway ordered: Continue clopidogrel with statin  Follow-up on echocardiogram and MRI

## 2025-07-05 NOTE — ASSESSMENT & PLAN NOTE
Baseline creatinine closer to 1.8  Currently stable    Results from last 7 days   Lab Units 07/05/25  0457 07/04/25  0901   BUN mg/dL 20 22   CREATININE mg/dL 1.51* 1.63*   EGFR ml/min/1.73sq m 49 45

## 2025-07-05 NOTE — UTILIZATION REVIEW
Initial Clinical Review    Admission: Date/Time/Statement:   Admission Orders (From admission, onward)       Ordered        07/04/25 1122  INPATIENT ADMISSION  Once                          Orders Placed This Encounter   Procedures    INPATIENT ADMISSION     Standing Status:   Standing     Number of Occurrences:   1     Level of Care:   Level 2 Stepdown / HOT [14]     Estimated length of stay:   More than 2 Midnights     Certification:   I certify that inpatient services are medically necessary for this patient for a duration of greater than two midnights. See H&P and MD Progress Notes for additional information about the patient's course of treatment.     ED Arrival Information       Expected   -    Arrival   7/4/2025 08:44    Acuity   Emergent              Means of arrival   Walk-In    Escorted by   Spouse    Service   Hospitalist    Admission type   Emergency              Arrival complaint   Slurred Speech             Chief Complaint   Patient presents with    Slurred Speech     Pt states his wife noticed slurred speech and expressive aphasia this morning while making coffee approx 0800.  Hx of right facial droop from previous stroke, maybe be slightly worse per patient        Initial Presentation:   60 yom to ER from home for slurred speech earlier this morning, improved upon arrival. Hx CVA with residual R facial droop. Presents hypertensive. Neuro exam: Normal speech, right mouth droop (patient reports chronic). 5 out of 5 strength all 4 extremities, no drift. Normal sensation to light touch in all 4 extremities. Normal gait . Admission CT Brain: No acute intracranial abnormality.  - Unchanged chronic left MCA territory infarct involving left-sided basal ganglia, frontal, insular, and superior temporal lobes with mild chronic microangiopathy. CT Angiography: Occluded left MCA branches involving frontoinsular regions, likely chronic given chronic left MCA territory infarct. Nonocclusive thrombus in left MCA  distal M1 segment extending into proximal M2 segments superior divisional branch with slight extension into left MCA proximal M2 inferior divisional branch, which may be chronic given appearance since CTA stroke alert head and neck 2/10/2019. Severe stenosis in left MCA proximal M2 segment superior divisional branch vessel which is occluded more distally. Severe stenosis in origin and proximal cervical segment of left ICA which is asymmetrically diminutive throughout cervical and intracranial segments. Severe stenosis in left ICA distal cavernous segment. Labs: Cr 1.63.  Admitted to inpatient status for stroke-like symptoms. Plan: IV Heparin gtt, neuro checks, telemetry, neuro consult  Per neuro: speech issues  No new LVO and resolved symptoms.  - Start stroke protocol high risk heparin gtt  - Continue plavix. Given a dose of ASA, no need to continue triple therapy while in heparin gtt.   - Admit to stroke pathway, MRI brain, Echo, A1c, lipid panel, telemetry. Repeat CTH if unable to tolerate MRI  - Vascular risk factor management, A1c goal <6.5, LDL goal <70  - PT/OT/ST    Anticipated Length of Stay/Certification Statement:   Patient will be admitted on an inpatient basis with an anticipated length of stay of greater than 2 midnights secondary to stroke workup.     Date: 7/5/25   Day 2:   Remains on IV heparin gtt, stroke pathway. Reports feels back to normal. MRI 7 Echo pending.    ED Treatment-Medication Administration from 07/04/2025 0844 to 07/04/2025 1343         Date/Time Order Dose Route Action     07/04/2025 0920 iohexol (OMNIPAQUE) 350 MG/ML injection (MULTI-DOSE) 85 mL 85 mL Intravenous Given     07/04/2025 1119 heparin (porcine) 25,000 units in 0.45% NaCl 250 mL infusion (premix) 15 Units/kg/hr Intravenous Rate/Dose Verify     07/04/2025 1131 heparin (porcine) 25,000 units in 0.45% NaCl 250 mL infusion (premix) 15 Units/kg/hr Intravenous New Bag            Scheduled Medications:  atorvastatin, 80 mg,  Oral, QPM  cholecalciferol, 2,000 Units, Oral, Daily  clopidogrel, 75 mg, Oral, Daily  ezetimibe, 10 mg, Oral, Daily  folic acid, 1 mg, Oral, Daily  [Held by provider] losartan, 100 mg, Oral, Daily  [Held by provider] metoprolol tartrate, 25 mg, Oral, Daily  Thiamine Mononitrate, 100 mg, Oral, Daily      Continuous IV Infusions:  heparin, 3-24 Units/kg/hr, Intravenous, Titrated      PRN Meds:  acetaminophen, 650 mg, Oral, Q4H PRN  ondansetron, 4 mg, Intravenous, Q4H PRN      ED Triage Vitals   Temperature Pulse Respirations Blood Pressure SpO2 Pain Score   07/04/25 0902 07/04/25 0852 07/04/25 0852 07/04/25 0852 07/04/25 0852 07/04/25 1359   97.6 °F (36.4 °C) 70 18 (!) 186/96 96 % No Pain     Weight (last 2 days)       Date/Time Weight    07/05/25 0923 104 (229)    07/04/25 1358 104 (229.06)    07/04/25 0852 104 (229.2)            Vital Signs (last 3 days)       Date/Time Temp Pulse Resp BP MAP (mmHg) SpO2 O2 Device Patient Position - Orthostatic VS Salvatore Coma Scale Score Pain    07/05/25 0923 -- 60 -- 140/84 -- -- -- -- -- --    07/05/25 0905 -- -- -- -- -- -- -- -- 15 No Pain    07/05/25 07:34:16 97.6 °F (36.4 °C) 61 17 140/84 103 95 % -- -- -- --    07/05/25 05:18:39 -- 65 -- 120/67 85 90 % -- -- -- --    07/05/25 0505 -- -- -- -- -- -- -- -- 15 --    07/05/25 01:07:12 -- 64 -- 126/86 99 90 % -- -- -- --    07/05/25 0105 -- -- -- -- -- -- -- -- 15 --    07/04/25 23:08:35 -- 57 16 116/65 82 93 % -- -- -- --    07/04/25 2305 -- -- -- -- -- -- -- -- 15 --    07/04/25 21:36:59 98.1 °F (36.7 °C) 64 -- 128/85 99 92 % -- -- -- --    07/04/25 2105 -- -- -- -- -- -- -- -- 15 No Pain    07/04/25 20:12:43 -- 65 -- 163/99 120 92 % -- -- -- --    07/04/25 19:51:16 98.2 °F (36.8 °C) 57 -- 151/95 114 92 % -- -- -- --    07/04/25 1905 98.1 °F (36.7 °C) 66 18 147/92 110 95 % None (Room air) Lying 15 --    07/04/25 19:04:31 98.1 °F (36.7 °C) 60 -- 147/92 110 93 % -- -- -- --    07/04/25 1702 98.6 °F (37 °C) 67 18 148/85 106 93 %  None (Room air) Lying 15 --    07/04/25 17:01:52 98.6 °F (37 °C) 66 -- 148/85 106 91 % -- -- -- --    07/04/25 1605 98.2 °F (36.8 °C) 70 17 145/96 112 92 % None (Room air) Lying 15 --    07/04/25 16:04:18 98.2 °F (36.8 °C) 70 -- 145/96 112 92 % -- -- -- --    07/04/25 1505 -- -- -- -- -- -- -- -- 15 --    07/04/25 1504 98 °F (36.7 °C) 75 18 157/100 119 95 % None (Room air) Lying -- --    07/04/25 15:03:58 98 °F (36.7 °C) 75 -- 157/100 119 95 % -- -- -- --    07/04/25 1405 98.1 °F (36.7 °C) 68 18 142/89 107 95 % None (Room air) Lying -- No Pain    07/04/25 14:04:50 98.1 °F (36.7 °C) 67 -- 142/89 107 96 % -- -- -- --    07/04/25 1359 98.1 °F (36.7 °C) 71 18 142/89 107 95 % -- -- -- No Pain    07/04/25 1355 -- -- -- -- -- -- -- -- 15 --    07/04/25 1330 97.2 °F (36.2 °C) 59 18 166/89 -- 97 % None (Room air) Lying -- --    07/04/25 1300 -- 64 18 159/86 115 97 % -- -- 15 --    07/04/25 1230 -- 55 15 146/89 112 96 % None (Room air) -- -- --    07/04/25 1200 -- 59 18 161/85 117 97 % None (Room air) -- 15 --    07/04/25 1130 -- 57 21 154/93 117 97 % -- -- -- --    07/04/25 1100 -- 58 16 163/100 125 97 % None (Room air) -- 15 --    07/04/25 1040 -- -- -- 160/88 -- -- -- -- -- --    07/04/25 1030 -- 58 16 160/88 118 95 % -- -- -- --    07/04/25 1000 -- 56 20 185/98 132 96 % None (Room air) -- 15 --    07/04/25 0945 -- 66 22 182/101 134 98 % None (Room air) -- -- --    07/04/25 0930 -- 62 19 -- -- 98 % None (Room air) -- -- --    07/04/25 0928 -- -- -- -- -- -- -- -- 15 --    07/04/25 0902 97.6 °F (36.4 °C) -- -- -- -- -- -- -- -- --    07/04/25 0900 -- 59 18 175/105 133 98 % None (Room air) -- -- --    07/04/25 0858 -- -- -- -- -- -- -- -- 15 --    07/04/25 0852 -- 70 18 186/96 -- 96 % None (Room air) Lying -- --              Pertinent Labs/Diagnostic Test Results:   Radiology:  CTA head and neck with and without contrast   Final Interpretation by John Heard MD (07/04 0959)      CT Brain:   - No acute  intracranial abnormality.   - Unchanged chronic left MCA territory infarct involving left-sided basal ganglia, frontal, insular, and superior temporal lobes with mild chronic microangiopathy.      CT Angiography:   - Occluded left MCA branches involving frontoinsular regions, likely chronic given chronic left MCA territory infarct.   - Nonocclusive thrombus in left MCA distal M1 segment extending into proximal M2 segments superior divisional branch with slight extension into left MCA proximal M2 inferior divisional branch, which may be chronic given appearance since CTA stroke alert    head and neck 2/10/2019. Severe stenosis in left MCA proximal M2 segment superior divisional branch vessel which is occluded more distally.   - Severe stenosis in origin and proximal cervical segment of left ICA which is asymmetrically diminutive throughout cervical and intracranial segments. Severe stenosis in left ICA distal cavernous segment.   - Additional atherosclerosis of the head and neck, as detailed above.      Additional chronic/incidental findings as detailed above.         I personally discussed this study with MAX ESTRADA on 7/4/2025 9:53 AM. The study was marked in EPIC for immediate notification.                           Workstation performed: [id number]         MRI brain wo contrast    (Results Pending)     Cardiology:  Echo complete w/ contrast if indicated    by ROSENDO Rios (07/05 0924)      ECG 12 lead   Final Result by Naeem Nicole MD (07/04 1233)   Sinus bradycardia   Otherwise normal ECG   When compared with ECG of 10-Feb-2019 10:07,   Rate is slower      Confirmed by Naeem Nicole ([id number]) on 7/4/2025 12:33:16 PM        GI:  No orders to display           Results from last 7 days   Lab Units 07/05/25  0457 07/04/25  0901   WBC Thousand/uL 6.30 6.12   HEMOGLOBIN g/dL 15.2 15.6   HEMATOCRIT % 46.1 47.4   PLATELETS Thousands/uL 189 187   TOTAL NEUT ABS Thousands/µL  --  4.03         Results from last 7  "days   Lab Units 07/05/25  0457 07/04/25  0901   SODIUM mmol/L 136 137   POTASSIUM mmol/L 4.2 4.8   CHLORIDE mmol/L 107 105   CO2 mmol/L 23 26   ANION GAP mmol/L 6 6   BUN mg/dL 20 22   CREATININE mg/dL 1.51* 1.63*   EGFR ml/min/1.73sq m 49 45   CALCIUM mg/dL 8.9 9.5     Results from last 7 days   Lab Units 07/05/25  0457   AST U/L 15   ALT U/L 16   ALK PHOS U/L 63   TOTAL PROTEIN g/dL 6.6   ALBUMIN g/dL 3.8   TOTAL BILIRUBIN mg/dL 0.64     Results from last 7 days   Lab Units 07/04/25  0853   POC GLUCOSE mg/dl 124     Results from last 7 days   Lab Units 07/05/25  0457 07/04/25  0901   GLUCOSE RANDOM mg/dL 90 117         Results from last 7 days   Lab Units 07/05/25  0457   HEMOGLOBIN A1C % 5.7*   EAG mg/dl 117     No results found for: \"BETA-HYDROXYBUTYRATE\"                           Results from last 7 days   Lab Units 07/05/25  0823 07/05/25  0149 07/04/25  1802 07/04/25  1059   PROTIME seconds  --   --   --  13.1   INR   --   --   --  0.94   PTT seconds 60* 79* 110* 27                                                 Results from last 7 days   Lab Units 07/05/25  0457   CRP mg/L 5.1*                                                               Past Medical History[1]  Present on Admission:   Hypercholesterolemia   Benign essential hypertension   Stage 3b chronic kidney disease (HCC)   Stroke-like symptoms      Admitting Diagnosis: TIA (transient ischemic attack) [G45.9]  Slurred speech [R47.81]  Age/Sex: 60 y.o. male    Network Utilization Review Department  ATTENTION: Please call with any questions or concerns to [phone] and carefully listen to the prompts so that you are directed to the right person. All voicemails are confidential.   For Discharge needs, contact Care Management DC Support Team at [phone] opt. 2  Send all requests for admission clinical reviews, approved or denied determinations and any other requests to dedicated fax number below belonging to the campus where the patient is " receiving treatment. List of dedicated fax numbers for the Facilities:  FACILITY NAME UR FAX NUMBER   ADMISSION DENIALS (Administrative/Medical Necessity) [phone]   DISCHARGE SUPPORT TEAM (NETWORK) [phone]   PARENT CHILD HEALTH (Maternity/NICU/Pediatrics) [phone]   [facility] [phone]   [facility] [phone]   [facility] [phone]   [facility] [phone]   [facility] [phone]   [facility] [phone]   [facility] [phone]   [facility] [phone]   [facility] [phone]   [facility] [phone]   [facility] [phone]   [facility] [phone]              [1]   Past Medical History:  Diagnosis Date    Chronic kidney disease     Hyperlipidemia     Hypertension     Stroke (HCC) 02/10/2019    right side sl weakness

## 2025-07-05 NOTE — PLAN OF CARE
Problem: PAIN - ADULT  Goal: Verbalizes/displays adequate comfort level or baseline comfort level  Description: Interventions:  - Encourage patient to monitor pain and request assistance  - Assess pain using appropriate pain scale  - Administer analgesics as ordered based on type and severity of pain and evaluate response  - Implement non-pharmacological measures as appropriate and evaluate response  - Consider cultural and social influences on pain and pain management  - Notify physician/advanced practitioner if interventions unsuccessful or patient reports new pain  - Educate patient/family on pain management process including their role and importance of  reporting pain   - Provide non-pharmacologic/complimentary pain relief interventions  Outcome: Progressing     Problem: INFECTION - ADULT  Goal: Absence or prevention of progression during hospitalization  Description: INTERVENTIONS:  - Assess and monitor for signs and symptoms of infection  - Monitor lab/diagnostic results  - Monitor all insertion sites, i.e. indwelling lines, tubes, and drains  - Monitor endotracheal if appropriate and nasal secretions for changes in amount and color  - [place] appropriate cooling/warming therapies per order  - Administer medications as ordered  - Instruct and encourage patient and family to use good hand hygiene technique  - Identify and instruct in appropriate isolation precautions for identified infection/condition  Outcome: Progressing  Goal: Absence of fever/infection during neutropenic period  Description: INTERVENTIONS:  - Monitor WBC  - Perform strict hand hygiene  - Limit to healthy visitors only  - No plants, dried, fresh or silk flowers with brower in patient room  Outcome: Progressing     Problem: SAFETY ADULT  Goal: Patient will remain free of falls  Description: INTERVENTIONS:  - Educate patient/family on patient safety including physical limitations  - Instruct patient to call for assistance with activity   -  Consider consulting OT/PT to assist with strengthening/mobility based on AM PAC & JH-HLM score  - Consult OT/PT to assist with strengthening/mobility   - Keep Call bell within reach  - Keep bed low and locked with side rails adjusted as appropriate  - Keep care items and personal belongings within reach  - Initiate and maintain comfort rounds  - Make Fall Risk Sign visible to staff  - Offer Toileting every 2 Hours, in advance of need  - Initiate/Maintain bed alarm  - Obtain necessary fall risk management equipment: bed alarm  - Apply yellow socks and bracelet for high fall risk patients  - Consider moving patient to room near nurses station  Outcome: Progressing  Goal: Maintain or return to baseline ADL function  Description: INTERVENTIONS:  -  Assess patient's ability to carry out ADLs; assess patient's baseline for ADL function and identify physical deficits which impact ability to perform ADLs (bathing, care of mouth/teeth, toileting, grooming, dressing, etc.)  - Assess/evaluate cause of self-care deficits   - Assess range of motion  - Assess patient's mobility; develop plan if impaired  - Assess patient's need for assistive devices and provide as appropriate  - Encourage maximum independence but intervene and supervise when necessary  - Involve family in performance of ADLs  - Assess for home care needs following discharge   - Consider OT consult to assist with ADL evaluation and planning for discharge  - Provide patient education as appropriate  - Monitor functional capacity and physical performance, use of AM PAC & JH-HLM   - Monitor gait, balance and fatigue with ambulation    Outcome: Progressing  Goal: Maintains/Returns to pre admission functional level  Description: INTERVENTIONS:  - Perform AM-PAC 6 Click Basic Mobility/ Daily Activity assessment daily.  - Set and communicate daily mobility goal to care team and patient/family/caregiver.   - Collaborate with rehabilitation services on mobility goals if  consulted  - Perform Range of Motion 4 times a day.  - Reposition patient every 2 hours.  - Dangle patient 4 times a day  - Stand patient 4 times a day  - Ambulate patient 4 times a day  - Out of bed to chair 4 times a day   - Out of bed for meals 3 times a day  - Out of bed for toileting  - Record patient progress and toleration of activity level   Outcome: Progressing     Problem: DISCHARGE PLANNING  Goal: Discharge to home or other facility with appropriate resources  Description: INTERVENTIONS:  - Identify barriers to discharge w/patient and caregiver  - Arrange for needed discharge resources and transportation as appropriate  - Identify discharge learning needs (meds, wound care, etc.)  - Arrange for interpretive services to assist at discharge as needed  - Refer to Case Management Department for coordinating discharge planning if the patient needs post-hospital services based on physician/advanced practitioner order or complex needs related to functional status, cognitive ability, or social support system  Outcome: Progressing     Problem: Knowledge Deficit  Goal: Patient/family/caregiver demonstrates understanding of disease process, treatment plan, medications, and discharge instructions  Description: Complete learning assessment and assess knowledge base.  Interventions:  - Provide teaching at level of understanding  - Provide teaching via preferred learning methods  Outcome: Progressing     Problem: Neurological Deficit  Goal: Neurological status is stable or improving  Description: Interventions:  - Monitor and assess patient's level of consciousness, motor function, sensory function, and level of assistance needed for ADLs.   - Monitor and report changes from baseline. Collaborate with interdisciplinary team to initiate plan and implement interventions as ordered.   - Provide and maintain a safe environment.  - Consider seizure precautions.  - Consider fall precautions.  - Consider aspiration  precautions.  - Consider bleeding precautions.  Outcome: Progressing     Problem: Activity Intolerance/Impaired Mobility  Goal: Mobility/activity is maintained at optimum level for patient  Description: Interventions:  - Assess and monitor patient  barriers to mobility and need for assistive/adaptive devices.  - Assess patient's emotional response to limitations.  - Collaborate with interdisciplinary team and initiate plans and interventions as ordered.  - Encourage independent activity per ability.  - Maintain proper body alignment.  - Perform active/passive rom as tolerated/ordered.  - Plan activities to conserve energy.  - Turn patient as appropriate  Outcome: Progressing     Problem: Communication Impairment  Goal: Ability to express needs and understand communication  Description: Assess patient's communication skills and ability to understand information.  Patient will demonstrate use of effective communication techniques, alternative methods of communication and understanding even if not able to speak.     - Encourage communication and provide alternate methods of communication as needed.  - Collaborate with case management/ for discharge needs.  - Include patient/family/caregiver in decisions related to communication.  Outcome: Progressing     Problem: Potential for Aspiration  Goal: Non-ventilated patient's risk of aspiration is minimized  Description: Assess and monitor vital signs, respiratory status, and labs (WBC).  Monitor for signs of aspiration (tachypnea, cough, rales, wheezing, cyanosis, fever).    - Assess and monitor patient's ability to swallow.  - Place patient up in chair to eat if possible.  - HOB up at 90 degrees to eat if unable to get patient up into chair.  - Supervise patient during oral intake.   - Instruct patient/ family to take small bites.  - Instruct patient/ family to take small single sips when taking liquids.  - Follow patient-specific strategies generated by  speech pathologist.  Outcome: Progressing     Problem: Nutrition  Goal: Nutrition/Hydration status is improving  Description: Monitor and assess patient's nutrition/hydration status for malnutrition (ex- brittle hair, bruises, dry skin, pale skin and conjunctiva, muscle wasting, smooth red tongue, and disorientation). Collaborate with interdisciplinary team and initiate plan and interventions as ordered.  Monitor patient's weight and dietary intake as ordered or per policy. Utilize nutrition screening tool and intervene per policy. Determine patient's food preferences and provide high-protein, high-caloric foods as appropriate.     - Assist patient with eating.  - Allow adequate time for meals.  - Encourage patient to take dietary supplement as ordered.  - Collaborate with clinical nutritionist.  - Include patient/family/caregiver in decisions related to nutrition.  Outcome: Progressing

## 2025-07-06 VITALS
SYSTOLIC BLOOD PRESSURE: 116 MMHG | WEIGHT: 229 LBS | OXYGEN SATURATION: 92 % | HEIGHT: 72 IN | RESPIRATION RATE: 16 BRPM | TEMPERATURE: 97.7 F | HEART RATE: 60 BPM | BODY MASS INDEX: 31.02 KG/M2 | DIASTOLIC BLOOD PRESSURE: 78 MMHG

## 2025-07-06 LAB — APTT PPP: 58 SECONDS (ref 23–34)

## 2025-07-06 PROCEDURE — 99239 HOSP IP/OBS DSCHRG MGMT >30: CPT | Performed by: INTERNAL MEDICINE

## 2025-07-06 PROCEDURE — 85730 THROMBOPLASTIN TIME PARTIAL: CPT | Performed by: INTERNAL MEDICINE

## 2025-07-06 RX ORDER — ATORVASTATIN CALCIUM 80 MG/1
80 TABLET, FILM COATED ORAL
Qty: 90 TABLET | Refills: 0 | Status: SHIPPED | OUTPATIENT
Start: 2025-07-06

## 2025-07-06 RX ORDER — CLOPIDOGREL BISULFATE 75 MG/1
75 TABLET ORAL DAILY
Qty: 90 TABLET | Refills: 0 | Status: SHIPPED | OUTPATIENT
Start: 2025-07-06

## 2025-07-06 RX ADMIN — THIAMINE HCL TAB 100 MG 100 MG: 100 TAB at 09:57

## 2025-07-06 RX ADMIN — FOLIC ACID 1 MG: 1 TABLET ORAL at 09:57

## 2025-07-06 RX ADMIN — EZETIMIBE 10 MG: 10 TABLET ORAL at 09:57

## 2025-07-06 RX ADMIN — HEPARIN SODIUM 10 UNITS/KG/HR: 10000 INJECTION, SOLUTION INTRAVENOUS at 03:27

## 2025-07-06 RX ADMIN — CLOPIDOGREL 75 MG: 75 TABLET ORAL at 09:57

## 2025-07-06 RX ADMIN — Medication 2000 UNITS: at 09:57

## 2025-07-06 NOTE — PHYSICAL THERAPY NOTE
PT evaluation noted however pt was discharged before PT could complete the evaluation. Per MD and  notes, pt remained independent with ambulation and ADLs.   Romi Mcknight PT  [id number]

## 2025-07-06 NOTE — DISCHARGE SUMMARY
Discharge Summary - Hospitalist   Name: Elie Cooper 60 y.o. male I MRN: [id number]  Unit/Bed#: [address] [place] [address] I Date of Admission: 7/4/2025   Date of Service: 7/6/2025 I Hospital Day: 2    Assessment & Plan  Stroke-like symptoms  History of stroke, carotid disease, CKD 3b, hypertension, hyperlipidemia presented to the hospital with dysarthria  Patient with history of state of health until waking up to make coffee.  Was noted by wife to have slurring of speech which slowly improved.  Patient does report taking clopidogrel but not aspirin.  May be atorvastatin.  CTA with multiple vascular occlusions.  ED discussed with neurology recommended stroke heparin infusion.  Stroke pathway ordered: Continue clopidogrel with statin  MRI of the brain negative for stroke.  Discussed with neurology.  Suspect transient metabolic encephalopathy.  Refill for clopidogrel and atorvastatin sent to pharmacy.  Hypercholesterolemia  Continue atorvastatin  Refill sent to pharmacy  Benign essential hypertension  Prior to admission on losartan and metoprolol  Was held during hospitalization to allow for permissive hypertension  Continue as previously taken  Stage 3b chronic kidney disease (HCC)  Baseline creatinine closer to 1.8  Currently stable    Results from last 7 days   Lab Units 07/05/25  0457 07/04/25  0901   BUN mg/dL 20 22   CREATININE mg/dL 1.51* 1.63*   EGFR ml/min/1.73sq m 49 45         Medical Problems       Resolved Problems  Date Reviewed: 7/6/2025   None        Discharging Physician / Practitioner: David Soto DO  PCP: Salome Gonsalves MD  Admission Date:   Admission Orders (From admission, onward)       Ordered        07/04/25 1122  INPATIENT ADMISSION  Once                        Discharge Date: 07/06/25    Medication Changes for Discharge & Rationale:   See after visit summary for reconciled discharge medications provided to patient and/or family.     Consultations During Hospital Stay:  IP CONSULT TO  NEUROLOGY  IP CONSULT TO CASE MANAGEMENT  IP CONSULT TO NUTRITION SERVICES     Procedures Performed:   Echo complete w/ contrast if indicated  Result Date: 7/5/2025  Narrative:   Left Ventricle: Left ventricular cavity size is normal. Wall thickness is mildly increased. The left ventricular ejection fraction is 60% by single dimension measurement. Systolic function is normal. Wall motion is normal. Diastolic function is normal.   Right Ventricle: Right ventricular cavity size is normal. Systolic function is normal. Wall thickness is normal.   Left Atrium: The atrium is normal in size.   Right Atrium: The atrium is normal in size.         Images:   MRI brain wo contrast  Result Date: 7/5/2025  Impression: No acute intracranial abnormality. Sequela of chronic hemorrhagic infarct in left MCA territory, as detailed above. Absent flow void in majority of left ICA intracranial segments with distal reconstitution in supraclinoid segment, likely due to slow flow in the previously noted hypoplastic left carotid artery seen on CTA head and neck with and without contrast 7/4/2025. Mild chronic microangiopathy. Workstation performed: [id number]     CTA head and neck with and without contrast  Result Date: 7/4/2025  Impression: CT Brain: - No acute intracranial abnormality. - Unchanged chronic left MCA territory infarct involving left-sided basal ganglia, frontal, insular, and superior temporal lobes with mild chronic microangiopathy. CT Angiography: - Occluded left MCA branches involving frontoinsular regions, likely chronic given chronic left MCA territory infarct. - Nonocclusive thrombus in left MCA distal M1 segment extending into proximal M2 segments superior divisional branch with slight extension into left MCA proximal M2 inferior divisional branch, which may be chronic given appearance since CTA stroke alert head and neck 2/10/2019. Severe stenosis in left MCA proximal M2 segment superior divisional branch vessel which is  occluded more distally. - Severe stenosis in origin and proximal cervical segment of left ICA which is asymmetrically diminutive throughout cervical and intracranial segments. Severe stenosis in left ICA distal cavernous segment. - Additional atherosclerosis of the head and neck, as detailed above. Additional chronic/incidental findings as detailed above. I personally discussed this study with MAX ESTRADA on 7/4/2025 9:53 AM. The study was marked in EPIC for immediate notification. Workstation performed: [id number]       Lab Results: I have reviewed the following results:  Results from last 7 days   Lab Units 07/05/25  0457 07/04/25  1059 07/04/25  0901   WBC Thousand/uL 6.30  --  6.12   HEMOGLOBIN g/dL 15.2  --  15.6   HEMATOCRIT % 46.1  --  47.4   MCV fL 85  --  85   PLATELETS Thousands/uL 189  --  187   INR   --  0.94  --      Results from last 7 days   Lab Units 07/05/25  0457 07/04/25  0901   SODIUM mmol/L 136 137   POTASSIUM mmol/L 4.2 4.8   CHLORIDE mmol/L 107 105   CO2 mmol/L 23 26   BUN mg/dL 20 22   CREATININE mg/dL 1.51* 1.63*   CALCIUM mg/dL 8.9 9.5   ALBUMIN g/dL 3.8  --    TOTAL BILIRUBIN mg/dL 0.64  --    ALK PHOS U/L 63  --    ALT U/L 16  --    AST U/L 15  --    EGFR ml/min/1.73sq m 49 45   GLUCOSE RANDOM mg/dL 90 117                      Results from last 7 days   Lab Units 07/04/25  0853   POC GLUCOSE mg/dl 124     Results from last 7 days   Lab Units 07/05/25  0457   HEMOGLOBIN A1C % 5.7*             Results from last 7 days   Lab Units 07/05/25  0457   TRIGLYCERIDES mg/dL 250*   CHOLESTEROL mg/dL 207*   LDL CALC mg/dL 122*   HDL mg/dL 35*                           Incidental Findings:   Chronic carotid/intracranial occlusions/stenoses    Test Results Pending at Discharge (will require follow up):      Reason for Admission:   Slurred Speech (Pt states his wife noticed slurred speech and expressive aphasia this morning while making coffee approx 0800.  Hx of right facial droop from previous  stroke, maybe be slightly worse per patient )    Hospital Course:   Elie Cooper is a 60 y.o. male patient who originally presented to the hospital on 7/4/2025 due to strokelike symptoms.  The patient does have a history of left MCA stroke 2019.  He was having dysarthria and facial droop and was brought here to the hospital where he was admitted on stroke pathway.  MRI of the brain negative for acute stroke.  Differentials include TME versus recrudescence of old stroke.  He was briefly on a heparin infusion.  He is advised to continue clopidogrel and atorvastatin as previously taken in addition to his other home medications.    Please see above list of diagnoses and related plan for additional information.     Condition at Discharge: stable     Discharge Day Visit / Exam:   Subjective: Patient seen and examined.  Feeling well.    Vitals: Blood Pressure: 116/78 (07/06/25 0728)  Pulse: 60 (07/06/25 0728)  Temperature: 97.7 °F (36.5 °C) (07/06/25 0728)  Temp Source: Oral (07/04/25 2308)  Respirations: 16 (07/06/25 0728)  Height: 6' (182.9 cm) (07/05/25 0923)  Weight - Scale: 104 kg (229 lb) (07/05/25 0923)  SpO2: 92 % (07/06/25 0728)    Exam:   Physical Exam  Vitals reviewed.   Constitutional:       General: He is not in acute distress.  HENT:      Head: Atraumatic.     Eyes:      Extraocular Movements: Extraocular movements intact.       Cardiovascular:      Rate and Rhythm: Regular rhythm.      Heart sounds:      No gallop.   Pulmonary:      Effort: Pulmonary effort is normal. No respiratory distress.   Abdominal:      General: Bowel sounds are normal.      Palpations: Abdomen is soft.      Tenderness: There is no abdominal tenderness. There is no guarding.     Musculoskeletal:         General: No deformity.     Skin:     General: Skin is warm.      Coloration: Skin is not jaundiced.     Neurological:      Mental Status: He is alert.      Motor: No weakness.     Psychiatric:         Mood and Affect: Mood normal.        Discussion with Family: Spouse and daughter at bedside    Discharge instructions/Information to patient and family:   See after visit summary for information provided to patient and family.      Provisions for Follow-Up Care:  See after visit summary for information related to follow-up care and any pertinent home health orders.      Mobility at time of Discharge:  Basic Mobility Inpatient Raw Score: 24  JH-HLM Goal: 8: Walk 250 feet or more  JH-HLM Achieved: 7: Walk 25 feet or more  JH-HLM Goal achieved. Continue to encourage appropriate mobility.    Disposition:   Home    Planned Readmission: No    Administrative Statements   I spent 35 minutes discharging the patient. This time was spent on the day of discharge. I had direct contact with the patient on the day of discharge. Greater than 50% of the total time was spent examining patient, answering all patient questions, arranging and discussing plan of care with patient as well as directly providing post-discharge instructions.  Additional time then spent on discharge activities.    **Please Note: This note may have been constructed using a voice recognition system**

## 2025-07-06 NOTE — CONSULTS
Nutrition consult received. PT seen at bedside with wife. Good intake and appetite, reported. No GI issues. No difficulty to chew /swallow. Passed RN dysphagia screen per flowsheet. RD provided and reviewed written instructions on Stroke Nutrition Therapy. PT and wife receptive.

## 2025-07-06 NOTE — DISCHARGE INSTR - AVS FIRST PAGE
Transient metabolic encephalopathy.  Stay hydrated.  History of stroke.  Please take clopidogrel and atorvastatin daily.  Prescription sent to [facility].  Continue to take medications as outlined below.

## 2025-07-06 NOTE — NURSING NOTE
Patient discharged to home with wife.  Discharge instructions and medication reconciliation explained.  All questions answered.  All belongings accounted for.  Padmaja in cradle.

## 2025-07-06 NOTE — PLAN OF CARE
Problem: PAIN - ADULT  Goal: Verbalizes/displays adequate comfort level or baseline comfort level  Description: Interventions:  - Encourage patient to monitor pain and request assistance  - Assess pain using appropriate pain scale  - Administer analgesics as ordered based on type and severity of pain and evaluate response  - Implement non-pharmacological measures as appropriate and evaluate response  - Consider cultural and social influences on pain and pain management  - Notify physician/advanced practitioner if interventions unsuccessful or patient reports new pain  - Educate patient/family on pain management process including their role and importance of  reporting pain   - Provide non-pharmacologic/complimentary pain relief interventions  7/6/2025 1207 by Denise Pool  Outcome: Progressing  7/6/2025 1207 by Denise Pool  Outcome: Progressing     Problem: INFECTION - ADULT  Goal: Absence or prevention of progression during hospitalization  Description: INTERVENTIONS:  - Assess and monitor for signs and symptoms of infection  - Monitor lab/diagnostic results  - Monitor all insertion sites, i.e. indwelling lines, tubes, and drains  - Monitor endotracheal if appropriate and nasal secretions for changes in amount and color  - [place] appropriate cooling/warming therapies per order  - Administer medications as ordered  - Instruct and encourage patient and family to use good hand hygiene technique  - Identify and instruct in appropriate isolation precautions for identified infection/condition  7/6/2025 1207 by Denise Pool  Outcome: Progressing  7/6/2025 1207 by Denise Pool  Outcome: Progressing  Goal: Absence of fever/infection during neutropenic period  Description: INTERVENTIONS:  - Monitor WBC  - Perform strict hand hygiene  - Limit to healthy visitors only  - No plants, dried, fresh or silk flowers with brower in patient room  7/6/2025 1207 by Denise Pool  Outcome: Progressing  7/6/2025 1207 by Denise  Yrn  Outcome: Progressing     Problem: SAFETY ADULT  Goal: Patient will remain free of falls  Description: INTERVENTIONS:  - Educate patient/family on patient safety including physical limitations  - Instruct patient to call for assistance with activity   - Consider consulting OT/PT to assist with strengthening/mobility based on AM PAC & JH-HLM score  - Consult OT/PT to assist with strengthening/mobility   - Keep Call bell within reach  - Keep bed low and locked with side rails adjusted as appropriate  - Keep care items and personal belongings within reach  - Initiate and maintain comfort rounds  - Make Fall Risk Sign visible to staff  - Offer Toileting every 2 Hours, in advance of need  - Initiate/Maintain bed alarm  - Obtain necessary fall risk management equipment: bed alarm  - Apply yellow socks and bracelet for high fall risk patients  - Consider moving patient to room near nurses station  7/6/2025 1207 by Denise Pool  Outcome: Progressing  7/6/2025 1207 by Denise Pool  Outcome: Progressing  Goal: Maintain or return to baseline ADL function  Description: INTERVENTIONS:  -  Assess patient's ability to carry out ADLs; assess patient's baseline for ADL function and identify physical deficits which impact ability to perform ADLs (bathing, care of mouth/teeth, toileting, grooming, dressing, etc.)  - Assess/evaluate cause of self-care deficits   - Assess range of motion  - Assess patient's mobility; develop plan if impaired  - Assess patient's need for assistive devices and provide as appropriate  - Encourage maximum independence but intervene and supervise when necessary  - Involve family in performance of ADLs  - Assess for home care needs following discharge   - Consider OT consult to assist with ADL evaluation and planning for discharge  - Provide patient education as appropriate  - Monitor functional capacity and physical performance, use of AM PAC & JH-HLM   - Monitor gait, balance and fatigue with  ambulation    7/6/2025 1207 by Denise Pool  Outcome: Progressing  7/6/2025 1207 by Denise Pool  Outcome: Progressing  Goal: Maintains/Returns to pre admission functional level  Description: INTERVENTIONS:  - Perform AM-PAC 6 Click Basic Mobility/ Daily Activity assessment daily.  - Set and communicate daily mobility goal to care team and patient/family/caregiver.   - Collaborate with rehabilitation services on mobility goals if consulted  - Perform Range of Motion 4 times a day.  - Reposition patient every 2 hours.  - Dangle patient 4 times a day  - Stand patient 4 times a day  - Ambulate patient 4 times a day  - Out of bed to chair 4 times a day   - Out of bed for meals 3 times a day  - Out of bed for toileting  - Record patient progress and toleration of activity level   7/6/2025 1207 by Denise Pool  Outcome: Progressing  7/6/2025 1207 by Denise Pool  Outcome: Progressing     Problem: DISCHARGE PLANNING  Goal: Discharge to home or other facility with appropriate resources  Description: INTERVENTIONS:  - Identify barriers to discharge w/patient and caregiver  - Arrange for needed discharge resources and transportation as appropriate  - Identify discharge learning needs (meds, wound care, etc.)  - Arrange for interpretive services to assist at discharge as needed  - Refer to Case Management Department for coordinating discharge planning if the patient needs post-hospital services based on physician/advanced practitioner order or complex needs related to functional status, cognitive ability, or social support system  7/6/2025 1207 by Denise Pool  Outcome: Progressing  7/6/2025 1207 by Denise Pool  Outcome: Progressing     Problem: Knowledge Deficit  Goal: Patient/family/caregiver demonstrates understanding of disease process, treatment plan, medications, and discharge instructions  Description: Complete learning assessment and assess knowledge base.  Interventions:  - Provide teaching at level of  understanding  - Provide teaching via preferred learning methods  7/6/2025 1207 by Denise Pool  Outcome: Progressing  7/6/2025 1207 by Denise Pool  Outcome: Progressing     Problem: Neurological Deficit  Goal: Neurological status is stable or improving  Description: Interventions:  - Monitor and assess patient's level of consciousness, motor function, sensory function, and level of assistance needed for ADLs.   - Monitor and report changes from baseline. Collaborate with interdisciplinary team to initiate plan and implement interventions as ordered.   - Provide and maintain a safe environment.  - Consider seizure precautions.  - Consider fall precautions.  - Consider aspiration precautions.  - Consider bleeding precautions.  7/6/2025 1207 by Denise Pool  Outcome: Progressing  7/6/2025 1207 by Denise Pool  Outcome: Progressing     Problem: Activity Intolerance/Impaired Mobility  Goal: Mobility/activity is maintained at optimum level for patient  Description: Interventions:  - Assess and monitor patient  barriers to mobility and need for assistive/adaptive devices.  - Assess patient's emotional response to limitations.  - Collaborate with interdisciplinary team and initiate plans and interventions as ordered.  - Encourage independent activity per ability.  - Maintain proper body alignment.  - Perform active/passive rom as tolerated/ordered.  - Plan activities to conserve energy.  - Turn patient as appropriate  7/6/2025 1207 by Denise Pool  Outcome: Progressing  7/6/2025 1207 by Denise Pool  Outcome: Progressing     Problem: Communication Impairment  Goal: Ability to express needs and understand communication  Description: Assess patient's communication skills and ability to understand information.  Patient will demonstrate use of effective communication techniques, alternative methods of communication and understanding even if not able to speak.     - Encourage communication and provide alternate methods  of communication as needed.  - Collaborate with case management/ for discharge needs.  - Include patient/family/caregiver in decisions related to communication.  7/6/2025 1207 by Denise Pool  Outcome: Progressing  7/6/2025 1207 by Denise Pool  Outcome: Progressing     Problem: Potential for Aspiration  Goal: Non-ventilated patient's risk of aspiration is minimized  Description: Assess and monitor vital signs, respiratory status, and labs (WBC).  Monitor for signs of aspiration (tachypnea, cough, rales, wheezing, cyanosis, fever).    - Assess and monitor patient's ability to swallow.  - Place patient up in chair to eat if possible.  - HOB up at 90 degrees to eat if unable to get patient up into chair.  - Supervise patient during oral intake.   - Instruct patient/ family to take small bites.  - Instruct patient/ family to take small single sips when taking liquids.  - Follow patient-specific strategies generated by speech pathologist.  7/6/2025 1207 by Denise Pool  Outcome: Progressing  7/6/2025 1207 by Denise Pool  Outcome: Progressing     Problem: Nutrition  Goal: Nutrition/Hydration status is improving  Description: Monitor and assess patient's nutrition/hydration status for malnutrition (ex- brittle hair, bruises, dry skin, pale skin and conjunctiva, muscle wasting, smooth red tongue, and disorientation). Collaborate with interdisciplinary team and initiate plan and interventions as ordered.  Monitor patient's weight and dietary intake as ordered or per policy. Utilize nutrition screening tool and intervene per policy. Determine patient's food preferences and provide high-protein, high-caloric foods as appropriate.     - Assist patient with eating.  - Allow adequate time for meals.  - Encourage patient to take dietary supplement as ordered.  - Collaborate with clinical nutritionist.  - Include patient/family/caregiver in decisions related to nutrition.  7/6/2025 1207 by Denise  Yrn  Outcome: Progressing  7/6/2025 1207 by Denise Pool  Outcome: Progressing

## 2025-07-06 NOTE — ASSESSMENT & PLAN NOTE
Prior to admission on losartan and metoprolol  Was held during hospitalization to allow for permissive hypertension  Continue as previously taken

## 2025-07-06 NOTE — ASSESSMENT & PLAN NOTE
History of stroke, carotid disease, CKD 3b, hypertension, hyperlipidemia presented to the hospital with dysarthria  Patient with history of state of health until waking up to make coffee.  Was noted by wife to have slurring of speech which slowly improved.  Patient does report taking clopidogrel but not aspirin.  May be atorvastatin.  CTA with multiple vascular occlusions.  ED discussed with neurology recommended stroke heparin infusion.  Stroke pathway ordered: Continue clopidogrel with statin  MRI of the brain negative for stroke.  Discussed with neurology.  Suspect transient metabolic encephalopathy.  Refill for clopidogrel and atorvastatin sent to pharmacy.

## 2025-07-06 NOTE — PLAN OF CARE
Problem: PAIN - ADULT  Goal: Verbalizes/displays adequate comfort level or baseline comfort level  Description: Interventions:  - Encourage patient to monitor pain and request assistance  - Assess pain using appropriate pain scale  - Administer analgesics as ordered based on type and severity of pain and evaluate response  - Implement non-pharmacological measures as appropriate and evaluate response  - Consider cultural and social influences on pain and pain management  - Notify physician/advanced practitioner if interventions unsuccessful or patient reports new pain  - Educate patient/family on pain management process including their role and importance of  reporting pain   - Provide non-pharmacologic/complimentary pain relief interventions  7/6/2025 0549 by Juni Melton RN  Outcome: Progressing  7/5/2025 2127 by Juni Melton RN  Outcome: Progressing     Problem: INFECTION - ADULT  Goal: Absence or prevention of progression during hospitalization  Description: INTERVENTIONS:  - Assess and monitor for signs and symptoms of infection  - Monitor lab/diagnostic results  - Monitor all insertion sites, i.e. indwelling lines, tubes, and drains  - Monitor endotracheal if appropriate and nasal secretions for changes in amount and color  - [place] appropriate cooling/warming therapies per order  - Administer medications as ordered  - Instruct and encourage patient and family to use good hand hygiene technique  - Identify and instruct in appropriate isolation precautions for identified infection/condition  7/6/2025 0549 by Juni Melton RN  Outcome: Progressing  7/5/2025 2127 by Juni Melton RN  Outcome: Progressing  Goal: Absence of fever/infection during neutropenic period  Description: INTERVENTIONS:  - Monitor WBC  - Perform strict hand hygiene  - Limit to healthy visitors only  - No plants, dried, fresh or silk flowers with brower in patient room  7/6/2025 0549 by Juni Melton RN  Outcome:  Progressing  7/5/2025 2127 by Juni Melton RN  Outcome: Progressing     Problem: SAFETY ADULT  Goal: Patient will remain free of falls  Description: INTERVENTIONS:  - Educate patient/family on patient safety including physical limitations  - Instruct patient to call for assistance with activity   - Consider consulting OT/PT to assist with strengthening/mobility based on AM PAC & JH-HLM score  - Consult OT/PT to assist with strengthening/mobility   - Keep Call bell within reach  - Keep bed low and locked with side rails adjusted as appropriate  - Keep care items and personal belongings within reach  - Initiate and maintain comfort rounds  - Make Fall Risk Sign visible to staff  - Offer Toileting every 2 Hours, in advance of need  - Initiate/Maintain bed alarm  - Obtain necessary fall risk management equipment: slipper socks  - Apply yellow socks and bracelet for high fall risk patients  - Consider moving patient to room near nurses station  7/6/2025 0549 by Juni Melton RN  Outcome: Progressing  7/5/2025 2127 by Juni Melton RN  Outcome: Progressing  Goal: Maintain or return to baseline ADL function  Description: INTERVENTIONS:  -  Assess patient's ability to carry out ADLs; assess patient's baseline for ADL function and identify physical deficits which impact ability to perform ADLs (bathing, care of mouth/teeth, toileting, grooming, dressing, etc.)  - Assess/evaluate cause of self-care deficits   - Assess range of motion  - Assess patient's mobility; develop plan if impaired  - Assess patient's need for assistive devices and provide as appropriate  - Encourage maximum independence but intervene and supervise when necessary  - Involve family in performance of ADLs  - Assess for home care needs following discharge   - Consider OT consult to assist with ADL evaluation and planning for discharge  - Provide patient education as appropriate  - Monitor functional capacity and physical performance, use of AM PAC &  JH-M   - Monitor gait, balance and fatigue with ambulation    Outcome: Progressing     Problem: DISCHARGE PLANNING  Goal: Discharge to home or other facility with appropriate resources  Description: INTERVENTIONS:  - Identify barriers to discharge w/patient and caregiver  - Arrange for needed discharge resources and transportation as appropriate  - Identify discharge learning needs (meds, wound care, etc.)  - Arrange for interpretive services to assist at discharge as needed  - Refer to Case Management Department for coordinating discharge planning if the patient needs post-hospital services based on physician/advanced practitioner order or complex needs related to functional status, cognitive ability, or social support system  7/6/2025 0549 by Juni Melton RN  Outcome: Progressing  7/5/2025 2127 by Juni Melton RN  Outcome: Progressing     Problem: Knowledge Deficit  Goal: Patient/family/caregiver demonstrates understanding of disease process, treatment plan, medications, and discharge instructions  Description: Complete learning assessment and assess knowledge base.  Interventions:  - Provide teaching at level of understanding  - Provide teaching via preferred learning methods  7/6/2025 0549 by Juni Melton RN  Outcome: Progressing  7/5/2025 2127 by Juni Melton RN  Outcome: Progressing     Problem: Neurological Deficit  Goal: Neurological status is stable or improving  Description: Interventions:  - Monitor and assess patient's level of consciousness, motor function, sensory function, and level of assistance needed for ADLs.   - Monitor and report changes from baseline. Collaborate with interdisciplinary team to initiate plan and implement interventions as ordered.   - Provide and maintain a safe environment.  - Consider seizure precautions.  - Consider fall precautions.  - Consider aspiration precautions.  - Consider bleeding precautions.  7/6/2025 0549 by Juni Melton RN  Outcome:  Progressing  7/5/2025 2127 by Juni Melton RN  Outcome: Progressing     Problem: Activity Intolerance/Impaired Mobility  Goal: Mobility/activity is maintained at optimum level for patient  Description: Interventions:  - Assess and monitor patient  barriers to mobility and need for assistive/adaptive devices.  - Assess patient's emotional response to limitations.  - Collaborate with interdisciplinary team and initiate plans and interventions as ordered.  - Encourage independent activity per ability.  - Maintain proper body alignment.  - Perform active/passive rom as tolerated/ordered.  - Plan activities to conserve energy.  - Turn patient as appropriate  7/6/2025 0549 by Juni Melton RN  Outcome: Progressing  7/5/2025 2127 by Juni Melton RN  Outcome: Progressing     Problem: Communication Impairment  Goal: Ability to express needs and understand communication  Description: Assess patient's communication skills and ability to understand information.  Patient will demonstrate use of effective communication techniques, alternative methods of communication and understanding even if not able to speak.     - Encourage communication and provide alternate methods of communication as needed.  - Collaborate with case management/ for discharge needs.  - Include patient/family/caregiver in decisions related to communication.  7/6/2025 0549 by Jnui Melton RN  Outcome: Progressing  7/5/2025 2127 by Juni Melton RN  Outcome: Progressing     Problem: Potential for Aspiration  Goal: Non-ventilated patient's risk of aspiration is minimized  Description: Assess and monitor vital signs, respiratory status, and labs (WBC).  Monitor for signs of aspiration (tachypnea, cough, rales, wheezing, cyanosis, fever).    - Assess and monitor patient's ability to swallow.  - Place patient up in chair to eat if possible.  - HOB up at 90 degrees to eat if unable to get patient up into chair.  - Supervise patient during oral  intake.   - Instruct patient/ family to take small bites.  - Instruct patient/ family to take small single sips when taking liquids.  - Follow patient-specific strategies generated by speech pathologist.  7/6/2025 0549 by Juni Melton RN  Outcome: Progressing  7/5/2025 2127 by Juni Melton RN  Outcome: Progressing     Problem: Nutrition  Goal: Nutrition/Hydration status is improving  Description: Monitor and assess patient's nutrition/hydration status for malnutrition (ex- brittle hair, bruises, dry skin, pale skin and conjunctiva, muscle wasting, smooth red tongue, and disorientation). Collaborate with interdisciplinary team and initiate plan and interventions as ordered.  Monitor patient's weight and dietary intake as ordered or per policy. Utilize nutrition screening tool and intervene per policy. Determine patient's food preferences and provide high-protein, high-caloric foods as appropriate.     - Assist patient with eating.  - Allow adequate time for meals.  - Encourage patient to take dietary supplement as ordered.  - Collaborate with clinical nutritionist.  - Include patient/family/caregiver in decisions related to nutrition.  7/6/2025 0549 by Juni Melton RN  Outcome: Progressing  7/5/2025 2127 by Juni Melton RN  Outcome: Progressing

## 2025-07-06 NOTE — PLAN OF CARE
Problem: PAIN - ADULT  Goal: Verbalizes/displays adequate comfort level or baseline comfort level  Description: Interventions:  - Encourage patient to monitor pain and request assistance  - Assess pain using appropriate pain scale  - Administer analgesics as ordered based on type and severity of pain and evaluate response  - Implement non-pharmacological measures as appropriate and evaluate response  - Consider cultural and social influences on pain and pain management  - Notify physician/advanced practitioner if interventions unsuccessful or patient reports new pain  - Educate patient/family on pain management process including their role and importance of  reporting pain   - Provide non-pharmacologic/complimentary pain relief interventions  Outcome: Progressing     Problem: INFECTION - ADULT  Goal: Absence or prevention of progression during hospitalization  Description: INTERVENTIONS:  - Assess and monitor for signs and symptoms of infection  - Monitor lab/diagnostic results  - Monitor all insertion sites, i.e. indwelling lines, tubes, and drains  - Monitor endotracheal if appropriate and nasal secretions for changes in amount and color  - [place] appropriate cooling/warming therapies per order  - Administer medications as ordered  - Instruct and encourage patient and family to use good hand hygiene technique  - Identify and instruct in appropriate isolation precautions for identified infection/condition  Outcome: Progressing  Goal: Absence of fever/infection during neutropenic period  Description: INTERVENTIONS:  - Monitor WBC  - Perform strict hand hygiene  - Limit to healthy visitors only  - No plants, dried, fresh or silk flowers with brower in patient room  Outcome: Progressing     Problem: SAFETY ADULT  Goal: Patient will remain free of falls  Description: INTERVENTIONS:  - Educate patient/family on patient safety including physical limitations  - Instruct patient to call for assistance with activity   -  Consider consulting OT/PT to assist with strengthening/mobility based on AM PAC & JH-HLM score  - Consult OT/PT to assist with strengthening/mobility   - Keep Call bell within reach  - Keep bed low and locked with side rails adjusted as appropriate  - Keep care items and personal belongings within reach  - Initiate and maintain comfort rounds  - Make Fall Risk Sign visible to staff  - Offer Toileting every 2 Hours, in advance of need  - Initiate/Maintain bed alarm  - Obtain necessary fall risk management equipment: slipper socks  - Apply yellow socks and bracelet for high fall risk patients  - Consider moving patient to room near nurses station  Outcome: Progressing     Problem: DISCHARGE PLANNING  Goal: Discharge to home or other facility with appropriate resources  Description: INTERVENTIONS:  - Identify barriers to discharge w/patient and caregiver  - Arrange for needed discharge resources and transportation as appropriate  - Identify discharge learning needs (meds, wound care, etc.)  - Arrange for interpretive services to assist at discharge as needed  - Refer to Case Management Department for coordinating discharge planning if the patient needs post-hospital services based on physician/advanced practitioner order or complex needs related to functional status, cognitive ability, or social support system  Outcome: Progressing     Problem: Knowledge Deficit  Goal: Patient/family/caregiver demonstrates understanding of disease process, treatment plan, medications, and discharge instructions  Description: Complete learning assessment and assess knowledge base.  Interventions:  - Provide teaching at level of understanding  - Provide teaching via preferred learning methods  Outcome: Progressing     Problem: Neurological Deficit  Goal: Neurological status is stable or improving  Description: Interventions:  - Monitor and assess patient's level of consciousness, motor function, sensory function, and level of assistance  needed for ADLs.   - Monitor and report changes from baseline. Collaborate with interdisciplinary team to initiate plan and implement interventions as ordered.   - Provide and maintain a safe environment.  - Consider seizure precautions.  - Consider fall precautions.  - Consider aspiration precautions.  - Consider bleeding precautions.  Outcome: Progressing     Problem: Activity Intolerance/Impaired Mobility  Goal: Mobility/activity is maintained at optimum level for patient  Description: Interventions:  - Assess and monitor patient  barriers to mobility and need for assistive/adaptive devices.  - Assess patient's emotional response to limitations.  - Collaborate with interdisciplinary team and initiate plans and interventions as ordered.  - Encourage independent activity per ability.  - Maintain proper body alignment.  - Perform active/passive rom as tolerated/ordered.  - Plan activities to conserve energy.  - Turn patient as appropriate  Outcome: Progressing     Problem: Communication Impairment  Goal: Ability to express needs and understand communication  Description: Assess patient's communication skills and ability to understand information.  Patient will demonstrate use of effective communication techniques, alternative methods of communication and understanding even if not able to speak.     - Encourage communication and provide alternate methods of communication as needed.  - Collaborate with case management/ for discharge needs.  - Include patient/family/caregiver in decisions related to communication.  Outcome: Progressing     Problem: Potential for Aspiration  Goal: Non-ventilated patient's risk of aspiration is minimized  Description: Assess and monitor vital signs, respiratory status, and labs (WBC).  Monitor for signs of aspiration (tachypnea, cough, rales, wheezing, cyanosis, fever).    - Assess and monitor patient's ability to swallow.  - Place patient up in chair to eat if  possible.  - HOB up at 90 degrees to eat if unable to get patient up into chair.  - Supervise patient during oral intake.   - Instruct patient/ family to take small bites.  - Instruct patient/ family to take small single sips when taking liquids.  - Follow patient-specific strategies generated by speech pathologist.  Outcome: Progressing     Problem: Nutrition  Goal: Nutrition/Hydration status is improving  Description: Monitor and assess patient's nutrition/hydration status for malnutrition (ex- brittle hair, bruises, dry skin, pale skin and conjunctiva, muscle wasting, smooth red tongue, and disorientation). Collaborate with interdisciplinary team and initiate plan and interventions as ordered.  Monitor patient's weight and dietary intake as ordered or per policy. Utilize nutrition screening tool and intervene per policy. Determine patient's food preferences and provide high-protein, high-caloric foods as appropriate.     - Assist patient with eating.  - Allow adequate time for meals.  - Encourage patient to take dietary supplement as ordered.  - Collaborate with clinical nutritionist.  - Include patient/family/caregiver in decisions related to nutrition.  Outcome: Progressing

## 2025-07-07 ENCOUNTER — TRANSITIONAL CARE MANAGEMENT (OUTPATIENT)
Dept: FAMILY MEDICINE CLINIC | Facility: CLINIC | Age: 60
End: 2025-07-07

## 2025-07-16 ENCOUNTER — TELEPHONE (OUTPATIENT)
Dept: FAMILY MEDICINE CLINIC | Facility: CLINIC | Age: 60
End: 2025-07-16

## 2025-07-16 NOTE — TELEPHONE ENCOUNTER
This patient needs a TCM with me this week. OK to overbook. Call was already made; not sure where appointment is

## 2025-07-17 ENCOUNTER — OFFICE VISIT (OUTPATIENT)
Dept: FAMILY MEDICINE CLINIC | Facility: CLINIC | Age: 60
End: 2025-07-17
Payer: COMMERCIAL

## 2025-07-17 VITALS
HEIGHT: 71 IN | TEMPERATURE: 98.6 F | DIASTOLIC BLOOD PRESSURE: 82 MMHG | OXYGEN SATURATION: 97 % | WEIGHT: 224 LBS | SYSTOLIC BLOOD PRESSURE: 138 MMHG | RESPIRATION RATE: 16 BRPM | BODY MASS INDEX: 31.36 KG/M2 | HEART RATE: 85 BPM

## 2025-07-17 DIAGNOSIS — E78.1 PURE HYPERTRIGLYCERIDEMIA: Primary | ICD-10-CM

## 2025-07-17 DIAGNOSIS — I65.22 LEFT CAROTID STENOSIS: ICD-10-CM

## 2025-07-17 DIAGNOSIS — I10 ESSENTIAL HYPERTENSION: ICD-10-CM

## 2025-07-17 DIAGNOSIS — R53.82 CHRONIC FATIGUE: ICD-10-CM

## 2025-07-17 PROCEDURE — 99495 TRANSJ CARE MGMT MOD F2F 14D: CPT | Performed by: FAMILY MEDICINE

## 2025-07-17 NOTE — PROGRESS NOTES
Transition of Care Visit:  Name: Elie Cooper      : 1965      MRN: [id number]  Encounter Provider: Salome Gonsalves MD  Encounter Date: 2025   Encounter department: [facility]    Assessment & Plan  Pure hypertriglyceridemia  Very important that the patient continue on his medications as prescribed.  Repeat all levels in 3 months continue BP medications as prescribed       Essential hypertension    Orders:  •  Comprehensive metabolic panel; Future  •  Lipid panel; Future    Chronic fatigue         Left carotid stenosis              History of Present Illness     Transitional Care Management Review:   Elie Cooper is a 60 y.o. male here for TCM follow up.     During the TCM phone call patient stated:  TCM Call (since 2025)     Date and time call was made  2025  3:41 PM    Hospital care reviewed  Records reviewed    Patient was hospitialized at  [facility]    Date of Admission  25    Date of discharge  25    Diagnosis  Slurred Speech    Disposition  Home    Were the patients medications reviewed and updated  Yes    Current Symptoms  None      TCM Call (since 2025)     Post hospital issues  None    Scheduled for follow up?  Yes    Patients specialists  Neurology    Neurologist name  [facility] [place]    Neurologist contact #  [phone]    Referrals needed  none    Did you obtain your prescribed medications  Yes    Do you need help managing your prescriptions or medications  Yes    Is transportation to your appointment needed  Yes    I have advised the patient to call PCP with any new or worsening symptoms  C Kapral, lpn    Living Arrangements  Spouse or Significiant other        HPI  60-year-old male presenting to the office for transition of care.  Elie Cooper, a 60-year-old patient, presented with chronic conditions including hypertension, hyperlipidemia, and CKD stage 3b. He experienced transient slurred speech on 2025, with  "imaging revealing chronic left MCA infarct and severe stenosis   His treatment included heparin infusion and continuation of clopidogrel and atorvastatin    A nutrition consultation on July 6, 2025, showed good intake and no dysphagia  Patient admits that he has been not taking any his vitamins.  Also states that he has been drinking 2 beers  Review of Systems   Constitutional:  Negative for activity change, appetite change, chills, fatigue and fever.   HENT:  Negative for congestion.    Respiratory:  Negative for cough, chest tightness and shortness of breath.    Cardiovascular:  Negative for chest pain and leg swelling.   Gastrointestinal:  Negative for abdominal distention, abdominal pain, constipation, diarrhea, nausea and vomiting.   All other systems reviewed and are negative.    Objective   /82 (BP Location: Left arm, Patient Position: Sitting, Cuff Size: Large)   Pulse 85   Temp 98.6 °F (37 °C) (Temporal)   Resp 16   Ht 5' 11\" (1.803 m)   Wt 102 kg (224 lb)   SpO2 97%   BMI 31.24 kg/m²     Physical Exam  Constitutional:       Appearance: He is well-developed.   HENT:      Head: Normocephalic and atraumatic.     Eyes:      Conjunctiva/sclera: Conjunctivae normal.      Pupils: Pupils are equal, round, and reactive to light.       Cardiovascular:      Rate and Rhythm: Normal rate.   Pulmonary:      Effort: Pulmonary effort is normal.     Neurological:      General: No focal deficit present.      Mental Status: He is alert and oriented to person, place, and time. Mental status is at baseline.      Comments: Neurologic at baseline with a slight facial droop on the left   Psychiatric:         Behavior: Behavior normal.         Thought Content: Thought content normal.         Judgment: Judgment normal.       Medications have been reviewed by provider in current encounter      "

## 2025-07-21 ENCOUNTER — OFFICE VISIT (OUTPATIENT)
Dept: URGENT CARE | Facility: CLINIC | Age: 60
End: 2025-07-21
Payer: COMMERCIAL

## 2025-07-21 ENCOUNTER — APPOINTMENT (OUTPATIENT)
Dept: RADIOLOGY | Facility: CLINIC | Age: 60
End: 2025-07-21
Attending: FAMILY MEDICINE
Payer: COMMERCIAL

## 2025-07-21 VITALS
SYSTOLIC BLOOD PRESSURE: 130 MMHG | HEART RATE: 77 BPM | RESPIRATION RATE: 20 BRPM | WEIGHT: 224 LBS | OXYGEN SATURATION: 97 % | DIASTOLIC BLOOD PRESSURE: 80 MMHG | BODY MASS INDEX: 31.24 KG/M2 | TEMPERATURE: 98 F

## 2025-07-21 DIAGNOSIS — L03.116 CELLULITIS OF LEFT KNEE: Primary | ICD-10-CM

## 2025-07-21 DIAGNOSIS — L03.116 CELLULITIS OF LEFT KNEE: ICD-10-CM

## 2025-07-21 PROCEDURE — 99204 OFFICE O/P NEW MOD 45 MIN: CPT | Performed by: FAMILY MEDICINE

## 2025-07-21 PROCEDURE — 73564 X-RAY EXAM KNEE 4 OR MORE: CPT

## 2025-07-21 RX ORDER — CEPHALEXIN 500 MG/1
500 CAPSULE ORAL EVERY 6 HOURS SCHEDULED
Qty: 28 CAPSULE | Refills: 0 | Status: SHIPPED | OUTPATIENT
Start: 2025-07-21 | End: 2025-07-25

## 2025-07-22 NOTE — PROGRESS NOTES
[facility] Now  Name: Elie Cooper      : 1965      MRN: [id number]  Encounter Provider: Wayne Vance MD  Encounter Date: 2025   Encounter department: [facility]  :  Assessment & Plan  Cellulitis of left knee    Orders:    XR knee 4+ vw left injury; Future    cephalexin (KEFLEX) 500 mg capsule; Take 1 capsule (500 mg total) by mouth every 6 (six) hours for 7 days      Patient Instructions   Xray of the left knee shows no apparent acute abnormalities, however we are awaiting official radiology report.   Keflex x 7 days prescribed, complete as directed.   Take Tylenol as needed for pain.   Patient is to rest the leg, keep it elevated and apply warm compresses to the site.   Tdap vaccine is up to date ().   Patient is to gently wash the skin w/ soap and water daily, keep the area clean.   Follow up w/ PCP office for re-check in 2 days.   If symptoms persist despite treatment, worsen, or any new symptoms present, patient is to be seen in the ER.    Patient Instructions  Follow up with PCP in 3-5 days.  Proceed to  ER if symptoms worsen.    If tests are performed, our office will contact you with results only if changes need to made to the care plan discussed with you at the visit. [facility]    Chief Complaint:   Chief Complaint   Patient presents with    Wound Infection     Pt here for concerns of an infection in Left knee, pt states he got a puncture wound 5 days ago, since then pt has knee pain and swelling. No meds used.     History of Present Illness     59 yo male presents for concerns of infection on his left knee. Patient states he was working in his garage kneeling on the ground while wearing shorts 5 days ago, and he suddenly felt a sharp pinch/puncture like sensation on his left knee. He noted slight bleeding from the site. Patient believes he likely accidentally kneeled on a sharp object causing a puncture wound at the  site. Since then patient has noted pain at the site, and for the past few days he is experiencing soft tissue swelling and redness surrounding the puncture wound site. He states the area feels warm and is tender to touch. He denies any pus like drainage from the site. No fever/chills. No bone/joint stiffness or locking. No pain or redness traveling down the leg. No numbness/tingling or weakness of the leg. Patient has no known allergies. He has not yet attempted any treatment for the symptoms. He denies any hx of MRSA infections. Per review of records patient's last Tdap vaccine was in 2022.          Review of Systems   Constitutional: Negative.    Respiratory: Negative.     Cardiovascular: Negative.    Musculoskeletal:         As noted in HPI   Skin:         As noted in HPI   Allergic/Immunologic: Negative.    Neurological: Negative.    Hematological: Negative.      Past Medical History   Past Medical History[1]  Past Surgical History[2]  Family History[3]  he reports that he quit smoking about 13 years ago. His smoking use included cigarettes. He started smoking about 43 years ago. He has a 30.5 pack-year smoking history. He has never used smokeless tobacco. He reports current alcohol use of about 6.0 standard drinks of alcohol per week. He reports that he does not use drugs.  Current Outpatient Medications   Medication Instructions    atorvastatin (LIPITOR) 80 mg, Oral, Daily with dinner    cephalexin (KEFLEX) 500 mg, Oral, Every 6 hours scheduled    cholecalciferol (VITAMIN D3) 2,000 Units, Daily    clopidogrel (PLAVIX) 75 mg, Oral, Daily    ezetimibe (ZETIA) 10 mg, Oral, Daily    folic acid (FOLVITE) 1 mg, Oral, Daily    losartan (COZAAR) 100 mg, Oral, Daily    metoprolol tartrate (LOPRESSOR) 25 mg, Oral, Daily    Thiamine Mononitrate (VITAMIN B1) 100 mg, Oral, Daily   Allergies[4]     Objective   /80 (Patient Position: Sitting, Cuff Size: Large)   Pulse 77   Temp 98 °F (36.7 °C) (Tympanic)   Resp 20   " Wt 102 kg (224 lb)   SpO2 97%   BMI 31.24 kg/m²      Physical Exam  Vitals and nursing note reviewed.   Constitutional:       General: He is awake. He is not in acute distress.     Appearance: Normal appearance. He is well-developed and well-groomed. He is not ill-appearing, toxic-appearing or diaphoretic.     Cardiovascular:      Rate and Rhythm: Normal rate.      Pulses: Normal pulses.   Pulmonary:      Effort: Pulmonary effort is normal. No tachypnea, accessory muscle usage or respiratory distress.     Musculoskeletal:      Comments: Left knee: there is localized soft tissue swelling and erythema over the patellar aspect of the knee joint. The area is warm and tender to touch. There is a very small puncture wound noted at the site which is now closed. No fluctuant area consistent with an abscess. No drainage noted from the site. Patient has full ROM of the knee joint, however experiences discomfort w/ movement. Strength and sensations are intact. No erythema/streaking noted along the leg.     Skin:     General: Skin is warm and dry.      Capillary Refill: Capillary refill takes less than 2 seconds.      Findings: Erythema and wound present. No abscess, bruising or ecchymosis.     Neurological:      Mental Status: He is alert and oriented to person, place, and time. Mental status is at baseline.     Psychiatric:         Mood and Affect: Mood normal.         Behavior: Behavior normal. Behavior is cooperative.         Thought Content: Thought content normal.         Judgment: Judgment normal.         Portions of the record may have been created with voice recognition software.  Occasional wrong word or \"sound a like\" substitutions may have occurred due to the inherent limitations of voice recognition software.  Read the chart carefully and recognize, using context, where substitutions have occurred.       [1]   Past Medical History:  Diagnosis Date    Chronic kidney disease     Hyperlipidemia     Hypertension  "    Stroke (HCC) 02/10/2019    right side sl weakness   [2]   Past Surgical History:  Procedure Laterality Date    FL RETROGRADE URETHROCYSTOGRAM  6/10/2021    NO PAST SURGERIES      VT CYSTOURETHROSCOPY W/URETERAL CATHETERIZATION N/A 6/10/2021    Procedure: CYSTOSCOPY WITH RETROGRADE PYELOGRAM/internal urethrotomy, DVIU, URETHROGRAM, CYSTOGRAM.;  Surgeon: Fabian Remy MD;  Location: [facility];  Service: Urology    VT TRURL ELECTROSURG RESCJ PROSTATE BLEED COMPLETE N/A 7/15/2021    Procedure: CYSTOSCOPY, TRANSURETHRAL RESECTION OF PROSTATE (TURP);  Surgeon: Fabian Remy MD;  Location: [facility];  Service: Urology   [3]   Family History  Problem Relation Name Age of Onset    Diabetes type II Mother      Hypertension Father      Diabetes type II Father      Diabetes type II Sister      Hypertension Brother     [4] No Known Allergies

## 2025-07-22 NOTE — PATIENT INSTRUCTIONS
Xray of the left knee shows no apparent acute abnormalities, however we are awaiting official radiology report.   Keflex x 7 days prescribed, complete as directed.   Take Tylenol as needed for pain.   Patient is to rest the leg, keep it elevated and apply warm compresses to the site.   Tdap vaccine is up to date (2022).   Patient is to gently wash the skin w/ soap and water daily, keep the area clean.   Follow up w/ PCP office for re-check in 2 days.   If symptoms persist despite treatment, worsen, or any new symptoms present, patient is to be seen in the ER.

## 2025-07-23 ENCOUNTER — APPOINTMENT (EMERGENCY)
Dept: RADIOLOGY | Facility: HOSPITAL | Age: 60
End: 2025-07-23
Payer: COMMERCIAL

## 2025-07-23 ENCOUNTER — HOSPITAL ENCOUNTER (INPATIENT)
Facility: HOSPITAL | Age: 60
LOS: 2 days | Discharge: HOME | End: 2025-07-25
Admitting: INTERNAL MEDICINE
Payer: COMMERCIAL

## 2025-07-23 DIAGNOSIS — N18.9 ACUTE KIDNEY INJURY SUPERIMPOSED ON CHRONIC KIDNEY DISEASE  (HCC): ICD-10-CM

## 2025-07-23 DIAGNOSIS — I10 BENIGN ESSENTIAL HYPERTENSION: ICD-10-CM

## 2025-07-23 DIAGNOSIS — N18.32 STAGE 3B CHRONIC KIDNEY DISEASE (HCC): ICD-10-CM

## 2025-07-23 DIAGNOSIS — L03.116 CELLULITIS OF LEFT LOWER EXTREMITY: ICD-10-CM

## 2025-07-23 DIAGNOSIS — N17.9 ACUTE KIDNEY INJURY SUPERIMPOSED ON CHRONIC KIDNEY DISEASE  (HCC): ICD-10-CM

## 2025-07-23 DIAGNOSIS — L03.90 CELLULITIS: Primary | ICD-10-CM

## 2025-07-23 DIAGNOSIS — N17.9 AKI (ACUTE KIDNEY INJURY) (HCC): ICD-10-CM

## 2025-07-23 PROBLEM — M25.562 ACUTE PAIN OF LEFT KNEE: Status: ACTIVE | Noted: 2025-07-23

## 2025-07-23 PROBLEM — M25.569 KNEE PAIN: Status: ACTIVE | Noted: 2025-07-23

## 2025-07-23 PROBLEM — L03.119 CELLULITIS OF EXTREMITY: Status: ACTIVE | Noted: 2025-07-23

## 2025-07-23 LAB
ALBUMIN SERPL BCG-MCNC: 3.7 G/DL (ref 3.5–5)
ALP SERPL-CCNC: 75 U/L (ref 34–104)
ALT SERPL W P-5'-P-CCNC: 17 U/L (ref 7–52)
ANION GAP SERPL CALCULATED.3IONS-SCNC: 9 MMOL/L (ref 4–13)
APTT PPP: 34 SECONDS (ref 23–34)
AST SERPL W P-5'-P-CCNC: 11 U/L (ref 13–39)
BACTERIA UR QL AUTO: ABNORMAL /HPF
BASOPHILS # BLD AUTO: 0.04 THOUSANDS/ÂΜL (ref 0–0.1)
BASOPHILS NFR BLD AUTO: 0 % (ref 0–1)
BILIRUB SERPL-MCNC: 1.32 MG/DL (ref 0.2–1)
BILIRUB UR QL STRIP: NEGATIVE
BUN SERPL-MCNC: 37 MG/DL (ref 5–25)
CALCIUM SERPL-MCNC: 9.5 MG/DL (ref 8.4–10.2)
CHLORIDE SERPL-SCNC: 97 MMOL/L (ref 96–108)
CLARITY UR: CLEAR
CO2 SERPL-SCNC: 25 MMOL/L (ref 21–32)
COLOR UR: YELLOW
CREAT SERPL-MCNC: 2.38 MG/DL (ref 0.6–1.3)
CRP SERPL QL: 313.5 MG/L
EOSINOPHIL # BLD AUTO: 0.14 THOUSAND/ÂΜL (ref 0–0.61)
EOSINOPHIL NFR BLD AUTO: 1 % (ref 0–6)
ERYTHROCYTE [DISTWIDTH] IN BLOOD BY AUTOMATED COUNT: 13.6 % (ref 11.6–15.1)
ERYTHROCYTE [SEDIMENTATION RATE] IN BLOOD: 67 MM/HOUR (ref 0–19)
GFR SERPL CREATININE-BSD FRML MDRD: 28 ML/MIN/1.73SQ M
GLUCOSE SERPL-MCNC: 106 MG/DL (ref 65–140)
GLUCOSE UR STRIP-MCNC: NEGATIVE MG/DL
HCT VFR BLD AUTO: 45.4 % (ref 36.5–49.3)
HGB BLD-MCNC: 14.6 G/DL (ref 12–17)
HGB UR QL STRIP.AUTO: NEGATIVE
IMM GRANULOCYTES # BLD AUTO: 0.21 THOUSAND/UL (ref 0–0.2)
IMM GRANULOCYTES NFR BLD AUTO: 1 % (ref 0–2)
INR PPP: 1.09 (ref 0.85–1.19)
KETONES UR STRIP-MCNC: NEGATIVE MG/DL
LACTATE SERPL-SCNC: 1 MMOL/L (ref 0.5–2)
LEUKOCYTE ESTERASE UR QL STRIP: NEGATIVE
LYMPHOCYTES # BLD AUTO: 1.13 THOUSANDS/ÂΜL (ref 0.6–4.47)
LYMPHOCYTES NFR BLD AUTO: 6 % (ref 14–44)
MCH RBC QN AUTO: 28.1 PG (ref 26.8–34.3)
MCHC RBC AUTO-ENTMCNC: 32.2 G/DL (ref 31.4–37.4)
MCV RBC AUTO: 87 FL (ref 82–98)
MONOCYTES # BLD AUTO: 1.11 THOUSAND/ÂΜL (ref 0.17–1.22)
MONOCYTES NFR BLD AUTO: 6 % (ref 4–12)
NEUTROPHILS # BLD AUTO: 16.66 THOUSANDS/ÂΜL (ref 1.85–7.62)
NEUTS SEG NFR BLD AUTO: 86 % (ref 43–75)
NITRITE UR QL STRIP: NEGATIVE
NON-SQ EPI CELLS URNS QL MICRO: ABNORMAL /HPF
NRBC BLD AUTO-RTO: 0 /100 WBCS
PH UR STRIP.AUTO: 5.5 [PH]
PLATELET # BLD AUTO: 235 THOUSANDS/UL (ref 149–390)
PMV BLD AUTO: 9.2 FL (ref 8.9–12.7)
POTASSIUM SERPL-SCNC: 4.1 MMOL/L (ref 3.5–5.3)
PROCALCITONIN SERPL-MCNC: 2.43 NG/ML
PROT SERPL-MCNC: 7.5 G/DL (ref 6.4–8.4)
PROT UR STRIP-MCNC: NEGATIVE MG/DL
PROTHROMBIN TIME: 14.7 SECONDS (ref 12.3–15)
RBC # BLD AUTO: 5.2 MILLION/UL (ref 3.88–5.62)
RBC #/AREA URNS AUTO: ABNORMAL /HPF
SODIUM SERPL-SCNC: 131 MMOL/L (ref 135–147)
SP GR UR STRIP.AUTO: 1.02 (ref 1–1.03)
UROBILINOGEN UR STRIP-ACNC: <2 MG/DL
WBC # BLD AUTO: 19.29 THOUSAND/UL (ref 4.31–10.16)
WBC #/AREA URNS AUTO: ABNORMAL /HPF

## 2025-07-23 PROCEDURE — 86140 C-REACTIVE PROTEIN: CPT

## 2025-07-23 PROCEDURE — 80053 COMPREHEN METABOLIC PANEL: CPT

## 2025-07-23 PROCEDURE — 85610 PROTHROMBIN TIME: CPT

## 2025-07-23 PROCEDURE — 99284 EMERGENCY DEPT VISIT MOD MDM: CPT

## 2025-07-23 PROCEDURE — 99255 IP/OBS CONSLTJ NEW/EST HI 80: CPT | Performed by: ORTHOPAEDIC SURGERY

## 2025-07-23 PROCEDURE — 99285 EMERGENCY DEPT VISIT HI MDM: CPT

## 2025-07-23 PROCEDURE — 84145 PROCALCITONIN (PCT): CPT

## 2025-07-23 PROCEDURE — 85652 RBC SED RATE AUTOMATED: CPT

## 2025-07-23 PROCEDURE — 85730 THROMBOPLASTIN TIME PARTIAL: CPT

## 2025-07-23 PROCEDURE — 81001 URINALYSIS AUTO W/SCOPE: CPT | Performed by: INTERNAL MEDICINE

## 2025-07-23 PROCEDURE — 85025 COMPLETE CBC W/AUTO DIFF WBC: CPT

## 2025-07-23 PROCEDURE — 96361 HYDRATE IV INFUSION ADD-ON: CPT

## 2025-07-23 PROCEDURE — 96367 TX/PROPH/DG ADDL SEQ IV INF: CPT

## 2025-07-23 PROCEDURE — 87040 BLOOD CULTURE FOR BACTERIA: CPT

## 2025-07-23 PROCEDURE — 73700 CT LOWER EXTREMITY W/O DYE: CPT

## 2025-07-23 PROCEDURE — 99223 1ST HOSP IP/OBS HIGH 75: CPT | Performed by: INTERNAL MEDICINE

## 2025-07-23 PROCEDURE — 83605 ASSAY OF LACTIC ACID: CPT

## 2025-07-23 PROCEDURE — 96366 THER/PROPH/DIAG IV INF ADDON: CPT

## 2025-07-23 PROCEDURE — 36415 COLL VENOUS BLD VENIPUNCTURE: CPT

## 2025-07-23 PROCEDURE — 96365 THER/PROPH/DIAG IV INF INIT: CPT

## 2025-07-23 RX ORDER — CEFTRIAXONE 1 G/50ML
1000 INJECTION, SOLUTION INTRAVENOUS ONCE
Status: COMPLETED | OUTPATIENT
Start: 2025-07-23 | End: 2025-07-23

## 2025-07-23 RX ORDER — CEFTRIAXONE 2 G/50ML
2000 INJECTION, SOLUTION INTRAVENOUS EVERY 24 HOURS
Status: DISCONTINUED | OUTPATIENT
Start: 2025-07-24 | End: 2025-07-24

## 2025-07-23 RX ORDER — VANCOMYCIN HYDROCHLORIDE 1 G/200ML
12.5 INJECTION, SOLUTION INTRAVENOUS ONCE AS NEEDED
Status: DISCONTINUED | OUTPATIENT
Start: 2025-07-24 | End: 2025-07-24

## 2025-07-23 RX ORDER — EZETIMIBE 10 MG/1
10 TABLET ORAL DAILY
Status: DISCONTINUED | OUTPATIENT
Start: 2025-07-23 | End: 2025-07-25 | Stop reason: HOSPADM

## 2025-07-23 RX ORDER — ACETAMINOPHEN 325 MG/1
650 TABLET ORAL EVERY 6 HOURS PRN
Status: DISCONTINUED | OUTPATIENT
Start: 2025-07-23 | End: 2025-07-25 | Stop reason: HOSPADM

## 2025-07-23 RX ORDER — CLOPIDOGREL BISULFATE 75 MG/1
75 TABLET ORAL DAILY
Status: DISCONTINUED | OUTPATIENT
Start: 2025-07-23 | End: 2025-07-25 | Stop reason: HOSPADM

## 2025-07-23 RX ORDER — METOPROLOL TARTRATE 25 MG/1
25 TABLET, FILM COATED ORAL DAILY
Status: DISCONTINUED | OUTPATIENT
Start: 2025-07-24 | End: 2025-07-25 | Stop reason: HOSPADM

## 2025-07-23 RX ORDER — LANOLIN ALCOHOL/MO/W.PET/CERES
100 CREAM (GRAM) TOPICAL DAILY
Status: DISCONTINUED | OUTPATIENT
Start: 2025-07-23 | End: 2025-07-25 | Stop reason: HOSPADM

## 2025-07-23 RX ORDER — SODIUM CHLORIDE 9 MG/ML
75 INJECTION, SOLUTION INTRAVENOUS CONTINUOUS
Status: DISPENSED | OUTPATIENT
Start: 2025-07-23 | End: 2025-07-24

## 2025-07-23 RX ORDER — ACETAMINOPHEN 325 MG/1
975 TABLET ORAL ONCE
Status: COMPLETED | OUTPATIENT
Start: 2025-07-23 | End: 2025-07-23

## 2025-07-23 RX ORDER — FOLIC ACID 1 MG/1
1 TABLET ORAL DAILY
Status: DISCONTINUED | OUTPATIENT
Start: 2025-07-23 | End: 2025-07-25 | Stop reason: HOSPADM

## 2025-07-23 RX ORDER — ENOXAPARIN SODIUM 100 MG/ML
40 INJECTION SUBCUTANEOUS DAILY
Status: DISCONTINUED | OUTPATIENT
Start: 2025-07-24 | End: 2025-07-25 | Stop reason: HOSPADM

## 2025-07-23 RX ORDER — ATORVASTATIN CALCIUM 80 MG/1
80 TABLET, FILM COATED ORAL
Status: DISCONTINUED | OUTPATIENT
Start: 2025-07-23 | End: 2025-07-25 | Stop reason: HOSPADM

## 2025-07-23 RX ADMIN — VANCOMYCIN HYDROCHLORIDE 1250 MG: 10 INJECTION, POWDER, LYOPHILIZED, FOR SOLUTION INTRAVENOUS at 11:51

## 2025-07-23 RX ADMIN — ACETAMINOPHEN 650 MG: 325 TABLET, FILM COATED ORAL at 23:33

## 2025-07-23 RX ADMIN — ACETAMINOPHEN 975 MG: 325 TABLET, FILM COATED ORAL at 11:13

## 2025-07-23 RX ADMIN — SODIUM CHLORIDE 1000 ML: 0.9 INJECTION, SOLUTION INTRAVENOUS at 12:42

## 2025-07-23 RX ADMIN — CEFTRIAXONE 1000 MG: 1 INJECTION, SOLUTION INTRAVENOUS at 15:54

## 2025-07-23 RX ADMIN — SODIUM CHLORIDE 75 ML/HR: 0.9 INJECTION, SOLUTION INTRAVENOUS at 15:54

## 2025-07-23 RX ADMIN — CEFTRIAXONE 1000 MG: 1 INJECTION, SOLUTION INTRAVENOUS at 11:26

## 2025-07-23 RX ADMIN — ATORVASTATIN CALCIUM 80 MG: 80 TABLET, FILM COATED ORAL at 15:54

## 2025-07-23 NOTE — ASSESSMENT & PLAN NOTE
Lab Results   Component Value Date    EGFR 28 07/23/2025    EGFR 49 07/05/2025    EGFR 45 07/04/2025    CREATININE 2.38 (H) 07/23/2025    CREATININE 1.51 (H) 07/05/2025    CREATININE 1.63 (H) 07/04/2025

## 2025-07-23 NOTE — ASSESSMENT & PLAN NOTE
History of hypertension, on metoprolol, losartan  Blood pressure low on presentation, improved post IV fluids  Continue metoprolol with holding parameters  Hold losartan

## 2025-07-23 NOTE — ASSESSMENT & PLAN NOTE
CT scan personally reviewed with Dr. Herring. Upon his review: there is no intra-articular or subcutaneous soft tissue gas or excessive fluid. Hypertrophic bursa with cellulitis, without a retained foreign body.   Overall, the diagnosis appears to be consistent with a cellulitis infection, without concern for septic arthritis.   Recommending continue IV Abx: Vancomycin & Ceftriaxone with resolution of symptoms.   Today's  objective results consisted of a Tmax 99.2 at 1010, CRP of 313.5, sed rate of 67, and WBC 19.29. all which were normal on labs from 7/5/25   Follow up on blood culture results. Continue to monitor clinical exam, temp, and blood work, especially inflammatory/ infectious markers.   Orthopedics will continue to monitor clinical exam, vitals, and blood work. If there are any worsening symptoms or increasing infectious blood markers,  will discuss surgical intervention at that time.  The patient and primary team are in agreement with the plan.   Primary team to monitor and treat co-morbidities and pain control.   Thank you for the consult.   .

## 2025-07-23 NOTE — H&P
H&P - Hospitalist   Name: Eile Cooper 60 y.o. male I MRN: [id number]  Unit/Bed#: ED 12 I Date of Admission: 7/23/2025   Date of Service: 7/23/2025 I Hospital Day: 0     Assessment & Plan  Cellulitis of extremity  Patient reported that about a week ago he was working on dolly in the garage and had a puncture injury to the left knee.    Subsequently he had increasing redness and pain and was evaluated urgent care center on 7/21/2025, patient was prescribed cephalexin.    Despite compliance with antibiotics he noted increasing swelling redness pain swelling and redness of left lower extremity and he presented to ED for further evaluation.    In ED, patient was afebrile, blood pressure was low normal other vital signs were stable saturating adequately on room air.    Lab revealed leukocytosis of 19K creatinine was elevated at 2.38, lactic acid was within normal limit, procalcitonin and  inflammatory markers were elevated.     CT scan of the lower extremity revealed skin thickening and subcutaneous edema surrounding anterior left knee consistent with cellulitis no focal collection or soft tissue gas noted.  No underlying osseous abnormalities noted.    Patient received Tylenol, 1 L IV fluid bolus, IV vancomycin and ceftriaxone and subsequently referred for admission.  Patient is up-to-date with tetanus shot documented in 2022  Left lower extremity cellulitis after puncture wound possible bursitis, less likely septic arthritis  Will continue IV vancomycin, ceftriaxone 2 g daily  Pharmacy consult for vancomycin dosing  Follow-up blood cultures  Orthopedic evaluation  Venous Doppler lower extremity  Leg elevation as tolerated  MRSA surveillance culture  Monitor clinically  ARMANDO (acute kidney injury) (HCC)  Creatinine on presentation 2.38 mg/dL  Suspect in setting of hypoperfusion in setting of low blood pressure  Patient denies any urinary symptoms at present, denies NSAID use  IV fluids  Check UA  Check bladder scan to  evaluate PVR  Avoid hypotension, nephrotoxin, hold losartan  Monitor intake output  Follow-up BMP, electrolytes  Hypertension  History of hypertension, on metoprolol, losartan  Blood pressure low on presentation, improved post IV fluids  Continue metoprolol with holding parameters  Hold losartan  Cerebrovascular accident (CVA) due to occlusion of left middle cerebral artery (HCC)  History of left MCA CVA with mild residual right-sided weakness  Currently at baseline  Continue home clopidogrel, atorvastatin, ezetimibe    Stage 3b chronic kidney disease (HCC)  Baseline creatinine appears to be 1.5-1.8  Hypercholesterolemia  Continue home statin, ezetimibe  Benign prostatic hyperplasia with lower urinary tract symptoms  Status post TURP, 2021      VTE Pharmacologic Prophylaxis: VTE Score: 3 Moderate Risk (Score 3-4) - Pharmacological DVT Prophylaxis Ordered: enoxaparin (Lovenox).  Code Status: Level 1 - Full Code       Anticipated Length of Stay: Patient will be admitted on an inpatient basis with an anticipated length of stay of greater than 2 midnights secondary to left lower extremity cellulitis failing outpatient treatment.    History of Present Illness   Chief Complaint: Left knee and leg redness and swelling    Elie Cooper is a 60 y.o. male with a PMH of CVA with right-sided weakness, hypertension, dyslipidemia, CKD who presents with left lower extremity redness and swelling.  Patient reported that about a week ago he was not working dolly in the garage and had a puncture injury to the left knee.  Subsequently he had increasing redness and pain and was evaluated urgent care center on 7/21/2025, patient was prescribed cephalexin.  Despite compliance with antibiotics he noted increasing swelling redness pain swelling and redness of left lower extremity and he presented to ED for further evaluation.    In ED, patient was afebrile, blood pressure was low normal other vital signs were stable saturating adequately  on room air.  Lab revealed leukocytosis of 19K creatinine was elevated at 2.38, lactic acid was within normal limit, procalcitonin and  inflammatory markers were elevated.  Blood cultures were obtained, CT scan of the lower extremity revealed skin thickening and subcutaneous edema surrounding anterior left knee consistent with cellulitis no focal collection or soft tissue gas noted.  No underlying osseous abnormalities noted.  Patient received Tylenol, 1 L IV fluid bolus, IV vancomycin and ceftriaxone and subsequently referred for admission.  Currently patient reports improvement in swelling since treatment in ED.    Review of Systems   Constitutional:  Negative for chills and fever.   Cardiovascular:  Positive for leg swelling.   Gastrointestinal:  Negative for abdominal pain, diarrhea, nausea and vomiting.        Poor appetite last 1 to 2 days   Genitourinary:  Negative for difficulty urinating.   Musculoskeletal:  Positive for arthralgias.   Neurological:  Negative for dizziness and headaches.        Chronic mild residual right-sided weakness from prior CVA   Psychiatric/Behavioral:  Negative for confusion.        Historical Information   Past Medical History[1]  Past Surgical History[2]  Social History[3]  E-Cigarette/Vaping    E-Cigarette Use Never User      E-Cigarette/Vaping Substances    Nicotine No     THC No     CBD No     Flavoring No     Other No     Unknown No      Family History[4]  Social History:  Marital Status: /Civil Union   Occupation: Self-employed  Patient Pre-hospital Living Situation: Home  Patient Pre-hospital Level of Mobility: walks      Meds/Allergies   I have reviewed home medications with patient personally.  Prior to Admission medications    Medication Sig Start Date End Date Taking? Authorizing Provider   atorvastatin (LIPITOR) 80 mg tablet Take 1 tablet (80 mg total) by mouth daily with dinner 7/6/25   David Soto DO   cephalexin (KEFLEX) 500 mg capsule Take 1 capsule (500  mg total) by mouth every 6 (six) hours for 7 days 7/21/25 7/28/25  Wayne Vance MD   cholecalciferol (VITAMIN D3) 1,000 units tablet Take 2,000 Units by mouth in the morning.    Historical Provider, MD   clopidogrel (PLAVIX) 75 mg tablet Take 1 tablet (75 mg total) by mouth daily 7/6/25   David Soto DO   ezetimibe (ZETIA) 10 mg tablet Take 1 tablet (10 mg total) by mouth daily 3/6/25   Salome Gonsalves MD   folic acid (FOLVITE) 1 mg tablet Take 1 tablet (1 mg total) by mouth daily 3/6/25   Salome Gonsalves MD   losartan (COZAAR) 100 MG tablet Take 1 tablet (100 mg total) by mouth daily 3/6/25   Salome Gonsalves MD   metoprolol tartrate (LOPRESSOR) 25 mg tablet Take 1 tablet (25 mg total) by mouth daily 3/6/25   Salome Gonsalves MD   Thiamine Mononitrate (VITAMIN B1) 100 mg tablet Take 1 tablet (100 mg total) by mouth daily 3/6/25   Salome Gonsalves MD   thiamine (VITAMIN B1) 100 mg tablet TAKE ONE TABLET BY MOUTH EVERY DAY 10/15/20 8/4/21  Salome Gonsalves MD     No Known Allergies    Objective :  Temp:  [97.7 °F (36.5 °C)-99.2 °F (37.3 °C)] 97.7 °F (36.5 °C)  HR:  [63-79] 63  BP: ()/(61-68) 104/64  Resp:  [20] 20  SpO2:  [94 %-100 %] 98 %  O2 Device: None (Room air)    Physical Exam  Constitutional:       General: He is not in acute distress.     Appearance: He is obese.   HENT:      Head: Normocephalic and atraumatic.     Cardiovascular:      Rate and Rhythm: Normal rate.   Pulmonary:      Effort: Pulmonary effort is normal. No respiratory distress.      Breath sounds: No rhonchi.   Abdominal:      General: Bowel sounds are normal. There is no distension.      Palpations: Abdomen is soft.      Tenderness: There is no abdominal tenderness. There is no guarding or rebound.     Musculoskeletal:         General: Swelling and tenderness present.      Cervical back: Neck supple.      Left lower leg: Edema present.     Skin:     General: Skin is  warm and dry.      Findings: Erythema present. No rash.      Comments: Puncture she overlying left knee with surrounding erythema induration and tenderness and swelling extending proximally and distally.  Decreased range of motion left knee due to swelling.  No obvious crepitation or fluctuation.     Neurological:      Mental Status: He is alert. Mental status is at baseline.      Cranial Nerves: No cranial nerve deficit.                        Lines/Drains:            Lab Results: I have reviewed the following results:  Results from last 7 days   Lab Units 07/23/25  1106   WBC Thousand/uL 19.29*   HEMOGLOBIN g/dL 14.6   HEMATOCRIT % 45.4   PLATELETS Thousands/uL 235   SEGS PCT % 86*   LYMPHO PCT % 6*   MONO PCT % 6   EOS PCT % 1     Results from last 7 days   Lab Units 07/23/25  1106   SODIUM mmol/L 131*   POTASSIUM mmol/L 4.1   CHLORIDE mmol/L 97   CO2 mmol/L 25   BUN mg/dL 37*   CREATININE mg/dL 2.38*   ANION GAP mmol/L 9   CALCIUM mg/dL 9.5   ALBUMIN g/dL 3.7   TOTAL BILIRUBIN mg/dL 1.32*   ALK PHOS U/L 75   ALT U/L 17   AST U/L 11*   GLUCOSE RANDOM mg/dL 106     Results from last 7 days   Lab Units 07/23/25  1106   INR  1.09         Lab Results   Component Value Date    HGBA1C 5.7 (H) 07/05/2025    HGBA1C 5.7 (H) 04/17/2021    HGBA1C 5.7 (H) 02/17/2020     Results from last 7 days   Lab Units 07/23/25  1106   LACTIC ACID mmol/L 1.0   PROCALCITONIN ng/ml 2.43*       Imaging Results Review: I reviewed radiology reports from this admission including: CT left lower extremity.  Other Study Results Review: No additional pertinent studies reviewed.    Administrative Statements   I have spent a total time of 45 minutes in caring for this patient on the day of the visit/encounter including Diagnostic results, Risks and benefits of tx options, Instructions for management, Patient and family education, Impressions, Documenting in the medical record, Reviewing/placing orders in the medical record (including tests,  medications, and/or procedures), Obtaining or reviewing history  , and Communicating with other healthcare professionals .    ** Please Note: This note has been constructed using a voice recognition system. **         [1]   Past Medical History:  Diagnosis Date    Chronic kidney disease     Hyperlipidemia     Hypertension     Stroke (HCC) 02/10/2019    right side sl weakness   [2]   Past Surgical History:  Procedure Laterality Date    FL RETROGRADE URETHROCYSTOGRAM  6/10/2021    NO PAST SURGERIES      DE CYSTOURETHROSCOPY W/URETERAL CATHETERIZATION N/A 6/10/2021    Procedure: CYSTOSCOPY WITH RETROGRADE PYELOGRAM/internal urethrotomy, DVIU, URETHROGRAM, CYSTOGRAM.;  Surgeon: Fabian Remy MD;  Location: [facility] OR;  Service: Urology    DE TRURL ELECTROSURG RESCJ PROSTATE BLEED COMPLETE N/A 7/15/2021    Procedure: CYSTOSCOPY, TRANSURETHRAL RESECTION OF PROSTATE (TURP);  Surgeon: Fabian Remy MD;  Location: [facility];  Service: Urology   [3]   Social History  Tobacco Use    Smoking status: Former     Current packs/day: 0.00     Average packs/day: 1 pack/day for 30.5 years (30.5 ttl pk-yrs)     Types: Cigarettes     Start date: 1982     Quit date: 2012     Years since quittin.0    Smokeless tobacco: Never   Vaping Use    Vaping status: Never Used   Substance and Sexual Activity    Alcohol use: Yes     Alcohol/week: 6.0 standard drinks of alcohol     Types: 6 Cans of beer per week    Drug use: Never    Sexual activity: Not Currently   [4]   Family History  Problem Relation Name Age of Onset    Diabetes type II Mother      Hypertension Father      Diabetes type II Father      Diabetes type II Sister      Hypertension Brother

## 2025-07-23 NOTE — CONSULTS
Consultation - Orthopedics   Name: Elie Cooper 60 y.o. male I MRN: [id number]  Unit/Bed#: [address] [place] [address] Date of Admission: 7/23/2025   Date of Service: 7/23/2025 I Hospital Day: 0   Consults  Physician Requesting Evaluation: Sheree Sigala MD   Reason for Evaluation / Principal Problem: left knee inflammation/ bursitis     Assessment & Plan  Cellulitis of extremity    Acute pain of left knee  CT scan personally reviewed with Dr. Herring. Upon his review: there is no intra-articular or subcutaneous soft tissue gas or excessive fluid. Hypertrophic bursa with cellulitis, without a retained foreign body.   Overall, the diagnosis appears to be consistent with a cellulitis infection, without concern for septic arthritis.   Recommending continue IV Abx: Vancomycin & Ceftriaxone with resolution of symptoms.   Today's  objective results consisted of a Tmax 99.2 at 1010, CRP of 313.5, sed rate of 67, and WBC 19.29. all which were normal on labs from 7/5/25   Follow up on blood culture results. Continue to monitor clinical exam, temp, and blood work, especially inflammatory/ infectious markers.   Orthopedics will continue to monitor clinical exam, vitals, and blood work. If there are any worsening symptoms or increasing infectious blood markers,  will discuss surgical intervention at that time.  The patient and primary team are in agreement with the plan.   Primary team to monitor and treat co-morbidities and pain control.   Thank you for the consult.   .   Cerebrovascular accident (CVA) due to occlusion of left middle cerebral artery (HCC)    Alcohol abuse    Hypertension    Hypercholesterolemia    Benign essential hypertension    Benign prostatic hyperplasia with lower urinary tract symptoms    Stage 3b chronic kidney disease (HCC)  Lab Results   Component Value Date    EGFR 28 07/23/2025    EGFR 49 07/05/2025    EGFR 45 07/04/2025    CREATININE 2.38 (H) 07/23/2025    CREATININE 1.51 (H) 07/05/2025    CREATININE 1.63 (H)  07/04/2025     ARMANDO (acute kidney injury) (HCC)        History of Present Illness   HPI: Elie Cooper is a 60 y.o. year old male with a PMH of HTN, ARMANDO in setting of CKD III, HTN, hyperlipidemia, CVA with residual right sided mild weakness, and BPH who presents to the ED with left knee pain. He was previously working on garage dolly and suffered a puncture injury to the left knee. He was seen at Urgent Care on 7/21/25 and was given a course of Keflex. He had increasing pain, redness, and swelling. Therefore, he presented to ED and upon evaluation, after obtaining blood work and imaging, he was admitted this afternoon to the Medicine Service for left knee cellulitis and ARMANDO .he was given IV Vanco & Ceftriaxone.     On discussion currently, his pain is 7/10. He states that the pain in his knee has significantly improved from this morning. He has no fever, chills, diaphoresis, nausea, or vomiting.     Review of Systems   Constitutional:  Positive for activity change. Negative for appetite change, chills and unexpected weight change.   HENT:  Negative for congestion, sore throat, trouble swallowing and voice change.    Eyes:  Negative for visual disturbance.   Respiratory:  Negative for cough and shortness of breath.    Cardiovascular:  Negative for chest pain.   Gastrointestinal:  Negative for abdominal distention, nausea and vomiting.   Musculoskeletal:  Positive for arthralgias, gait problem and joint swelling. Negative for back pain and neck pain.   Skin:  Positive for color change (of left knee).   Allergic/Immunologic: Negative for food allergies and immunocompromised state.   Neurological:  Positive for weakness (right side from prior CVA). Negative for dizziness and headaches.   Psychiatric/Behavioral:  Negative for agitation, behavioral problems and confusion.    All other systems reviewed and are negative.   significant for findings described in the HPI.  Historical Information   Past Medical  History[1]  Past Surgical History[2]  Social History[3]  E-Cigarette/Vaping    E-Cigarette Use Never User      E-Cigarette/Vaping Substances    Nicotine No     THC No     CBD No     Flavoring No     Other No     Unknown No      Family history non-contributory    Objective :  Temp:  [97.7 °F (36.5 °C)-99.2 °F (37.3 °C)] 97.9 °F (36.6 °C)  HR:  [63-79] 68  BP: ()/(61-73) 113/73  Resp:  [20] 20  SpO2:  [94 %-100 %] 96 %  O2 Device: None (Room air)  Physical Exam  Vitals reviewed.   Constitutional:       General: He is not in acute distress.     Appearance: Normal appearance. He is not toxic-appearing.   HENT:      Head: Normocephalic and atraumatic.      Right Ear: External ear normal.      Left Ear: External ear normal.      Nose: No congestion or rhinorrhea.      Mouth/Throat:      Mouth: Mucous membranes are dry.      Pharynx: No oropharyngeal exudate.     Eyes:      General: No scleral icterus.     Extraocular Movements: Extraocular movements intact.      Conjunctiva/sclera: Conjunctivae normal.       Cardiovascular:      Rate and Rhythm: Normal rate and regular rhythm.      Pulses: Normal pulses.      Heart sounds: Normal heart sounds.   Pulmonary:      Effort: Pulmonary effort is normal. No respiratory distress.   Abdominal:      Palpations: Abdomen is soft.      Tenderness: There is no abdominal tenderness.     Musculoskeletal:         General: Swelling, tenderness and signs of injury present.      Cervical back: Normal range of motion and neck supple. No rigidity.     Skin:     General: Skin is warm and dry.      Coloration: Skin is not jaundiced.      Findings: Erythema (left knee) present.     Neurological:      General: No focal deficit present.      Mental Status: He is alert and oriented to person, place, and time.      Motor: Weakness (mild right side secondary to previous CVA) present.     Psychiatric:         Mood and Affect: Mood normal.         Thought Content: Thought content normal.     Ortho  "Exam   Musculoskeletal: Left lower extremity  Skin: left knee warm to touch with a well demarcated area of erythema about knee. This will be outlined. Mild to moderate tenderness to palpation over left knee, severe pain not reproducible during exam.  Hypertrophic skin changes noted on left knee. Edema is present, but no palpable effusion or fluctuance.  Vascular Status:  2+ DP pulse. No calf swelling or tenderness to palpation. Thigh and lower leg compartments soft, non tender.  Left knee range of motion intact with a normal range of motion arc.   Neurologic Status Motor intact to +FHL/EHL, +ankle dorsi/plantar flexion. Sensation intact to saphenous, sural, tibial, superficial peroneal nerve, and deep peroneal      Lab Results: I have reviewed the following results:   Recent Labs     07/23/25  1106   WBC 19.29*   HGB 14.6   HCT 45.4      BUN 37*   CREATININE 2.38*   PTT 34   INR 1.09   ESR 67*   .5*     Blood Culture: No results found for: \"BLOODCX\"  Wound Culture: No results found for: \"WOUNDCULT\"    Imaging Results Review: I personally reviewed the following image studies in PACS and associated radiology reports: CT of left lower extremity and xray(s). My interpretation of the radiology images/reports is: xray of the left knee does not reveal any acute fracture, dislocation, or osteomyelitis.It's appearance does show  findings, consistent superficial infrapatellar bursitis. This study was performed on 7/21/25.   CT scan of the LLE from today reveals subcutaneous edema surrounding anterior left knee, suspicious for cellulitis, without accumulation of fluid.   Other Study Results Review: No additional pertinent studies reviewed.       [1]   Past Medical History:  Diagnosis Date    Chronic kidney disease     Hyperlipidemia     Hypertension     Stroke (HCC) 02/10/2019    right side sl weakness   [2]   Past Surgical History:  Procedure Laterality Date    FL RETROGRADE URETHROCYSTOGRAM  6/10/2021    NO " PAST SURGERIES      NE CYSTOURETHROSCOPY W/URETERAL CATHETERIZATION N/A 6/10/2021    Procedure: CYSTOSCOPY WITH RETROGRADE PYELOGRAM/internal urethrotomy, DVIU, URETHROGRAM, CYSTOGRAM.;  Surgeon: Fabian Remy MD;  Location: [facility];  Service: Urology    NE TRURL ELECTROSURG RESCJ PROSTATE BLEED COMPLETE N/A 7/15/2021    Procedure: CYSTOSCOPY, TRANSURETHRAL RESECTION OF PROSTATE (TURP);  Surgeon: Fabian Remy MD;  Location: [facility];  Service: Urology   [3]   Social History  Tobacco Use    Smoking status: Former     Current packs/day: 0.00     Average packs/day: 1 pack/day for 30.5 years (30.5 ttl pk-yrs)     Types: Cigarettes     Start date: 1982     Quit date: 2012     Years since quittin.0    Smokeless tobacco: Never   Vaping Use    Vaping status: Never Used   Substance and Sexual Activity    Alcohol use: Yes     Alcohol/week: 6.0 standard drinks of alcohol     Types: 6 Cans of beer per week    Drug use: Never    Sexual activity: Not Currently

## 2025-07-23 NOTE — ASSESSMENT & PLAN NOTE
History of left MCA CVA with mild residual right-sided weakness  Currently at baseline  Continue home clopidogrel, atorvastatin, ezetimibe

## 2025-07-23 NOTE — ED PROVIDER NOTES
Time reflects when diagnosis was documented in both MDM as applicable and the Disposition within this note       Time User Action Codes Description Comment    7/23/2025  1:36 PM Opal Sampson Add [L03.90] Cellulitis     7/23/2025  1:36 PM Opal Sampson Add [N17.9] ARMANDO (acute kidney injury) (HCC)     7/23/2025  1:37 PM Opal Sampson [N18.32] Stage 3b chronic kidney disease (HCC)     7/23/2025  1:37 PM Opal Sampson [I10] Benign essential hypertension     7/23/2025  3:20 PM Sid Sigalakevon Add [L03.116] Cellulitis of left lower extremity           ED Disposition       ED Disposition   Admit    Condition   Stable    Date/Time   Wed Jul 23, 2025  1:36 PM    Comment   --             Assessment & Plan       Medical Decision Making  Amount and/or Complexity of Data Reviewed  Labs: ordered. Decision-making details documented in ED Course.  Radiology: ordered.    Risk  OTC drugs.  Prescription drug management.  Decision regarding hospitalization.        Patient is a 60-year-old male with history of HTN and CKD who presents for evaluation of left knee pain.  On exam, patient is in no acute distress.  Vital signs are stable.  Differential includes cellulitis, abscess, osteomyelitis.  Will order septic workup and CT of lower extremity.  Will treat symptomatically with Tylenol. vancomycin and Rocephin were ordered.    Leukocytosis with a white count of 19. Mildly hyponatremic at 131.  He was started on fluids.  He has an ARMANDO with a creatinine of 2.38.  Baseline is 1.6-1.7.  Pro-Fermin is elevated.  CRP is elevated.  CT consistent with cellulitis.  Discussed patient with [facility]IM who will admit for further management.        ED Course as of 07/23/25 2021 Wed Jul 23, 2025   1131 WBC(!): 19.29       Medications   vancomycin (VANCOCIN) 1,250 mg in sodium chloride 0.9 % 250 mL IVPB (0 mg Intravenous Stopped 7/23/25 1327)   cefTRIAXone (ROCEPHIN) IVPB (premix in dextrose) 1,000 mg 50 mL (0 mg Intravenous Stopped 7/23/25 1150)  "  acetaminophen (TYLENOL) tablet 975 mg (975 mg Oral Given 7/23/25 1113)   sodium chloride 0.9 % bolus 1,000 mL (0 mL Intravenous Stopped 7/23/25 1428)       ED Risk Strat Scores                    No data recorded        SBIRT 20yo+      Flowsheet Row Most Recent Value   Initial Alcohol Screen: US AUDIT-C     1. How often do you have a drink containing alcohol? 1 Filed at: 07/23/2025 1013   2. How many drinks containing alcohol do you have on a typical day you are drinking?  2 Filed at: 07/23/2025 1013   3a. Male UNDER 65: How often do you have five or more drinks on one occasion? 0 Filed at: 07/23/2025 1013   Audit-C Score 3 Filed at: 07/23/2025 1013   INA: How many times in the past year have you...    Used an illegal drug or used a prescription medication for non-medical reasons? Never Filed at: 07/23/2025 1013                            History of Present Illness       Chief Complaint   Patient presents with    Knee Pain     Pt went to urgent care on Monday for redness,swelling to left knee dx with cellulitis started on keflex. Today woke up, knee \"blew up\" more swollen increased pain 8/10, redness to knee cap and surrounding areas streaking up thigh and behind knee. Denies fever. Decreased mobility.       Past Medical History[1]   Past Surgical History[2]   Family History[3]   Social History[4]   E-Cigarette/Vaping    E-Cigarette Use Never User       E-Cigarette/Vaping Substances    Nicotine No     THC No     CBD No     Flavoring No     Other No     Unknown No       I have reviewed and agree with the history as documented.     HPI    Patient is a 60-year-old male with history of HTN and CKD who presents for evaluation of left knee pain.  Patient states about a week ago he was working on a garage floor when something punctured his left knee.  He has had progressively worsening redness and pain. He was seen in urgent care 2 days ago and prescribed Keflex.  He has been compliant with the medications but today " when he woke up the redness and pain had worsened. He is able to ambulate but it is painful.  He has not been taking pain medications for the pain. He is up to date on tetanus. He denies fevers, chills, nausea, vomiting.       Review of Systems   Constitutional:  Negative for chills and fever.   Gastrointestinal:  Negative for nausea and vomiting.   Musculoskeletal:  Negative for joint swelling and myalgias.   Skin:  Positive for color change and wound.   Neurological:  Negative for weakness and numbness.   All other systems reviewed and are negative.          Objective       ED Triage Vitals [07/23/25 1010]   Temperature Pulse Blood Pressure Respirations SpO2 Patient Position - Orthostatic VS   99.2 °F (37.3 °C) 79 112/68 20 97 % Lying      Temp Source Heart Rate Source BP Location FiO2 (%) Pain Score    Oral Monitor Right arm -- 8      Vitals      Date and Time Temp Pulse SpO2 Resp BP Pain Score FACES Pain Rating User   07/23/25 1848 98.2 °F (36.8 °C) 92 98 % 18 127/78 -- -- DII   07/23/25 1520 -- -- -- -- -- 7 -- CG   07/23/25 1515 -- 68 96 % -- -- -- -- DII   07/23/25 1508 -- -- -- 17 -- -- -- CG   07/23/25 1508 97.9 °F (36.6 °C) -- -- -- 113/73 -- -- DII   07/23/25 1400 -- 63 97 % -- 104/62 -- -- MDD   07/23/25 1345 -- 63 98 % 20 104/64 -- -- OE   07/23/25 1336 -- 65 96 % -- 99/62 -- -- OE   07/23/25 1327 97.7 °F (36.5 °C) 66 97 % 20 99/61 No Pain -- MDD   07/23/25 1321 -- 69 100 % -- 97/61 -- -- OE   07/23/25 1306 -- 67 97 % -- 97/61 -- -- OE   07/23/25 1251 -- 66 96 % -- 92/64 -- -- OE   07/23/25 1206 -- 71 94 % -- 96/63 -- -- OE   07/23/25 1113 -- -- -- -- -- 8 -- MDD   07/23/25 1010 99.2 °F (37.3 °C) 79 97 % 20 112/68 8 -- [facility]            Physical Exam  Vitals and nursing note reviewed.   HENT:      Head: Normocephalic and atraumatic.      Nose: No congestion or rhinorrhea.      Mouth/Throat:      Mouth: Mucous membranes are moist.     Eyes:      Extraocular Movements: Extraocular movements intact.        Cardiovascular:      Rate and Rhythm: Normal rate and regular rhythm.   Pulmonary:      Effort: Pulmonary effort is normal.     Musculoskeletal:      Cervical back: Normal range of motion.      Comments: Puncture wound to left knee with surrounding erythema and tenderness.  Warm to touch.  No crepitus.  Photo in media tab.     Skin:     General: Skin is warm and dry.      Capillary Refill: Capillary refill takes less than 2 seconds.     Neurological:      Mental Status: He is alert.      Sensory: No sensory deficit.      Motor: No weakness.         Results Reviewed       Procedure Component Value Units Date/Time    Blood culture #2 [[id number]] Collected: 07/23/25 1106    Lab Status: Preliminary result Specimen: Blood from Arm, Left Updated: 07/23/25 2017     Blood Culture Received in Microbiology Lab. Culture in Progress.    Blood culture #1 [[id number]] Collected: 07/23/25 1124    Lab Status: Preliminary result Specimen: Blood from Hand, Left Updated: 07/23/25 2001     Blood Culture Received in Microbiology Lab. Culture in Progress.    Procalcitonin [[id number]]  (Abnormal) Collected: 07/23/25 1106    Lab Status: Final result Specimen: Blood from Arm, Left Updated: 07/23/25 1155     Procalcitonin 2.43 ng/ml     Sedimentation rate, automated [[id number]]  (Abnormal) Collected: 07/23/25 1106    Lab Status: Final result Specimen: Blood from Arm, Left Updated: 07/23/25 1146     Sed Rate 67 mm/hour     Comprehensive metabolic panel [[id number]]  (Abnormal) Collected: 07/23/25 1106    Lab Status: Final result Specimen: Blood from Arm, Left Updated: 07/23/25 1143     Sodium 131 mmol/L      Potassium 4.1 mmol/L      Chloride 97 mmol/L      CO2 25 mmol/L      ANION GAP 9 mmol/L      BUN 37 mg/dL      Creatinine 2.38 mg/dL      Glucose 106 mg/dL      Calcium 9.5 mg/dL      AST 11 U/L      ALT 17 U/L      Alkaline Phosphatase 75 U/L      Total Protein 7.5 g/dL      Albumin 3.7 g/dL      Total Bilirubin 1.32 mg/dL      eGFR  28 ml/min/1.73sq m     Narrative:      National Kid[employer] Foundation guidelines for Chronic Kidney Disease (CKD):     Stage 1 with normal or high GFR (GFR > 90 mL/min/1.73 square meters)    Stage 2 Mild CKD (GFR = 60-89 mL/min/1.73 square meters)    Stage 3A Moderate CKD (GFR = 45-59 mL/min/1.73 square meters)    Stage 3B Moderate CKD (GFR = 30-44 mL/min/1.73 square meters)    Stage 4 Severe CKD (GFR = 15-29 mL/min/1.73 square meters)    Stage 5 End Stage CKD (GFR <15 mL/min/1.73 square meters)  Note: GFR calculation is accurate only with a steady state creatinine    C-reactive protein [[id number]]  (Abnormal) Collected: 07/23/25 1106    Lab Status: Final result Specimen: Blood from Arm, Left Updated: 07/23/25 1143     .5 mg/L     Lactic acid [[id number]]  (Normal) Collected: 07/23/25 1106    Lab Status: Final result Specimen: Blood from Arm, Left Updated: 07/23/25 1142     LACTIC ACID 1.0 mmol/L     Narrative:      Result may be elevated if tourniquet was used during collection.    Protime-INR [[id number]]  (Normal) Collected: 07/23/25 1106    Lab Status: Final result Specimen: Blood from Arm, Left Updated: 07/23/25 1140     Protime 14.7 seconds      INR 1.09    Narrative:      INR Therapeutic Range    Indication                                             INR Range      Atrial Fibrillation                                               2.0-3.0  Hypercoagulable State                                    2.0.2.3  Left Ventricular Asist Device                            2.0-3.0  Mechanical Heart Valve                                  -    Aortic(with afib, MI, embolism, HF, LA enlargement,    and/or coagulopathy)                                     2.0-3.0 (2.5-3.5)     Mitral                                                             2.5-3.5  Prosthetic/Bioprosthetic Heart Valve               2.0-3.0  Venous thromboembolism (VTE: VT, PE        2.0-3.0    APTT [[id number]]  (Normal) Collected: 07/23/25 1106     Lab Status: Final result Specimen: Blood from Arm, Left Updated: 07/23/25 1140     PTT 34 seconds     CBC and differential [[id number]]  (Abnormal) Collected: 07/23/25 1106    Lab Status: Final result Specimen: Blood from Arm, Left Updated: 07/23/25 1116     WBC 19.29 Thousand/uL      RBC 5.20 Million/uL      Hemoglobin 14.6 g/dL      Hematocrit 45.4 %      MCV 87 fL      MCH 28.1 pg      MCHC 32.2 g/dL      RDW 13.6 %      MPV 9.2 fL      Platelets 235 Thousands/uL      nRBC 0 /100 WBCs      Segmented % 86 %      Immature Grans % 1 %      Lymphocytes % 6 %      Monocytes % 6 %      Eosinophils Relative 1 %      Basophils Relative 0 %      Absolute Neutrophils 16.66 Thousands/µL      Absolute Immature Grans 0.21 Thousand/uL      Absolute Lymphocytes 1.13 Thousands/µL      Absolute Monocytes 1.11 Thousand/µL      Eosinophils Absolute 0.14 Thousand/µL      Basophils Absolute 0.04 Thousands/µL             CT lower extremity wo contrast left   Final Interpretation by Christiano Garber MD (07/23 1326)      Skin thickening and subcutaneous edema surrounding the anterior left knee consistent with cellulitis.   No focal fluid collection. No soft tissue gas.   No underlying osseous abnormality.         The study was marked in EPIC for immediate notification.         Workstation performed: [id number]          VAS VENOUS DUPLEX - LOWER LIMB BILATERAL    (Results Pending)       Procedures    ED Medication and Procedure Management   Prior to Admission Medications   Prescriptions Last Dose Informant Patient Reported? Taking?   Thiamine Mononitrate (VITAMIN B1) 100 mg tablet  Self No No   Sig: Take 1 tablet (100 mg total) by mouth daily   atorvastatin (LIPITOR) 80 mg tablet  Self No No   Sig: Take 1 tablet (80 mg total) by mouth daily with dinner   cephalexin (KEFLEX) 500 mg capsule   No No   Sig: Take 1 capsule (500 mg total) by mouth every 6 (six) hours for 7 days   cholecalciferol (VITAMIN D3) 1,000 units tablet  Self Yes No    Sig: Take 2,000 Units by mouth in the morning.   clopidogrel (PLAVIX) 75 mg tablet  Self No No   Sig: Take 1 tablet (75 mg total) by mouth daily   ezetimibe (ZETIA) 10 mg tablet  Self No No   Sig: Take 1 tablet (10 mg total) by mouth daily   folic acid (FOLVITE) 1 mg tablet  Self No No   Sig: Take 1 tablet (1 mg total) by mouth daily   losartan (COZAAR) 100 MG tablet  Self No No   Sig: Take 1 tablet (100 mg total) by mouth daily   metoprolol tartrate (LOPRESSOR) 25 mg tablet  Self No No   Sig: Take 1 tablet (25 mg total) by mouth daily      Facility-Administered Medications: None     Current Discharge Medication List        CONTINUE these medications which have NOT CHANGED    Details   atorvastatin (LIPITOR) 80 mg tablet Take 1 tablet (80 mg total) by mouth daily with dinner  Qty: 90 tablet, Refills: 0    Associated Diagnoses: Cerebrovascular accident (CVA) due to occlusion of left middle cerebral artery (HCC)      cephalexin (KEFLEX) 500 mg capsule Take 1 capsule (500 mg total) by mouth every 6 (six) hours for 7 days  Qty: 28 capsule, Refills: 0    Associated Diagnoses: Cellulitis of left knee      cholecalciferol (VITAMIN D3) 1,000 units tablet Take 2,000 Units by mouth in the morning.      clopidogrel (PLAVIX) 75 mg tablet Take 1 tablet (75 mg total) by mouth daily  Qty: 90 tablet, Refills: 0    Associated Diagnoses: Cerebrovascular accident (CVA) due to occlusion of left middle cerebral artery (HCC)      ezetimibe (ZETIA) 10 mg tablet Take 1 tablet (10 mg total) by mouth daily  Qty: 90 tablet, Refills: 2    Associated Diagnoses: Pure hypertriglyceridemia      folic acid (FOLVITE) 1 mg tablet Take 1 tablet (1 mg total) by mouth daily  Qty: 90 tablet, Refills: 2    Associated Diagnoses: Alcohol abuse      losartan (COZAAR) 100 MG tablet Take 1 tablet (100 mg total) by mouth daily  Qty: 90 tablet, Refills: 1    Associated Diagnoses: Essential hypertension      metoprolol tartrate (LOPRESSOR) 25 mg tablet Take 1  tablet (25 mg total) by mouth daily  Qty: 90 tablet, Refills: 3    Associated Diagnoses: Essential hypertension      Thiamine Mononitrate (VITAMIN B1) 100 mg tablet Take 1 tablet (100 mg total) by mouth daily  Qty: 90 tablet, Refills: 2    Associated Diagnoses: Alcohol abuse           No discharge procedures on file.  ED SEPSIS DOCUMENTATION   Time reflects when diagnosis was documented in both MDM as applicable and the Disposition within this note       Time User Action Codes Description Comment    7/23/2025  1:36 PM Opal Sampson Add [L03.90] Cellulitis     7/23/2025  1:36 PM Opal Sampson Add [N17.9] ARMANDO (acute kidney injury) ([facility])     7/23/2025  1:37 PM Opal Sampson [N18.32] Stage 3b chronic kidney disease ([facility])     7/23/2025  1:37 PM Opal Sampson [I10] Benign essential hypertension     7/23/2025  3:20 PM Sheree Sigala Add [L03.116] Cellulitis of left lower extremity                      [1]   Past Medical History:  Diagnosis Date    Chronic kidney disease     Hyperlipidemia     Hypertension     Stroke ([facility]) 02/10/2019    right side sl weakness   [2]   Past Surgical History:  Procedure Laterality Date    FL RETROGRADE URETHROCYSTOGRAM  6/10/2021    NO PAST SURGERIES      AK CYSTOURETHROSCOPY W/URETERAL CATHETERIZATION N/A 6/10/2021    Procedure: CYSTOSCOPY WITH RETROGRADE PYELOGRAM/internal urethrotomy, DVIU, URETHROGRAM, CYSTOGRAM.;  Surgeon: Fabian Remy MD;  Location: [facility];  Service: Urology    AK TRURL ELECTROSURG RESCJ PROSTATE BLEED COMPLETE N/A 7/15/2021    Procedure: CYSTOSCOPY, TRANSURETHRAL RESECTION OF PROSTATE (TURP);  Surgeon: Fabian Remy MD;  Location: [facility];  Service: Urology   [3]   Family History  Problem Relation Name Age of Onset    Diabetes type II Mother      Hypertension Father      Diabetes type II Father      Diabetes type II Sister      Hypertension Brother     [4]   Social History  Tobacco Use    Smoking status: Former     Current packs/day: 0.00     Average  packs/day: 1 pack/day for 30.5 years (30.5 ttl pk-yrs)     Types: Cigarettes     Start date: 1982     Quit date: 2012     Years since quittin.0    Smokeless tobacco: Never   Vaping Use    Vaping status: Never Used   Substance Use Topics    Alcohol use: Yes     Alcohol/week: 6.0 standard drinks of alcohol     Types: 6 Cans of beer per week    Drug use: Never        Opal Sampson MD  25

## 2025-07-23 NOTE — ASSESSMENT & PLAN NOTE
Patient reported that about a week ago he was working on dolly in the garage and had a puncture injury to the left knee.    Subsequently he had increasing redness and pain and was evaluated urgent care center on 7/21/2025, patient was prescribed cephalexin.    Despite compliance with antibiotics he noted increasing swelling redness pain swelling and redness of left lower extremity and he presented to ED for further evaluation.    In ED, patient was afebrile, blood pressure was low normal other vital signs were stable saturating adequately on room air.    Lab revealed leukocytosis of 19K creatinine was elevated at 2.38, lactic acid was within normal limit, procalcitonin and  inflammatory markers were elevated.     CT scan of the lower extremity revealed skin thickening and subcutaneous edema surrounding anterior left knee consistent with cellulitis no focal collection or soft tissue gas noted.  No underlying osseous abnormalities noted.    Patient received Tylenol, 1 L IV fluid bolus, IV vancomycin and ceftriaxone and subsequently referred for admission.  Patient is up-to-date with tetanus shot documented in 2022  Left lower extremity cellulitis after puncture wound possible bursitis, less likely septic arthritis  Will continue IV vancomycin, ceftriaxone 2 g daily  Pharmacy consult for vancomycin dosing  Follow-up blood cultures  Orthopedic evaluation  Venous Doppler lower extremity  Leg elevation as tolerated  MRSA surveillance culture  Monitor clinically

## 2025-07-23 NOTE — ASSESSMENT & PLAN NOTE
Creatinine on presentation 2.38 mg/dL  Suspect in setting of hypoperfusion in setting of low blood pressure  Patient denies any urinary symptoms at present, denies NSAID use  IV fluids  Check UA  Check bladder scan to evaluate PVR  Avoid hypotension, nephrotoxin, hold losartan  Monitor intake output  Follow-up BMP, electrolytes

## 2025-07-24 ENCOUNTER — APPOINTMENT (INPATIENT)
Dept: RADIOLOGY | Facility: HOSPITAL | Age: 60
End: 2025-07-24
Payer: COMMERCIAL

## 2025-07-24 PROBLEM — N18.9 ACUTE KIDNEY INJURY SUPERIMPOSED ON CHRONIC KIDNEY DISEASE  (HCC): Status: ACTIVE | Noted: 2025-07-23

## 2025-07-24 LAB
ANION GAP SERPL CALCULATED.3IONS-SCNC: 9 MMOL/L (ref 4–13)
BUN SERPL-MCNC: 29 MG/DL (ref 5–25)
CALCIUM SERPL-MCNC: 8.1 MG/DL (ref 8.4–10.2)
CHLORIDE SERPL-SCNC: 104 MMOL/L (ref 96–108)
CO2 SERPL-SCNC: 20 MMOL/L (ref 21–32)
CREAT SERPL-MCNC: 1.67 MG/DL (ref 0.6–1.3)
ERYTHROCYTE [DISTWIDTH] IN BLOOD BY AUTOMATED COUNT: 13.6 % (ref 11.6–15.1)
GFR SERPL CREATININE-BSD FRML MDRD: 43 ML/MIN/1.73SQ M
GLUCOSE SERPL-MCNC: 111 MG/DL (ref 65–140)
HCT VFR BLD AUTO: 38 % (ref 36.5–49.3)
HGB BLD-MCNC: 12.5 G/DL (ref 12–17)
MCH RBC QN AUTO: 28.4 PG (ref 26.8–34.3)
MCHC RBC AUTO-ENTMCNC: 32.9 G/DL (ref 31.4–37.4)
MCV RBC AUTO: 86 FL (ref 82–98)
PLATELET # BLD AUTO: 219 THOUSANDS/UL (ref 149–390)
PMV BLD AUTO: 9.4 FL (ref 8.9–12.7)
POTASSIUM SERPL-SCNC: 3.9 MMOL/L (ref 3.5–5.3)
RBC # BLD AUTO: 4.4 MILLION/UL (ref 3.88–5.62)
SODIUM SERPL-SCNC: 133 MMOL/L (ref 135–147)
VANCOMYCIN SERPL-MCNC: 5.7 UG/ML (ref 10–20)
WBC # BLD AUTO: 12.48 THOUSAND/UL (ref 4.31–10.16)

## 2025-07-24 PROCEDURE — 85027 COMPLETE CBC AUTOMATED: CPT | Performed by: INTERNAL MEDICINE

## 2025-07-24 PROCEDURE — 99232 SBSQ HOSP IP/OBS MODERATE 35: CPT | Performed by: ORTHOPAEDIC SURGERY

## 2025-07-24 PROCEDURE — 93970 EXTREMITY STUDY: CPT | Performed by: SURGERY

## 2025-07-24 PROCEDURE — 80048 BASIC METABOLIC PNL TOTAL CA: CPT | Performed by: INTERNAL MEDICINE

## 2025-07-24 PROCEDURE — 80202 ASSAY OF VANCOMYCIN: CPT | Performed by: INTERNAL MEDICINE

## 2025-07-24 PROCEDURE — 93970 EXTREMITY STUDY: CPT

## 2025-07-24 PROCEDURE — 99232 SBSQ HOSP IP/OBS MODERATE 35: CPT

## 2025-07-24 RX ORDER — ACETAMINOPHEN 325 MG/1
975 TABLET ORAL EVERY 6 HOURS SCHEDULED
Status: DISCONTINUED | OUTPATIENT
Start: 2025-07-24 | End: 2025-07-25 | Stop reason: HOSPADM

## 2025-07-24 RX ORDER — CEFAZOLIN SODIUM 2 G/50ML
2000 SOLUTION INTRAVENOUS EVERY 8 HOURS
Status: DISCONTINUED | OUTPATIENT
Start: 2025-07-24 | End: 2025-07-25

## 2025-07-24 RX ADMIN — ATORVASTATIN CALCIUM 80 MG: 80 TABLET, FILM COATED ORAL at 16:52

## 2025-07-24 RX ADMIN — SODIUM CHLORIDE 75 ML/HR: 0.9 INJECTION, SOLUTION INTRAVENOUS at 06:42

## 2025-07-24 RX ADMIN — FOLIC ACID 1 MG: 1 TABLET ORAL at 08:49

## 2025-07-24 RX ADMIN — VANCOMYCIN HYDROCHLORIDE 1250 MG: 10 INJECTION, POWDER, LYOPHILIZED, FOR SOLUTION INTRAVENOUS at 08:51

## 2025-07-24 RX ADMIN — EZETIMIBE 10 MG: 10 TABLET ORAL at 08:49

## 2025-07-24 RX ADMIN — Medication 2000 UNITS: at 08:48

## 2025-07-24 RX ADMIN — CLOPIDOGREL 75 MG: 75 TABLET ORAL at 08:49

## 2025-07-24 RX ADMIN — ACETAMINOPHEN 650 MG: 325 TABLET, FILM COATED ORAL at 16:55

## 2025-07-24 RX ADMIN — ACETAMINOPHEN 975 MG: 325 TABLET, FILM COATED ORAL at 23:18

## 2025-07-24 RX ADMIN — CEFAZOLIN SODIUM 2000 MG: 2 SOLUTION INTRAVENOUS at 21:05

## 2025-07-24 RX ADMIN — ENOXAPARIN SODIUM 40 MG: 40 INJECTION SUBCUTANEOUS at 08:49

## 2025-07-24 RX ADMIN — METOPROLOL TARTRATE 25 MG: 25 TABLET, FILM COATED ORAL at 08:49

## 2025-07-24 RX ADMIN — CEFAZOLIN SODIUM 2000 MG: 2 SOLUTION INTRAVENOUS at 14:41

## 2025-07-24 RX ADMIN — THIAMINE HCL TAB 100 MG 100 MG: 100 TAB at 08:49

## 2025-07-24 NOTE — PLAN OF CARE
Problem: Potential for Falls  Goal: Patient will remain free of falls  Description: INTERVENTIONS:  - Educate patient/family on patient safety including physical limitations  - Instruct patient to call for assistance with activity   - Consider consulting OT/PT to assist with strengthening/mobility based on AM PAC & JH-HLM score  - Consult OT/PT to assist with strengthening/mobility   - Keep Call bell within reach  - Keep bed low and locked with side rails adjusted as appropriate  - Keep care items and personal belongings within reach  - Initiate and maintain comfort rounds  - Make Fall Risk Sign visible to staff  - Offer Toileting every 2 Hours, in advance of need  - Initiate/Maintain bed alarm  - Obtain necessary fall risk management equipment: yellow socks  - Apply yellow socks and bracelet for high fall risk patients  - Consider moving patient to room near nurses station  Outcome: Progressing     Problem: PAIN - ADULT  Goal: Verbalizes/displays adequate comfort level or baseline comfort level  Description: Interventions:  - Encourage patient to monitor pain and request assistance  - Assess pain using appropriate pain scale  - Administer analgesics as ordered based on type and severity of pain and evaluate response  - Implement non-pharmacological measures as appropriate and evaluate response  - Consider cultural and social influences on pain and pain management  - Notify physician/advanced practitioner if interventions unsuccessful or patient reports new pain  - Educate patient/family on pain management process including their role and importance of  reporting pain   - Provide non-pharmacologic/complimentary pain relief interventions  Outcome: Progressing     Problem: INFECTION - ADULT  Goal: Absence or prevention of progression during hospitalization  Description: INTERVENTIONS:  - Assess and monitor for signs and symptoms of infection  - Monitor lab/diagnostic results  - Monitor all insertion sites, i.e.  indwelling lines, tubes, and drains  - Monitor endotracheal if appropriate and nasal secretions for changes in amount and color  - [place] appropriate cooling/warming therapies per order  - Administer medications as ordered  - Instruct and encourage patient and family to use good hand hygiene technique  - Identify and instruct in appropriate isolation precautions for identified infection/condition  Outcome: Progressing     Problem: SAFETY ADULT  Goal: Maintain or return to baseline ADL function  Description: INTERVENTIONS:  -  Assess patient's ability to carry out ADLs; assess patient's baseline for ADL function and identify physical deficits which impact ability to perform ADLs (bathing, care of mouth/teeth, toileting, grooming, dressing, etc.)  - Assess/evaluate cause of self-care deficits   - Assess range of motion  - Assess patient's mobility; develop plan if impaired  - Assess patient's need for assistive devices and provide as appropriate  - Encourage maximum independence but intervene and supervise when necessary  - Involve family in performance of ADLs  - Assess for home care needs following discharge   - Consider OT consult to assist with ADL evaluation and planning for discharge  - Provide patient education as appropriate  - Monitor functional capacity and physical performance, use of AM PAC & JH-HLM   - Monitor gait, balance and fatigue with ambulation    Outcome: Progressing     Problem: DISCHARGE PLANNING  Goal: Discharge to home or other facility with appropriate resources  Description: INTERVENTIONS:  - Identify barriers to discharge w/patient and caregiver  - Arrange for needed discharge resources and transportation as appropriate  - Identify discharge learning needs (meds, wound care, etc.)  - Arrange for interpretive services to assist at discharge as needed  - Refer to Case Management Department for coordinating discharge planning if the patient needs post-hospital services based on  physician/advanced practitioner order or complex needs related to functional status, cognitive ability, or social support system  Outcome: Progressing

## 2025-07-24 NOTE — PLAN OF CARE
Problem: PAIN - ADULT  Goal: Verbalizes/displays adequate comfort level or baseline comfort level  Description: Interventions:  - Encourage patient to monitor pain and request assistance  - Assess pain using appropriate pain scale  - Administer analgesics as ordered based on type and severity of pain and evaluate response  - Implement non-pharmacological measures as appropriate and evaluate response  - Consider cultural and social influences on pain and pain management  - Notify physician/advanced practitioner if interventions unsuccessful or patient reports new pain  - Educate patient/family on pain management process including their role and importance of  reporting pain   - Provide non-pharmacologic/complimentary pain relief interventions  Outcome: Progressing     Problem: INFECTION - ADULT  Goal: Absence or prevention of progression during hospitalization  Description: INTERVENTIONS:  - Assess and monitor for signs and symptoms of infection  - Monitor lab/diagnostic results  - Monitor all insertion sites, i.e. indwelling lines, tubes, and drains  - Monitor endotracheal if appropriate and nasal secretions for changes in amount and color  - Golden appropriate cooling/warming therapies per order  - Administer medications as ordered  - Instruct and encourage patient and family to use good hand hygiene technique  - Identify and instruct in appropriate isolation precautions for identified infection/condition  Outcome: Progressing     Problem: SAFETY ADULT  Goal: Maintain or return to baseline ADL function  Description: INTERVENTIONS:  -  Assess patient's ability to carry out ADLs; assess patient's baseline for ADL function and identify physical deficits which impact ability to perform ADLs (bathing, care of mouth/teeth, toileting, grooming, dressing, etc.)  - Assess/evaluate cause of self-care deficits   - Assess range of motion  - Assess patient's mobility; develop plan if impaired  - Assess patient's need for  assistive devices and provide as appropriate  - Encourage maximum independence but intervene and supervise when necessary  - Involve family in performance of ADLs  - Assess for home care needs following discharge   - Consider OT consult to assist with ADL evaluation and planning for discharge  - Provide patient education as appropriate  - Monitor functional capacity and physical performance, use of AM PAC & JH-HLM   - Monitor gait, balance and fatigue with ambulation    Outcome: Progressing     Problem: DISCHARGE PLANNING  Goal: Discharge to home or other facility with appropriate resources  Description: INTERVENTIONS:  - Identify barriers to discharge w/patient and caregiver  - Arrange for needed discharge resources and transportation as appropriate  - Identify discharge learning needs (meds, wound care, etc.)  - Arrange for interpretive services to assist at discharge as needed  - Refer to Case Management Department for coordinating discharge planning if the patient needs post-hospital services based on physician/advanced practitioner order or complex needs related to functional status, cognitive ability, or social support system  Outcome: Progressing

## 2025-07-24 NOTE — UTILIZATION REVIEW
"Initial Clinical Review    Admission: Date/Time/Statement:   Admission Orders (From admission, onward)       Ordered        07/23/25 1418  INPATIENT ADMISSION  Once                          Orders Placed This Encounter   Procedures    INPATIENT ADMISSION     Standing Status:   Standing     Number of Occurrences:   1     Level of Care:   Med Surg [16]     Estimated length of stay:   More than 2 Midnights     Certification:   I certify that inpatient services are medically necessary for this patient for a duration of greater than two midnights. See H&P and MD Progress Notes for additional information about the patient's course of treatment.     ED Arrival Information       Expected   -    Arrival   7/23/2025 09:58    Acuity   Urgent              Means of arrival   Wheelchair    Escorted by   Self    Service   Hospitalist    Admission type   Emergency              Arrival complaint   Knee problem.             Chief Complaint   Patient presents with    Knee Pain     Pt went to urgent care on Monday for redness,swelling to left knee dx with cellulitis started on keflex. Today woke up, knee \"blew up\" more swollen increased pain 8/10, redness to knee cap and surrounding areas streaking up thigh and behind knee. Denies fever. Decreased mobility.       Initial Presentation:   60 yom to ER from home  for evaluation of left knee pain.  Patient states about a week ago he was working on a garage floor when something punctured his left knee.  He has had progressively worsening redness and pain. He was seen in urgent care 2 days ago and prescribed Keflex.  He has been compliant with the medications but today when he woke up the redness and pain had worsened. He is able to ambulate but it is painful. Hx CKD, HTN. Presents with temp 99. Exam: Puncture wound to left knee with surrounding erythema and tenderness. Warm to touch. No crepitus. See images below. Admission CT LLE: Skin thickening and subcutaneous edema surrounding the " anterior left knee consistent with cellulitis. No focal fluid collection. No soft tissue gas. No underlying osseous abnormality. Labs: WBC 19.29, Na 131, BUN 37, Cr 2.38, ast 11, tbili 1.32, procalcitonin 2.43, .5, sed rate 67, u/a+wbc, rbc, occas bacteria.  Admitted to inpatient status for cellulitis LLE. Plan: IVF, IVABT, cultures, consult ortho.    Per ortho: left knee inflammation/ bursitis   CT is without intra-articular or subcuticular gas, large effusion, abscess/fluid collection, or foreign body.  Findings consistent with cellulitis were present surrounding the knee and hypertrophic bursa.  Clinically the patient was without signs of septic arthritis of the left knee.    Continue IV abx. WBAT LLE. PT/OT to tolerance once symptoms improve. Will continue to monitor clinically.  Patient already reports improvements in pain with initiation of IV Abx.  No surgical intervention at this time.                Anticipated Length of Stay/Certification Statement:   Patient will be admitted on an inpatient basis with an anticipated length of stay of greater than 2 midnights secondary to left lower extremity cellulitis failing outpatient treatment.     Date: 7/24/25   Day 2:   Tmax 101. IVABT changed to Ancef for cellulitis LLE. Reports ongoing pain in left knee but states he is able to move it a little bit more. States it is hard to tell if swelling/redness have improved much. Exam:  L knee puncture she with surrounding erythema, warmth, swelling, TTP slightly in from demarcation lines. Limited ROM L knee due to pain.          ED Treatment-Medication Administration from 07/23/2025 0958 to 07/23/2025 1440         Date/Time Order Dose Route Action     07/23/2025 1151 vancomycin (VANCOCIN) 1,250 mg in sodium chloride 0.9 % 250 mL IVPB 1,250 mg Intravenous New Bag     07/23/2025 1126 cefTRIAXone (ROCEPHIN) IVPB (premix in dextrose) 1,000 mg 50 mL 1,000 mg Intravenous New Bag     07/23/2025 1113 acetaminophen  (TYLENOL) tablet 975 mg 975 mg Oral Given     07/23/2025 1242 sodium chloride 0.9 % bolus 1,000 mL 1,000 mL Intravenous New Bag            Scheduled Medications:  Medications 07/15 07/16 07/17 07/18 07/19 07/20 07/21 07/22 07/23 07/24   acetaminophen (TYLENOL) tablet 975 mg  Dose: 975 mg  Freq: Once Route: PO  Start: 07/23/25 1045 End: 07/23/25 1113            1113         atorvastatin (LIPITOR) tablet 80 mg  Dose: 80 mg  Freq: Daily with dinner Route: PO  Start: 07/23/25 1630            1554      1630        ceFAZolin (ANCEF) IVPB (premix in dextrose) 2,000 mg 50 mL  Dose: 2,000 mg  Freq: Every 8 hours Route: IV  Start: 07/24/25 1400   Admin Instructions:      Order specific questions:                1400     2200         cefTRIAXone (ROCEPHIN) IVPB (premix in dextrose) 1,000 mg 50 mL  Dose: 1,000 mg  Freq: Once Route: IV  Last Dose: 1,000 mg (07/23/25 1554)  Start: 07/23/25 1545 End: 07/23/25 1624   Admin Instructions:      Order specific questions:               1554         Followed by   cefTRIAXone (ROCEPHIN) IVPB (premix in dextrose) 2,000 mg 50 mL  Dose: 2,000 mg  Freq: Every 24 hours Route: IV  Start: 07/24/25 1400 End: 07/24/25 1132   Admin Instructions:      Order specific questions:                1132-D/C'd      cefTRIAXone (ROCEPHIN) IVPB (premix in dextrose) 1,000 mg 50 mL  Dose: 1,000 mg  Freq: Once Route: IV  Last Dose: Stopped (07/23/25 1150)  Start: 07/23/25 1045 End: 07/23/25 1150   Admin Instructions:      Order specific questions:               1126     1150         Cholecalciferol (VITAMIN D3) tablet 2,000 Units  Dose: 2,000 Units  Freq: Daily Route: PO  Start: 07/23/25 1530   Admin Instructions:               (1522) [C]      0848        clopidogrel (PLAVIX) tablet 75 mg  Dose: 75 mg  Freq: Daily Route: PO  Start: 07/23/25 1530   Admin Instructions:               (1522) [C]      0860        enoxaparin (LOVENOX) subcutaneous injection 40 mg  Dose: 40 mg  Freq: Daily Route: SC  Start: 07/24/25  0900   Admin Instructions:                0849        ezetimibe (ZETIA) tablet 10 mg  Dose: 10 mg  Freq: Daily Route: PO  Start: 07/23/25 1530   Admin Instructions:               (1523) [C]      0849        folic acid (FOLVITE) tablet 1 mg  Dose: 1 mg  Freq: Daily Route: PO  Start: 07/23/25 1530            (1523) [C]      0849        metoprolol tartrate (LOPRESSOR) tablet 25 mg  Dose: 25 mg  Freq: Daily Route: PO  Start: 07/24/25 0900   Admin Instructions:      Order specific questions:                0849        sodium chloride 0.9 % bolus 1,000 mL  Dose: 1,000 mL  Freq: Once Route: IV  Last Dose: Stopped (07/23/25 1428)  Start: 07/23/25 1230 End: 07/23/25 1428            1242     1428         thiamine tablet 100 mg  Dose: 100 mg  Freq: Daily Route: PO  Start: 07/23/25 1530            (1523) [C]      0849        vancomycin (VANCOCIN) 1,250 mg in sodium chloride 0.9 % 250 mL IVPB  Dose: 12.5 mg/kg  Weight Dosing Info: 101 kg  Freq: Every 24 hours Route: IV  Last Dose: Stopped (07/24/25 1137)  Start: 07/24/25 0900 End: 07/24/25 1132   Admin Instructions:      Order specific questions:                0851     1132-D/C'd  1137        vancomycin (VANCOCIN) 1,250 mg in sodium chloride 0.9 % 250 mL IVPB  Dose: 15 mg/kg  Weight Dosing Info: 86 kg (Adjusted)  Freq: Once Route: IV  Last Dose: Stopped (07/23/25 1327)  Start: 07/23/25 1045 End: 07/23/25 1327   Admin Instructions:      Order specific questions:               0246     1327         Legend:       [id number]/1807/1907/2007/2107/2207/2307/24        Continuous Meds Sorted by Name  for Elie Cooper as of 07/15/25 through 7/24/25  Legend:       Medications 07/15 07/16 07/17 07/18 07/19 07/20 07/21 07/22 07/23 07/24   sodium chloride 0.9 % infusion  Rate: 75 mL/hr Dose: 75 mL/hr  Freq: Continuous Route: IV  Last Dose: 75 mL/hr (07/24/25 0642)  Start: 07/23/25 1530 End: 07/24/25 1553            1554      0642 [C]     1553 [C]     1553-D/C'd                   PRN Meds Sorted by Name  for Elie Cooper as of 07/15/25 through 7/24/25  Legend:       Medications 07/15 07/16 07/17 07/18 07/19 07/20 07/21 07/22 07/23 07/24   acetaminophen (TYLENOL) tablet 650 mg  Dose: 650 mg  Freq: Every 6 hours PRN Route: PO  PRN Reasons: mild pain,headaches,fever  Indications of Use: FEVER,HEADACHE,MILD PAIN  Start: 07/23/25 1441            2333          vancomycin (VANCOCIN) IVPB (premix in dextrose) 1,000 mg 200 mL  Dose: 12.5 mg/kg  Weight Dosing Info: 86 kg (Adjusted)  Freq: Once as needed Route: IV  PRN Comment: if trough </=15  Start: 07/24/25 0600 End: 07/24/25 0738   Admin Instructions:      Order specific questions:                0738-D/C'd                      ED Triage Vitals [07/23/25 1010]   Temperature Pulse Respirations Blood Pressure SpO2 Pain Score   99.2 °F (37.3 °C) 79 20 112/68 97 % 8     Weight (last 2 days)       Date/Time Weight    07/23/25 1815 101 (222.89)            Vital Signs (last 3 days)       Date/Time Temp Pulse Resp BP MAP (mmHg) SpO2 O2 Device Patient Position - Orthostatic VS Pain    07/24/25 0838 -- -- -- -- -- -- -- -- No Pain    07/24/25 07:21:19 99 °F (37.2 °C) 81 -- 111/69 83 95 % -- -- --    07/24/25 01:47:57 99.5 °F (37.5 °C) 79 17 -- -- 96 % -- -- --    07/23/25 2333 -- -- -- -- -- -- -- -- Med Not Given for Pain - for MAR use only    07/23/25 23:20:52 101 °F (38.3 °C) 96 18 91/60 70 93 % -- -- --    07/23/25 2100 -- -- -- -- -- -- -- -- No Pain    07/23/25 18:48:24 98.2 °F (36.8 °C) 92 18 127/78 94 98 % -- -- --    07/23/25 1520 -- -- -- -- -- -- -- -- 7    07/23/25 15:15:31 -- 68 -- -- -- 96 % -- -- --    07/23/25 15:08:43 97.9 °F (36.6 °C) -- 17 113/73 86 -- -- Lying --    07/23/25 1400 -- 63 -- 104/62 78 97 % -- -- --    07/23/25 1345 -- 63 20 104/64 80 98 % -- -- --    07/23/25 1336 -- 65 -- 99/62 76 96 % -- -- --    07/23/25 1327 97.7 °F (36.5 °C) 66 20 99/61 -- 97 % None (Room air) -- No Pain    07/23/25 1321 -- 69 -- 97/61 74 100  "% -- -- --    07/23/25 1306 -- 67 -- 97/61 74 97 % -- -- --    07/23/25 1251 -- 66 -- 92/64 74 96 % -- -- --    07/23/25 1206 -- 71 -- 96/63 75 94 % -- -- --    07/23/25 1113 -- -- -- -- -- -- -- -- 8    07/23/25 1035 -- -- -- -- -- -- None (Room air) -- --    07/23/25 1010 99.2 °F (37.3 °C) 79 20 112/68 -- 97 % None (Room air) Lying 8              Pertinent Labs/Diagnostic Test Results:   Radiology:  CT lower extremity wo contrast left   Final Interpretation by Christiano Garber MD (07/23 1326)      Skin thickening and subcutaneous edema surrounding the anterior left knee consistent with cellulitis.   No focal fluid collection. No soft tissue gas.   No underlying osseous abnormality.         The study was marked in EPIC for immediate notification.         Workstation performed: [id number]          VAS VENOUS DUPLEX - LOWER LIMB BILATERAL    (Results Pending)     Cardiology:  No orders to display     GI:  No orders to display           Results from last 7 days   Lab Units 07/24/25  0632 07/23/25  1106   WBC Thousand/uL 12.48* 19.29*   HEMOGLOBIN g/dL 12.5 14.6   HEMATOCRIT % 38.0 45.4   PLATELETS Thousands/uL 219 235   TOTAL NEUT ABS Thousands/µL  --  16.66*         Results from last 7 days   Lab Units 07/24/25  0632 07/23/25  1106   SODIUM mmol/L 133* 131*   POTASSIUM mmol/L 3.9 4.1   CHLORIDE mmol/L 104 97   CO2 mmol/L 20* 25   ANION GAP mmol/L 9 9   BUN mg/dL 29* 37*   CREATININE mg/dL 1.67* 2.38*   EGFR ml/min/1.73sq m 43 28   CALCIUM mg/dL 8.1* 9.5     Results from last 7 days   Lab Units 07/23/25  1106   AST U/L 11*   ALT U/L 17   ALK PHOS U/L 75   TOTAL PROTEIN g/dL 7.5   ALBUMIN g/dL 3.7   TOTAL BILIRUBIN mg/dL 1.32*         Results from last 7 days   Lab Units 07/24/25  0632 07/23/25  1106   GLUCOSE RANDOM mg/dL 111 106             No results found for: \"BETA-HYDROXYBUTYRATE\"                           Results from last 7 days   Lab Units 07/23/25  1106   PROTIME seconds 14.7   INR  1.09   PTT seconds 34 "         Results from last 7 days   Lab Units 07/23/25  1106   PROCALCITONIN ng/ml 2.43*     Results from last 7 days   Lab Units 07/23/25  1106   LACTIC ACID mmol/L 1.0                                     Results from last 7 days   Lab Units 07/23/25  1106   CRP mg/L 313.5*   SED RATE mm/hour 67*             Results from last 7 days   Lab Units 07/23/25  1558   CLARITY UA  Clear   COLOR UA  Yellow   SPEC GRAV UA  1.025   PH UA  5.5   GLUCOSE UA mg/dl Negative   KETONES UA mg/dl Negative   BLOOD UA  Negative   PROTEIN UA mg/dl Negative   NITRITE UA  Negative   BILIRUBIN UA  Negative   UROBILINOGEN UA (BE) mg/dl <2.0   LEUKOCYTES UA  Negative   WBC UA /hpf 0-1*   RBC UA /hpf 0-1*   BACTERIA UA /hpf Occasional   EPITHELIAL CELLS WET PREP /hpf Occasional                                 Results from last 7 days   Lab Units 07/23/25  1124 07/23/25  1106   BLOOD CULTURE  Received in Microbiology Lab. Culture in Progress. Received in Microbiology Lab. Culture in Progress.             Results from last 7 days   Lab Units 07/24/25  0632   VANCOMYCIN RM ug/mL 5.7*       Past Medical History[1]  Present on Admission:   Cerebrovascular accident (CVA) due to occlusion of left middle cerebral artery (HCC)   Benign prostatic hyperplasia with lower urinary tract symptoms   Hypercholesterolemia   Hypertension   Cellulitis of extremity      Admitting Diagnosis: Benign essential hypertension [I10]  Cellulitis [L03.90]  Knee pain [M25.569]  ARMANDO (acute kidney injury) (HCC) [N17.9]  Stage 3b chronic kidney disease (HCC) [N18.32]  Age/Sex: 60 y.o. male    Network Utilization Review Department  ATTENTION: Please call with any questions or concerns to [phone] and carefully listen to the prompts so that you are directed to the right person. All voicemails are confidential.   For Discharge needs, contact Care Management DC Support Team at [phone] opt. 2  Send all requests for admission clinical reviews, approved or denied  determinations and any other requests to dedicated fax number below belonging to the campus where the patient is receiving treatment. List of dedicated fax numbers for the Facilities:  FACILITY NAME UR FAX NUMBER   ADMISSION DENIALS (Administrative/Medical Necessity) [phone]   DISCHARGE SUPPORT TEAM (NETWORK) [phone]   PARENT CHILD HEALTH (Maternity/NICU/Pediatrics) [phone]   [facility] [phone]   [facility] [phone]   [facility] [phone]   [facility] [phone]   [facility] [phone]   [facility] [phone]   General acute hospital [phone]   [facility] [phone]   [facility] [phone]   [facility] [phone]   [facility] [phone]   [facility] [phone]   [facility] [phone]   --           [1]   Past Medical History:  Diagnosis Date    Chronic kidney disease     Hyperlipidemia     Hypertension     Stroke (HCC) 02/10/2019    right side sl weakness

## 2025-07-24 NOTE — ASSESSMENT & PLAN NOTE
Cr 2.38 on admission, baseline Cr around 1.5-1.8  Suspect in setting of hypoperfusion in setting of low blood pressure  UA benign  Check bladder scan  Improved with IVF, currently back at baseline  Daily bmp   Pt is A&Ox4 and breathing unlabored. Pt bibems with complaints of hypotension and weakness from an infusion center where she was receiving her regularly scheduled MS medication infusions. Pt states "this has happened once before and the Dr just said I needed fluids, the nurses at the infusion center called the ambulance because the lowest BP reading they got was 58 over something." Pt denies pain, and SOB. Pt is resting comfortably with  at bedside. Pt is A&Ox4 and breathing unlabored. Pt bibems with complaints of hypotension and weakness from an infusion center where she was receiving her regularly scheduled MS medication infusions. Pt states "this has happened once before and the Dr just said I needed fluids, the nurses at the infusion center called the ambulance because the lowest BP reading they got was 58 over something." Pt BP in ED is now 111/74. Pt denies pain, and SOB. Pt is resting comfortably with  at bedside.

## 2025-07-24 NOTE — PROGRESS NOTES
Elie Cooper is a 60 y.o. male who is currently ordered Vancomycin IV with management by the Pharmacy Consult service.  Relevant clinical data and objective / subjective history reviewed.  Vancomycin Assessment:  Indication and Goal AUC/Trough: Soft tissue (goal -600, trough >10)  Clinical Status: stable  Micro:   pending  Renal Function:  SCr: 1.67 mg/dL  CrCl: 57.9 mL/min  Renal replacement: Not on dialysis  Days of Therapy: 2  Current Dose: 1000 mg IV qd prn T </=15  Vancomycin Plan:  New Dosin mg IV qd  Estimated AUC: 454 mcg*hr/mL  Estimated Trough: 12.6 mcg/mL  Next Level: 25 @ 0600  Renal Function Monitoring: Daily BMP and UOP  Pharmacy will continue to follow closely for s/sx of nephrotoxicity, infusion reactions and appropriateness of therapy.  BMP and CBC will be ordered per protocol. We will continue to follow the patient’s culture results and clinical progress daily.    Mar Hernandez, Pharmacist

## 2025-07-24 NOTE — ASSESSMENT & PLAN NOTE
Lab Results   Component Value Date    EGFR 43 07/24/2025    EGFR 28 07/23/2025    EGFR 49 07/05/2025    CREATININE 1.67 (H) 07/24/2025    CREATININE 2.38 (H) 07/23/2025    CREATININE 1.51 (H) 07/05/2025

## 2025-07-24 NOTE — ASSESSMENT & PLAN NOTE
Patient reported that about a week ago he was working on dolly in the garage and had a puncture injury to the left knee.    Subsequently he had increasing redness and pain and was evaluated urgent care center on 7/21/2025, patient was prescribed cephalexin.    Despite compliance with antibiotics he noted increasing swelling redness pain swelling and redness of left lower extremity.  In ED, afebrile, BP low/normal, leukocytosis 19k, elevated Cr 2.38, lactic acid WNL, procal and inflammatory markers elevated  CT LLE shows findings consistent with cellulitis, no focal fluid collection or soft tissue gas  LE venous duplex appears negative for DVT  Up to date with tetanus shot documented in 2022  Orthopedics consulted - no surgical intervention at this time, WBAT LLE  Initially started on IV vancomycin and ceftriaxone  Given no history of MRSA or intra-articular involvement, will de-escalate to IV cefazolin and continue to monitor  Follow up blood cultures  Continue serial leg exams

## 2025-07-24 NOTE — ASSESSMENT & PLAN NOTE
CT scan of left knee demonstrated cellulitis, without any  intra-articular or subcutaneous soft tissue gas or excessive fluid. Hypertrophic bursa with cellulitis.   Overall, the diagnosis appears to be consistent with a cellulitis infection, without concern for septic arthritis.   Recommending continue IV Abx: Vancomycin & Ceftriaxone with resolution of symptoms.   DVT prophylaxis: Lovenox  Tmax today was 101, with a current temp of 100.3. WBC dropped to 12.48, Hb/Hct remains within normal range.   Follow up on blood culture results. Continue to monitor clinical exam, temp, and blood work, especially inflammatory/ infectious markers.   Overall, his clinical picture is improving adequately, but will continue to follow labs and change in symptoms or clinical exam   Primary team to monitor and treat co-morbidities and pain control.   WBAT left lower extremity, PT /OT recommended when patient able to participate.   .

## 2025-07-24 NOTE — PROGRESS NOTES
Progress Note - Orthopedics   Name: Elie Cooper 60 y.o. male I MRN: [id number]  Unit/Bed#: [address] [place] [address] Date of Admission: 7/23/2025   Date of Service: 7/24/2025 I Hospital Day: 1     Assessment & Plan  Cellulitis of extremity    Acute pain of left knee  CT scan of left knee demonstrated cellulitis, without any  intra-articular or subcutaneous soft tissue gas or excessive fluid. Hypertrophic bursa with cellulitis.   Overall, the diagnosis appears to be consistent with a cellulitis infection, without concern for septic arthritis.   Recommending continue IV Abx: Vancomycin & Ceftriaxone with resolution of symptoms.   DVT prophylaxis: Lovenox  Tmax today was 101, with a current temp of 100.3. WBC dropped to 12.48, Hb/Hct remains within normal range.   Follow up on blood culture results. Continue to monitor clinical exam, temp, and blood work, especially inflammatory/ infectious markers.   Overall, his clinical picture is improving adequately, but will continue to follow labs and change in symptoms or clinical exam   Primary team to monitor and treat co-morbidities and pain control.   WBAT left lower extremity, PT /OT recommended when patient able to participate.   .   Cerebrovascular accident (CVA) due to occlusion of left middle cerebral artery (HCC)    Hypertension    Hypercholesterolemia    Benign prostatic hyperplasia with lower urinary tract symptoms    Acute kidney injury superimposed on chronic kidney disease  (HCC)  Lab Results   Component Value Date    EGFR 43 07/24/2025    EGFR 28 07/23/2025    EGFR 49 07/05/2025    CREATININE 1.67 (H) 07/24/2025    CREATININE 2.38 (H) 07/23/2025    CREATININE 1.51 (H) 07/05/2025       Medical co-morbidities include HTN, ARMANDO in setting of CKD III, hyperlipidemia, and CVA with residual right sided weakness, all of which are being managed per primary team.     Subjective   60 y.o.male who is admitted for left knee Cellulitis on 7/23/25 with a puncture wound. He has been  "receiving IV Ancef, which has been improving his pain and signs of infection.  No acute events overnight. Patient without any new complaints. Pain is about 6.5/10 right now. Spoke with patient regarding pain medicine, as he doesn't like to take pills. Denies fevers, chills, CP, SOB, N/V, numbness or tingling. Patient reports no issues with urination or bowel movements.         Objective :  Temp:  [98.2 °F (36.8 °C)-101 °F (38.3 °C)] 100.3 °F (37.9 °C)  HR:  [79-96] 83  BP: ()/(60-82) 132/82  Resp:  [17-18] 18  SpO2:  [93 %-98 %] 97 %    Physical Exam  Musculoskeletal: Left lower extremity  Skin: left knee mild warm to touch with a decrease in the demarcated area that was marked. Mild tenderness to palpation over left knee.  Hypertrophic skin changes noted on left knee. Edema has decreased since yesterday without a palpable effusion. Diffuse erythematous maculopapular rash seen on left thigh.    Vascular Status:  2+ DP pulse. No calf swelling or tenderness to palpation. Thigh and lower leg compartments soft, non tender.  Left knee range of motion intact with a normal range of motion arc.   Neurologic Status Motor intact to +FHL/EHL, +ankle dorsi/plantar flexion. Sensation intact to saphenous, sural, tibial, superficial peroneal nerve, and deep peroneal      Lab Results: I have reviewed the following results:  Recent Labs     07/23/25  1106 07/24/25  0632   WBC 19.29* 12.48*   HGB 14.6 12.5   HCT 45.4 38.0    219   BUN 37* 29*   CREATININE 2.38* 1.67*   PTT 34  --    INR 1.09  --    ESR 67*  --    .5*  --      Blood Culture:    Lab Results   Component Value Date    BLOODCX Received in Microbiology Lab. Culture in Progress. 07/23/2025     Wound Culture: No results found for: \"WOUNDCULT\"    Imaging Results Review: No pertinent imaging studies reviewed.  Other Study Results Review: No additional pertinent studies reviewed.  "

## 2025-07-24 NOTE — PROGRESS NOTES
Progress Note - Hospitalist   Name: Elie Cooper 60 y.o. male I MRN: [id number]  Unit/Bed#: 2 [address] Date of Admission: 7/23/2025   Date of Service: 7/24/2025 I Hospital Day: 1    Assessment & Plan  Cellulitis of extremity  Patient reported that about a week ago he was working on dolly in the garage and had a puncture injury to the left knee.    Subsequently he had increasing redness and pain and was evaluated urgent care center on 7/21/2025, patient was prescribed cephalexin.    Despite compliance with antibiotics he noted increasing swelling redness pain swelling and redness of left lower extremity.  In ED, afebrile, BP low/normal, leukocytosis 19k, elevated Cr 2.38, lactic acid WNL, procal and inflammatory markers elevated  CT LLE shows findings consistent with cellulitis, no focal fluid collection or soft tissue gas  LE venous duplex appears negative for DVT  Up to date with tetanus shot documented in 2022  Orthopedics consulted - no surgical intervention at this time, WBAT LLE  Initially started on IV vancomycin and ceftriaxone  Given no history of MRSA or intra-articular involvement, will de-escalate to IV cefazolin and continue to monitor  Follow up blood cultures  Continue serial leg exams  Acute kidney injury superimposed on chronic kidney disease  (HCC)  Cr 2.38 on admission, baseline Cr around 1.5-1.8  Suspect in setting of hypoperfusion in setting of low blood pressure  UA benign  Check bladder scan  Improved with IVF, currently back at baseline  Daily bmp  Hypertension  Home regimen includes lopressor 25 mg daily and losartan 100 mg daily  BP low on presentation  Continue metoprolol with hold parameters  Continue to hold losartan for now  Cerebrovascular accident (CVA) due to occlusion of left middle cerebral artery (HCC)  History of left MCA CVA with mild residual right-sided weakness  Currently at baseline  Continue home clopidogrel, atorvastatin, ezetimibe  Hypercholesterolemia  Continue  home statin, ezetimibe  Benign prostatic hyperplasia with lower urinary tract symptoms  Status post TURP, 2021    VTE Pharmacologic Prophylaxis: VTE Score: 3 Moderate Risk (Score 3-4) - Pharmacological DVT Prophylaxis Ordered: enoxaparin (Lovenox).    Mobility:   Basic Mobility Inpatient Raw Score: 18  JH-HLM Goal: 6: Walk 10 steps or more  JH-HLM Achieved: 6: Walk 10 steps or more  JH-HLM Goal achieved. Continue to encourage appropriate mobility.    Patient Centered Rounds: I performed bedside rounds with nursing staff today.   Discussions with Specialists or Other Care Team Provider: rn    Education and Discussions with Family / Patient: Patient declined call to .     Current Length of Stay: 1 day(s)  Current Patient Status: Inpatient   Certification Statement: The patient will continue to require additional inpatient hospital stay due to L knee cellulitis, IV abx, cultures pending  Discharge Plan: Anticipate discharge in 24-48 hrs to home.    Code Status: Level 1 - Full Code    Subjective   Patient resting comfortably in bed. Reports ongoing pain in left knee but states he is able to move it a little bit more. States it is hard to tell if swelling/redness have improved much. Tmax 101 overnight. Denies chills, chest pain, shortness of breath, nausea, vomiting, abdominal pain.    Objective :  Temp:  [97.7 °F (36.5 °C)-101 °F (38.3 °C)] 99 °F (37.2 °C)  HR:  [63-96] 81  BP: ()/(60-78) 111/69  Resp:  [17-20] 17  SpO2:  [93 %-100 %] 95 %  O2 Device: None (Room air)    Body mass index is 30.23 kg/m².     Input and Output Summary (last 24 hours):     Intake/Output Summary (Last 24 hours) at 7/24/2025 0819  Last data filed at 7/23/2025 1848  Gross per 24 hour   Intake --   Output 550 ml   Net -550 ml       Physical Exam  Vitals and nursing note reviewed.   Constitutional:       General: He is not in acute distress.     Appearance: He is well-developed.     Cardiovascular:      Rate and Rhythm: Normal  rate and regular rhythm.   Pulmonary:      Effort: Pulmonary effort is normal. No respiratory distress.      Breath sounds: Normal breath sounds.   Abdominal:      Palpations: Abdomen is soft.      Tenderness: There is no abdominal tenderness.     Musculoskeletal:         General: No swelling.      Left knee: Swelling and erythema present. Tenderness present.      Comments: Limited ROM L knee due to pain     Skin:     General: Skin is warm and dry.      Capillary Refill: Capillary refill takes less than 2 seconds.      Comments: L knee puncture she with surrounding erythema, warmth, swelling, TTP slightly in from demarcation lines     Neurological:      Mental Status: He is alert.     Psychiatric:         Mood and Affect: Mood normal.       Lines/Drains:        Lab Results: I have reviewed the following results:   Results from last 7 days   Lab Units 07/24/25  0632 07/23/25  1106   WBC Thousand/uL 12.48* 19.29*   HEMOGLOBIN g/dL 12.5 14.6   HEMATOCRIT % 38.0 45.4   PLATELETS Thousands/uL 219 235   SEGS PCT %  --  86*   LYMPHO PCT %  --  6*   MONO PCT %  --  6   EOS PCT %  --  1     Results from last 7 days   Lab Units 07/24/25  0632 07/23/25  1106   SODIUM mmol/L 133* 131*   POTASSIUM mmol/L 3.9 4.1   CHLORIDE mmol/L 104 97   CO2 mmol/L 20* 25   BUN mg/dL 29* 37*   CREATININE mg/dL 1.67* 2.38*   ANION GAP mmol/L 9 9   CALCIUM mg/dL 8.1* 9.5   ALBUMIN g/dL  --  3.7   TOTAL BILIRUBIN mg/dL  --  1.32*   ALK PHOS U/L  --  75   ALT U/L  --  17   AST U/L  --  11*   GLUCOSE RANDOM mg/dL 111 106     Results from last 7 days   Lab Units 07/23/25  1106   INR  1.09             Results from last 7 days   Lab Units 07/23/25  1106   LACTIC ACID mmol/L 1.0   PROCALCITONIN ng/ml 2.43*       Recent Cultures (last 7 days):   Results from last 7 days   Lab Units 07/23/25  1124 07/23/25  1106   BLOOD CULTURE  Received in Microbiology Lab. Culture in Progress. Received in Microbiology Lab. Culture in Progress.       Imaging Results  Review: I reviewed radiology reports from this admission including: CT LLE and Ultrasound(s).  Other Study Results Review: No additional pertinent studies reviewed.    Last 24 Hours Medication List:     Current Facility-Administered Medications:     acetaminophen (TYLENOL) tablet 650 mg, Q6H PRN    atorvastatin (LIPITOR) tablet 80 mg, Daily With Dinner    [COMPLETED] cefTRIAXone (ROCEPHIN) IVPB (premix in dextrose) 1,000 mg 50 mL, Once, Last Rate: 1,000 mg (07/23/25 1554) **FOLLOWED BY** cefTRIAXone (ROCEPHIN) IVPB (premix in dextrose) 2,000 mg 50 mL, Q24H    Cholecalciferol (VITAMIN D3) tablet 2,000 Units, Daily    clopidogrel (PLAVIX) tablet 75 mg, Daily    enoxaparin (LOVENOX) subcutaneous injection 40 mg, Daily    ezetimibe (ZETIA) tablet 10 mg, Daily    folic acid (FOLVITE) tablet 1 mg, Daily    metoprolol tartrate (LOPRESSOR) tablet 25 mg, Daily    sodium chloride 0.9 % infusion, Continuous, Last Rate: 75 mL/hr (07/24/25 0642)    thiamine tablet 100 mg, Daily    vancomycin (VANCOCIN) 1,250 mg in sodium chloride 0.9 % 250 mL IVPB, Q24H    Administrative Statements   Today, Patient Was Seen By: Jenny Linn PA-C    **Please Note: This note may have been constructed using a voice recognition system.**

## 2025-07-24 NOTE — PROGRESS NOTES
Elie Cooper is a 60 y.o. male who is currently ordered Vancomycin IV with management by the Pharmacy Consult service.  Relevant clinical data and objective / subjective history reviewed.  Vancomycin Assessment:  Indication and Goal AUC/Trough: Soft tissue (goal -600, trough >10)  Clinical Status: new  Micro:     Renal Function:  SCr: 2.38 mg/dL  CrCl: 40.1 mL/min  Renal replacement: Not on dialysis  Days of Therapy: 1  Current Dose: 1250 mg x 1 (Loading dose)  Vancomycin Plan:  New Dosin mg PRN when level < 15)  Estimated AUC: N/A mcg*hr/mL  Estimated Trough: N/A mcg/mL  Next Level: 0600 on 25  Renal Function Monitoring: Daily BMP and UOP  Pharmacy will continue to follow closely for s/sx of nephrotoxicity, infusion reactions and appropriateness of therapy.  BMP and CBC will be ordered per protocol. We will continue to follow the patient’s culture results and clinical progress daily.    Juan R Adame, Pharmacist

## 2025-07-24 NOTE — ASSESSMENT & PLAN NOTE
Home regimen includes lopressor 25 mg daily and losartan 100 mg daily  BP low on presentation  Continue metoprolol with hold parameters  Continue to hold losartan for now

## 2025-07-25 ENCOUNTER — TELEPHONE (OUTPATIENT)
Dept: FAMILY MEDICINE CLINIC | Facility: CLINIC | Age: 60
End: 2025-07-25

## 2025-07-25 ENCOUNTER — TRANSITIONAL CARE MANAGEMENT (OUTPATIENT)
Dept: FAMILY MEDICINE CLINIC | Facility: CLINIC | Age: 60
End: 2025-07-25

## 2025-07-25 VITALS
SYSTOLIC BLOOD PRESSURE: 116 MMHG | BODY MASS INDEX: 30.19 KG/M2 | HEIGHT: 72 IN | WEIGHT: 222.88 LBS | DIASTOLIC BLOOD PRESSURE: 72 MMHG | RESPIRATION RATE: 19 BRPM | OXYGEN SATURATION: 97 % | TEMPERATURE: 98.8 F | HEART RATE: 81 BPM

## 2025-07-25 LAB
ANION GAP SERPL CALCULATED.3IONS-SCNC: 7 MMOL/L (ref 4–13)
BUN SERPL-MCNC: 22 MG/DL (ref 5–25)
CALCIUM SERPL-MCNC: 8.8 MG/DL (ref 8.4–10.2)
CHLORIDE SERPL-SCNC: 106 MMOL/L (ref 96–108)
CO2 SERPL-SCNC: 23 MMOL/L (ref 21–32)
CREAT SERPL-MCNC: 1.58 MG/DL (ref 0.6–1.3)
CRP SERPL QL: 159 MG/L
ERYTHROCYTE [DISTWIDTH] IN BLOOD BY AUTOMATED COUNT: 13.8 % (ref 11.6–15.1)
GFR SERPL CREATININE-BSD FRML MDRD: 46 ML/MIN/1.73SQ M
GLUCOSE SERPL-MCNC: 94 MG/DL (ref 65–140)
HCT VFR BLD AUTO: 42.2 % (ref 36.5–49.3)
HGB BLD-MCNC: 13.6 G/DL (ref 12–17)
MCH RBC QN AUTO: 28.1 PG (ref 26.8–34.3)
MCHC RBC AUTO-ENTMCNC: 32.2 G/DL (ref 31.4–37.4)
MCV RBC AUTO: 87 FL (ref 82–98)
PLATELET # BLD AUTO: 232 THOUSANDS/UL (ref 149–390)
PMV BLD AUTO: 9 FL (ref 8.9–12.7)
POTASSIUM SERPL-SCNC: 4.2 MMOL/L (ref 3.5–5.3)
RBC # BLD AUTO: 4.84 MILLION/UL (ref 3.88–5.62)
SODIUM SERPL-SCNC: 136 MMOL/L (ref 135–147)
VANCOMYCIN TROUGH SERPL-MCNC: 6.2 UG/ML (ref 10–20)
WBC # BLD AUTO: 9.8 THOUSAND/UL (ref 4.31–10.16)

## 2025-07-25 PROCEDURE — 97116 GAIT TRAINING THERAPY: CPT

## 2025-07-25 PROCEDURE — 85027 COMPLETE CBC AUTOMATED: CPT | Performed by: INTERNAL MEDICINE

## 2025-07-25 PROCEDURE — 97162 PT EVAL MOD COMPLEX 30 MIN: CPT

## 2025-07-25 PROCEDURE — 80202 ASSAY OF VANCOMYCIN: CPT | Performed by: INTERNAL MEDICINE

## 2025-07-25 PROCEDURE — 99239 HOSP IP/OBS DSCHRG MGMT >30: CPT | Performed by: NURSE PRACTITIONER

## 2025-07-25 PROCEDURE — 80048 BASIC METABOLIC PNL TOTAL CA: CPT | Performed by: INTERNAL MEDICINE

## 2025-07-25 PROCEDURE — 86140 C-REACTIVE PROTEIN: CPT

## 2025-07-25 RX ORDER — SACCHAROMYCES BOULARDII 250 MG
250 CAPSULE ORAL 2 TIMES DAILY
Status: DISCONTINUED | OUTPATIENT
Start: 2025-07-25 | End: 2025-07-25 | Stop reason: HOSPADM

## 2025-07-25 RX ORDER — CEPHALEXIN 500 MG/1
500 CAPSULE ORAL EVERY 6 HOURS SCHEDULED
Status: DISCONTINUED | OUTPATIENT
Start: 2025-07-25 | End: 2025-07-25 | Stop reason: HOSPADM

## 2025-07-25 RX ORDER — CEPHALEXIN 500 MG/1
500 CAPSULE ORAL EVERY 6 HOURS SCHEDULED
Qty: 12 CAPSULE | Refills: 0 | Status: SHIPPED | OUTPATIENT
Start: 2025-07-25 | End: 2025-07-28

## 2025-07-25 RX ORDER — SACCHAROMYCES BOULARDII 250 MG
250 CAPSULE ORAL 2 TIMES DAILY
Qty: 6 CAPSULE | Refills: 0 | Status: SHIPPED | OUTPATIENT
Start: 2025-07-25 | End: 2025-07-28

## 2025-07-25 RX ADMIN — ACETAMINOPHEN 975 MG: 325 TABLET, FILM COATED ORAL at 05:04

## 2025-07-25 RX ADMIN — CEFAZOLIN SODIUM 2000 MG: 2 SOLUTION INTRAVENOUS at 05:05

## 2025-07-25 RX ADMIN — EZETIMIBE 10 MG: 10 TABLET ORAL at 09:55

## 2025-07-25 RX ADMIN — CEPHALEXIN 500 MG: 500 CAPSULE ORAL at 12:30

## 2025-07-25 RX ADMIN — Medication 250 MG: at 09:55

## 2025-07-25 RX ADMIN — ACETAMINOPHEN 975 MG: 325 TABLET, FILM COATED ORAL at 11:26

## 2025-07-25 RX ADMIN — THIAMINE HCL TAB 100 MG 100 MG: 100 TAB at 09:54

## 2025-07-25 RX ADMIN — Medication 2000 UNITS: at 09:55

## 2025-07-25 RX ADMIN — METOPROLOL TARTRATE 25 MG: 25 TABLET, FILM COATED ORAL at 09:55

## 2025-07-25 RX ADMIN — ENOXAPARIN SODIUM 40 MG: 40 INJECTION SUBCUTANEOUS at 09:54

## 2025-07-25 RX ADMIN — FOLIC ACID 1 MG: 1 TABLET ORAL at 09:54

## 2025-07-25 RX ADMIN — CLOPIDOGREL 75 MG: 75 TABLET ORAL at 09:55

## 2025-07-25 NOTE — ASSESSMENT & PLAN NOTE
Cr 2.38 on admission, baseline Cr around 1.5-1.8  Suspect in setting of hypoperfusion in setting of low blood pressure  UA benign  Check bladder scan  Improved with IVF, currently back at baseline  Encourage oral hydration  Discharge to home today

## 2025-07-25 NOTE — PLAN OF CARE
Problem: PHYSICAL THERAPY ADULT  Goal: Performs mobility at highest level of function for planned discharge setting.  See evaluation for individualized goals.  Description: Treatment/Interventions: Therapeutic exercise, LE strengthening/ROM, Gait training, Patient/family training, Equipment eval/education  Equipment Recommended:  (Pt issued a cane)       See flowsheet documentation for full assessment, interventions and recommendations.  Note: Prognosis: Good  Problem List: Decreased range of motion, Decreased strength, Pain, Decreased mobility  Assessment: Pt is 60 y.o. male seen for PT evaluation s/p admit to [facility] ([facility] on 7/23/2025 w/ Cellulitis of extremity. PT consulted to assess pt's functional mobility and d/c needs. Order placed for PT eval and tx, w/ WBAT L LE order.  Co-morbidities affecting patient's physical performance include: CVA with R hemibody weakness, CKD, anemia.  Personal factors affecting patient at time of initial evaluation include: lives in 2 story house and stairs to enter home.     Prior to admission, patient was independent with functional mobility without assistive device and independent with ADLS.  Upon evaluation: Pt remains independently ambulatory with or without a cane.  Pt reports that the cane helps stabilize the patient as his first few steps are quite painful.       Please find objective findings from Physical Therapy assessment regarding body systems outlined above with impairments and limitations including weakness, decreased ROM, pain, decreased activity tolerance, and decreased functional mobility tolerance. Patient's clinical presentation is currently evolving as seen in patient's presentation of changing level of pain, new onset of impairment of functional mobility, and new onset of weakness. Pt would benefit from continued Physical Therapy treatment to address deficits as defined above and maximize level of functional mobility.     As demonstrated by  objective findings, the assigned level of complexity for this evaluation is moderate.The patient's AM-PAC Basic Mobility Inpatient Short Form Raw Score is 24. A Raw score of greater than 16 suggests the patient may benefit from discharge to home. Please also refer to the recommendation of the Physical Therapist for safe discharge planning.        Rehab Resource Intensity Level, PT: III (Minimum Resource Intensity) (if knee ROM/strength does not return to baseline when infection resolves.)    See flowsheet documentation for full assessment.

## 2025-07-25 NOTE — ASSESSMENT & PLAN NOTE
History of left MCA CVA with mild residual right-sided weakness  Currently at baseline  Continue home clopidogrel, atorvastatin, ezetimibe   Full range of motion of upper and lower extremities, no joint tenderness/swelling.

## 2025-07-25 NOTE — PLAN OF CARE
Problem: Potential for Falls  Goal: Patient will remain free of falls  Description: INTERVENTIONS:  - Educate patient/family on patient safety including physical limitations  - Instruct patient to call for assistance with activity   - Consider consulting OT/PT to assist with strengthening/mobility based on AM PAC & JH-HLM score  - Consult OT/PT to assist with strengthening/mobility   - Keep Call bell within reach  - Keep bed low and locked with side rails adjusted as appropriate  - Keep care items and personal belongings within reach  - Initiate and maintain comfort rounds  - Make Fall Risk Sign visible to staff  - Offer Toileting every 2 Hours, in advance of need  - Initiate/Maintain bed alarm  - Obtain necessary fall risk management equipment: alarm  - Apply yellow socks and bracelet for high fall risk patients  - Consider moving patient to room near nurses station  Outcome: Progressing     Problem: PAIN - ADULT  Goal: Verbalizes/displays adequate comfort level or baseline comfort level  Description: Interventions:  - Encourage patient to monitor pain and request assistance  - Assess pain using appropriate pain scale  - Administer analgesics as ordered based on type and severity of pain and evaluate response  - Implement non-pharmacological measures as appropriate and evaluate response  - Consider cultural and social influences on pain and pain management  - Notify physician/advanced practitioner if interventions unsuccessful or patient reports new pain  - Educate patient/family on pain management process including their role and importance of  reporting pain   - Provide non-pharmacologic/complimentary pain relief interventions  Outcome: Progressing     Problem: INFECTION - ADULT  Goal: Absence or prevention of progression during hospitalization  Description: INTERVENTIONS:  - Assess and monitor for signs and symptoms of infection  - Monitor lab/diagnostic results  - Monitor all insertion sites, i.e. indwelling  lines, tubes, and drains  - Monitor endotracheal if appropriate and nasal secretions for changes in amount and color  - [place] appropriate cooling/warming therapies per order  - Administer medications as ordered  - Instruct and encourage patient and family to use good hand hygiene technique  - Identify and instruct in appropriate isolation precautions for identified infection/condition  Outcome: Progressing     Problem: DISCHARGE PLANNING  Goal: Discharge to home or other facility with appropriate resources  Description: INTERVENTIONS:  - Identify barriers to discharge w/patient and caregiver  - Arrange for needed discharge resources and transportation as appropriate  - Identify discharge learning needs (meds, wound care, etc.)  - Arrange for interpretive services to assist at discharge as needed  - Refer to Case Management Department for coordinating discharge planning if the patient needs post-hospital services based on physician/advanced practitioner order or complex needs related to functional status, cognitive ability, or social support system  Outcome: Progressing

## 2025-07-25 NOTE — ASSESSMENT & PLAN NOTE
Patient had acute pain of left knee present on admission status post kneeling on a nail while doing some dolly work  Pain has improved, erythremia and swelling improved also.  PT/OT evaluated patient, ambulates independently however provided cane for stability as knee continues to heal

## 2025-07-25 NOTE — ASSESSMENT & PLAN NOTE
Home regimen includes lopressor 25 mg daily and losartan 100 mg daily  BP low on presentation  Continue metoprolol with hold parameters  Losartan had been on hold, creatinine back at baseline, can resume  Follow-up outpatient PCP 1 to 2 weeks postdischarge

## 2025-07-25 NOTE — PHYSICAL THERAPY NOTE
"       PHYSICAL THERAPY EVALUATION/TREATMENT     07/25/25 0950   PT Last Visit   PT Visit Date 07/25/25   Note Type   Note type Evaluation   Pain Assessment   Pain Assessment Tool 0-10   Pain Score 4   Pain Location/Orientation Orientation: Left;Location: Knee   Effect of Pain on Daily Activities limits walking   Restrictions/Precautions   Weight Bearing Precautions Per Order Yes   LLE Weight Bearing Per Order WBAT   Other Precautions Pain   Home Living   Type of Home House   Home Layout Two level  (full bath upstairs, pt sleeps downstairs, few MARY LOU)   Home Equipment   (none)   Prior Function   Level of [place] Independent with ADLs;Independent with functional mobility   Lives With Spouse   Receives Help From Family   Falls in the last 6 months 0   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  Pt reports his knee is feeling much better since admission.  Pt reports that his knee is really sore upon first standing but improves with walking.   Cognition   Overall Cognitive Status WFL   Orientation Level Oriented X4   Following Commands Follows all commands and directions without difficulty   Subjective   Subjective \"I am supposed to go on vacation tomorrow\"   RLE Assessment   RLE Assessment WFL   LLE Assessment   LLE Assessment   (knee ROM 10-80 with empty end feel;  pt was not asked to push his ROM but to move his knee more as his pain decreases;  pt is able to SLR with his knee flexed to 10 degrees, MMT at least 3-/5)   Bed Mobility   Supine to Sit 7  Independent   Sit to Supine 7  Independent   Transfers   Sit to Stand 7  Independent   Stand to Sit 7  Independent   Stand pivot 7  Independent   Ambulation/Elevation   Gait pattern   (initially antalgic on the L but improved with practice)   Gait Assistance 7  Independent   Assistive Device   (none)   Distance 50 feet   Balance   Static Sitting Good "   Static Standing Good   Ambulatory Good   Activity Tolerance   Activity Tolerance Patient tolerated treatment well   Assessment   Prognosis Good   Problem List Decreased range of motion;Decreased strength;Pain;Decreased mobility   Assessment Pt is 60 y.o. male seen for PT evaluation s/p admit to [facility] on 7/[facility]/2025 w/ Cellulitis of extremity. PT consulted to assess pt's functional mobility and d/c needs. Order placed for PT eval and tx, w/ WBAT L LE order.  Co-morbidities affecting patient's physical performance include: CVA with R hemibody weakness, CKD, anemia.  Personal factors affecting patient at time of initial evaluation include: lives in 2 story house and stairs to enter home.         Prior to admission, patient was independent with functional mobility without assistive device and independent with ADLS.  Upon evaluation: Pt remains independently ambulatory with or without a cane.  Pt reports that the cane helps stabilize the patient as his first few steps are quite painful.           Please find objective findings from Physical Therapy assessment regarding body systems outlined above with impairments and limitations including weakness, decreased ROM, pain, decreased activity tolerance, and decreased functional mobility tolerance. Patient's clinical presentation is currently evolving as seen in patient's presentation of changing level of pain, new onset of impairment of functional mobility, and new onset of weakness. Pt would benefit from continued Physical Therapy treatment to address deficits as defined above and maximize level of functional mobility.     As demonstrated by objective findings, the assigned level of complexity for this evaluation is moderate.    The patient's AM-PAC Basic Mobility Inpatient Short Form Raw Score is 24. A Raw score of greater than 16 suggests the patient may benefit from discharge to home. Please also refer to the recommendation of the Physical Therapist  "for safe discharge planning.   Goals   Patient Goals \"walk without pain\"   STG Expiration Date 08/01/25   Short Term Goals 1. pt will ambulate with a SPC with modified independence 300 feet    2. modified independence up and down 1 flight of steps with a step to pattern and cane   LTG Expiration Date 08/08/25   Long Term Goals 1. independent ambulation 500 feet with a non antalgic gait,   2.independent up and down 1 flight of steps with a step over step pattern and a rail, 3. improve L knee ROM to 0-110, 4. improve L knee MMT to 4+/5   Plan   Treatment/Interventions Therapeutic exercise;LE strengthening/ROM;Gait training;Patient/family training;Equipment eval/education   PT Frequency   (1 x visit only)   Discharge Recommendation   Rehab Resource Intensity Level, PT III (Minimum Resource Intensity)  (if knee ROM/strength does not return to baseline when infection resolves.)   Equipment Recommended   (Pt issued a cane)   AM-PAC Basic Mobility Inpatient   Turning in Flat Bed Without Bedrails 4   Lying on Back to Sitting on Edge of Flat Bed Without Bedrails 4   Moving Bed to Chair 4   Standing Up From Chair Using Arms 4   Walk in Room 4   Climb 3-5 Stairs With Railing 4   Basic Mobility Inpatient Raw Score 24   Basic Mobility Standardized Score 57.68   [facility] Highest Level Of Mobility   -HLM Goal 8: Walk 250 feet or more   JH-HLM Achieved 7: Walk 25 feet or more  (Pt did not walk 250 feet due to L knee pain.)   Additional Treatment Session   Start Time 0940   End Time 0950   Treatment Assessment S:  \"I think the cane will help\"   O:  Gait training with a SPC x 50 feet and on the steps WBAT.   A: Pt is independently ambulatory with or without a cane however the cane helps steady the pt for the first few steps of walking which are quite painful.    P: Pt to ambulate ad jamar WBAT with the cane as needed.   End of Consult   Patient Position at End of Consult All needs within reach;Supine   Licensure   [place] License " Number  Romi Mcknight PT [id number]

## 2025-07-25 NOTE — DISCHARGE INSTR - AVS FIRST PAGE
You were admitted with cellulitis of the left knee which she received IV antibiotics for, we are transitioning you to Keflex 500 mg every 6 hours by mouth through Monday, June 28    We are also prescribing for you probiotics to prevent you from developing diarrhea while on the antibiotics    Please be mindful of being outside in the sun while on antibiotics, please wear sunscreen and hat as you are prone to developing sunburn    When you first came in it was noted that your creatinine which is a measure of your kidney function was elevated most likely secondary to element of dehydration.  You received IV hydration and this has gone back down to your baseline.  Please be mindful and stay adequately hydrated    Please follow-up with your primary care physician 1 to 2 weeks postdischarge    Orthopedics will be reaching out to you to set up for a follow-up appointment in the office.    It has been a pleasure taking care of you during your hospitalization

## 2025-07-25 NOTE — DISCHARGE SUMMARY
Discharge Summary - Hospitalist   Name: Elie Cooper 60 y.o. male I MRN: [id number]  Unit/Bed#: 2 [address] Date of Admission: 7/23/2025   Date of Service: 7/25/2025 I Hospital Day: 2     Assessment & Plan  Cellulitis of extremity  Patient reported that about a week ago he was working on dolly in the garage and had a puncture injury to the left knee.    Subsequently he had increasing redness and pain and was evaluated urgent care center on 7/21/2025, patient was prescribed cephalexin.    Despite compliance with antibiotics he noted increasing swelling redness pain swelling and redness of left lower extremity.  In ED, afebrile, BP low/normal, leukocytosis 19k, elevated Cr 2.38, lactic acid WNL, procal and inflammatory markers elevated  CT LLE shows findings consistent with cellulitis, no focal fluid collection or soft tissue gas  LE venous duplex appears negative for DVT  Up to date with tetanus shot documented in 2022  Orthopedics consulted - no surgical intervention at this time, WBAT LLE  Initially started on IV vancomycin and ceftriaxone  Given no history of MRSA or intra-articular involvement, will de-escalate to IV cefazolin, continues to improve transition to oral Keflex  Blood cultures negative  PT/OT evaluation treatment performed, recommending using single-point cane  Follow-up outpatient PCP 1 to 2 weeks postdischarge  Orthopedics indicated their office will reach out to the patient to set up for outpatient follow-up in the office  Discharged to home today  Acute kidney injury superimposed on chronic kidney disease  (HCC)  Cr 2.38 on admission, baseline Cr around 1.5-1.8  Suspect in setting of hypoperfusion in setting of low blood pressure  UA benign  Check bladder scan  Improved with IVF, currently back at baseline  Encourage oral hydration  Discharge to home today  Hypertension  Home regimen includes lopressor 25 mg daily and losartan 100 mg daily  BP low on presentation  Continue metoprolol with  hold parameters  Losartan had been on hold, creatinine back at baseline, can resume  Follow-up outpatient PCP 1 to 2 weeks postdischarge  Cerebrovascular accident (CVA) due to occlusion of left middle cerebral artery (HCC)  History of left MCA CVA with mild residual right-sided weakness  Currently at baseline  Continue home clopidogrel, atorvastatin, ezetimibe  Hypercholesterolemia  Continue home statin, ezetimibe  Benign prostatic hyperplasia with lower urinary tract symptoms  Status post TURP, 2021     Medical Problems       Resolved Problems  Date Reviewed: 7/25/2025   None       Discharging Physician / Practitioner: YASIR Conley  PCP: Salome Gonsalves MD  Admission Date:   Admission Orders (From admission, onward)       Ordered        07/23/25 1418  INPATIENT ADMISSION  Once                          Discharge Date: 07/25/25    Next Steps for Physician/AP Assuming Care:  Repeat BMP in 1 week  Follow-up outpatient PCP 1 to 2 weeks postdischarge  Orthopedics indicated office will reach out to patient to set up for office follow-up    Test Results Pending at Discharge (will require follow up):  None    Medication Changes for Discharge & Rationale:   Keflex 500 mg p.o. every 6 through Monday, July 8  Florastor twice daily while on antibiotics  See after visit summary for reconciled discharge medications provided to patient and/or family.     Consultations During Hospital Stay:  Orthopedics    Procedures Performed:   None    Significant Findings / Test Results:   Venous duplex bilateral lower extremities demonstrating no DVT or thrombophlebitis  Left knee x-ray performed on 7/22/2025 shows no acute osseous abnormality as per radiology report.  CT of left lower extremity performed on 7/23/2025 shows skin thickening and subcutaneous edema surrounding the anterior left knee consistent with cellulitis, no focal fluid collection, no soft tissue gas, no underlying osseous abnormality as per  radiology report.    Incidental Findings:   None      Hospital Course:   Elie Cooper is a 60 y.o. male patient PMH of CVA with right-sided weakness, HTN, dyslipidemia, CKD who originally presented to the hospital on 7/23/2025 due to left knee and left leg redness and swelling.  Patient reported that about a week prior to presentation he was working on dolly in the garage and had a puncture injury to the left knee.  He had subsequently increased redness and pain, was evaluated urgent care on 7/21/2025.  At that time he was prescribed Keflex.  He reported he was compliant with antibiotics but he noted increased swelling, redness and presented to the emergency department for further evaluation and treatment.  In the ED patient was noted to have leukocytosis of 19,000 and an elevated creatinine at 2.38, baseline around 1.5.  Blood cultures were obtained which were negative throughout hospitalization.  CT scan of the lower extremity revealed skin thickening and subcutaneous edema surrounding the anterior left knee consistent with cellulitis however there was no focal collection or soft tissue gas noted.  No underlying osseous abnormalities were noted.  Patient received IV Tylenol, fluid bolus IV vancomycin and ceftriaxone and was referred for admission.  Orthopedics evaluated the patient, indicated no surgical intervention continue with antibiotics.  Swelling and redness improved, patient was ultimately transition back to Keflex 500 mg p.o. every 6 hours through 7/28, was also placed on probiotic which he will continue while on antibiotics.  PT evaluated patient, recommended patient to use a single-point cane to ambulate while knee continues to heal.  Patient is advised to follow-up with his PCP 1 to 2 weeks postdischarge.  Orthopedics indicated they will contact the patient to set up an outpatient office visit with them postdischarge.  Patient will be discharged to home today.          Please see above list of  diagnoses and related plan for additional information.     Discharge Day Visit / Exam:   Subjective:    Patient reports that he feels very good, no fevers or chills.  Feels that the swelling in his left knee has gone down considerably and the pain is much less than when he first came in.  The erythremia also has receded, wife showed a picture of when patient first came in and definitely looks much improved.  Good appetite no nausea or vomiting.  Is hopeful that he will be able to go home today.    Vitals: Blood Pressure: 116/72 (07/25/25 0957)  Pulse: 81 (07/25/25 0957)  Temperature: 98.8 °F (37.1 °C) (07/25/25 0729)  Temp Source: Oral (07/24/25 2318)  Respirations: 19 (07/24/25 2318)  Height: 6' (182.9 cm) (07/23/25 1815)  Weight - Scale: 101 kg (222 lb 14.2 oz) (07/23/25 1815)  SpO2: 97 % (07/25/25 0957)    Physical Exam  Vitals reviewed.   Constitutional:       Appearance: Normal appearance.   HENT:      Head: Normocephalic.      Nose: Nose normal.      Mouth/Throat:      Mouth: Mucous membranes are moist.     Eyes:      Extraocular Movements: Extraocular movements intact.      Conjunctiva/sclera: Conjunctivae normal.      Pupils: Pupils are equal, round, and reactive to light.       Cardiovascular:      Rate and Rhythm: Normal rate and regular rhythm.      Pulses: Normal pulses.      Heart sounds: Normal heart sounds.   Pulmonary:      Effort: Pulmonary effort is normal.      Breath sounds: Normal breath sounds.   Abdominal:      General: There is no distension.      Palpations: Abdomen is soft.      Tenderness: There is no abdominal tenderness.   Genitourinary:     Comments: Voiding spontaneously     Musculoskeletal:      Cervical back: Normal range of motion.      Comments: Left knee with some swelling and erythremia, receding from demarcation pen line     Skin:     General: Skin is warm.      Capillary Refill: Capillary refill takes less than 2 seconds.      Findings: Erythema present.     Neurological:       General: No focal deficit present.      Mental Status: He is alert and oriented to person, place, and time.     Psychiatric:         Mood and Affect: Mood normal.         Behavior: Behavior normal.         Thought Content: Thought content normal.         Judgment: Judgment normal.          Discussion with Family: Updated  (wife) at bedside.    Discharge instructions/Information to patient and family:   See after visit summary for information provided to patient and family.      Provisions for Follow-Up Care:  See after visit summary for information related to follow-up care and any pertinent home health orders.      Mobility at time of Discharge:   Basic Mobility Inpatient Raw Score: 24  JH-HLM Goal: 8: Walk 250 feet or more  JH-HLM Achieved: 7: Walk 25 feet or more (Pt did not walk 250 feet due to L knee pain.)  HLM Goal NOT achieved. Continue to encourage mobility in post discharge setting.     Disposition:   Home    Planned Readmission: No     Administrative Statements   Discharge Statement:  I have spent a total time of greater than 45 minutes in caring for this patient on the day of the visit/encounter. >30 minutes of time was spent on: Diagnostic results, Risks and benefits of tx options, Instructions for management, Patient and family education, Importance of tx compliance, Risk factor reductions, Impressions, Counseling / Coordination of care, Documenting in the medical record, Reviewing / ordering tests, medicine, procedures  , and Communicating with other healthcare professionals .    **Please Note: This note may have been constructed using a voice recognition system**

## 2025-07-25 NOTE — PLAN OF CARE
Problem: PAIN - ADULT  Goal: Verbalizes/displays adequate comfort level or baseline comfort level  Description: Interventions:  - Encourage patient to monitor pain and request assistance  - Assess pain using appropriate pain scale  - Administer analgesics as ordered based on type and severity of pain and evaluate response  - Implement non-pharmacological measures as appropriate and evaluate response  - Consider cultural and social influences on pain and pain management  - Notify physician/advanced practitioner if interventions unsuccessful or patient reports new pain  - Educate patient/family on pain management process including their role and importance of  reporting pain   - Provide non-pharmacologic/complimentary pain relief interventions  Outcome: Progressing     Problem: INFECTION - ADULT  Goal: Absence or prevention of progression during hospitalization  Description: INTERVENTIONS:  - Assess and monitor for signs and symptoms of infection  - Monitor lab/diagnostic results  - Monitor all insertion sites, i.e. indwelling lines, tubes, and drains  - [place] appropriate cooling/warming therapies per order  - Administer medications as ordered  - Instruct and encourage patient and family to use good hand hygiene technique  - Identify and instruct in appropriate isolation precautions for identified infection/condition  Outcome: Progressing

## 2025-07-25 NOTE — ASSESSMENT & PLAN NOTE
Patient reported that about a week ago he was working on dolly in the garage and had a puncture injury to the left knee.    Subsequently he had increasing redness and pain and was evaluated urgent care center on 7/21/2025, patient was prescribed cephalexin.    Despite compliance with antibiotics he noted increasing swelling redness pain swelling and redness of left lower extremity.  In ED, afebrile, BP low/normal, leukocytosis 19k, elevated Cr 2.38, lactic acid WNL, procal and inflammatory markers elevated  CT LLE shows findings consistent with cellulitis, no focal fluid collection or soft tissue gas  LE venous duplex appears negative for DVT  Up to date with tetanus shot documented in 2022  Orthopedics consulted - no surgical intervention at this time, WBAT LLE  Initially started on IV vancomycin and ceftriaxone  Given no history of MRSA or intra-articular involvement, will de-escalate to IV cefazolin, continues to improve transition to oral Keflex  Blood cultures negative  PT/OT evaluation treatment performed, recommending using single-point cane  Follow-up outpatient PCP 1 to 2 weeks postdischarge  Orthopedics indicated their office will reach out to the patient to set up for outpatient follow-up in the office  Discharged to home today

## 2025-07-27 LAB
BACTERIA BLD CULT: NORMAL
BACTERIA BLD CULT: NORMAL

## 2025-07-28 LAB
BACTERIA BLD CULT: NORMAL
BACTERIA BLD CULT: NORMAL

## 2025-07-31 ENCOUNTER — TELEMEDICINE (OUTPATIENT)
Dept: FAMILY MEDICINE CLINIC | Facility: CLINIC | Age: 60
End: 2025-07-31
Payer: COMMERCIAL

## 2025-07-31 DIAGNOSIS — L03.115 CELLULITIS OF RIGHT KNEE: Primary | ICD-10-CM

## 2025-07-31 PROCEDURE — 99495 TRANSJ CARE MGMT MOD F2F 14D: CPT | Performed by: FAMILY MEDICINE

## 2025-08-04 ENCOUNTER — APPOINTMENT (OUTPATIENT)
Dept: LAB | Facility: CLINIC | Age: 60
End: 2025-08-04
Attending: FAMILY MEDICINE
Payer: COMMERCIAL

## 2025-08-04 DIAGNOSIS — N18.9 ACUTE KIDNEY INJURY SUPERIMPOSED ON CHRONIC KIDNEY DISEASE  (HCC): ICD-10-CM

## 2025-08-04 DIAGNOSIS — L03.90 CELLULITIS: ICD-10-CM

## 2025-08-04 DIAGNOSIS — N17.9 ACUTE KIDNEY INJURY SUPERIMPOSED ON CHRONIC KIDNEY DISEASE  (HCC): ICD-10-CM

## 2025-08-06 ENCOUNTER — TELEPHONE (OUTPATIENT)
Dept: OBGYN CLINIC | Facility: CLINIC | Age: 60
End: 2025-08-06

## (undated) DEVICE — BAG URINE DRAINAGE 4000ML CONTINUOUS IRR

## (undated) DEVICE — POUCH UR CATCHER STERILE

## (undated) DEVICE — LUBRICANT SURGILUBE TUBE 4 OZ  FLIP TOP

## (undated) DEVICE — STANDARD SURGICAL GOWN, L: Brand: CONVERTORS

## (undated) DEVICE — CYSTO TUBING TUR Y IRRIGATION

## (undated) DEVICE — Device

## (undated) DEVICE — GLOVE SRG BIOGEL 8

## (undated) DEVICE — RADIOLOGY STERILE LABELS: Brand: CENTURION

## (undated) DEVICE — EVACUATOR BLADDER ELLIK DISP STRL

## (undated) DEVICE — FABRIC REINFORCED, SURGICAL GOWN, XL: Brand: CONVERTORS

## (undated) DEVICE — STORZ LOOP ELECTRODE 24 FR

## (undated) DEVICE — 100% SILICONE FOLEY CATHETER, 30 CC, 3-WAY, 22 FR (7.3 MM): Brand: DOVER

## (undated) DEVICE — SPONGE STICK WITH PVP-I: Brand: KENDALL

## (undated) DEVICE — GUIDEWIRE STRGHT TIP 0.038 IN SOLO PLUS

## (undated) DEVICE — CYSTOSCOPY PACK: Brand: CONVERTORS

## (undated) DEVICE — CATH FOLEY 14FR 5ML 2 WAY UNCOATED SILICONE

## (undated) DEVICE — SYRINGE 20ML LL

## (undated) DEVICE — URETERAL CATH 5 FR

## (undated) DEVICE — GROUNDING PAD UNIVERSAL SLW